# Patient Record
Sex: MALE | Race: WHITE | NOT HISPANIC OR LATINO | Employment: FULL TIME | URBAN - METROPOLITAN AREA
[De-identification: names, ages, dates, MRNs, and addresses within clinical notes are randomized per-mention and may not be internally consistent; named-entity substitution may affect disease eponyms.]

---

## 2017-05-22 ENCOUNTER — HOSPITAL ENCOUNTER (OUTPATIENT)
Dept: PHYSICAL THERAPY | Facility: HOSPITAL | Age: 47
Setting detail: RECURRING SERIES
Discharge: HOME | End: 2017-06-21
Attending: PODIATRIST | Admitting: PODIATRIST

## 2018-05-30 ENCOUNTER — HOSPITAL ENCOUNTER (OUTPATIENT)
Dept: FAMILY MEDICINE CLINIC | Facility: CLINIC | Age: 48
Setting detail: SPECIMEN
Discharge: HOME | End: 2018-05-30
Attending: INTERNAL MEDICINE | Admitting: INTERNAL MEDICINE

## 2018-05-30 LAB
ALBUMIN SERPL-MCNC: 4 G/DL (ref 3.5–4.8)
ALBUMIN/GLOB SERPL: 1.3 {RATIO} (ref 1–1.7)
ALP SERPL-CCNC: 77 IU/L (ref 32–91)
ALT SERPL-CCNC: 40 IU/L (ref 17–63)
ANION GAP SERPL CALC-SCNC: 12.1 MMOL/L (ref 10–20)
AST SERPL-CCNC: 30 IU/L (ref 15–41)
BASOPHILS # BLD AUTO: 0.1 10*3/UL (ref 0–0.2)
BASOPHILS NFR BLD AUTO: 1 % (ref 0–2)
BILIRUB SERPL-MCNC: 0.8 MG/DL (ref 0.3–1.2)
BILIRUB UR QL STRIP: NEGATIVE MG/DL
BUN SERPL-MCNC: 9 MG/DL (ref 8–20)
BUN/CREAT SERPL: 10 (ref 6.2–20.3)
CALCIUM SERPL-MCNC: 9.1 MG/DL (ref 8.9–10.3)
CASTS URNS QL MICRO: ABNORMAL /[LPF]
CHLORIDE SERPL-SCNC: 104 MMOL/L (ref 101–111)
CHOLEST SERPL-MCNC: 172 MG/DL
CHOLEST/HDLC SERPL: 4.3 {RATIO}
COLOR UR: YELLOW
CONV BACTERIA IN URINE MICRO: NEGATIVE
CONV CLARITY OF URINE: CLEAR
CONV CO2: 25 MMOL/L (ref 22–32)
CONV HYALINE CASTS IN URINE MICRO: 0 /[LPF] (ref 0–5)
CONV LDL CHOLESTEROL DIRECT: 119 MG/DL (ref 0–100)
CONV PROTEIN IN URINE BY AUTOMATED TEST STRIP: NEGATIVE MG/DL
CONV SMALL ROUND CELLS: ABNORMAL /[HPF]
CONV TOTAL PROTEIN: 7.1 G/DL (ref 6.1–7.9)
CONV UROBILINOGEN IN URINE BY AUTOMATED TEST STRIP: 0.2 MG/DL
CREAT UR-MCNC: 0.9 MG/DL (ref 0.7–1.2)
CULTURE INDICATED?: ABNORMAL
DIFFERENTIAL METHOD BLD: (no result)
EOSINOPHIL # BLD AUTO: 0.1 10*3/UL (ref 0–0.3)
EOSINOPHIL # BLD AUTO: 2 % (ref 0–3)
ERYTHROCYTE [DISTWIDTH] IN BLOOD BY AUTOMATED COUNT: 13 % (ref 11.5–14.5)
GLOBULIN UR ELPH-MCNC: 3.1 G/DL (ref 2.5–3.8)
GLUCOSE SERPL-MCNC: 237 MG/DL (ref 65–99)
GLUCOSE UR QL: >1000 MG/DL
HCT VFR BLD AUTO: 45.8 % (ref 40–54)
HDLC SERPL-MCNC: 40 MG/DL
HGB BLD-MCNC: 15.3 G/DL (ref 14–18)
HGB UR QL STRIP: NEGATIVE
KETONES UR QL STRIP: NEGATIVE MG/DL
LDLC/HDLC SERPL: 3 {RATIO}
LEUKOCYTE ESTERASE UR QL STRIP: NEGATIVE
LIPID INTERPRETATION: ABNORMAL
LYMPHOCYTES # BLD AUTO: 1.7 10*3/UL (ref 0.8–4.8)
LYMPHOCYTES NFR BLD AUTO: 31 % (ref 18–42)
MCH RBC QN AUTO: 31 PG (ref 26–32)
MCHC RBC AUTO-ENTMCNC: 33.4 G/DL (ref 32–36)
MCV RBC AUTO: 92.9 FL (ref 80–94)
MONOCYTES # BLD AUTO: 0.4 10*3/UL (ref 0.1–1.3)
MONOCYTES NFR BLD AUTO: 8 % (ref 2–11)
NEUTROPHILS # BLD AUTO: 3.1 10*3/UL (ref 2.3–8.6)
NEUTROPHILS NFR BLD AUTO: 58 % (ref 50–75)
NITRITE UR QL STRIP: NEGATIVE
NRBC BLD AUTO-RTO: 0 /100{WBCS}
NRBC/RBC NFR BLD MANUAL: 0 10*3/UL
PH UR STRIP.AUTO: 5 [PH] (ref 4.5–8)
PLATELET # BLD AUTO: 220 10*3/UL (ref 150–450)
PMV BLD AUTO: 9.3 FL (ref 7.4–10.4)
POTASSIUM SERPL-SCNC: 4.1 MMOL/L (ref 3.6–5.1)
RBC # BLD AUTO: 4.93 10*6/UL (ref 4.6–6)
RBC #/AREA URNS HPF: 1 /[HPF] (ref 0–3)
SODIUM SERPL-SCNC: 137 MMOL/L (ref 136–144)
SP GR UR: 1.02 (ref 1–1.03)
SPERM URNS QL MICRO: ABNORMAL /[HPF]
SQUAMOUS SPT QL MICRO: 0 /[HPF] (ref 0–5)
TRIGL SERPL-MCNC: 130 MG/DL
UNIDENT CRYS URNS QL MICRO: ABNORMAL /[HPF]
VLDLC SERPL CALC-MCNC: 13.5 MG/DL
WBC # BLD AUTO: 5.3 10*3/UL (ref 4.5–11.5)
WBC #/AREA URNS HPF: 1 /[HPF] (ref 0–5)
YEAST SPEC QL WET PREP: ABNORMAL /[HPF]

## 2019-05-01 ENCOUNTER — CONVERSION ENCOUNTER (OUTPATIENT)
Dept: URGENT CARE | Facility: CLINIC | Age: 49
End: 2019-05-01

## 2019-06-03 VITALS
OXYGEN SATURATION: 98 % | RESPIRATION RATE: 20 BRPM | BODY MASS INDEX: 28.79 KG/M2 | SYSTOLIC BLOOD PRESSURE: 166 MMHG | HEART RATE: 64 BPM | WEIGHT: 218.2 LBS | DIASTOLIC BLOOD PRESSURE: 86 MMHG

## 2019-07-17 RX ORDER — MELOXICAM 15 MG/1
TABLET ORAL
Qty: 30 TABLET | Refills: 3 | Status: SHIPPED | OUTPATIENT
Start: 2019-07-17 | End: 2019-11-22 | Stop reason: SDUPTHER

## 2019-07-22 RX ORDER — SIMVASTATIN 40 MG
TABLET ORAL
Qty: 90 TABLET | Refills: 2 | Status: SHIPPED | OUTPATIENT
Start: 2019-07-22 | End: 2020-04-24

## 2019-08-05 PROBLEM — S86.319A PERONEAL TENDON RUPTURE: Status: ACTIVE | Noted: 2017-04-20

## 2019-08-05 PROBLEM — E78.5 HYPERLIPIDEMIA: Status: ACTIVE | Noted: 2019-08-05

## 2019-08-05 RX ORDER — MULTIVITAMIN
1 CAPSULE ORAL DAILY
COMMUNITY
Start: 2016-02-10

## 2019-08-05 RX ORDER — ALPHA LIPOIC ACID 200 MG
CAPSULE ORAL
COMMUNITY
Start: 2016-02-10 | End: 2019-08-06

## 2019-08-05 RX ORDER — GLIMEPIRIDE 4 MG/1
TABLET ORAL EVERY 12 HOURS
COMMUNITY
Start: 2017-12-06 | End: 2019-08-06 | Stop reason: SDUPTHER

## 2019-08-05 RX ORDER — BLOOD-GLUCOSE METER
EACH MISCELLANEOUS
COMMUNITY
Start: 2015-05-13 | End: 2020-10-14

## 2019-08-06 ENCOUNTER — OFFICE VISIT (OUTPATIENT)
Dept: FAMILY MEDICINE CLINIC | Facility: CLINIC | Age: 49
End: 2019-08-06

## 2019-08-06 ENCOUNTER — LAB (OUTPATIENT)
Dept: FAMILY MEDICINE CLINIC | Facility: CLINIC | Age: 49
End: 2019-08-06

## 2019-08-06 VITALS
HEIGHT: 73 IN | SYSTOLIC BLOOD PRESSURE: 158 MMHG | HEART RATE: 125 BPM | WEIGHT: 219.6 LBS | BODY MASS INDEX: 29.1 KG/M2 | DIASTOLIC BLOOD PRESSURE: 84 MMHG | OXYGEN SATURATION: 97 % | RESPIRATION RATE: 18 BRPM | TEMPERATURE: 97.7 F

## 2019-08-06 DIAGNOSIS — I10 ESSENTIAL HYPERTENSION: Primary | ICD-10-CM

## 2019-08-06 DIAGNOSIS — E11.65 TYPE 2 DIABETES MELLITUS WITH HYPERGLYCEMIA, WITHOUT LONG-TERM CURRENT USE OF INSULIN (HCC): ICD-10-CM

## 2019-08-06 DIAGNOSIS — R00.0 TACHYCARDIA: ICD-10-CM

## 2019-08-06 DIAGNOSIS — Z12.11 SCREENING FOR COLON CANCER: ICD-10-CM

## 2019-08-06 DIAGNOSIS — Z00.00 ENCOUNTER FOR GENERAL ADULT MEDICAL EXAMINATION WITHOUT ABNORMAL FINDINGS: ICD-10-CM

## 2019-08-06 DIAGNOSIS — E78.41 ELEVATED LIPOPROTEIN(A): ICD-10-CM

## 2019-08-06 DIAGNOSIS — E55.9 VITAMIN D DEFICIENCY DISEASE: ICD-10-CM

## 2019-08-06 LAB
ALBUMIN SERPL-MCNC: 4.1 G/DL (ref 3.5–4.8)
ALBUMIN UR-MCNC: 163 MG/L
ALBUMIN/GLOB SERPL: 1.2 G/DL (ref 1–1.7)
ALP SERPL-CCNC: 88 U/L (ref 32–91)
ALT SERPL W P-5'-P-CCNC: 46 U/L (ref 17–63)
ANION GAP SERPL CALCULATED.3IONS-SCNC: 14.1 MMOL/L (ref 5–15)
AST SERPL-CCNC: 24 U/L (ref 15–41)
BASOPHILS # BLD AUTO: 0.1 10*3/MM3 (ref 0–0.2)
BASOPHILS NFR BLD AUTO: 1.4 % (ref 0–1.5)
BILIRUB SERPL-MCNC: 0.9 MG/DL (ref 0.3–1.2)
BUN BLD-MCNC: 9 MG/DL (ref 8–20)
BUN/CREAT SERPL: 10 (ref 6.2–20.3)
CALCIUM SPEC-SCNC: 9.1 MG/DL (ref 8.9–10.3)
CHLORIDE SERPL-SCNC: 99 MMOL/L (ref 101–111)
CO2 SERPL-SCNC: 24 MMOL/L (ref 22–32)
CREAT BLD-MCNC: 0.9 MG/DL (ref 0.7–1.2)
DEPRECATED RDW RBC AUTO: 43.8 FL (ref 37–54)
EOSINOPHIL # BLD AUTO: 0.1 10*3/MM3 (ref 0–0.4)
EOSINOPHIL NFR BLD AUTO: 1.5 % (ref 0.3–6.2)
ERYTHROCYTE [DISTWIDTH] IN BLOOD BY AUTOMATED COUNT: 13.2 % (ref 12.3–15.4)
GFR SERPL CREATININE-BSD FRML MDRD: 89 ML/MIN/1.73
GLOBULIN UR ELPH-MCNC: 3.3 GM/DL (ref 2.5–3.8)
GLUCOSE BLD-MCNC: 270 MG/DL (ref 65–99)
HBA1C MFR BLD: 9.3 % (ref 3.5–5.6)
HCT VFR BLD AUTO: 45.9 % (ref 37.5–51)
HGB BLD-MCNC: 15.7 G/DL (ref 13–17.7)
LYMPHOCYTES # BLD AUTO: 1.4 10*3/MM3 (ref 0.7–3.1)
LYMPHOCYTES NFR BLD AUTO: 22.3 % (ref 19.6–45.3)
MCH RBC QN AUTO: 32.1 PG (ref 26.6–33)
MCHC RBC AUTO-ENTMCNC: 34.2 G/DL (ref 31.5–35.7)
MCV RBC AUTO: 94 FL (ref 79–97)
MONOCYTES # BLD AUTO: 0.5 10*3/MM3 (ref 0.1–0.9)
MONOCYTES NFR BLD AUTO: 7.1 % (ref 5–12)
NEUTROPHILS # BLD AUTO: 4.4 10*3/MM3 (ref 1.7–7)
NEUTROPHILS NFR BLD AUTO: 67.7 % (ref 42.7–76)
NRBC BLD AUTO-RTO: 0.1 /100 WBC (ref 0–0.2)
PLATELET # BLD AUTO: 237 10*3/MM3 (ref 140–450)
PMV BLD AUTO: 9.4 FL (ref 6–12)
POTASSIUM BLD-SCNC: 4.1 MMOL/L (ref 3.6–5.1)
PROT SERPL-MCNC: 7.4 G/DL (ref 6.1–7.9)
PSA SERPL-MCNC: 0.57 NG/ML (ref 0–4)
RBC # BLD AUTO: 4.89 10*6/MM3 (ref 4.14–5.8)
SODIUM BLD-SCNC: 133 MMOL/L (ref 136–144)
WBC NRBC COR # BLD: 6.5 10*3/MM3 (ref 3.4–10.8)

## 2019-08-06 PROCEDURE — 82306 VITAMIN D 25 HYDROXY: CPT | Performed by: INTERNAL MEDICINE

## 2019-08-06 PROCEDURE — 80053 COMPREHEN METABOLIC PANEL: CPT | Performed by: INTERNAL MEDICINE

## 2019-08-06 PROCEDURE — G0103 PSA SCREENING: HCPCS | Performed by: INTERNAL MEDICINE

## 2019-08-06 PROCEDURE — 82043 UR ALBUMIN QUANTITATIVE: CPT | Performed by: INTERNAL MEDICINE

## 2019-08-06 PROCEDURE — 99396 PREV VISIT EST AGE 40-64: CPT | Performed by: INTERNAL MEDICINE

## 2019-08-06 PROCEDURE — 93000 ELECTROCARDIOGRAM COMPLETE: CPT | Performed by: INTERNAL MEDICINE

## 2019-08-06 PROCEDURE — 90670 PCV13 VACCINE IM: CPT | Performed by: INTERNAL MEDICINE

## 2019-08-06 PROCEDURE — 90471 IMMUNIZATION ADMIN: CPT | Performed by: INTERNAL MEDICINE

## 2019-08-06 PROCEDURE — 36415 COLL VENOUS BLD VENIPUNCTURE: CPT

## 2019-08-06 PROCEDURE — 83036 HEMOGLOBIN GLYCOSYLATED A1C: CPT | Performed by: INTERNAL MEDICINE

## 2019-08-06 PROCEDURE — 85025 COMPLETE CBC W/AUTO DIFF WBC: CPT | Performed by: INTERNAL MEDICINE

## 2019-08-06 RX ORDER — BUPROPION HYDROCHLORIDE 150 MG/1
150 TABLET ORAL DAILY
Qty: 30 TABLET | Refills: 3 | Status: SHIPPED | OUTPATIENT
Start: 2019-08-06 | End: 2019-10-29 | Stop reason: SDUPTHER

## 2019-08-06 RX ORDER — GLIMEPIRIDE 4 MG/1
4 TABLET ORAL EVERY 12 HOURS
Qty: 90 TABLET | Refills: 3 | Status: SHIPPED | OUTPATIENT
Start: 2019-08-06 | End: 2020-04-03

## 2019-08-06 NOTE — ASSESSMENT & PLAN NOTE
Hypertension is unchanged.  Regular aerobic exercise.  Blood pressure will be reassessed in 3 months.

## 2019-08-06 NOTE — PROGRESS NOTES
"Rooming Tab(CC,VS,Pt Hx,Fall Screen)  Chief Complaint   Patient presents with   • Annual Exam       Subjective   Pt here for annual exam- admits not being good with DM- hasn't been here for some time to get labs and doesn't routinely check sugars at home.  Very complaint with meds however.   Neck is better- tries to do stretches- restarted mobic as mild pain and helping.   No headaches, sleeping ok- very stressed with work- takes 1-2 hours to fall asleep, increased sinuses from allergies.   no open sores, no chest pain no difficulty breathing    no ED issues  I have reviewed and updated his medications, medical history and problem list during today's office visit.     Patient Care Team:  Karen Pickard MD as PCP - General    Problem List Tab  Medications Tab  Synopsis Tab  Chart Review Tab  Care Everywhere Tab  Immunizations Tab  Patient History Tab    Social History     Tobacco Use   • Smoking status: Never Smoker   • Smokeless tobacco: Never Used   Substance Use Topics   • Alcohol use: Not on file       Review of Systems   Constitutional: Positive for activity change. Negative for fatigue and fever.   HENT: Negative for congestion.    Respiratory: Negative for apnea, cough and wheezing.    Cardiovascular: Negative for chest pain.   Gastrointestinal: Negative for abdominal distention.   Musculoskeletal: Positive for neck pain.   Neurological: Negative for syncope.   Psychiatric/Behavioral: Positive for sleep disturbance. Negative for behavioral problems.       Objective     Rooming Tab(CC,VS,Pt Hx,Fall Screen)  /84 (BP Location: Left arm, Patient Position: Sitting)   Pulse (!) 125   Temp 97.7 °F (36.5 °C) (Oral)   Resp 18   Ht 185.4 cm (73\")   Wt 99.6 kg (219 lb 9.6 oz)   SpO2 97%   BMI 28.97 kg/m²     Body mass index is 28.97 kg/m².    Physical Exam   Constitutional: He is oriented to person, place, and time. He appears well-developed and well-nourished.   HENT:   Head: Normocephalic and " atraumatic.   Right Ear: Tympanic membrane normal.   Left Ear: Tympanic membrane normal.   Eyes: Pupils are equal, round, and reactive to light.   Neck: Normal range of motion. Neck supple.   Cardiovascular: Normal rate and regular rhythm.   No murmur heard.  Pulmonary/Chest: Effort normal and breath sounds normal.   Abdominal: Soft. Bowel sounds are normal. He exhibits no distension.   Musculoskeletal:    DROM and diffuse tender neck   Neurological: He is oriented to person, place, and time.   Skin: Skin is warm. Capillary refill takes less than 2 seconds.   Nursing note and vitals reviewed.     Diabetic foot exam:   Left: Filament test present   Pulses Dorsalis Pedis:  present   Reflexes 2+    Vibratory sensation normal   Proprioception normal   Sharp/dull discrimination normal       Right: Filament test present   Pulses Dorsalis Pedis:  present   Reflexes 2+    Vibratory sensation normal   Proprioception normal   Sharp/dull discrimination normal    Statin Choice Calculator  Data Reviewed:       .procd    ECG 12 Lead  Date/Time: 8/6/2019 10:30 AM  Performed by: Karen Pickard MD  Authorized by: Karen Pickard MD   Previous ECG: no previous ECG available  Rhythm: sinus tachycardia  Rate: tachycardic  BPM: 96  ST Segments: ST segments normal  T Waves: T waves normal  QRS axis: normal  Other: no other findings    Clinical impression: normal ECG  Comments: HR was 125 on initial vitals                  Assessment/Plan   Order Review Tab  Health Maintenance Tab  Patient Plan/Order Tab  Diagnoses and all orders for this visit:    1. Essential hypertension (Primary)  Assessment & Plan:  Hypertension is unchanged.  Regular aerobic exercise.  Blood pressure will be reassessed in 3 months.      2. Type 2 diabetes mellitus with hyperglycemia, without long-term current use of insulin (CMS/[facility])  Assessment & Plan:  Diabetes is worsening.   Reminded to bring in blood sugar diary at next visit.  Diabetes will be  reassessed in 3 months.    Orders:  -     Hemoglobin A1c; Future  -     Comprehensive Metabolic Panel; Future  -     CBC & Differential; Future  -     MicroAlbumin, Urine, Random - Urine, Clean Catch    3. Elevated lipoprotein(a)  Assessment & Plan:  Lipid abnormalities are improving with treatment.  Pharmacotherapy as ordered.  Lipids will be reassessed in 6 months.      4. Encounter for general adult medical examination without abnormal findings  Assessment & Plan:  Discussed all recommendations for his age    Orders:  -     Pneumococcal Conjugate Vaccine 13-Valent All    5. Screening for colon cancer  -     Ambulatory Referral to Gastroenterology  -     Colonoscopy  -     PSA Screen; Future    6. Vitamin D deficiency disease  -     Vitamin D 25 Hydroxy    7. Tachycardia  -     ECG 12 Lead    Other orders  -     glimepiride (AMARYL) 4 MG tablet; Take 1 tablet by mouth Every 12 (Twelve) Hours.  Dispense: 90 tablet; Refill: 3  -     buPROPion XL (WELLBUTRIN XL) 150 MG 24 hr tablet; Take 1 tablet by mouth Daily.  Dispense: 30 tablet; Refill: 3      Wrapup Tab  Return in about 3 months (around 11/6/2019) for Recheck DM.       To get eye MD appt soon   needs c scope, to get pneumococcal today as well   f/u in 3 months to get better control of DM   pt understands   stay on allergy meds OTC as needed    During this visit for their annual exam, we reviewed their personal history, social history and family history.  We went over their medications and all the recommended health maintenence items for their age group. They were given the opportunity to ask questions and discuss other concerns.

## 2019-08-06 NOTE — ASSESSMENT & PLAN NOTE
Diabetes is worsening.   Reminded to bring in blood sugar diary at next visit.  Diabetes will be reassessed in 3 months.

## 2019-08-07 LAB — 25(OH)D3 SERPL-MCNC: 49.3 NG/ML (ref 30–100)

## 2019-08-07 NOTE — PROGRESS NOTES
Please call the patient regarding his abnormal result. Please tell pt that his sugar was 233 and that the a1c was 9.3--  We need to start a new medicine  That is a weekly shot that is NOT insulin.  I have sent in Rx, and I would get online and print out a coupon to help the cost.  Please see me in 1 month.

## 2019-08-12 ENCOUNTER — TELEPHONE (OUTPATIENT)
Dept: FAMILY MEDICINE CLINIC | Facility: CLINIC | Age: 49
End: 2019-08-12

## 2019-08-12 NOTE — TELEPHONE ENCOUNTER
VM MESSAGE.  PT STATES YOU STARTED HIM ON NEW RX FOR TRULICITY. HE NEEDS TO KNOW IF HE IS TO QUIT THE JANUMET. THANK YOU.

## 2019-08-15 ENCOUNTER — TELEPHONE (OUTPATIENT)
Dept: FAMILY MEDICINE CLINIC | Facility: CLINIC | Age: 49
End: 2019-08-15

## 2019-08-15 NOTE — TELEPHONE ENCOUNTER
PATIENT CONTACTED PHARMACY AND THEY STATE THEY FAXED OVER REJECTION ON BOTH 8/13/19 AND 8/14/19. THANKS FOR YOUR HELP.

## 2019-08-15 NOTE — TELEPHONE ENCOUNTER
"VM MESSAGE.  PATIENT STATES HIS PHARMACY NEEDS AN \"OVERRIDE\" FOR THE JANUMET. INSURANCE IS NOT WANTING TO COVER BOTH JANUMET AND TRULICITY. THANK YOU.  "

## 2019-09-13 ENCOUNTER — ON CAMPUS - OUTPATIENT (AMBULATORY)
Dept: URBAN - METROPOLITAN AREA HOSPITAL 2 | Facility: HOSPITAL | Age: 49
End: 2019-09-13
Payer: COMMERCIAL

## 2019-09-13 ENCOUNTER — OFFICE (AMBULATORY)
Dept: URBAN - METROPOLITAN AREA PATHOLOGY 4 | Facility: PATHOLOGY | Age: 49
End: 2019-09-13
Payer: COMMERCIAL

## 2019-09-13 VITALS
DIASTOLIC BLOOD PRESSURE: 103 MMHG | WEIGHT: 212 LBS | DIASTOLIC BLOOD PRESSURE: 108 MMHG | HEART RATE: 116 BPM | SYSTOLIC BLOOD PRESSURE: 146 MMHG | RESPIRATION RATE: 17 BRPM | SYSTOLIC BLOOD PRESSURE: 132 MMHG | DIASTOLIC BLOOD PRESSURE: 97 MMHG | SYSTOLIC BLOOD PRESSURE: 151 MMHG | DIASTOLIC BLOOD PRESSURE: 60 MMHG | RESPIRATION RATE: 16 BRPM | SYSTOLIC BLOOD PRESSURE: 144 MMHG | HEART RATE: 112 BPM | HEART RATE: 117 BPM | SYSTOLIC BLOOD PRESSURE: 109 MMHG | HEIGHT: 73 IN | HEART RATE: 104 BPM | TEMPERATURE: 96.9 F | OXYGEN SATURATION: 96 % | SYSTOLIC BLOOD PRESSURE: 145 MMHG | DIASTOLIC BLOOD PRESSURE: 93 MMHG | HEART RATE: 110 BPM | HEART RATE: 126 BPM | DIASTOLIC BLOOD PRESSURE: 89 MMHG | RESPIRATION RATE: 18 BRPM | OXYGEN SATURATION: 98 %

## 2019-09-13 DIAGNOSIS — D12.3 BENIGN NEOPLASM OF TRANSVERSE COLON: ICD-10-CM

## 2019-09-13 DIAGNOSIS — Z12.11 ENCOUNTER FOR SCREENING FOR MALIGNANT NEOPLASM OF COLON: ICD-10-CM

## 2019-09-13 DIAGNOSIS — C18.4 MALIGNANT NEOPLASM OF TRANSVERSE COLON: ICD-10-CM

## 2019-09-13 PROBLEM — R19.4 CHANGE IN BOWEL HABITS: Status: ACTIVE | Noted: 2019-09-13

## 2019-09-13 LAB
GI HISTOLOGY: A. UNSPECIFIED: (no result)
GI HISTOLOGY: PDF REPORT: (no result)

## 2019-09-13 PROCEDURE — 45385 COLONOSCOPY W/LESION REMOVAL: CPT | Performed by: INTERNAL MEDICINE

## 2019-09-13 PROCEDURE — 88305 TISSUE EXAM BY PATHOLOGIST: CPT | Mod: 33 | Performed by: INTERNAL MEDICINE

## 2019-09-17 ENCOUNTER — OFFICE VISIT (OUTPATIENT)
Dept: FAMILY MEDICINE CLINIC | Facility: CLINIC | Age: 49
End: 2019-09-17

## 2019-09-17 VITALS
WEIGHT: 215 LBS | SYSTOLIC BLOOD PRESSURE: 142 MMHG | OXYGEN SATURATION: 98 % | RESPIRATION RATE: 16 BRPM | TEMPERATURE: 97.8 F | HEART RATE: 91 BPM | DIASTOLIC BLOOD PRESSURE: 84 MMHG | BODY MASS INDEX: 28.49 KG/M2 | HEIGHT: 73 IN

## 2019-09-17 DIAGNOSIS — E11.65 TYPE 2 DIABETES MELLITUS WITH HYPERGLYCEMIA, WITHOUT LONG-TERM CURRENT USE OF INSULIN (HCC): Primary | ICD-10-CM

## 2019-09-17 DIAGNOSIS — I10 ESSENTIAL HYPERTENSION: ICD-10-CM

## 2019-09-17 DIAGNOSIS — E78.41 ELEVATED LIPOPROTEIN(A): ICD-10-CM

## 2019-09-17 LAB
ALBUMIN SERPL-MCNC: 4.2 G/DL (ref 3.5–4.8)
ALBUMIN/GLOB SERPL: 1.2 G/DL (ref 1–1.7)
ALP SERPL-CCNC: 87 U/L (ref 32–91)
ALT SERPL W P-5'-P-CCNC: 42 U/L (ref 17–63)
ANION GAP SERPL CALCULATED.3IONS-SCNC: 13.4 MMOL/L (ref 5–15)
AST SERPL-CCNC: 27 U/L (ref 15–41)
BILIRUB SERPL-MCNC: 0.8 MG/DL (ref 0.3–1.2)
BUN BLD-MCNC: 10 MG/DL (ref 8–20)
BUN/CREAT SERPL: 9.1 (ref 6.2–20.3)
CALCIUM SPEC-SCNC: 9 MG/DL (ref 8.9–10.3)
CHLORIDE SERPL-SCNC: 101 MMOL/L (ref 101–111)
CO2 SERPL-SCNC: 27 MMOL/L (ref 22–32)
CREAT BLD-MCNC: 1.1 MG/DL (ref 0.7–1.2)
GFR SERPL CREATININE-BSD FRML MDRD: 71 ML/MIN/1.73
GLOBULIN UR ELPH-MCNC: 3.5 GM/DL (ref 2.5–3.8)
GLUCOSE BLD-MCNC: 154 MG/DL (ref 65–99)
HBA1C MFR BLD: 8 % (ref 3.5–5.6)
POTASSIUM BLD-SCNC: 4.4 MMOL/L (ref 3.6–5.1)
PROT SERPL-MCNC: 7.7 G/DL (ref 6.1–7.9)
SODIUM BLD-SCNC: 137 MMOL/L (ref 136–144)

## 2019-09-17 PROCEDURE — 99213 OFFICE O/P EST LOW 20 MIN: CPT | Performed by: INTERNAL MEDICINE

## 2019-09-17 PROCEDURE — 83036 HEMOGLOBIN GLYCOSYLATED A1C: CPT | Performed by: INTERNAL MEDICINE

## 2019-09-17 PROCEDURE — 80053 COMPREHEN METABOLIC PANEL: CPT | Performed by: INTERNAL MEDICINE

## 2019-09-17 NOTE — PROGRESS NOTES
"Rooming Tab(CC,VS,Pt Hx,Fall Screen)  Chief Complaint   Patient presents with   • Diabetes       Subjective   Pt was seen 6 weeks ago with DM follow up- sugars were elevated and a1c was quite high-  Was on trucility- tried to add in the janumet and insurance denied it - treid to do override- but pt never got it- was using wifes 500mg metformin BID with the truciity only-  Highest sugar in last 2 weeks was 165-     wellbutrin working well- doesn't fel as stressed   no open sores on legs/feet.  sleeping well   I have reviewed and updated his medications, medical history and problem list during today's office visit.     Patient Care Team:  Karen Pickard MD as PCP - General    Problem List Tab  Medications Tab  Synopsis Tab  Chart Review Tab  Care Everywhere Tab  Immunizations Tab  Patient History Tab    Social History     Tobacco Use   • Smoking status: Never Smoker   • Smokeless tobacco: Never Used   Substance Use Topics   • Alcohol use: Not on file       Review of Systems   Constitutional: Negative for fatigue and fever.   HENT: Negative for congestion.    Respiratory: Negative for apnea, cough and wheezing.    Cardiovascular: Negative for chest pain.   Gastrointestinal: Negative for abdominal distention.   Neurological: Negative for syncope.   Psychiatric/Behavioral: Negative for behavioral problems.       Objective     Rooming Tab(CC,VS,Pt Hx,Fall Screen)  /84 (BP Location: Right arm, Patient Position: Sitting, Cuff Size: Adult)   Pulse 91   Temp 97.8 °F (36.6 °C) (Oral)   Resp 16   Ht 185.4 cm (73\")   Wt 97.5 kg (215 lb)   SpO2 98%   BMI 28.37 kg/m²     Body mass index is 28.37 kg/m².    Physical Exam   Constitutional: He appears well-developed and well-nourished.   HENT:   Head: Normocephalic.   Cardiovascular: Regular rhythm.   Pulmonary/Chest: Effort normal and breath sounds normal. No respiratory distress.   Abdominal: Soft. Bowel sounds are normal. He exhibits no distension.   Skin: Skin " is warm. Capillary refill takes less than 2 seconds.   Nursing note and vitals reviewed.       Statin Choice Calculator  Data Reviewed:               Lab Results   Component Value Date    BUN 9 08/06/2019    CREATININE 0.90 08/06/2019    EGFRIFNONA 89 08/06/2019     (L) 08/06/2019    K 4.1 08/06/2019    CL 99 (L) 08/06/2019    CALCIUM 9.1 08/06/2019    ALBUMIN 4.10 08/06/2019    BILITOT 0.9 08/06/2019    ALKPHOS 88 08/06/2019    AST 24 08/06/2019    ALT 46 08/06/2019    MICROALBUR 163.0 (H) 08/06/2019    WBC 6.50 08/06/2019    RBC 4.89 08/06/2019    HCT 45.9 08/06/2019    MCV 94.0 08/06/2019    MCH 32.1 08/06/2019    PSA 0.570 08/06/2019    IGJM81ZO 49.3 08/06/2019      Assessment/Plan   Order Review Tab  Health Maintenance Tab  Patient Plan/Order Tab  Diagnoses and all orders for this visit:    1. Type 2 diabetes mellitus with hyperglycemia, without long-term current use of insulin (CMS/[facility]) (Primary)  -     Hemoglobin A1c  -     Comprehensive Metabolic Panel    2. Essential hypertension    3. Elevated lipoprotein(a)    Other orders  -     metFORMIN (GLUCOPHAGE) 1000 MG tablet; Take 1 tablet by mouth 2 (Two) Times a Day With Meals for 180 days.  Dispense: 180 tablet; Refill: 1        Wrapup Tab  Return in about 3 months (around 12/17/2019).      They were informed of the diagnosis and treatment plan and directed to f/u for any further problems or concerns.   will see if needs the januvia now or not- hopefully can just do metformin with truilicty

## 2019-10-29 RX ORDER — BUPROPION HYDROCHLORIDE 150 MG/1
TABLET ORAL
Qty: 30 TABLET | Refills: 3 | Status: SHIPPED | OUTPATIENT
Start: 2019-10-29 | End: 2020-03-09

## 2019-11-22 RX ORDER — MELOXICAM 15 MG/1
TABLET ORAL
Qty: 30 TABLET | Refills: 3 | Status: SHIPPED | OUTPATIENT
Start: 2019-11-22 | End: 2020-04-06

## 2019-12-17 ENCOUNTER — OFFICE VISIT (OUTPATIENT)
Dept: FAMILY MEDICINE CLINIC | Facility: CLINIC | Age: 49
End: 2019-12-17

## 2019-12-17 ENCOUNTER — LAB (OUTPATIENT)
Dept: FAMILY MEDICINE CLINIC | Facility: CLINIC | Age: 49
End: 2019-12-17

## 2019-12-17 VITALS
DIASTOLIC BLOOD PRESSURE: 78 MMHG | HEART RATE: 92 BPM | WEIGHT: 210.6 LBS | RESPIRATION RATE: 16 BRPM | OXYGEN SATURATION: 97 % | BODY MASS INDEX: 27.79 KG/M2 | SYSTOLIC BLOOD PRESSURE: 130 MMHG | TEMPERATURE: 98.6 F

## 2019-12-17 DIAGNOSIS — E11.9 TYPE 2 DIABETES MELLITUS WITHOUT COMPLICATION, WITHOUT LONG-TERM CURRENT USE OF INSULIN (HCC): ICD-10-CM

## 2019-12-17 DIAGNOSIS — E11.9 TYPE 2 DIABETES MELLITUS WITHOUT COMPLICATION, WITHOUT LONG-TERM CURRENT USE OF INSULIN (HCC): Primary | ICD-10-CM

## 2019-12-17 LAB — HBA1C MFR BLD: 6.3 % (ref 3.5–5.6)

## 2019-12-17 PROCEDURE — 99213 OFFICE O/P EST LOW 20 MIN: CPT | Performed by: INTERNAL MEDICINE

## 2019-12-17 PROCEDURE — 80053 COMPREHEN METABOLIC PANEL: CPT | Performed by: INTERNAL MEDICINE

## 2019-12-17 PROCEDURE — 36415 COLL VENOUS BLD VENIPUNCTURE: CPT

## 2019-12-17 PROCEDURE — 83036 HEMOGLOBIN GLYCOSYLATED A1C: CPT | Performed by: INTERNAL MEDICINE

## 2019-12-17 NOTE — PROGRESS NOTES
Rooming Tab(CC,VS,Pt Hx,Fall Screen)  Chief Complaint   Patient presents with   • Diabetes       Subjective   Pt here for DM check- states started checking again 2 weeks ago- and sugars are doing better with this regimen.   is the range.  Getting used to the weekly injection- taking the prilosec to help with the nausea- down 9 lbs in 4 months.   was out of wellbutrin for 1 week- but back on it.  Hard to find time exercising- but incorporates in daily work- phone shows 3.5K steps average.   I have reviewed and updated his medications, medical history and problem list during today's office visit.     Patient Care Team:  Karen Pickard MD as PCP - General    Problem List Tab  Medications Tab  Synopsis Tab  Chart Review Tab  Care Everywhere Tab  Immunizations Tab  Patient History Tab    Social History     Tobacco Use   • Smoking status: Never Smoker   • Smokeless tobacco: Never Used   Substance Use Topics   • Alcohol use: Yes     Alcohol/week: 1.0 standard drinks     Types: 1 Glasses of wine, 1 Standard drinks or equivalent per week       Review of Systems   Constitutional: Positive for fatigue.   HENT: Negative for congestion and swollen glands.    Eyes: Negative for blurred vision and double vision.   Gastrointestinal: Positive for GERD. Negative for abdominal pain.   Endocrine: Negative for polyphagia.   Musculoskeletal: Negative for joint swelling.   Skin: Negative for rash and skin lesions.   Neurological: Negative for syncope and numbness.   Psychiatric/Behavioral: Positive for sleep disturbance. Negative for depressed mood.       Objective     Rooming Tab(CC,VS,Pt Hx,Fall Screen)  /78 (BP Location: Left arm, Patient Position: Sitting, Cuff Size: Adult)   Pulse 92   Temp 98.6 °F (37 °C) (Oral)   Resp 16   Wt 95.5 kg (210 lb 9.6 oz)   SpO2 97%   BMI 27.79 kg/m²     Body mass index is 27.79 kg/m².    Physical Exam   Constitutional: He is oriented to person, place, and time. He appears  well-developed and well-nourished.   HENT:   Head: Normocephalic and atraumatic.   Right Ear: Tympanic membrane normal.   Left Ear: Tympanic membrane normal.   Eyes: Pupils are equal, round, and reactive to light.   Neck: Normal range of motion. Neck supple.   Cardiovascular: Normal rate and regular rhythm.   No murmur heard.  Pulmonary/Chest: Effort normal and breath sounds normal.   Abdominal: Soft. Bowel sounds are normal. He exhibits no distension.   Neurological: He is oriented to person, place, and time.   Skin: Capillary refill takes less than 2 seconds.   Nursing note and vitals reviewed.       Statin Choice Calculator  Data Reviewed:      Diabetic foot exam:   Left:    Pulses Dorsalis Pedis:  present   Reflexes 2+    Vibratory sensation normal   Proprioception normal   Sharp/dull discrimination normal       Right:   Pulses Dorsalis Pedis:  present   Reflexes 2+    Vibratory sensation normal   Proprioception normal   Sharp/dull discrimination normal            Lab Results   Component Value Date    BUN 14 12/17/2019    CREATININE 1.15 12/17/2019    EGFRIFNONA 67 12/17/2019     12/17/2019    K 4.1 12/17/2019     12/17/2019    CALCIUM 9.6 12/17/2019    ALBUMIN 4.50 12/17/2019    BILITOT 0.2 12/17/2019    ALKPHOS 75 12/17/2019    AST 23 12/17/2019    ALT 29 12/17/2019      Assessment/Plan   Order Review Tab  Health Maintenance Tab  Patient Plan/Order Tab  Diagnoses and all orders for this visit:    1. Type 2 diabetes mellitus without complication, without long-term current use of insulin (CMS/[facility]) (Primary)  Comments:  doing better- conitnue with current meds.   Orders:  -     Hemoglobin A1c; Future  -     Comprehensive Metabolic Panel; Future        Wrapup Tab  Return in about 6 months (around 6/17/2020), or if symptoms worsen or fail to improve.       They were informed of the diagnosis and treatment plan and directed to f/u for any further problems or concerns.

## 2019-12-18 LAB
ALBUMIN SERPL-MCNC: 4.5 G/DL (ref 3.5–5.2)
ALBUMIN/GLOB SERPL: 1.5 G/DL
ALP SERPL-CCNC: 75 U/L (ref 39–117)
ALT SERPL W P-5'-P-CCNC: 29 U/L (ref 1–41)
ANION GAP SERPL CALCULATED.3IONS-SCNC: 12.9 MMOL/L (ref 5–15)
AST SERPL-CCNC: 23 U/L (ref 1–40)
BILIRUB SERPL-MCNC: 0.2 MG/DL (ref 0.2–1.2)
BUN BLD-MCNC: 14 MG/DL (ref 6–20)
BUN/CREAT SERPL: 12.2 (ref 7–25)
CALCIUM SPEC-SCNC: 9.6 MG/DL (ref 8.6–10.5)
CHLORIDE SERPL-SCNC: 103 MMOL/L (ref 98–107)
CO2 SERPL-SCNC: 24.1 MMOL/L (ref 22–29)
CREAT BLD-MCNC: 1.15 MG/DL (ref 0.76–1.27)
GFR SERPL CREATININE-BSD FRML MDRD: 67 ML/MIN/1.73
GLOBULIN UR ELPH-MCNC: 3.1 GM/DL
GLUCOSE BLD-MCNC: 174 MG/DL (ref 65–99)
POTASSIUM BLD-SCNC: 4.1 MMOL/L (ref 3.5–5.2)
PROT SERPL-MCNC: 7.6 G/DL (ref 6–8.5)
SODIUM BLD-SCNC: 140 MMOL/L (ref 136–145)

## 2020-03-09 RX ORDER — BUPROPION HYDROCHLORIDE 150 MG/1
TABLET ORAL
Qty: 90 TABLET | Refills: 1 | Status: SHIPPED | OUTPATIENT
Start: 2020-03-09 | End: 2020-09-01

## 2020-04-03 RX ORDER — GLIMEPIRIDE 4 MG/1
TABLET ORAL
Qty: 180 TABLET | Refills: 1 | Status: SHIPPED | OUTPATIENT
Start: 2020-04-03 | End: 2020-10-01

## 2020-04-06 RX ORDER — MELOXICAM 15 MG/1
TABLET ORAL
Qty: 30 TABLET | Refills: 3 | Status: SHIPPED | OUTPATIENT
Start: 2020-04-06 | End: 2020-10-14 | Stop reason: RX

## 2020-04-24 RX ORDER — SIMVASTATIN 40 MG
TABLET ORAL
Qty: 90 TABLET | Refills: 2 | Status: SHIPPED | OUTPATIENT
Start: 2020-04-24 | End: 2020-12-31

## 2020-07-16 RX ORDER — DULAGLUTIDE 1.5 MG/.5ML
INJECTION, SOLUTION SUBCUTANEOUS
Qty: 2 PEN | Refills: 5 | Status: SHIPPED | OUTPATIENT
Start: 2020-07-16 | End: 2021-01-04

## 2020-08-14 ENCOUNTER — CLINICAL SUPPORT (OUTPATIENT)
Dept: FAMILY MEDICINE CLINIC | Facility: CLINIC | Age: 50
End: 2020-08-14

## 2020-08-14 DIAGNOSIS — Z23 IMMUNIZATION DUE: Primary | ICD-10-CM

## 2020-08-14 PROCEDURE — 90715 TDAP VACCINE 7 YRS/> IM: CPT | Performed by: INTERNAL MEDICINE

## 2020-08-14 PROCEDURE — 90471 IMMUNIZATION ADMIN: CPT | Performed by: INTERNAL MEDICINE

## 2020-09-01 RX ORDER — BUPROPION HYDROCHLORIDE 150 MG/1
TABLET ORAL
Qty: 90 TABLET | Refills: 1 | Status: SHIPPED | OUTPATIENT
Start: 2020-09-01 | End: 2021-01-22 | Stop reason: SDUPTHER

## 2020-10-01 RX ORDER — GLIMEPIRIDE 4 MG/1
TABLET ORAL
Qty: 30 TABLET | Refills: 0 | Status: SHIPPED | OUTPATIENT
Start: 2020-10-01 | End: 2020-10-13

## 2020-10-01 NOTE — TELEPHONE ENCOUNTER
Please tell pt that he is overdue for a DM check- please make appt and will do labs that day as well

## 2020-10-10 ENCOUNTER — HOSPITAL ENCOUNTER (EMERGENCY)
Facility: HOSPITAL | Age: 50
Discharge: HOME | End: 2020-10-10
Attending: EMERGENCY MEDICINE | Admitting: EMERGENCY MEDICINE

## 2020-10-10 ENCOUNTER — APPOINTMENT (OUTPATIENT)
Dept: GENERAL RADIOLOGY | Facility: HOSPITAL | Age: 50
End: 2020-10-10

## 2020-10-10 VITALS
SYSTOLIC BLOOD PRESSURE: 143 MMHG | OXYGEN SATURATION: 100 % | HEIGHT: 73 IN | BODY MASS INDEX: 27.26 KG/M2 | HEART RATE: 112 BPM | WEIGHT: 205.69 LBS | RESPIRATION RATE: 15 BRPM | DIASTOLIC BLOOD PRESSURE: 88 MMHG | TEMPERATURE: 99.2 F

## 2020-10-10 DIAGNOSIS — U07.1 COVID-19: Primary | ICD-10-CM

## 2020-10-10 LAB
ALBUMIN SERPL-MCNC: 4.3 G/DL (ref 3.5–5.2)
ALBUMIN/GLOB SERPL: 1.3 G/DL
ALP SERPL-CCNC: 87 U/L (ref 39–117)
ALT SERPL W P-5'-P-CCNC: 41 U/L (ref 1–41)
ANION GAP SERPL CALCULATED.3IONS-SCNC: 14 MMOL/L (ref 5–15)
AST SERPL-CCNC: 32 U/L (ref 1–40)
BASOPHILS # BLD AUTO: 0.1 10*3/MM3 (ref 0–0.2)
BASOPHILS NFR BLD AUTO: 1.9 % (ref 0–1.5)
BILIRUB SERPL-MCNC: 0.4 MG/DL (ref 0–1.2)
BUN SERPL-MCNC: 10 MG/DL (ref 6–20)
BUN SERPL-MCNC: ABNORMAL MG/DL
BUN/CREAT SERPL: ABNORMAL
CALCIUM SPEC-SCNC: 8.9 MG/DL (ref 8.6–10.5)
CHLORIDE SERPL-SCNC: 97 MMOL/L (ref 98–107)
CO2 SERPL-SCNC: 25 MMOL/L (ref 22–29)
CREAT SERPL-MCNC: 1.07 MG/DL (ref 0.76–1.27)
D-LACTATE SERPL-SCNC: 1.6 MMOL/L (ref 0.5–2)
DEPRECATED RDW RBC AUTO: 43.3 FL (ref 37–54)
EOSINOPHIL # BLD AUTO: 0 10*3/MM3 (ref 0–0.4)
EOSINOPHIL NFR BLD AUTO: 0.1 % (ref 0.3–6.2)
ERYTHROCYTE [DISTWIDTH] IN BLOOD BY AUTOMATED COUNT: 13.2 % (ref 12.3–15.4)
GFR SERPL CREATININE-BSD FRML MDRD: 73 ML/MIN/1.73
GLOBULIN UR ELPH-MCNC: 3.2 GM/DL
GLUCOSE SERPL-MCNC: 141 MG/DL (ref 65–99)
HCT VFR BLD AUTO: 47.6 % (ref 37.5–51)
HGB BLD-MCNC: 16.3 G/DL (ref 13–17.7)
LYMPHOCYTES # BLD AUTO: 1.3 10*3/MM3 (ref 0.7–3.1)
LYMPHOCYTES NFR BLD AUTO: 29.8 % (ref 19.6–45.3)
MCH RBC QN AUTO: 31.6 PG (ref 26.6–33)
MCHC RBC AUTO-ENTMCNC: 34.3 G/DL (ref 31.5–35.7)
MCV RBC AUTO: 92.3 FL (ref 79–97)
MONOCYTES # BLD AUTO: 0.6 10*3/MM3 (ref 0.1–0.9)
MONOCYTES NFR BLD AUTO: 13.7 % (ref 5–12)
NEUTROPHILS NFR BLD AUTO: 2.4 10*3/MM3 (ref 1.7–7)
NEUTROPHILS NFR BLD AUTO: 54.5 % (ref 42.7–76)
NRBC BLD AUTO-RTO: 0.2 /100 WBC (ref 0–0.2)
PLATELET # BLD AUTO: 190 10*3/MM3 (ref 140–450)
PMV BLD AUTO: 7.9 FL (ref 6–12)
POTASSIUM SERPL-SCNC: 3.8 MMOL/L (ref 3.5–5.2)
PROT SERPL-MCNC: 7.5 G/DL (ref 6–8.5)
RBC # BLD AUTO: 5.16 10*6/MM3 (ref 4.14–5.8)
SARS-COV-2 RNA PNL SPEC NAA+PROBE: DETECTED
SODIUM SERPL-SCNC: 136 MMOL/L (ref 136–145)
WBC # BLD AUTO: 4.3 10*3/MM3 (ref 3.4–10.8)

## 2020-10-10 PROCEDURE — 85025 COMPLETE CBC W/AUTO DIFF WBC: CPT | Performed by: EMERGENCY MEDICINE

## 2020-10-10 PROCEDURE — 83605 ASSAY OF LACTIC ACID: CPT

## 2020-10-10 PROCEDURE — 99284 EMERGENCY DEPT VISIT MOD MDM: CPT

## 2020-10-10 PROCEDURE — 71045 X-RAY EXAM CHEST 1 VIEW: CPT

## 2020-10-10 PROCEDURE — 87635 SARS-COV-2 COVID-19 AMP PRB: CPT | Performed by: EMERGENCY MEDICINE

## 2020-10-10 PROCEDURE — 80053 COMPREHEN METABOLIC PANEL: CPT | Performed by: EMERGENCY MEDICINE

## 2020-10-10 PROCEDURE — 87040 BLOOD CULTURE FOR BACTERIA: CPT | Performed by: EMERGENCY MEDICINE

## 2020-10-10 RX ORDER — SODIUM CHLORIDE 0.9 % (FLUSH) 0.9 %
10 SYRINGE (ML) INJECTION AS NEEDED
Status: DISCONTINUED | OUTPATIENT
Start: 2020-10-10 | End: 2020-10-10 | Stop reason: HOSPADM

## 2020-10-10 RX ADMIN — SODIUM CHLORIDE 1000 ML: 9 INJECTION, SOLUTION INTRAVENOUS at 11:35

## 2020-10-10 NOTE — ED PROVIDER NOTES
Subjective   Chief complaint cough congestion short of breath    History of present illness this is a 50-year-old male with a one-week history of congestion in his sinuses drainage and cough.  He states he can draw on a deep breath without coughing.  He denies any fevers although he said chills he has had body aches and muscle aches.  Denies any leg swelling no foreign travels no tick bites or rashes.  No one in the home with similar illness.  Patient states he wears a mask everywhere he goes.  He has not had a COVID-19 test.  Nothing makes this worse or better is ongoing for 1 week continuous moderate in degree.  No vomiting or diarrhea          Review of Systems   Constitutional: Positive for chills. Negative for fatigue and fever.   HENT: Positive for congestion and sinus pressure.    Eyes: Negative for photophobia and visual disturbance.   Respiratory: Positive for cough, chest tightness and shortness of breath.    Cardiovascular: Negative for chest pain and leg swelling.   Gastrointestinal: Negative for abdominal pain and vomiting.   Endocrine: Negative for cold intolerance and heat intolerance.   Genitourinary: Negative for difficulty urinating and dysuria.   Musculoskeletal: Positive for arthralgias and myalgias.   Skin: Negative for color change and pallor.   Neurological: Negative for dizziness and headaches.   Psychiatric/Behavioral: Negative for agitation and behavioral problems.       Past Medical History:   Diagnosis Date   • Colon polyp 2019   • Diabetes mellitus (CMS/HCC)     Type 2   • Hyperlipidemia        No Known Allergies    Past Surgical History:   Procedure Laterality Date   • COLONOSCOPY  2019       Family History   Problem Relation Age of Onset   • Arthritis Mother         Lupus   • Diabetes Brother    • Heart disease Father    • Heart disease Brother        Social History     Socioeconomic History   • Marital status:      Spouse name: Not on file   • Number of children: Not on file    • Years of education: Not on file   • Highest education level: Not on file   Tobacco Use   • Smoking status: Never Smoker   • Smokeless tobacco: Never Used   Substance and Sexual Activity   • Alcohol use: Yes     Alcohol/week: 1.0 standard drinks     Types: 1 Glasses of wine, 1 Standard drinks or equivalent per week   • Drug use: Never   • Sexual activity: Yes     Partners: Female     Birth control/protection: Post-menopausal, None           Objective   Physical Exam  50-year-old male awake alert sats 100% on room air with a dry hacking cough HEENT extraocular stenting pupils equal nares no photophobia mouth is clear otherwise no stridor or drooling neck supple without adenopathy lungs a few scattered rhonchi wheezes throughout but no retractions heart regular slight tachycardic without murmur abdomen was soft that tenderness no masses extremities no edema cords or Homans sign no evidence of DVT no cellulitic changes.  Patient's awake alert he follows commands no facial asymmetry normal speech focal weakness  Procedures           ED Course      Results for orders placed or performed during the hospital encounter of 10/10/20   COVID-19,[facility] IN-HOUSE,Nasal Swab No Transport Media 3-4 HR TAT - Swab, Nasal Cavity    Specimen: Nasal Cavity; Swab   Result Value Ref Range    COVID19 Detected (C) Not Detected - Ref. Range   Comprehensive Metabolic Panel    Specimen: Blood   Result Value Ref Range    Glucose 141 (H) 65 - 99 mg/dL    BUN      Creatinine 1.07 0.76 - 1.27 mg/dL    Sodium 136 136 - 145 mmol/L    Potassium 3.8 3.5 - 5.2 mmol/L    Chloride 97 (L) 98 - 107 mmol/L    CO2 25.0 22.0 - 29.0 mmol/L    Calcium 8.9 8.6 - 10.5 mg/dL    Total Protein 7.5 6.0 - 8.5 g/dL    Albumin 4.30 3.50 - 5.20 g/dL    ALT (SGPT) 41 1 - 41 U/L    AST (SGOT) 32 1 - 40 U/L    Alkaline Phosphatase 87 39 - 117 U/L    Total Bilirubin 0.4 0.0 - 1.2 mg/dL    eGFR Non African Amer 73 >60 mL/min/1.73    Globulin 3.2 gm/dL    A/G Ratio 1.3  g/dL    BUN/Creatinine Ratio      Anion Gap 14.0 5.0 - 15.0 mmol/L   CBC Auto Differential    Specimen: Blood   Result Value Ref Range    WBC 4.30 3.40 - 10.80 10*3/mm3    RBC 5.16 4.14 - 5.80 10*6/mm3    Hemoglobin 16.3 13.0 - 17.7 g/dL    Hematocrit 47.6 37.5 - 51.0 %    MCV 92.3 79.0 - 97.0 fL    MCH 31.6 26.6 - 33.0 pg    MCHC 34.3 31.5 - 35.7 g/dL    RDW 13.2 12.3 - 15.4 %    RDW-SD 43.3 37.0 - 54.0 fl    MPV 7.9 6.0 - 12.0 fL    Platelets 190 140 - 450 10*3/mm3    Neutrophil % 54.5 42.7 - 76.0 %    Lymphocyte % 29.8 19.6 - 45.3 %    Monocyte % 13.7 (H) 5.0 - 12.0 %    Eosinophil % 0.1 (L) 0.3 - 6.2 %    Basophil % 1.9 (H) 0.0 - 1.5 %    Neutrophils, Absolute 2.40 1.70 - 7.00 10*3/mm3    Lymphocytes, Absolute 1.30 0.70 - 3.10 10*3/mm3    Monocytes, Absolute 0.60 0.10 - 0.90 10*3/mm3    Eosinophils, Absolute 0.00 0.00 - 0.40 10*3/mm3    Basophils, Absolute 0.10 0.00 - 0.20 10*3/mm3    nRBC 0.2 0.0 - 0.2 /100 WBC   BUN    Specimen: Blood   Result Value Ref Range    BUN 10 6 - 20 mg/dL     Xr Chest 1 View    Result Date: 10/10/2020  No active disease.  Electronically Signed By-Alton Garcia On:10/10/2020 11:25 AM This report was finalized on [id number] by  Alton Garcia, .    Medications   sodium chloride 0.9 % flush 10 mL (has no administration in time range)   sodium chloride 0.9 % bolus 1,000 mL (1,000 mL Intravenous New Bag 10/10/20 1135)                                            MDM  Number of Diagnoses or Management Options  COVID-19:   Diagnosis management comments: Medical decision make.  Patient IV normal saline x1 L and had the above exam evaluation.  Chest x-ray negative CBC electrolytes unremarkable lactic acid was normal.  Cultures pending.  COVID-19 was positive.  The patient on repeat exam is resting comfortably he has been in the ER for quite some time his oxygen saturations have been 99 to 100% with a respiratory rate of 18.  Heart rate after a bag of fluids is down to 90s.  And is feeling better.   The patient does have diabetes but chest x-ray is clear lungs are mostly clear sats are excellent and his respiratory rate is normal.  Patient's heart rate improved.  He qualifies for outpatient procedure he prefers to go home.  He will self quarantine at home we talked about what to return for he was given an O2 sat monitor and we talked about what to return for with oxygen saturations persistently low below 94% or shortness of breath vomiting at home anything down or severe worsening symptoms.  Patient voices understanding he was discharged home for outpatient management.  Patient examined appropriate PPE per hospital protocol       Amount and/or Complexity of Data Reviewed  Clinical lab tests: reviewed  Tests in the radiology section of CPT®: reviewed        Final diagnoses:   COVID-19            Aguila Mack MD  10/10/20 1232

## 2020-10-10 NOTE — DISCHARGE INSTRUCTIONS
Rest plenty fluids Tylenol.  Quarantine.  O2 saturation.  Return if less than 94% or consistently below 90%.  Return for uncontrolled fevers vomiting cannot anything down shortness of breath altered mental status or any other new or worse problems or concerns

## 2020-10-12 ENCOUNTER — READMISSION MANAGEMENT (OUTPATIENT)
Dept: CALL CENTER | Facility: HOSPITAL | Age: 50
End: 2020-10-12

## 2020-10-12 ENCOUNTER — TELEMEDICINE (OUTPATIENT)
Dept: FAMILY MEDICINE CLINIC | Facility: CLINIC | Age: 50
End: 2020-10-12

## 2020-10-12 DIAGNOSIS — U07.1 COVID-19 VIRUS DETECTED: ICD-10-CM

## 2020-10-12 DIAGNOSIS — E11.9 TYPE 2 DIABETES MELLITUS WITHOUT COMPLICATION, WITHOUT LONG-TERM CURRENT USE OF INSULIN (HCC): Primary | ICD-10-CM

## 2020-10-12 PROCEDURE — 99213 OFFICE O/P EST LOW 20 MIN: CPT | Performed by: INTERNAL MEDICINE

## 2020-10-12 RX ORDER — METHYLPREDNISOLONE 4 MG/1
TABLET ORAL
Qty: 21 EACH | Refills: 0 | Status: SHIPPED | OUTPATIENT
Start: 2020-10-12 | End: 2020-10-19 | Stop reason: HOSPADM

## 2020-10-12 RX ORDER — AZITHROMYCIN 250 MG/1
TABLET, FILM COATED ORAL
Qty: 6 TABLET | Refills: 0 | Status: SHIPPED | OUTPATIENT
Start: 2020-10-12 | End: 2020-10-19 | Stop reason: HOSPADM

## 2020-10-12 NOTE — OUTREACH NOTE
Prep Survey      Responses   [facility]  [facility]   Is LACE score < 7 ?  Yes   Eligibility  Readm Mgmt   Discharge diagnosis  Covid pos   Does the patient have one of the following disease processes/diagnoses(primary or secondary)?  COVID-19   Does the patient have Home health ordered?  No   Is there a DME ordered?  Yes   What DME was ordered?  Home O2 pulse ox   Prep survey completed?  Yes          Hermelinda Merritt RN

## 2020-10-12 NOTE — PROGRESS NOTES
Rooming Tab(CC,VS,Pt Hx,Fall Screen)  No chief complaint on file.      Subjective   Pt here through telemedicine with cough/congestion.  Last Tuesday 10/6 thought was allergies and off work- then by Thursday got much worse- fever, cough, headache and myalgias. Went and got tested - tried to several times on Thursday/Friday and was cancelled through [facility]- went to ED. Was +, has pulse o2 and always in 97%- except when coughing.  Now more dry. taking ASA, mucinex. Very tired        I have reviewed and updated his medications, medical history and problem list during today's office visit.     Patient Care Team:  Karen Pickard MD as PCP - General    Problem List Tab  Medications Tab  Synopsis Tab  Chart Review Tab  Care Everywhere Tab  Immunizations Tab  Patient History Tab    Social History     Tobacco Use   • Smoking status: Never Smoker   • Smokeless tobacco: Never Used   Substance Use Topics   • Alcohol use: Yes     Alcohol/week: 1.0 standard drinks     Types: 1 Glasses of wine, 1 Standard drinks or equivalent per week       Review of Systems   Constitutional: Positive for activity change, appetite change, chills, fatigue and fever.   HENT: Positive for congestion.    Respiratory: Positive for cough, chest tightness and shortness of breath.    Musculoskeletal: Positive for myalgias.   Psychiatric/Behavioral: Positive for sleep disturbance.       Objective     Rooming Tab(CC,VS,Pt Hx,Fall Screen)  There were no vitals taken for this visit.    There is no height or weight on file to calculate BMI.    Physical Exam  Constitutional:       Appearance: Normal appearance. He is obese. He is not ill-appearing.   HENT:      Head: Normocephalic and atraumatic.   Eyes:      General: No scleral icterus.  Pulmonary:      Effort: Pulmonary effort is normal. No respiratory distress.      Breath sounds: Rhonchi present.      Comments: Coughing during exam  Neurological:      General: No focal deficit present.      Mental  Status: He is alert and oriented to person, place, and time.   Psychiatric:         Mood and Affect: Mood normal.          Statin Choice Calculator  Data Reviewed:    Xr Chest 1 View    Result Date: 10/10/2020  Impression: No active disease.  Electronically Signed By-Alton Garcia On:10/10/2020 11:25 AM This report was finalized on [id number] by  Alton Garcia, .            Lab Results   Component Value Date    BUN  10/10/2020      Comment:      Testing performed by alternate method    BUN 10 10/10/2020    CREATININE 1.07 10/10/2020    EGFRIFNONA 73 10/10/2020     10/10/2020    K 3.8 10/10/2020    CL 97 (L) 10/10/2020    CALCIUM 8.9 10/10/2020    ALBUMIN 4.30 10/10/2020    BILITOT 0.4 10/10/2020    ALKPHOS 87 10/10/2020    AST 32 10/10/2020    ALT 41 10/10/2020    WBC 4.30 10/10/2020    RBC 5.16 10/10/2020    HCT 47.6 10/10/2020    MCV 92.3 10/10/2020    MCH 31.6 10/10/2020      Assessment/Plan   Order Review Tab  Health Maintenance Tab  Patient Plan/Order Tab  Diagnoses and all orders for this visit:    1. Type 2 diabetes mellitus without complication, without long-term current use of insulin (CMS/[facility]) (Primary)    2. COVID-19 virus detected  Comments:  isolate in his bedroom away from pregnant daughter  Assessment & Plan:  Instructed pt tylenol cold/flu, fluids, zinc, zpach and medrol but watch sugars on the medrol      Other orders  -     azithromycin (Zithromax Z-Migel) 250 MG tablet; Take 2 tablets the first day, then 1 tablet daily for 4 days.  Dispense: 6 tablet; Refill: 0  -     methylPREDNISolone (MEDROL) 4 MG dose pack; Take as directed on package instructions.  Dispense: 21 each; Refill: 0      Wrapup Tab  Return if symptoms worsen or fail to improve.       They were informed of the diagnosis and treatment plan and directed to f/u for any further problems or concerns.      During this visit for their annual exam, we reviewed their personal history, social history and family history.  We went over their  medications and all the recommended health maintenence items for their age group. They were given the opportunity to ask questions and discuss other concerns.

## 2020-10-12 NOTE — OUTREACH NOTE
COVID-19 Week 1 Survey      Responses   [facility]   Does the patient have one of the following disease processes/diagnoses(primary or secondary)?  COVID-19   COVID-19 underlying condition?  None   Call Number  Call 1   Week 1 Call successful?  Yes   Call start time  1250   Call end time  1326   Discharge diagnosis  Covid pos   Is patient permission given to speak with other caregiver?  Yes   List who call center can speak with  wife   Person spoke with today (if not patient) and relationship  wife   Meds reviewed with patient/caregiver?  Yes   Is the patient having any side effects they believe may be caused by any medication additions or changes?  No   Does the patient have all medications ordered at discharge?  N/A   Is the patient taking all medications as directed (includes completed medication regime)?  Yes   Medication comments  PCP called in Zinc, steroid, Zpack and told them to take Sudaphed cold/flu OTC this morning. The pt is also taky Tylenol for fever & body aches   Does the patient have a primary care provider?   Yes   Does the patient have an appointment with their PCP or specialist within 7 days of discharge?  Yes   Has the patient kept scheduled appointments due by today?  Yes   What DME was ordered?  Home O2 pulse ox   Has all DME been delivered?  Yes   Psychosocial issues?  No   Psychosocial comments  dtr who is 8mo pregnant and her  lives with pt and his wife, they are very concerned.    Comments  Pt has temp currently of 101.4 via forehead thermometer.No Tylenol since last pm. Pt is isolating in bedroom away from family. C/o myalgia, weakness, nausea, cough, SOA per wife. Wife is using isolation precautions when caring for pt. They're concerned about preg dtr, they're waiting for her OB to call back to see if she should isolate somewhere else. They all have had swab test yesterday, waiting for results. Explained that this is 14 day incubation virus so  they could come up + up until the 14th day.    Did the patient receive a copy of their discharge instructions?  Yes   Did the patient receive a copy of COVID-19 specific instructions?  Yes   Nursing interventions  Reviewed instructions with patient   What is the patient's perception of their health status since discharge?  Same   Does the patient have any of the following symptoms?  Fever/chills, Shortness of breath, Cough [myalgia, weakness, nausea, loss of appetite.]   Pulse Ox monitoring  Intermittent   Pulse Ox device source  Patient   O2 Sat comments  95%   O2 Sat: education provided  Sat levels   Is the patient/caregiver able to teach back steps to recovery at home?  Set small, achievable goals for return to baseline health, Rest and rebuild strength, gradually increase activity, Eat a well-balance diet   Is the patient/caregiver able to teach back the hierarchy of who to call/visit for symptoms/problems? PCP, Specialist, Home health nurse, Urgent Care, ED, 911  Yes   COVID-19 call completed?  Yes   Wrap up additional comments  Educated on COVID isolation precautions, disinfecting high touch areas, changing toothbrush or anything that may have resp secretions on it such as chapstick. Wearing gloves when discarding his food/drinks/tissues.           Brielle Brito, RN

## 2020-10-13 ENCOUNTER — READMISSION MANAGEMENT (OUTPATIENT)
Dept: CALL CENTER | Facility: HOSPITAL | Age: 50
End: 2020-10-13

## 2020-10-13 RX ORDER — GLIMEPIRIDE 4 MG/1
TABLET ORAL
Qty: 30 TABLET | Refills: 0 | Status: SHIPPED | OUTPATIENT
Start: 2020-10-13 | End: 2020-10-27

## 2020-10-13 NOTE — TELEPHONE ENCOUNTER
Please have pt schedule appt last week in October- for follow up for COVID and diabetes and needs labs

## 2020-10-13 NOTE — OUTREACH NOTE
COVID-19 Week 1 Survey      Responses   [facility]   Does the patient have one of the following disease processes/diagnoses(primary or secondary)?  COVID-19   COVID-19 underlying condition?  None   Call Number  Call 2   Week 1 Call successful?  No   Discharge diagnosis  Covid pos          Larissa Tee LPN

## 2020-10-14 ENCOUNTER — APPOINTMENT (OUTPATIENT)
Dept: CT IMAGING | Facility: HOSPITAL | Age: 50
End: 2020-10-14

## 2020-10-14 ENCOUNTER — READMISSION MANAGEMENT (OUTPATIENT)
Dept: CALL CENTER | Facility: HOSPITAL | Age: 50
End: 2020-10-14

## 2020-10-14 ENCOUNTER — HOSPITAL ENCOUNTER (INPATIENT)
Facility: HOSPITAL | Age: 50
LOS: 3 days | Discharge: HOME | End: 2020-10-19
Attending: FAMILY MEDICINE | Admitting: FAMILY MEDICINE

## 2020-10-14 ENCOUNTER — APPOINTMENT (OUTPATIENT)
Dept: GENERAL RADIOLOGY | Facility: HOSPITAL | Age: 50
End: 2020-10-14

## 2020-10-14 ENCOUNTER — TELEPHONE (OUTPATIENT)
Dept: FAMILY MEDICINE CLINIC | Facility: CLINIC | Age: 50
End: 2020-10-14

## 2020-10-14 DIAGNOSIS — U07.1 COVID-19: Primary | ICD-10-CM

## 2020-10-14 DIAGNOSIS — R09.02 HYPOXEMIA: ICD-10-CM

## 2020-10-14 PROBLEM — E55.9 VITAMIN D DEFICIENCY: Chronic | Status: ACTIVE | Noted: 2020-10-14

## 2020-10-14 PROBLEM — F32.A DEPRESSION: Chronic | Status: ACTIVE | Noted: 2020-10-14

## 2020-10-14 PROBLEM — E78.5 HYPERLIPIDEMIA: Chronic | Status: ACTIVE | Noted: 2019-08-05

## 2020-10-14 LAB
ALBUMIN SERPL-MCNC: 3.6 G/DL (ref 3.5–5.2)
ALBUMIN SERPL-MCNC: 4 G/DL (ref 3.5–5.2)
ALBUMIN/GLOB SERPL: 1.3 G/DL
ALP SERPL-CCNC: 62 U/L (ref 39–117)
ALP SERPL-CCNC: 68 U/L (ref 39–117)
ALT SERPL W P-5'-P-CCNC: 30 U/L (ref 1–41)
ALT SERPL W P-5'-P-CCNC: 34 U/L (ref 1–41)
ANION GAP SERPL CALCULATED.3IONS-SCNC: 14 MMOL/L (ref 5–15)
ARTERIAL PATENCY WRIST A: POSITIVE
AST SERPL-CCNC: 32 U/L (ref 1–40)
AST SERPL-CCNC: 33 U/L (ref 1–40)
ATMOSPHERIC PRESS: ABNORMAL MM[HG]
BASE EXCESS BLDA CALC-SCNC: 3.1 MMOL/L (ref 0–3)
BASOPHILS # BLD AUTO: 0 10*3/MM3 (ref 0–0.2)
BASOPHILS NFR BLD AUTO: 0.3 % (ref 0–1.5)
BDY SITE: ABNORMAL
BILIRUB CONJ SERPL-MCNC: 0.2 MG/DL (ref 0–0.3)
BILIRUB INDIRECT SERPL-MCNC: 0.2 MG/DL
BILIRUB SERPL-MCNC: 0.4 MG/DL (ref 0–1.2)
BILIRUB SERPL-MCNC: 0.5 MG/DL (ref 0–1.2)
BUN SERPL-MCNC: 12 MG/DL (ref 6–20)
BUN SERPL-MCNC: ABNORMAL MG/DL
BUN/CREAT SERPL: ABNORMAL
CALCIUM SPEC-SCNC: 8.1 MG/DL (ref 8.6–10.5)
CHLORIDE SERPL-SCNC: 95 MMOL/L (ref 98–107)
CK SERPL-CCNC: 455 U/L (ref 20–200)
CO2 BLDA-SCNC: 25.1 MMOL/L (ref 22–29)
CO2 SERPL-SCNC: 24 MMOL/L (ref 22–29)
CREAT SERPL-MCNC: 0.86 MG/DL (ref 0.76–1.27)
CREAT SERPL-MCNC: 0.87 MG/DL (ref 0.76–1.27)
CRP SERPL-MCNC: 1.81 MG/DL (ref 0–0.5)
D DIMER PPP FEU-MCNC: <0.19 MG/L (FEU) (ref 0–0.59)
D-LACTATE SERPL-SCNC: 1.7 MMOL/L (ref 0.5–2)
DEPRECATED RDW RBC AUTO: 40.7 FL (ref 37–54)
EOSINOPHIL # BLD AUTO: 0 10*3/MM3 (ref 0–0.4)
EOSINOPHIL NFR BLD AUTO: 0 % (ref 0.3–6.2)
ERYTHROCYTE [DISTWIDTH] IN BLOOD BY AUTOMATED COUNT: 12.8 % (ref 12.3–15.4)
FERRITIN SERPL-MCNC: 643 NG/ML (ref 30–400)
GFR SERPL CREATININE-BSD FRML MDRD: 93 ML/MIN/1.73
GFR SERPL CREATININE-BSD FRML MDRD: 94 ML/MIN/1.73
GLOBULIN UR ELPH-MCNC: 3 GM/DL
GLUCOSE BLDC GLUCOMTR-MCNC: 153 MG/DL (ref 70–105)
GLUCOSE BLDC GLUCOMTR-MCNC: 153 MG/DL (ref 70–105)
GLUCOSE BLDC GLUCOMTR-MCNC: 229 MG/DL (ref 70–105)
GLUCOSE BLDC GLUCOMTR-MCNC: 279 MG/DL (ref 70–105)
GLUCOSE SERPL-MCNC: 160 MG/DL (ref 65–99)
HBA1C MFR BLD: 6.7 % (ref 3.5–5.6)
HCO3 BLDA-SCNC: 24.2 MMOL/L (ref 21–28)
HCT VFR BLD AUTO: 42.7 % (ref 37.5–51)
HEMODILUTION: NO
HGB BLD-MCNC: 14.6 G/DL (ref 13–17.7)
INHALED O2 CONCENTRATION: 21 %
L PNEUMO1 AG UR QL IA: NEGATIVE
LDH SERPL-CCNC: 249 U/L (ref 135–225)
LDH SERPL-CCNC: 271 U/L (ref 135–225)
LYMPHOCYTES # BLD AUTO: 0.7 10*3/MM3 (ref 0.7–3.1)
LYMPHOCYTES NFR BLD AUTO: 10.6 % (ref 19.6–45.3)
MCH RBC QN AUTO: 31.2 PG (ref 26.6–33)
MCHC RBC AUTO-ENTMCNC: 34.2 G/DL (ref 31.5–35.7)
MCV RBC AUTO: 91.1 FL (ref 79–97)
MODALITY: ABNORMAL
MONOCYTES # BLD AUTO: 0.7 10*3/MM3 (ref 0.1–0.9)
MONOCYTES NFR BLD AUTO: 10.3 % (ref 5–12)
NEUTROPHILS NFR BLD AUTO: 5.4 10*3/MM3 (ref 1.7–7)
NEUTROPHILS NFR BLD AUTO: 78.8 % (ref 42.7–76)
NRBC BLD AUTO-RTO: 0.1 /100 WBC (ref 0–0.2)
PCO2 BLDA: 27.5 MM HG (ref 35–48)
PH BLDA: 7.55 PH UNITS (ref 7.35–7.45)
PLATELET # BLD AUTO: 198 10*3/MM3 (ref 140–450)
PMV BLD AUTO: 8.1 FL (ref 6–12)
PO2 BLDA: 69.7 MM HG (ref 83–108)
POTASSIUM SERPL-SCNC: 3.3 MMOL/L (ref 3.5–5.2)
PROCALCITONIN SERPL-MCNC: 0.09 NG/ML (ref 0–0.25)
PROCALCITONIN SERPL-MCNC: 0.17 NG/ML (ref 0–0.25)
PROT SERPL-MCNC: 6.5 G/DL (ref 6–8.5)
PROT SERPL-MCNC: 7 G/DL (ref 6–8.5)
RBC # BLD AUTO: 4.69 10*6/MM3 (ref 4.14–5.8)
S PNEUM AG SPEC QL LA: NEGATIVE
SAO2 % BLDCOA: 96.1 % (ref 94–98)
SODIUM SERPL-SCNC: 133 MMOL/L (ref 136–145)
TROPONIN T SERPL-MCNC: <0.01 NG/ML (ref 0–0.03)
WBC # BLD AUTO: 6.9 10*3/MM3 (ref 3.4–10.8)

## 2020-10-14 PROCEDURE — 71045 X-RAY EXAM CHEST 1 VIEW: CPT

## 2020-10-14 PROCEDURE — 82728 ASSAY OF FERRITIN: CPT | Performed by: NURSE PRACTITIONER

## 2020-10-14 PROCEDURE — 82962 GLUCOSE BLOOD TEST: CPT

## 2020-10-14 PROCEDURE — 93010 ELECTROCARDIOGRAM REPORT: CPT | Performed by: INTERNAL MEDICINE

## 2020-10-14 PROCEDURE — 25010000002 DEXAMETHASONE PER 1 MG: Performed by: NURSE PRACTITIONER

## 2020-10-14 PROCEDURE — 94664 DEMO&/EVAL PT USE INHALER: CPT

## 2020-10-14 PROCEDURE — 71275 CT ANGIOGRAPHY CHEST: CPT

## 2020-10-14 PROCEDURE — 80053 COMPREHEN METABOLIC PANEL: CPT | Performed by: NURSE PRACTITIONER

## 2020-10-14 PROCEDURE — 94799 UNLISTED PULMONARY SVC/PX: CPT

## 2020-10-14 PROCEDURE — 99222 1ST HOSP IP/OBS MODERATE 55: CPT | Performed by: NURSE PRACTITIONER

## 2020-10-14 PROCEDURE — 99284 EMERGENCY DEPT VISIT MOD MDM: CPT

## 2020-10-14 PROCEDURE — G0378 HOSPITAL OBSERVATION PER HR: HCPCS

## 2020-10-14 PROCEDURE — 0 IOPAMIDOL PER 1 ML: Performed by: NURSE PRACTITIONER

## 2020-10-14 PROCEDURE — 86140 C-REACTIVE PROTEIN: CPT | Performed by: NURSE PRACTITIONER

## 2020-10-14 PROCEDURE — 87899 AGENT NOS ASSAY W/OPTIC: CPT | Performed by: NURSE PRACTITIONER

## 2020-10-14 PROCEDURE — 36600 WITHDRAWAL OF ARTERIAL BLOOD: CPT

## 2020-10-14 PROCEDURE — 84145 PROCALCITONIN (PCT): CPT | Performed by: NURSE PRACTITIONER

## 2020-10-14 PROCEDURE — 83036 HEMOGLOBIN GLYCOSYLATED A1C: CPT | Performed by: NURSE PRACTITIONER

## 2020-10-14 PROCEDURE — 83615 LACTATE (LD) (LDH) ENZYME: CPT | Performed by: NURSE PRACTITIONER

## 2020-10-14 PROCEDURE — 82803 BLOOD GASES ANY COMBINATION: CPT

## 2020-10-14 PROCEDURE — 87040 BLOOD CULTURE FOR BACTERIA: CPT | Performed by: NURSE PRACTITIONER

## 2020-10-14 PROCEDURE — 25010000002 ENOXAPARIN PER 10 MG: Performed by: NURSE PRACTITIONER

## 2020-10-14 PROCEDURE — 93005 ELECTROCARDIOGRAM TRACING: CPT | Performed by: FAMILY MEDICINE

## 2020-10-14 PROCEDURE — 25010000002 AZITHROMYCIN PER 500 MG: Performed by: NURSE PRACTITIONER

## 2020-10-14 PROCEDURE — 94640 AIRWAY INHALATION TREATMENT: CPT

## 2020-10-14 PROCEDURE — XW033E5 INTRODUCTION OF REMDESIVIR ANTI-INFECTIVE INTO PERIPHERAL VEIN, PERCUTANEOUS APPROACH, NEW TECHNOLOGY GROUP 5: ICD-10-PCS | Performed by: INTERNAL MEDICINE

## 2020-10-14 PROCEDURE — 80076 HEPATIC FUNCTION PANEL: CPT | Performed by: NURSE PRACTITIONER

## 2020-10-14 PROCEDURE — 85025 COMPLETE CBC W/AUTO DIFF WBC: CPT | Performed by: NURSE PRACTITIONER

## 2020-10-14 PROCEDURE — 63710000001 INSULIN LISPRO (HUMAN) PER 5 UNITS: Performed by: NURSE PRACTITIONER

## 2020-10-14 PROCEDURE — 93005 ELECTROCARDIOGRAM TRACING: CPT | Performed by: NURSE PRACTITIONER

## 2020-10-14 PROCEDURE — 82550 ASSAY OF CK (CPK): CPT | Performed by: NURSE PRACTITIONER

## 2020-10-14 PROCEDURE — 85379 FIBRIN DEGRADATION QUANT: CPT | Performed by: NURSE PRACTITIONER

## 2020-10-14 PROCEDURE — 93005 ELECTROCARDIOGRAM TRACING: CPT

## 2020-10-14 PROCEDURE — 83605 ASSAY OF LACTIC ACID: CPT

## 2020-10-14 PROCEDURE — 82565 ASSAY OF CREATININE: CPT | Performed by: NURSE PRACTITIONER

## 2020-10-14 PROCEDURE — 84484 ASSAY OF TROPONIN QUANT: CPT | Performed by: NURSE PRACTITIONER

## 2020-10-14 RX ORDER — POTASSIUM CHLORIDE 20 MEQ/1
40 TABLET, EXTENDED RELEASE ORAL AS NEEDED
Status: DISCONTINUED | OUTPATIENT
Start: 2020-10-14 | End: 2020-10-19 | Stop reason: HOSPADM

## 2020-10-14 RX ORDER — ALBUTEROL SULFATE 90 UG/1
2 AEROSOL, METERED RESPIRATORY (INHALATION) EVERY 4 HOURS PRN
Status: DISCONTINUED | OUTPATIENT
Start: 2020-10-14 | End: 2020-10-19 | Stop reason: HOSPADM

## 2020-10-14 RX ORDER — IPRATROPIUM BROMIDE AND ALBUTEROL SULFATE 2.5; .5 MG/3ML; MG/3ML
3 SOLUTION RESPIRATORY (INHALATION) EVERY 4 HOURS PRN
Status: DISCONTINUED | OUTPATIENT
Start: 2020-10-14 | End: 2020-10-14

## 2020-10-14 RX ORDER — SODIUM CHLORIDE 0.9 % (FLUSH) 0.9 %
10 SYRINGE (ML) INJECTION AS NEEDED
Status: DISCONTINUED | OUTPATIENT
Start: 2020-10-14 | End: 2020-10-19 | Stop reason: HOSPADM

## 2020-10-14 RX ORDER — NICOTINE POLACRILEX 4 MG
15 LOZENGE BUCCAL
Status: DISCONTINUED | OUTPATIENT
Start: 2020-10-14 | End: 2020-10-19 | Stop reason: HOSPADM

## 2020-10-14 RX ORDER — ATORVASTATIN CALCIUM 20 MG/1
20 TABLET, FILM COATED ORAL NIGHTLY
Status: DISCONTINUED | OUTPATIENT
Start: 2020-10-14 | End: 2020-10-19 | Stop reason: HOSPADM

## 2020-10-14 RX ORDER — INSULIN LISPRO 100 [IU]/ML
0-24 INJECTION, SOLUTION INTRAVENOUS; SUBCUTANEOUS AS NEEDED
Status: DISCONTINUED | OUTPATIENT
Start: 2020-10-14 | End: 2020-10-19 | Stop reason: HOSPADM

## 2020-10-14 RX ORDER — DEXAMETHASONE SODIUM PHOSPHATE 4 MG/ML
6 INJECTION, SOLUTION INTRA-ARTICULAR; INTRALESIONAL; INTRAMUSCULAR; INTRAVENOUS; SOFT TISSUE DAILY
Status: DISCONTINUED | OUTPATIENT
Start: 2020-10-15 | End: 2020-10-18

## 2020-10-14 RX ORDER — ALBUTEROL SULFATE 90 UG/1
2 AEROSOL, METERED RESPIRATORY (INHALATION) ONCE
Status: COMPLETED | OUTPATIENT
Start: 2020-10-14 | End: 2020-10-14

## 2020-10-14 RX ORDER — BUPROPION HYDROCHLORIDE 150 MG/1
150 TABLET ORAL DAILY
Status: DISCONTINUED | OUTPATIENT
Start: 2020-10-14 | End: 2020-10-19 | Stop reason: HOSPADM

## 2020-10-14 RX ORDER — ASPIRIN 81 MG/1
81 TABLET ORAL DAILY
Status: DISCONTINUED | OUTPATIENT
Start: 2020-10-14 | End: 2020-10-19 | Stop reason: HOSPADM

## 2020-10-14 RX ORDER — ONDANSETRON 4 MG/1
4 TABLET, FILM COATED ORAL EVERY 6 HOURS PRN
Status: DISCONTINUED | OUTPATIENT
Start: 2020-10-14 | End: 2020-10-19 | Stop reason: HOSPADM

## 2020-10-14 RX ORDER — DEXAMETHASONE SODIUM PHOSPHATE 4 MG/ML
6 INJECTION, SOLUTION INTRA-ARTICULAR; INTRALESIONAL; INTRAMUSCULAR; INTRAVENOUS; SOFT TISSUE ONCE
Status: COMPLETED | OUTPATIENT
Start: 2020-10-14 | End: 2020-10-14

## 2020-10-14 RX ORDER — ONDANSETRON 2 MG/ML
4 INJECTION INTRAMUSCULAR; INTRAVENOUS EVERY 6 HOURS PRN
Status: DISCONTINUED | OUTPATIENT
Start: 2020-10-14 | End: 2020-10-19 | Stop reason: HOSPADM

## 2020-10-14 RX ORDER — INSULIN LISPRO 100 [IU]/ML
0-24 INJECTION, SOLUTION INTRAVENOUS; SUBCUTANEOUS
Status: DISCONTINUED | OUTPATIENT
Start: 2020-10-14 | End: 2020-10-19 | Stop reason: HOSPADM

## 2020-10-14 RX ORDER — ACETAMINOPHEN 160 MG/5ML
650 SOLUTION ORAL EVERY 4 HOURS PRN
Status: DISCONTINUED | OUTPATIENT
Start: 2020-10-14 | End: 2020-10-19 | Stop reason: HOSPADM

## 2020-10-14 RX ORDER — ALBUTEROL SULFATE 90 UG/1
2 AEROSOL, METERED RESPIRATORY (INHALATION)
Status: DISCONTINUED | OUTPATIENT
Start: 2020-10-14 | End: 2020-10-19 | Stop reason: HOSPADM

## 2020-10-14 RX ORDER — ACETAMINOPHEN 325 MG/1
650 TABLET ORAL EVERY 4 HOURS PRN
Status: DISCONTINUED | OUTPATIENT
Start: 2020-10-14 | End: 2020-10-19 | Stop reason: HOSPADM

## 2020-10-14 RX ORDER — ACETAMINOPHEN 650 MG/1
650 SUPPOSITORY RECTAL EVERY 4 HOURS PRN
Status: DISCONTINUED | OUTPATIENT
Start: 2020-10-14 | End: 2020-10-19 | Stop reason: HOSPADM

## 2020-10-14 RX ORDER — SODIUM CHLORIDE 0.9 % (FLUSH) 0.9 %
10 SYRINGE (ML) INJECTION EVERY 12 HOURS SCHEDULED
Status: DISCONTINUED | OUTPATIENT
Start: 2020-10-14 | End: 2020-10-19 | Stop reason: HOSPADM

## 2020-10-14 RX ORDER — DEXTROSE MONOHYDRATE 25 G/50ML
25 INJECTION, SOLUTION INTRAVENOUS
Status: DISCONTINUED | OUTPATIENT
Start: 2020-10-14 | End: 2020-10-19 | Stop reason: HOSPADM

## 2020-10-14 RX ORDER — MAGNESIUM SULFATE HEPTAHYDRATE 40 MG/ML
2 INJECTION, SOLUTION INTRAVENOUS AS NEEDED
Status: DISCONTINUED | OUTPATIENT
Start: 2020-10-14 | End: 2020-10-19 | Stop reason: HOSPADM

## 2020-10-14 RX ORDER — MAGNESIUM SULFATE 1 G/100ML
1 INJECTION INTRAVENOUS AS NEEDED
Status: DISCONTINUED | OUTPATIENT
Start: 2020-10-14 | End: 2020-10-19 | Stop reason: HOSPADM

## 2020-10-14 RX ORDER — MULTIVITAMIN,THERAPEUTIC
1 TABLET ORAL DAILY
Status: DISCONTINUED | OUTPATIENT
Start: 2020-10-14 | End: 2020-10-19 | Stop reason: HOSPADM

## 2020-10-14 RX ADMIN — IOPAMIDOL 100 ML: 755 INJECTION, SOLUTION INTRAVENOUS at 09:43

## 2020-10-14 RX ADMIN — ATORVASTATIN CALCIUM 20 MG: 20 TABLET, FILM COATED ORAL at 23:52

## 2020-10-14 RX ADMIN — SODIUM CHLORIDE 200 MG: 9 INJECTION, SOLUTION INTRAVENOUS at 15:04

## 2020-10-14 RX ADMIN — ASPIRIN 81 MG: 81 TABLET, COATED ORAL at 15:05

## 2020-10-14 RX ADMIN — SODIUM CHLORIDE 500 MG: 9 INJECTION, SOLUTION INTRAVENOUS at 17:46

## 2020-10-14 RX ADMIN — DEXAMETHASONE SODIUM PHOSPHATE 6 MG: 4 INJECTION, SOLUTION INTRAMUSCULAR; INTRAVENOUS at 11:21

## 2020-10-14 RX ADMIN — Medication 10 ML: at 23:54

## 2020-10-14 RX ADMIN — ALBUTEROL SULFATE 2 PUFF: 108 AEROSOL, METERED RESPIRATORY (INHALATION) at 11:07

## 2020-10-14 RX ADMIN — INSULIN LISPRO 12 UNITS: 100 INJECTION, SOLUTION INTRAVENOUS; SUBCUTANEOUS at 17:46

## 2020-10-14 RX ADMIN — SODIUM CHLORIDE 1000 ML: 0.9 INJECTION, SOLUTION INTRAVENOUS at 11:22

## 2020-10-14 RX ADMIN — ALBUTEROL SULFATE 2 PUFF: 90 AEROSOL, METERED RESPIRATORY (INHALATION) at 14:40

## 2020-10-14 RX ADMIN — Medication 10 ML: at 15:05

## 2020-10-14 RX ADMIN — THERA TABS 1 TABLET: TAB at 15:05

## 2020-10-14 RX ADMIN — ENOXAPARIN SODIUM 40 MG: 40 INJECTION SUBCUTANEOUS at 23:53

## 2020-10-14 RX ADMIN — Medication 5000 UNITS: at 15:05

## 2020-10-14 RX ADMIN — SODIUM CHLORIDE 1000 ML: 9 INJECTION, SOLUTION INTRAVENOUS at 08:11

## 2020-10-14 RX ADMIN — BUPROPION HYDROCHLORIDE 150 MG: 150 TABLET, EXTENDED RELEASE ORAL at 15:05

## 2020-10-14 RX ADMIN — ALBUTEROL SULFATE 2 PUFF: 90 AEROSOL, METERED RESPIRATORY (INHALATION) at 19:00

## 2020-10-14 NOTE — TELEPHONE ENCOUNTER
Spouse called in requesting to speak with Dr Pickard, she says the patient was admitted into the hospital yesterday. Spouse wants the doctor to know what's going on.    Please call back to advise    Best call back # [phone]

## 2020-10-14 NOTE — TELEPHONE ENCOUNTER
Please tell wife that pt was admitted with COVID since his oxygen level was decreasing.  He was placed on 2 L of oxygen and doing well- he is on IV form of zithromax, steroid and starting an antiviral that works reslly well.  This is started on all hospital patients that require oxygen with COVID. He had a CT that showed no blood clots, but did have a small pneumonia.   His nurse should update you regularly about his care and hopefully he will be able to facetime you shortly as well.  They will give him insulin instead of metformin while in the hospital

## 2020-10-14 NOTE — OUTREACH NOTE
COVID-19 Week 1 Survey      Responses   [facility]   Does the patient have one of the following disease processes/diagnoses(primary or secondary)?  COVID-19   COVID-19 underlying condition?  None   Call Number  Call 3   Week 1 Call successful?  No   Discharge diagnosis  Covid pos          Brielle Brito RN

## 2020-10-14 NOTE — OUTREACH NOTE
COVID-19 Week 1 Survey      Responses   [facility]   Does the patient have one of the following disease processes/diagnoses(primary or secondary)?  COVID-19   COVID-19 underlying condition?  None   Call Number  Call 3   Week 1 Call successful?  No   Revoke  Readmitted   Discharge diagnosis  Covid pos          Brittney Sweet RN

## 2020-10-15 LAB
ALBUMIN SERPL-MCNC: 3.5 G/DL (ref 3.5–5.2)
ALBUMIN/GLOB SERPL: 1.3 G/DL
ALP SERPL-CCNC: 57 U/L (ref 39–117)
ALT SERPL W P-5'-P-CCNC: 29 U/L (ref 1–41)
ANION GAP SERPL CALCULATED.3IONS-SCNC: 10 MMOL/L (ref 5–15)
AST SERPL-CCNC: 28 U/L (ref 1–40)
BACTERIA SPEC AEROBE CULT: NORMAL
BACTERIA SPEC AEROBE CULT: NORMAL
BASOPHILS # BLD AUTO: 0 10*3/MM3 (ref 0–0.2)
BASOPHILS NFR BLD AUTO: 0.3 % (ref 0–1.5)
BILIRUB CONJ SERPL-MCNC: <0.2 MG/DL (ref 0–0.3)
BILIRUB SERPL-MCNC: 0.3 MG/DL (ref 0–1.2)
BUN SERPL-MCNC: 13 MG/DL (ref 6–20)
BUN SERPL-MCNC: ABNORMAL MG/DL
BUN/CREAT SERPL: ABNORMAL
CALCIUM SPEC-SCNC: 8 MG/DL (ref 8.6–10.5)
CHLORIDE SERPL-SCNC: 102 MMOL/L (ref 98–107)
CO2 SERPL-SCNC: 27 MMOL/L (ref 22–29)
CREAT SERPL-MCNC: 0.84 MG/DL (ref 0.76–1.27)
D DIMER PPP FEU-MCNC: <0.19 MG/L (FEU) (ref 0–0.59)
DEPRECATED RDW RBC AUTO: 42.9 FL (ref 37–54)
EOSINOPHIL # BLD AUTO: 0 10*3/MM3 (ref 0–0.4)
EOSINOPHIL NFR BLD AUTO: 0 % (ref 0.3–6.2)
ERYTHROCYTE [DISTWIDTH] IN BLOOD BY AUTOMATED COUNT: 13.3 % (ref 12.3–15.4)
FERRITIN SERPL-MCNC: 727.4 NG/ML (ref 30–400)
GFR SERPL CREATININE-BSD FRML MDRD: 96 ML/MIN/1.73
GLOBULIN UR ELPH-MCNC: 2.7 GM/DL
GLUCOSE BLDC GLUCOMTR-MCNC: 157 MG/DL (ref 70–105)
GLUCOSE BLDC GLUCOMTR-MCNC: 180 MG/DL (ref 70–105)
GLUCOSE BLDC GLUCOMTR-MCNC: 191 MG/DL (ref 70–105)
GLUCOSE BLDC GLUCOMTR-MCNC: 193 MG/DL (ref 70–105)
GLUCOSE BLDC GLUCOMTR-MCNC: 234 MG/DL (ref 70–105)
GLUCOSE SERPL-MCNC: 163 MG/DL (ref 65–99)
HCT VFR BLD AUTO: 37.9 % (ref 37.5–51)
HGB BLD-MCNC: 12.8 G/DL (ref 13–17.7)
LDH SERPL-CCNC: 272 U/L (ref 135–225)
LYMPHOCYTES # BLD AUTO: 0.9 10*3/MM3 (ref 0.7–3.1)
LYMPHOCYTES NFR BLD AUTO: 15.9 % (ref 19.6–45.3)
MAGNESIUM SERPL-MCNC: 2.2 MG/DL (ref 1.6–2.6)
MCH RBC QN AUTO: 31.1 PG (ref 26.6–33)
MCHC RBC AUTO-ENTMCNC: 33.8 G/DL (ref 31.5–35.7)
MCV RBC AUTO: 92 FL (ref 79–97)
MONOCYTES # BLD AUTO: 0.7 10*3/MM3 (ref 0.1–0.9)
MONOCYTES NFR BLD AUTO: 12.1 % (ref 5–12)
NEUTROPHILS NFR BLD AUTO: 4 10*3/MM3 (ref 1.7–7)
NEUTROPHILS NFR BLD AUTO: 71.7 % (ref 42.7–76)
NRBC BLD AUTO-RTO: 0.1 /100 WBC (ref 0–0.2)
PLATELET # BLD AUTO: 193 10*3/MM3 (ref 140–450)
PMV BLD AUTO: 8.5 FL (ref 6–12)
POTASSIUM SERPL-SCNC: 3.6 MMOL/L (ref 3.5–5.2)
PROT SERPL-MCNC: 6.2 G/DL (ref 6–8.5)
RBC # BLD AUTO: 4.12 10*6/MM3 (ref 4.14–5.8)
SODIUM SERPL-SCNC: 139 MMOL/L (ref 136–145)
WBC # BLD AUTO: 5.6 10*3/MM3 (ref 3.4–10.8)

## 2020-10-15 PROCEDURE — 83615 LACTATE (LD) (LDH) ENZYME: CPT | Performed by: FAMILY MEDICINE

## 2020-10-15 PROCEDURE — 85025 COMPLETE CBC W/AUTO DIFF WBC: CPT | Performed by: NURSE PRACTITIONER

## 2020-10-15 PROCEDURE — 63710000001 INSULIN LISPRO (HUMAN) PER 5 UNITS: Performed by: NURSE PRACTITIONER

## 2020-10-15 PROCEDURE — 94799 UNLISTED PULMONARY SVC/PX: CPT

## 2020-10-15 PROCEDURE — 83735 ASSAY OF MAGNESIUM: CPT | Performed by: NURSE PRACTITIONER

## 2020-10-15 PROCEDURE — 25010000002 DEXAMETHASONE PER 1 MG: Performed by: NURSE PRACTITIONER

## 2020-10-15 PROCEDURE — 82248 BILIRUBIN DIRECT: CPT | Performed by: NURSE PRACTITIONER

## 2020-10-15 PROCEDURE — G0378 HOSPITAL OBSERVATION PER HR: HCPCS

## 2020-10-15 PROCEDURE — 80053 COMPREHEN METABOLIC PANEL: CPT | Performed by: NURSE PRACTITIONER

## 2020-10-15 PROCEDURE — 82962 GLUCOSE BLOOD TEST: CPT

## 2020-10-15 PROCEDURE — 25010000002 AZITHROMYCIN PER 500 MG: Performed by: NURSE PRACTITIONER

## 2020-10-15 PROCEDURE — 25010000002 ENOXAPARIN PER 10 MG: Performed by: NURSE PRACTITIONER

## 2020-10-15 PROCEDURE — 85379 FIBRIN DEGRADATION QUANT: CPT | Performed by: FAMILY MEDICINE

## 2020-10-15 PROCEDURE — 99232 SBSQ HOSP IP/OBS MODERATE 35: CPT | Performed by: FAMILY MEDICINE

## 2020-10-15 PROCEDURE — 82728 ASSAY OF FERRITIN: CPT | Performed by: FAMILY MEDICINE

## 2020-10-15 RX ADMIN — ALBUTEROL SULFATE 2 PUFF: 90 AEROSOL, METERED RESPIRATORY (INHALATION) at 11:54

## 2020-10-15 RX ADMIN — SODIUM CHLORIDE 500 MG: 9 INJECTION, SOLUTION INTRAVENOUS at 16:20

## 2020-10-15 RX ADMIN — ALBUTEROL SULFATE 2 PUFF: 90 AEROSOL, METERED RESPIRATORY (INHALATION) at 19:49

## 2020-10-15 RX ADMIN — Medication 10 ML: at 20:24

## 2020-10-15 RX ADMIN — INSULIN LISPRO 4 UNITS: 100 INJECTION, SOLUTION INTRAVENOUS; SUBCUTANEOUS at 12:40

## 2020-10-15 RX ADMIN — Medication 5000 UNITS: at 08:02

## 2020-10-15 RX ADMIN — ALBUTEROL SULFATE 2 PUFF: 90 AEROSOL, METERED RESPIRATORY (INHALATION) at 15:12

## 2020-10-15 RX ADMIN — INSULIN LISPRO 4 UNITS: 100 INJECTION, SOLUTION INTRAVENOUS; SUBCUTANEOUS at 08:02

## 2020-10-15 RX ADMIN — ATORVASTATIN CALCIUM 20 MG: 20 TABLET, FILM COATED ORAL at 20:24

## 2020-10-15 RX ADMIN — SODIUM CHLORIDE 100 MG: 9 INJECTION, SOLUTION INTRAVENOUS at 12:40

## 2020-10-15 RX ADMIN — BUPROPION HYDROCHLORIDE 150 MG: 150 TABLET, EXTENDED RELEASE ORAL at 08:02

## 2020-10-15 RX ADMIN — ENOXAPARIN SODIUM 40 MG: 40 INJECTION SUBCUTANEOUS at 20:24

## 2020-10-15 RX ADMIN — DEXAMETHASONE SODIUM PHOSPHATE 6 MG: 4 INJECTION, SOLUTION INTRAMUSCULAR; INTRAVENOUS at 08:02

## 2020-10-15 RX ADMIN — INSULIN LISPRO 4 UNITS: 100 INJECTION, SOLUTION INTRAVENOUS; SUBCUTANEOUS at 17:18

## 2020-10-15 RX ADMIN — Medication 10 ML: at 08:02

## 2020-10-15 RX ADMIN — THERA TABS 1 TABLET: TAB at 08:01

## 2020-10-15 RX ADMIN — ALBUTEROL SULFATE 2 PUFF: 90 AEROSOL, METERED RESPIRATORY (INHALATION) at 06:35

## 2020-10-15 RX ADMIN — ASPIRIN 81 MG: 81 TABLET, COATED ORAL at 08:01

## 2020-10-16 LAB
ALBUMIN SERPL-MCNC: 3.5 G/DL (ref 3.5–5.2)
ALBUMIN/GLOB SERPL: 1.3 G/DL
ALP SERPL-CCNC: 67 U/L (ref 39–117)
ALT SERPL W P-5'-P-CCNC: 43 U/L (ref 1–41)
ANION GAP SERPL CALCULATED.3IONS-SCNC: 10 MMOL/L (ref 5–15)
AST SERPL-CCNC: 35 U/L (ref 1–40)
BASOPHILS # BLD AUTO: 0 10*3/MM3 (ref 0–0.2)
BASOPHILS NFR BLD AUTO: 0.1 % (ref 0–1.5)
BILIRUB CONJ SERPL-MCNC: <0.2 MG/DL (ref 0–0.3)
BILIRUB SERPL-MCNC: 0.4 MG/DL (ref 0–1.2)
BUN SERPL-MCNC: 15 MG/DL (ref 6–20)
BUN SERPL-MCNC: ABNORMAL MG/DL
BUN/CREAT SERPL: ABNORMAL
CALCIUM SPEC-SCNC: 8.3 MG/DL (ref 8.6–10.5)
CHLORIDE SERPL-SCNC: 103 MMOL/L (ref 98–107)
CK SERPL-CCNC: 338 U/L (ref 20–200)
CO2 SERPL-SCNC: 27 MMOL/L (ref 22–29)
CREAT SERPL-MCNC: 0.81 MG/DL (ref 0.76–1.27)
CRP SERPL-MCNC: 1.64 MG/DL (ref 0–0.5)
D DIMER PPP FEU-MCNC: <0.19 MG/L (FEU) (ref 0–0.59)
DEPRECATED RDW RBC AUTO: 40.7 FL (ref 37–54)
EOSINOPHIL # BLD AUTO: 0 10*3/MM3 (ref 0–0.4)
EOSINOPHIL NFR BLD AUTO: 0 % (ref 0.3–6.2)
ERYTHROCYTE [DISTWIDTH] IN BLOOD BY AUTOMATED COUNT: 12.8 % (ref 12.3–15.4)
FERRITIN SERPL-MCNC: 631.8 NG/ML (ref 30–400)
FIBRINOGEN PPP-MCNC: 482 MG/DL (ref 210–450)
GFR SERPL CREATININE-BSD FRML MDRD: 100 ML/MIN/1.73
GLOBULIN UR ELPH-MCNC: 2.6 GM/DL
GLUCOSE BLDC GLUCOMTR-MCNC: 171 MG/DL (ref 70–105)
GLUCOSE BLDC GLUCOMTR-MCNC: 210 MG/DL (ref 70–105)
GLUCOSE BLDC GLUCOMTR-MCNC: 261 MG/DL (ref 70–105)
GLUCOSE BLDC GLUCOMTR-MCNC: 283 MG/DL (ref 70–105)
GLUCOSE SERPL-MCNC: 197 MG/DL (ref 65–99)
HCT VFR BLD AUTO: 37.7 % (ref 37.5–51)
HGB BLD-MCNC: 13 G/DL (ref 13–17.7)
LDH SERPL-CCNC: 247 U/L (ref 135–225)
LYMPHOCYTES # BLD AUTO: 0.9 10*3/MM3 (ref 0.7–3.1)
LYMPHOCYTES NFR BLD AUTO: 18.5 % (ref 19.6–45.3)
MAGNESIUM SERPL-MCNC: 2.3 MG/DL (ref 1.6–2.6)
MCH RBC QN AUTO: 31.6 PG (ref 26.6–33)
MCHC RBC AUTO-ENTMCNC: 34.5 G/DL (ref 31.5–35.7)
MCV RBC AUTO: 91.5 FL (ref 79–97)
MONOCYTES # BLD AUTO: 0.7 10*3/MM3 (ref 0.1–0.9)
MONOCYTES NFR BLD AUTO: 13.5 % (ref 5–12)
NEUTROPHILS NFR BLD AUTO: 3.4 10*3/MM3 (ref 1.7–7)
NEUTROPHILS NFR BLD AUTO: 67.9 % (ref 42.7–76)
NRBC BLD AUTO-RTO: 0.1 /100 WBC (ref 0–0.2)
PLATELET # BLD AUTO: 212 10*3/MM3 (ref 140–450)
PMV BLD AUTO: 8.3 FL (ref 6–12)
POTASSIUM SERPL-SCNC: 3.6 MMOL/L (ref 3.5–5.2)
PROT SERPL-MCNC: 6.1 G/DL (ref 6–8.5)
RBC # BLD AUTO: 4.12 10*6/MM3 (ref 4.14–5.8)
SODIUM SERPL-SCNC: 140 MMOL/L (ref 136–145)
WBC # BLD AUTO: 5.1 10*3/MM3 (ref 3.4–10.8)

## 2020-10-16 PROCEDURE — 94799 UNLISTED PULMONARY SVC/PX: CPT

## 2020-10-16 PROCEDURE — 85025 COMPLETE CBC W/AUTO DIFF WBC: CPT | Performed by: FAMILY MEDICINE

## 2020-10-16 PROCEDURE — 86140 C-REACTIVE PROTEIN: CPT | Performed by: FAMILY MEDICINE

## 2020-10-16 PROCEDURE — 25010000002 DEXAMETHASONE PER 1 MG: Performed by: NURSE PRACTITIONER

## 2020-10-16 PROCEDURE — 63710000001 INSULIN LISPRO (HUMAN) PER 5 UNITS: Performed by: NURSE PRACTITIONER

## 2020-10-16 PROCEDURE — 82248 BILIRUBIN DIRECT: CPT | Performed by: NURSE PRACTITIONER

## 2020-10-16 PROCEDURE — 85384 FIBRINOGEN ACTIVITY: CPT | Performed by: FAMILY MEDICINE

## 2020-10-16 PROCEDURE — 82962 GLUCOSE BLOOD TEST: CPT

## 2020-10-16 PROCEDURE — 83735 ASSAY OF MAGNESIUM: CPT | Performed by: NURSE PRACTITIONER

## 2020-10-16 PROCEDURE — 83615 LACTATE (LD) (LDH) ENZYME: CPT | Performed by: FAMILY MEDICINE

## 2020-10-16 PROCEDURE — 82550 ASSAY OF CK (CPK): CPT | Performed by: FAMILY MEDICINE

## 2020-10-16 PROCEDURE — 82728 ASSAY OF FERRITIN: CPT | Performed by: FAMILY MEDICINE

## 2020-10-16 PROCEDURE — 99232 SBSQ HOSP IP/OBS MODERATE 35: CPT | Performed by: FAMILY MEDICINE

## 2020-10-16 PROCEDURE — 25010000002 ENOXAPARIN PER 10 MG: Performed by: NURSE PRACTITIONER

## 2020-10-16 PROCEDURE — 80053 COMPREHEN METABOLIC PANEL: CPT | Performed by: FAMILY MEDICINE

## 2020-10-16 PROCEDURE — 25010000002 AZITHROMYCIN PER 500 MG: Performed by: NURSE PRACTITIONER

## 2020-10-16 PROCEDURE — 85379 FIBRIN DEGRADATION QUANT: CPT | Performed by: FAMILY MEDICINE

## 2020-10-16 RX ADMIN — ASPIRIN 81 MG: 81 TABLET, COATED ORAL at 08:57

## 2020-10-16 RX ADMIN — Medication 10 ML: at 08:58

## 2020-10-16 RX ADMIN — SODIUM CHLORIDE 100 MG: 9 INJECTION, SOLUTION INTRAVENOUS at 12:11

## 2020-10-16 RX ADMIN — ALBUTEROL SULFATE 2 PUFF: 90 AEROSOL, METERED RESPIRATORY (INHALATION) at 16:12

## 2020-10-16 RX ADMIN — ENOXAPARIN SODIUM 40 MG: 40 INJECTION SUBCUTANEOUS at 20:51

## 2020-10-16 RX ADMIN — DEXAMETHASONE SODIUM PHOSPHATE 6 MG: 4 INJECTION, SOLUTION INTRAMUSCULAR; INTRAVENOUS at 08:58

## 2020-10-16 RX ADMIN — Medication 5000 UNITS: at 08:57

## 2020-10-16 RX ADMIN — ALBUTEROL SULFATE 2 PUFF: 90 AEROSOL, METERED RESPIRATORY (INHALATION) at 07:17

## 2020-10-16 RX ADMIN — INSULIN LISPRO 12 UNITS: 100 INJECTION, SOLUTION INTRAVENOUS; SUBCUTANEOUS at 16:57

## 2020-10-16 RX ADMIN — BUPROPION HYDROCHLORIDE 150 MG: 150 TABLET, EXTENDED RELEASE ORAL at 08:57

## 2020-10-16 RX ADMIN — ALBUTEROL SULFATE 2 PUFF: 90 AEROSOL, METERED RESPIRATORY (INHALATION) at 19:35

## 2020-10-16 RX ADMIN — THERA TABS 1 TABLET: TAB at 08:57

## 2020-10-16 RX ADMIN — INSULIN LISPRO 8 UNITS: 100 INJECTION, SOLUTION INTRAVENOUS; SUBCUTANEOUS at 12:11

## 2020-10-16 RX ADMIN — INSULIN LISPRO 2 UNITS: 100 INJECTION, SOLUTION INTRAVENOUS; SUBCUTANEOUS at 08:58

## 2020-10-16 RX ADMIN — ALBUTEROL SULFATE 2 PUFF: 90 AEROSOL, METERED RESPIRATORY (INHALATION) at 11:52

## 2020-10-16 RX ADMIN — ATORVASTATIN CALCIUM 20 MG: 20 TABLET, FILM COATED ORAL at 20:51

## 2020-10-16 RX ADMIN — Medication 10 ML: at 20:51

## 2020-10-16 RX ADMIN — SODIUM CHLORIDE 500 MG: 9 INJECTION, SOLUTION INTRAVENOUS at 16:21

## 2020-10-17 LAB
ALBUMIN SERPL-MCNC: 3.6 G/DL (ref 3.5–5.2)
ALBUMIN/GLOB SERPL: 1.4 G/DL
ALP SERPL-CCNC: 66 U/L (ref 39–117)
ALT SERPL W P-5'-P-CCNC: 50 U/L (ref 1–41)
ANION GAP SERPL CALCULATED.3IONS-SCNC: 10 MMOL/L (ref 5–15)
AST SERPL-CCNC: 33 U/L (ref 1–40)
BASOPHILS # BLD AUTO: 0 10*3/MM3 (ref 0–0.2)
BASOPHILS NFR BLD AUTO: 0.1 % (ref 0–1.5)
BILIRUB CONJ SERPL-MCNC: 0.2 MG/DL (ref 0–0.3)
BILIRUB SERPL-MCNC: 0.4 MG/DL (ref 0–1.2)
BUN SERPL-MCNC: 16 MG/DL (ref 6–20)
BUN/CREAT SERPL: 21.6 (ref 7–25)
CALCIUM SPEC-SCNC: 8.3 MG/DL (ref 8.6–10.5)
CHLORIDE SERPL-SCNC: 103 MMOL/L (ref 98–107)
CK SERPL-CCNC: 208 U/L (ref 20–200)
CO2 SERPL-SCNC: 27 MMOL/L (ref 22–29)
CREAT SERPL-MCNC: 0.74 MG/DL (ref 0.76–1.27)
CRP SERPL-MCNC: 0.99 MG/DL (ref 0–0.5)
DEPRECATED RDW RBC AUTO: 41.1 FL (ref 37–54)
EOSINOPHIL # BLD AUTO: 0 10*3/MM3 (ref 0–0.4)
EOSINOPHIL NFR BLD AUTO: 0 % (ref 0.3–6.2)
ERYTHROCYTE [DISTWIDTH] IN BLOOD BY AUTOMATED COUNT: 13 % (ref 12.3–15.4)
FERRITIN SERPL-MCNC: 579.2 NG/ML (ref 30–400)
GFR SERPL CREATININE-BSD FRML MDRD: 112 ML/MIN/1.73
GLOBULIN UR ELPH-MCNC: 2.6 GM/DL
GLUCOSE BLDC GLUCOMTR-MCNC: 196 MG/DL (ref 70–105)
GLUCOSE BLDC GLUCOMTR-MCNC: 254 MG/DL (ref 70–105)
GLUCOSE BLDC GLUCOMTR-MCNC: 261 MG/DL (ref 70–105)
GLUCOSE BLDC GLUCOMTR-MCNC: 268 MG/DL (ref 70–105)
GLUCOSE SERPL-MCNC: 199 MG/DL (ref 65–99)
HCT VFR BLD AUTO: 37.9 % (ref 37.5–51)
HGB BLD-MCNC: 13.4 G/DL (ref 13–17.7)
LYMPHOCYTES # BLD AUTO: 1 10*3/MM3 (ref 0.7–3.1)
LYMPHOCYTES NFR BLD AUTO: 18.6 % (ref 19.6–45.3)
MAGNESIUM SERPL-MCNC: 2.2 MG/DL (ref 1.6–2.6)
MCH RBC QN AUTO: 32.2 PG (ref 26.6–33)
MCHC RBC AUTO-ENTMCNC: 35.2 G/DL (ref 31.5–35.7)
MCV RBC AUTO: 91.3 FL (ref 79–97)
MONOCYTES # BLD AUTO: 0.7 10*3/MM3 (ref 0.1–0.9)
MONOCYTES NFR BLD AUTO: 13.9 % (ref 5–12)
NEUTROPHILS NFR BLD AUTO: 3.5 10*3/MM3 (ref 1.7–7)
NEUTROPHILS NFR BLD AUTO: 67.4 % (ref 42.7–76)
NRBC BLD AUTO-RTO: 0.1 /100 WBC (ref 0–0.2)
PLATELET # BLD AUTO: 245 10*3/MM3 (ref 140–450)
PMV BLD AUTO: 8.7 FL (ref 6–12)
POTASSIUM SERPL-SCNC: 3.8 MMOL/L (ref 3.5–5.2)
PROT SERPL-MCNC: 6.2 G/DL (ref 6–8.5)
RBC # BLD AUTO: 4.16 10*6/MM3 (ref 4.14–5.8)
SODIUM SERPL-SCNC: 140 MMOL/L (ref 136–145)
WBC # BLD AUTO: 5.2 10*3/MM3 (ref 3.4–10.8)

## 2020-10-17 PROCEDURE — 83735 ASSAY OF MAGNESIUM: CPT | Performed by: NURSE PRACTITIONER

## 2020-10-17 PROCEDURE — 86140 C-REACTIVE PROTEIN: CPT | Performed by: FAMILY MEDICINE

## 2020-10-17 PROCEDURE — 85025 COMPLETE CBC W/AUTO DIFF WBC: CPT | Performed by: FAMILY MEDICINE

## 2020-10-17 PROCEDURE — 25010000002 ENOXAPARIN PER 10 MG: Performed by: NURSE PRACTITIONER

## 2020-10-17 PROCEDURE — 94799 UNLISTED PULMONARY SVC/PX: CPT

## 2020-10-17 PROCEDURE — 82550 ASSAY OF CK (CPK): CPT | Performed by: FAMILY MEDICINE

## 2020-10-17 PROCEDURE — 25010000002 DEXAMETHASONE PER 1 MG: Performed by: NURSE PRACTITIONER

## 2020-10-17 PROCEDURE — 99232 SBSQ HOSP IP/OBS MODERATE 35: CPT | Performed by: FAMILY MEDICINE

## 2020-10-17 PROCEDURE — 82962 GLUCOSE BLOOD TEST: CPT

## 2020-10-17 PROCEDURE — 82728 ASSAY OF FERRITIN: CPT | Performed by: FAMILY MEDICINE

## 2020-10-17 PROCEDURE — 80053 COMPREHEN METABOLIC PANEL: CPT | Performed by: FAMILY MEDICINE

## 2020-10-17 PROCEDURE — 63710000001 INSULIN LISPRO (HUMAN) PER 5 UNITS: Performed by: NURSE PRACTITIONER

## 2020-10-17 PROCEDURE — 82248 BILIRUBIN DIRECT: CPT | Performed by: NURSE PRACTITIONER

## 2020-10-17 RX ADMIN — DEXAMETHASONE SODIUM PHOSPHATE 6 MG: 4 INJECTION, SOLUTION INTRAMUSCULAR; INTRAVENOUS at 08:43

## 2020-10-17 RX ADMIN — ALBUTEROL SULFATE 2 PUFF: 90 AEROSOL, METERED RESPIRATORY (INHALATION) at 19:57

## 2020-10-17 RX ADMIN — Medication 5000 UNITS: at 08:43

## 2020-10-17 RX ADMIN — ENOXAPARIN SODIUM 40 MG: 40 INJECTION SUBCUTANEOUS at 20:14

## 2020-10-17 RX ADMIN — ATORVASTATIN CALCIUM 20 MG: 20 TABLET, FILM COATED ORAL at 20:14

## 2020-10-17 RX ADMIN — SODIUM CHLORIDE 100 MG: 9 INJECTION, SOLUTION INTRAVENOUS at 12:27

## 2020-10-17 RX ADMIN — ASPIRIN 81 MG: 81 TABLET, COATED ORAL at 08:43

## 2020-10-17 RX ADMIN — ALBUTEROL SULFATE 2 PUFF: 90 AEROSOL, METERED RESPIRATORY (INHALATION) at 15:00

## 2020-10-17 RX ADMIN — THERA TABS 1 TABLET: TAB at 08:43

## 2020-10-17 RX ADMIN — ALBUTEROL SULFATE 2 PUFF: 90 AEROSOL, METERED RESPIRATORY (INHALATION) at 07:41

## 2020-10-17 RX ADMIN — ALBUTEROL SULFATE 2 PUFF: 90 AEROSOL, METERED RESPIRATORY (INHALATION) at 11:06

## 2020-10-17 RX ADMIN — MINERAL OIL, AND WHITE PETROLATUM: 425; 573 OINTMENT OPHTHALMIC at 13:04

## 2020-10-17 RX ADMIN — Medication 10 ML: at 20:15

## 2020-10-17 RX ADMIN — INSULIN LISPRO 8 UNITS: 100 INJECTION, SOLUTION INTRAVENOUS; SUBCUTANEOUS at 11:56

## 2020-10-17 RX ADMIN — BUPROPION HYDROCHLORIDE 150 MG: 150 TABLET, EXTENDED RELEASE ORAL at 08:43

## 2020-10-17 RX ADMIN — INSULIN LISPRO 12 UNITS: 100 INJECTION, SOLUTION INTRAVENOUS; SUBCUTANEOUS at 16:55

## 2020-10-17 RX ADMIN — INSULIN LISPRO 4 UNITS: 100 INJECTION, SOLUTION INTRAVENOUS; SUBCUTANEOUS at 08:44

## 2020-10-17 RX ADMIN — Medication 10 ML: at 08:44

## 2020-10-18 LAB
ALBUMIN SERPL-MCNC: 3.4 G/DL (ref 3.5–5.2)
ALBUMIN/GLOB SERPL: 1.3 G/DL
ALP SERPL-CCNC: 68 U/L (ref 39–117)
ALT SERPL W P-5'-P-CCNC: 48 U/L (ref 1–41)
ANION GAP SERPL CALCULATED.3IONS-SCNC: 12 MMOL/L (ref 5–15)
AST SERPL-CCNC: 23 U/L (ref 1–40)
BASOPHILS # BLD AUTO: 0 10*3/MM3 (ref 0–0.2)
BASOPHILS NFR BLD AUTO: 0.1 % (ref 0–1.5)
BILIRUB CONJ SERPL-MCNC: <0.2 MG/DL (ref 0–0.3)
BILIRUB SERPL-MCNC: 0.4 MG/DL (ref 0–1.2)
BUN SERPL-MCNC: 18 MG/DL (ref 6–20)
BUN/CREAT SERPL: 22.8 (ref 7–25)
CALCIUM SPEC-SCNC: 8.9 MG/DL (ref 8.6–10.5)
CHLORIDE SERPL-SCNC: 103 MMOL/L (ref 98–107)
CK SERPL-CCNC: 174 U/L (ref 20–200)
CO2 SERPL-SCNC: 25 MMOL/L (ref 22–29)
CREAT SERPL-MCNC: 0.79 MG/DL (ref 0.76–1.27)
CRP SERPL-MCNC: 0.59 MG/DL (ref 0–0.5)
D DIMER PPP FEU-MCNC: <0.19 MG/L (FEU) (ref 0–0.59)
DEPRECATED RDW RBC AUTO: 40.7 FL (ref 37–54)
EOSINOPHIL # BLD AUTO: 0 10*3/MM3 (ref 0–0.4)
EOSINOPHIL NFR BLD AUTO: 0 % (ref 0.3–6.2)
ERYTHROCYTE [DISTWIDTH] IN BLOOD BY AUTOMATED COUNT: 12.8 % (ref 12.3–15.4)
FERRITIN SERPL-MCNC: 493.4 NG/ML (ref 30–400)
FIBRINOGEN PPP-MCNC: 439 MG/DL (ref 210–450)
GFR SERPL CREATININE-BSD FRML MDRD: 103 ML/MIN/1.73
GLOBULIN UR ELPH-MCNC: 2.7 GM/DL
GLUCOSE BLDC GLUCOMTR-MCNC: 204 MG/DL (ref 70–105)
GLUCOSE BLDC GLUCOMTR-MCNC: 238 MG/DL (ref 70–105)
GLUCOSE BLDC GLUCOMTR-MCNC: 290 MG/DL (ref 70–105)
GLUCOSE BLDC GLUCOMTR-MCNC: 299 MG/DL (ref 70–105)
GLUCOSE SERPL-MCNC: 222 MG/DL (ref 65–99)
HCT VFR BLD AUTO: 36.9 % (ref 37.5–51)
HGB BLD-MCNC: 12.8 G/DL (ref 13–17.7)
LDH SERPL-CCNC: 244 U/L (ref 135–225)
LYMPHOCYTES # BLD AUTO: 1.1 10*3/MM3 (ref 0.7–3.1)
LYMPHOCYTES NFR BLD AUTO: 16.6 % (ref 19.6–45.3)
MAGNESIUM SERPL-MCNC: 2.1 MG/DL (ref 1.6–2.6)
MCH RBC QN AUTO: 31.5 PG (ref 26.6–33)
MCHC RBC AUTO-ENTMCNC: 34.6 G/DL (ref 31.5–35.7)
MCV RBC AUTO: 91.1 FL (ref 79–97)
MONOCYTES # BLD AUTO: 0.9 10*3/MM3 (ref 0.1–0.9)
MONOCYTES NFR BLD AUTO: 12.9 % (ref 5–12)
NEUTROPHILS NFR BLD AUTO: 4.7 10*3/MM3 (ref 1.7–7)
NEUTROPHILS NFR BLD AUTO: 70.4 % (ref 42.7–76)
NRBC BLD AUTO-RTO: 0.1 /100 WBC (ref 0–0.2)
PLATELET # BLD AUTO: 283 10*3/MM3 (ref 140–450)
PMV BLD AUTO: 8.6 FL (ref 6–12)
POTASSIUM SERPL-SCNC: 3.6 MMOL/L (ref 3.5–5.2)
PROT SERPL-MCNC: 6.1 G/DL (ref 6–8.5)
RBC # BLD AUTO: 4.05 10*6/MM3 (ref 4.14–5.8)
SODIUM SERPL-SCNC: 140 MMOL/L (ref 136–145)
WBC # BLD AUTO: 6.7 10*3/MM3 (ref 3.4–10.8)

## 2020-10-18 PROCEDURE — 25010000002 DEXAMETHASONE PER 1 MG: Performed by: NURSE PRACTITIONER

## 2020-10-18 PROCEDURE — 82728 ASSAY OF FERRITIN: CPT | Performed by: FAMILY MEDICINE

## 2020-10-18 PROCEDURE — 85025 COMPLETE CBC W/AUTO DIFF WBC: CPT | Performed by: FAMILY MEDICINE

## 2020-10-18 PROCEDURE — 63710000001 INSULIN LISPRO (HUMAN) PER 5 UNITS: Performed by: NURSE PRACTITIONER

## 2020-10-18 PROCEDURE — 83615 LACTATE (LD) (LDH) ENZYME: CPT | Performed by: FAMILY MEDICINE

## 2020-10-18 PROCEDURE — 99232 SBSQ HOSP IP/OBS MODERATE 35: CPT | Performed by: FAMILY MEDICINE

## 2020-10-18 PROCEDURE — 63710000001 INSULIN REGULAR HUMAN PER 5 UNITS: Performed by: FAMILY MEDICINE

## 2020-10-18 PROCEDURE — 80053 COMPREHEN METABOLIC PANEL: CPT | Performed by: FAMILY MEDICINE

## 2020-10-18 PROCEDURE — 83735 ASSAY OF MAGNESIUM: CPT | Performed by: NURSE PRACTITIONER

## 2020-10-18 PROCEDURE — 82248 BILIRUBIN DIRECT: CPT | Performed by: NURSE PRACTITIONER

## 2020-10-18 PROCEDURE — 82962 GLUCOSE BLOOD TEST: CPT

## 2020-10-18 PROCEDURE — 85379 FIBRIN DEGRADATION QUANT: CPT | Performed by: FAMILY MEDICINE

## 2020-10-18 PROCEDURE — 85384 FIBRINOGEN ACTIVITY: CPT | Performed by: FAMILY MEDICINE

## 2020-10-18 PROCEDURE — 82550 ASSAY OF CK (CPK): CPT | Performed by: FAMILY MEDICINE

## 2020-10-18 PROCEDURE — 94799 UNLISTED PULMONARY SVC/PX: CPT

## 2020-10-18 PROCEDURE — 86140 C-REACTIVE PROTEIN: CPT | Performed by: FAMILY MEDICINE

## 2020-10-18 PROCEDURE — 25010000002 ENOXAPARIN PER 10 MG: Performed by: NURSE PRACTITIONER

## 2020-10-18 RX ORDER — DEXAMETHASONE 6 MG/1
6 TABLET ORAL
Status: COMPLETED | OUTPATIENT
Start: 2020-10-19 | End: 2020-10-19

## 2020-10-18 RX ORDER — DEXAMETHASONE 6 MG/1
6 TABLET ORAL
Status: DISCONTINUED | OUTPATIENT
Start: 2020-10-18 | End: 2020-10-18

## 2020-10-18 RX ADMIN — BUPROPION HYDROCHLORIDE 150 MG: 150 TABLET, EXTENDED RELEASE ORAL at 08:01

## 2020-10-18 RX ADMIN — ALBUTEROL SULFATE 2 PUFF: 90 AEROSOL, METERED RESPIRATORY (INHALATION) at 07:34

## 2020-10-18 RX ADMIN — INSULIN HUMAN 3 UNITS: 100 INJECTION, SOLUTION PARENTERAL at 08:02

## 2020-10-18 RX ADMIN — ENOXAPARIN SODIUM 40 MG: 40 INJECTION SUBCUTANEOUS at 21:38

## 2020-10-18 RX ADMIN — DEXAMETHASONE SODIUM PHOSPHATE 6 MG: 4 INJECTION, SOLUTION INTRAMUSCULAR; INTRAVENOUS at 08:01

## 2020-10-18 RX ADMIN — ASPIRIN 81 MG: 81 TABLET, COATED ORAL at 08:01

## 2020-10-18 RX ADMIN — INSULIN LISPRO 8 UNITS: 100 INJECTION, SOLUTION INTRAVENOUS; SUBCUTANEOUS at 08:01

## 2020-10-18 RX ADMIN — SODIUM CHLORIDE 100 MG: 9 INJECTION, SOLUTION INTRAVENOUS at 12:03

## 2020-10-18 RX ADMIN — THERA TABS 1 TABLET: TAB at 08:01

## 2020-10-18 RX ADMIN — ALBUTEROL SULFATE 2 PUFF: 90 AEROSOL, METERED RESPIRATORY (INHALATION) at 12:17

## 2020-10-18 RX ADMIN — ALBUTEROL SULFATE 2 PUFF: 90 AEROSOL, METERED RESPIRATORY (INHALATION) at 15:31

## 2020-10-18 RX ADMIN — ALBUTEROL SULFATE 2 PUFF: 90 AEROSOL, METERED RESPIRATORY (INHALATION) at 20:21

## 2020-10-18 RX ADMIN — Medication 10 ML: at 08:02

## 2020-10-18 RX ADMIN — ATORVASTATIN CALCIUM 20 MG: 20 TABLET, FILM COATED ORAL at 21:39

## 2020-10-18 RX ADMIN — Medication 5000 UNITS: at 08:01

## 2020-10-18 RX ADMIN — INSULIN LISPRO 8 UNITS: 100 INJECTION, SOLUTION INTRAVENOUS; SUBCUTANEOUS at 12:03

## 2020-10-18 RX ADMIN — Medication 10 ML: at 21:40

## 2020-10-18 RX ADMIN — INSULIN LISPRO 12 UNITS: 100 INJECTION, SOLUTION INTRAVENOUS; SUBCUTANEOUS at 17:27

## 2020-10-19 ENCOUNTER — READMISSION MANAGEMENT (OUTPATIENT)
Dept: CALL CENTER | Facility: HOSPITAL | Age: 50
End: 2020-10-19

## 2020-10-19 VITALS
TEMPERATURE: 97.8 F | HEART RATE: 90 BPM | RESPIRATION RATE: 19 BRPM | DIASTOLIC BLOOD PRESSURE: 81 MMHG | OXYGEN SATURATION: 95 % | BODY MASS INDEX: 26.5 KG/M2 | WEIGHT: 199.96 LBS | SYSTOLIC BLOOD PRESSURE: 134 MMHG | HEIGHT: 73 IN

## 2020-10-19 LAB
ALBUMIN SERPL-MCNC: 3.4 G/DL (ref 3.5–5.2)
ALBUMIN/GLOB SERPL: 1.2 G/DL
ALP SERPL-CCNC: 70 U/L (ref 39–117)
ALT SERPL W P-5'-P-CCNC: 43 U/L (ref 1–41)
ANION GAP SERPL CALCULATED.3IONS-SCNC: 12 MMOL/L (ref 5–15)
AST SERPL-CCNC: 18 U/L (ref 1–40)
BACTERIA SPEC AEROBE CULT: NORMAL
BACTERIA SPEC AEROBE CULT: NORMAL
BASOPHILS # BLD AUTO: 0 10*3/MM3 (ref 0–0.2)
BASOPHILS NFR BLD AUTO: 0.1 % (ref 0–1.5)
BILIRUB CONJ SERPL-MCNC: <0.2 MG/DL (ref 0–0.3)
BILIRUB SERPL-MCNC: 0.4 MG/DL (ref 0–1.2)
BUN SERPL-MCNC: 19 MG/DL (ref 6–20)
BUN/CREAT SERPL: 23.8 (ref 7–25)
CALCIUM SPEC-SCNC: 8.9 MG/DL (ref 8.6–10.5)
CHLORIDE SERPL-SCNC: 104 MMOL/L (ref 98–107)
CK SERPL-CCNC: 137 U/L (ref 20–200)
CO2 SERPL-SCNC: 23 MMOL/L (ref 22–29)
CREAT SERPL-MCNC: 0.8 MG/DL (ref 0.76–1.27)
CRP SERPL-MCNC: 0.35 MG/DL (ref 0–0.5)
DEPRECATED RDW RBC AUTO: 41.6 FL (ref 37–54)
EOSINOPHIL # BLD AUTO: 0 10*3/MM3 (ref 0–0.4)
EOSINOPHIL NFR BLD AUTO: 0 % (ref 0.3–6.2)
ERYTHROCYTE [DISTWIDTH] IN BLOOD BY AUTOMATED COUNT: 13 % (ref 12.3–15.4)
FERRITIN SERPL-MCNC: 475.5 NG/ML (ref 30–400)
GFR SERPL CREATININE-BSD FRML MDRD: 102 ML/MIN/1.73
GLOBULIN UR ELPH-MCNC: 2.8 GM/DL
GLUCOSE BLDC GLUCOMTR-MCNC: 216 MG/DL (ref 70–105)
GLUCOSE BLDC GLUCOMTR-MCNC: 253 MG/DL (ref 70–105)
GLUCOSE BLDC GLUCOMTR-MCNC: 335 MG/DL (ref 70–105)
GLUCOSE SERPL-MCNC: 245 MG/DL (ref 65–99)
HCT VFR BLD AUTO: 39.2 % (ref 37.5–51)
HGB BLD-MCNC: 13.4 G/DL (ref 13–17.7)
LYMPHOCYTES # BLD AUTO: 1.3 10*3/MM3 (ref 0.7–3.1)
LYMPHOCYTES NFR BLD AUTO: 18.6 % (ref 19.6–45.3)
MAGNESIUM SERPL-MCNC: 2.1 MG/DL (ref 1.6–2.6)
MCH RBC QN AUTO: 31.2 PG (ref 26.6–33)
MCHC RBC AUTO-ENTMCNC: 34.3 G/DL (ref 31.5–35.7)
MCV RBC AUTO: 91 FL (ref 79–97)
MONOCYTES # BLD AUTO: 0.9 10*3/MM3 (ref 0.1–0.9)
MONOCYTES NFR BLD AUTO: 12.1 % (ref 5–12)
NEUTROPHILS NFR BLD AUTO: 4.9 10*3/MM3 (ref 1.7–7)
NEUTROPHILS NFR BLD AUTO: 69.2 % (ref 42.7–76)
NRBC BLD AUTO-RTO: 0.1 /100 WBC (ref 0–0.2)
PLATELET # BLD AUTO: 318 10*3/MM3 (ref 140–450)
PMV BLD AUTO: 8.4 FL (ref 6–12)
POTASSIUM SERPL-SCNC: 3.4 MMOL/L (ref 3.5–5.2)
POTASSIUM SERPL-SCNC: 4.2 MMOL/L (ref 3.5–5.2)
PROT SERPL-MCNC: 6.2 G/DL (ref 6–8.5)
RBC # BLD AUTO: 4.31 10*6/MM3 (ref 4.14–5.8)
SODIUM SERPL-SCNC: 139 MMOL/L (ref 136–145)
WBC # BLD AUTO: 7.1 10*3/MM3 (ref 3.4–10.8)

## 2020-10-19 PROCEDURE — 82728 ASSAY OF FERRITIN: CPT | Performed by: FAMILY MEDICINE

## 2020-10-19 PROCEDURE — 83735 ASSAY OF MAGNESIUM: CPT | Performed by: NURSE PRACTITIONER

## 2020-10-19 PROCEDURE — 94799 UNLISTED PULMONARY SVC/PX: CPT

## 2020-10-19 PROCEDURE — 82550 ASSAY OF CK (CPK): CPT | Performed by: FAMILY MEDICINE

## 2020-10-19 PROCEDURE — 84132 ASSAY OF SERUM POTASSIUM: CPT | Performed by: INTERNAL MEDICINE

## 2020-10-19 PROCEDURE — 85025 COMPLETE CBC W/AUTO DIFF WBC: CPT | Performed by: FAMILY MEDICINE

## 2020-10-19 PROCEDURE — 63710000001 INSULIN REGULAR HUMAN PER 5 UNITS: Performed by: FAMILY MEDICINE

## 2020-10-19 PROCEDURE — 80053 COMPREHEN METABOLIC PANEL: CPT | Performed by: FAMILY MEDICINE

## 2020-10-19 PROCEDURE — 86140 C-REACTIVE PROTEIN: CPT | Performed by: FAMILY MEDICINE

## 2020-10-19 PROCEDURE — 63710000001 INSULIN LISPRO (HUMAN) PER 5 UNITS: Performed by: NURSE PRACTITIONER

## 2020-10-19 PROCEDURE — 82962 GLUCOSE BLOOD TEST: CPT

## 2020-10-19 PROCEDURE — 82248 BILIRUBIN DIRECT: CPT | Performed by: NURSE PRACTITIONER

## 2020-10-19 PROCEDURE — 99239 HOSP IP/OBS DSCHRG MGMT >30: CPT | Performed by: INTERNAL MEDICINE

## 2020-10-19 PROCEDURE — 94618 PULMONARY STRESS TESTING: CPT

## 2020-10-19 RX ORDER — ALBUTEROL SULFATE 90 UG/1
2 AEROSOL, METERED RESPIRATORY (INHALATION)
Qty: 36 G | Refills: 0 | Status: SHIPPED | OUTPATIENT
Start: 2020-10-19 | End: 2021-07-23

## 2020-10-19 RX ORDER — LISINOPRIL 5 MG/1
5 TABLET ORAL
Qty: 30 TABLET | Refills: 0 | Status: SHIPPED | OUTPATIENT
Start: 2020-10-20 | End: 2021-01-22 | Stop reason: SDUPTHER

## 2020-10-19 RX ORDER — LISINOPRIL 5 MG/1
5 TABLET ORAL
Status: DISCONTINUED | OUTPATIENT
Start: 2020-10-19 | End: 2020-10-19 | Stop reason: HOSPADM

## 2020-10-19 RX ADMIN — INSULIN LISPRO 16 UNITS: 100 INJECTION, SOLUTION INTRAVENOUS; SUBCUTANEOUS at 17:22

## 2020-10-19 RX ADMIN — INSULIN LISPRO 12 UNITS: 100 INJECTION, SOLUTION INTRAVENOUS; SUBCUTANEOUS at 12:06

## 2020-10-19 RX ADMIN — POTASSIUM CHLORIDE 40 MEQ: 1500 TABLET, EXTENDED RELEASE ORAL at 05:55

## 2020-10-19 RX ADMIN — BUPROPION HYDROCHLORIDE 150 MG: 150 TABLET, EXTENDED RELEASE ORAL at 08:31

## 2020-10-19 RX ADMIN — ALBUTEROL SULFATE 2 PUFF: 90 AEROSOL, METERED RESPIRATORY (INHALATION) at 11:56

## 2020-10-19 RX ADMIN — ALBUTEROL SULFATE 2 PUFF: 90 AEROSOL, METERED RESPIRATORY (INHALATION) at 08:03

## 2020-10-19 RX ADMIN — Medication 5000 UNITS: at 08:31

## 2020-10-19 RX ADMIN — LISINOPRIL 5 MG: 5 TABLET ORAL at 12:05

## 2020-10-19 RX ADMIN — ALBUTEROL SULFATE 2 PUFF: 90 AEROSOL, METERED RESPIRATORY (INHALATION) at 15:15

## 2020-10-19 RX ADMIN — INSULIN LISPRO 8 UNITS: 100 INJECTION, SOLUTION INTRAVENOUS; SUBCUTANEOUS at 08:31

## 2020-10-19 RX ADMIN — THERA TABS 1 TABLET: TAB at 08:31

## 2020-10-19 RX ADMIN — DEXAMETHASONE 6 MG: 6 TABLET ORAL at 08:32

## 2020-10-19 RX ADMIN — INSULIN HUMAN 6 UNITS: 100 INJECTION, SOLUTION PARENTERAL at 08:31

## 2020-10-19 RX ADMIN — POTASSIUM CHLORIDE 40 MEQ: 1500 TABLET, EXTENDED RELEASE ORAL at 12:08

## 2020-10-19 RX ADMIN — Medication 10 ML: at 08:32

## 2020-10-19 RX ADMIN — ASPIRIN 81 MG: 81 TABLET, COATED ORAL at 08:31

## 2020-10-20 ENCOUNTER — TRANSITIONAL CARE MANAGEMENT TELEPHONE ENCOUNTER (OUTPATIENT)
Dept: CALL CENTER | Facility: HOSPITAL | Age: 50
End: 2020-10-20

## 2020-10-20 NOTE — OUTREACH NOTE
Call Center TCM Note      Responses   [facility] patient discharged [facility]   Does the patient have one of the following disease processes/diagnoses(primary or secondary)?  COVID-19   COVID-19 underlying condition?  None   TCM attempt successful?  Yes   Call start time  1313   Call end time  1322   General alerts for this patient  Wife reports it's best to call patient on his cell phone   Discharge diagnosis  Hypoxemia d/t COVID-19, T2DM   Person spoke with today (if not patient) and relationship  Patient and wife    Meds reviewed with patient/caregiver?  Yes   Is the patient having any side effects they believe may be caused by any medication additions or changes?  No   Does the patient have all medications ordered at discharge?  Yes   Is the patient taking all medications as directed (includes completed medication regime)?  Yes   Does the patient have a primary care provider?   Yes   Comments regarding PCP  Hospital d/c f/u video visit appt is on 10/26/20 at 9:30 am    Does the patient have an appointment with their PCP or specialist within 7 days of discharge?  Yes   Has the patient kept scheduled appointments due by today?  N/A   What DME was ordered?  Home O2 ordered from [facility]   Has all DME been delivered?  Yes   Psychosocial issues?  No   Did the patient receive a copy of their discharge instructions?  Yes   Did the patient receive a copy of COVID-19 specific instructions?  Yes   Nursing interventions  Reviewed instructions with patient   What is the patient's perception of their health status since discharge?  Improving   Does the patient have any of the following symptoms?  Cough   Nursing Interventions  Nurse provided patient education   Pulse Ox monitoring  Intermittent   Pulse Ox device source  Patient   O2 Sat comments  >92% with exertion 98-99% with O2   O2 Sat: education provided  Sat levels   Is the patient/caregiver able to teach back steps to recovery at home?  Rest and rebuild  strength, gradually increase activity   If the patient is a current smoker, are they able to teach back resources for cessation?  Not a smoker   Is the patient/caregiver able to teach back the hierarchy of who to call/visit for symptoms/problems? PCP, Specialist, Home health nurse, Urgent Care, ED, 911  Yes   TCM call completed?  Yes          Alexandra Shepherd RN    10/20/2020, 13:22 EDT

## 2020-10-20 NOTE — OUTREACH NOTE
Prep Survey      Responses   [facility]   Is LACE score < 7 ?  No   Eligibility  TCM   [facility]   Date of Admission  10/14/20   Date of Discharge  10/19/20   Discharge Disposition  Home or Self Care   Discharge diagnosis  Hypoxemia d/t COVID-19, T2DM   Does the patient have one of the following disease processes/diagnoses(primary or secondary)?  COVID-19   Does the patient have Home health ordered?  No   Is there a DME ordered?  Yes   What DME was ordered?  Home O2 ordered from [place]   Prep survey completed?  Yes          Iris Cerda RN

## 2020-10-21 ENCOUNTER — READMISSION MANAGEMENT (OUTPATIENT)
Dept: CALL CENTER | Facility: HOSPITAL | Age: 50
End: 2020-10-21

## 2020-10-21 NOTE — OUTREACH NOTE
COVID-19 Week 1 Survey      Responses   [facility] discharged [facility]   Does the patient have one of the following disease processes/diagnoses(primary or secondary)?  COVID-19   COVID-19 underlying condition?  None   Call Number  Call 2   Week 1 Call successful?  No   Discharge diagnosis  Hypoxemia d/t COVID-19, T2DM          Brielle Brito RN

## 2020-10-22 ENCOUNTER — READMISSION MANAGEMENT (OUTPATIENT)
Dept: CALL CENTER | Facility: HOSPITAL | Age: 50
End: 2020-10-22

## 2020-10-22 NOTE — OUTREACH NOTE
COVID-19 Week 1 Survey      Responses   [facility]   Does the patient have one of the following disease processes/diagnoses(primary or secondary)?  COVID-19   COVID-19 underlying condition?  None   Call Number  Call 3   Week 1 Call successful?  No   Discharge diagnosis  Hypoxemia d/t COVID-19, T2DM          Susan Wise RN

## 2020-10-25 ENCOUNTER — READMISSION MANAGEMENT (OUTPATIENT)
Dept: CALL CENTER | Facility: HOSPITAL | Age: 50
End: 2020-10-25

## 2020-10-25 NOTE — OUTREACH NOTE
COVID-19 Week 1 Survey      Responses   [facility]   Does the patient have one of the following disease processes/diagnoses(primary or secondary)?  COVID-19   COVID-19 underlying condition?  None   Call Number  Call 4   Week 1 Call successful?  No   Discharge diagnosis  Hypoxemia d/t COVID-19, T2DM          Pau De RN

## 2020-10-26 ENCOUNTER — TELEMEDICINE (OUTPATIENT)
Dept: FAMILY MEDICINE CLINIC | Facility: CLINIC | Age: 50
End: 2020-10-26

## 2020-10-26 DIAGNOSIS — E11.65 TYPE 2 DIABETES MELLITUS WITH HYPERGLYCEMIA, WITHOUT LONG-TERM CURRENT USE OF INSULIN (HCC): ICD-10-CM

## 2020-10-26 DIAGNOSIS — U07.1 COVID-19: Primary | ICD-10-CM

## 2020-10-26 DIAGNOSIS — Z76.89 ENCOUNTER FOR SUPPORT AND COORDINATION OF TRANSITION OF CARE: ICD-10-CM

## 2020-10-26 DIAGNOSIS — R09.02 HYPOXEMIA: ICD-10-CM

## 2020-10-26 PROCEDURE — 99495 TRANSJ CARE MGMT MOD F2F 14D: CPT | Performed by: INTERNAL MEDICINE

## 2020-10-26 RX ORDER — BLOOD SUGAR DIAGNOSTIC
STRIP MISCELLANEOUS
Qty: 100 EACH | Refills: 12 | Status: SHIPPED | OUTPATIENT
Start: 2020-10-26 | End: 2022-09-09 | Stop reason: SDUPTHER

## 2020-10-26 RX ORDER — BLOOD-GLUCOSE METER
1 EACH MISCELLANEOUS DAILY
Qty: 1 KIT | Refills: 1 | Status: SHIPPED | OUTPATIENT
Start: 2020-10-26

## 2020-10-26 NOTE — PROGRESS NOTES
Transitional Care Follow Up Visit  Subjective     Ricci Ruiz is a 50 y.o. male who presents for a transitional care management visit.    Within 48 business hours after discharge our office contacted him via telephone to coordinate his care and needs.      I reviewed and discussed the details of that call along with the discharge summary, hospital problems, inpatient lab results, inpatient diagnostic studies, and consultation reports with Ricci.     Current outpatient and discharge medications have been reconciled for the patient.  Reviewed by: Karen Pickard MD      Date of TCM Phone Call 10/19/2020   [facility]   Date of Admission 10/14/2020   Date of Discharge 10/19/2020   Discharge Disposition Home or Self Care     Risk for Readmission (LACE) Score: 9 (10/19/2020  6:00 AM)      History of Present Illness   Course During Hospital Stay:  Was hospitalized for COVID and hypoxemia from 10/14-and discharged 10/17. Off steroids when was discharged- and sugars are doing better at 145-165.  Still very fatigued, eating better- getting nauseated if up doing too much and still constipated.  Weaned off O2 on Thursday. Still has tachycardia to 130 if does much activity. Down to 188lbs when got home.  On albuterol 4/day     The following portions of the patient's history were reviewed and updated as appropriate: current medications, past family history, past medical history, past social history, past surgical history and problem list.    Review of Systems   Constitutional: Positive for activity change and appetite change. Negative for fever.   HENT: Negative for congestion.    Respiratory: Positive for shortness of breath. Negative for cough.    Musculoskeletal: Negative for gait problem and myalgias.   Neurological: Negative for weakness.   Psychiatric/Behavioral: Positive for sleep disturbance.       Objective   Physical Exam  Constitutional:       General: He is not in acute distress.     Appearance:  Normal appearance. He is not ill-appearing.   HENT:      Head: Normocephalic and atraumatic.   Pulmonary:      Effort: Pulmonary effort is normal. No respiratory distress.   Skin:     Coloration: Skin is not jaundiced.   Neurological:      General: No focal deficit present.      Mental Status: He is alert and oriented to person, place, and time.   Psychiatric:         Mood and Affect: Mood normal.         Behavior: Behavior normal.         Assessment/Plan   Diagnoses and all orders for this visit:    1. COVID-19 (Primary)    2. Type 2 diabetes mellitus with hyperglycemia, without long-term current use of insulin (CMS/[facility])  Comments:  off steroids so sugars coming back- down weight and eating less    3. Hypoxemia  Assessment & Plan:  Weaned off oxygen- no O2 for 4 days- will have [facility] come pick it up if not using come Thrusday      4. Encounter for support and coordination of transition of care  Comments:  dsicussed all results- doing much better- recommend working from home this week    Other orders  -     Blood Glucose Monitoring Suppl (Accu-Chek Gladis Plus) w/Device kit; 1 kit Daily.  Dispense: 1 kit; Refill: 1  -     glucose blood (Accu-Chek Gladis Plus) test strip; Use daily E11.9  Dispense: 100 each; Refill: 12    They were informed of the diagnosis and treatment plan and directed to f/u for any further problems or concerns.

## 2020-10-27 RX ORDER — GLIMEPIRIDE 4 MG/1
TABLET ORAL
Qty: 30 TABLET | Refills: 5 | Status: SHIPPED | OUTPATIENT
Start: 2020-10-27 | End: 2021-01-23

## 2020-10-28 ENCOUNTER — READMISSION MANAGEMENT (OUTPATIENT)
Dept: CALL CENTER | Facility: HOSPITAL | Age: 50
End: 2020-10-28

## 2020-10-28 NOTE — OUTREACH NOTE
COVID-19 Week 2 Survey      Responses   [facility]   Does the patient have one of the following disease processes/diagnoses(primary or secondary)?  COVID-19   COVID-19 underlying condition?  None   Call Number  Call 1   COVID-19 Week 2: Call 1 attempt successful?  No   Discharge diagnosis  Hypoxemia d/t COVID-19, T2DM          Brielle Brito RN

## 2020-10-31 ENCOUNTER — READMISSION MANAGEMENT (OUTPATIENT)
Dept: CALL CENTER | Facility: HOSPITAL | Age: 50
End: 2020-10-31

## 2020-10-31 NOTE — OUTREACH NOTE
COVID-19 Week 2 Survey      Responses   [facility]   Does the patient have one of the following disease processes/diagnoses(primary or secondary)?  COVID-19   COVID-19 underlying condition?  None   Call Number  Call 2   COVID-19 Week 2: Call 1 attempt successful?  Yes   Call start time  1109   Call end time  1115   Meds reviewed with patient/caregiver?  Yes   Is the patient having any side effects they believe may be caused by any medication additions or changes?  No   Does the patient have all medications ordered at discharge?  Yes   Is the patient taking all medications as directed (includes completed medication regime)?  Yes   Does the patient have a primary care provider?   Yes   Does the patient have an appointment with their PCP or specialist within 7 days of discharge?  Yes   Has the patient kept scheduled appointments due by today?  Yes   Has home health visited the patient within 72 hours of discharge?  N/A   Psychosocial issues?  No   Did the patient receive a copy of their discharge instructions?  Yes   Did the patient receive a copy of COVID-19 specific instructions?  Yes   Nursing interventions  Reviewed instructions with patient   What is the patient's perception of their health status since discharge?  Improving   Does the patient have any of the following symptoms?  Cough   Nursing Interventions  Nurse provided patient education   Pulse Ox monitoring  Intermittent   Pulse Ox device source  Patient   O2 Sat comments  Reports O2 sats 99% on room air.   O2 Sat: education provided  Sat levels, Monitoring frequency, When to seek care   Is the patient/caregiver able to teach back steps to recovery at home?  Eat a well-balance diet   Is the patient/caregiver able to teach back the hierarchy of who to call/visit for symptoms/problems? PCP, Specialist, Home health nurse, Urgent Care, ED, 911  Yes   COVID-19 call completed?  Yes   Wrap up additional comments  States is  feeling better-no longer is using home oxygen. States continues with slight cough. Denies any fever, SOA or chest pain. States BS running at 125 range. States energy level is improved. Denies any needs today.          Rocio Zaidi RN

## 2020-11-09 ENCOUNTER — READMISSION MANAGEMENT (OUTPATIENT)
Dept: CALL CENTER | Facility: HOSPITAL | Age: 50
End: 2020-11-09

## 2020-11-09 NOTE — OUTREACH NOTE
COVID-19 Week 3 Survey      Responses   [facility]   Does the patient have one of the following disease processes/diagnoses(primary or secondary)?  COVID-19   COVID-19 underlying condition?  None   Call Number  Call 1   COVID-19 Week 3: Call 1 attempt successful?  No   Discharge diagnosis  Hypoxemia d/t COVID-19, T2DM          Brielle Brito RN

## 2020-11-16 ENCOUNTER — READMISSION MANAGEMENT (OUTPATIENT)
Dept: CALL CENTER | Facility: HOSPITAL | Age: 50
End: 2020-11-16

## 2020-11-16 NOTE — OUTREACH NOTE
COVID-19 Week 4 Survey      Responses   [facility] patient [facility]   Does the patient have one of the following disease processes/diagnoses(primary or secondary)?  COVID-19   COVID-19 underlying condition?  None   Call Number  Call 1   COVID-19 Week 4: Call 1 attempt successful?  No   Discharge diagnosis  Hypoxemia d/t COVID-19, T2DM          Román Ray RN

## 2020-12-31 ENCOUNTER — TELEPHONE (OUTPATIENT)
Dept: FAMILY MEDICINE CLINIC | Facility: CLINIC | Age: 50
End: 2020-12-31

## 2020-12-31 RX ORDER — SIMVASTATIN 40 MG
TABLET ORAL
Qty: 90 TABLET | Refills: 2 | Status: SHIPPED | OUTPATIENT
Start: 2020-12-31 | End: 2022-04-11

## 2021-01-04 RX ORDER — DULAGLUTIDE 1.5 MG/.5ML
INJECTION, SOLUTION SUBCUTANEOUS
Qty: 4 PEN | Refills: 5 | Status: SHIPPED | OUTPATIENT
Start: 2021-01-04 | End: 2021-06-28

## 2021-01-22 ENCOUNTER — OFFICE VISIT (OUTPATIENT)
Dept: FAMILY MEDICINE CLINIC | Facility: CLINIC | Age: 51
End: 2021-01-22

## 2021-01-22 ENCOUNTER — LAB (OUTPATIENT)
Dept: FAMILY MEDICINE CLINIC | Facility: CLINIC | Age: 51
End: 2021-01-22

## 2021-01-22 VITALS
OXYGEN SATURATION: 98 % | WEIGHT: 203 LBS | TEMPERATURE: 96.9 F | SYSTOLIC BLOOD PRESSURE: 148 MMHG | BODY MASS INDEX: 26.9 KG/M2 | DIASTOLIC BLOOD PRESSURE: 98 MMHG | RESPIRATION RATE: 16 BRPM | HEART RATE: 94 BPM | HEIGHT: 73 IN

## 2021-01-22 DIAGNOSIS — U07.1 COVID-19 VIRUS DETECTED: ICD-10-CM

## 2021-01-22 DIAGNOSIS — Z12.5 PROSTATE CANCER SCREENING: Primary | ICD-10-CM

## 2021-01-22 DIAGNOSIS — E55.9 VITAMIN D DEFICIENCY: Chronic | ICD-10-CM

## 2021-01-22 DIAGNOSIS — E11.9 TYPE 2 DIABETES MELLITUS WITHOUT COMPLICATION, WITHOUT LONG-TERM CURRENT USE OF INSULIN (HCC): Chronic | ICD-10-CM

## 2021-01-22 LAB
25(OH)D3 SERPL-MCNC: 52.5 NG/ML (ref 30–100)
ALBUMIN SERPL-MCNC: 4.3 G/DL (ref 3.5–5.2)
ALBUMIN UR-MCNC: 2.9 MG/DL
ALBUMIN/GLOB SERPL: 1.6 G/DL
ALP SERPL-CCNC: 66 U/L (ref 39–117)
ALT SERPL W P-5'-P-CCNC: 24 U/L (ref 1–41)
ANION GAP SERPL CALCULATED.3IONS-SCNC: 12.1 MMOL/L (ref 5–15)
AST SERPL-CCNC: 20 U/L (ref 1–40)
BILIRUB SERPL-MCNC: 0.4 MG/DL (ref 0–1.2)
BUN SERPL-MCNC: 15 MG/DL (ref 6–20)
BUN/CREAT SERPL: 14.4 (ref 7–25)
CALCIUM SPEC-SCNC: 9.2 MG/DL (ref 8.6–10.5)
CHLORIDE SERPL-SCNC: 104 MMOL/L (ref 98–107)
CHOLEST SERPL-MCNC: 162 MG/DL (ref 0–200)
CO2 SERPL-SCNC: 24.9 MMOL/L (ref 22–29)
CREAT SERPL-MCNC: 1.04 MG/DL (ref 0.76–1.27)
GFR SERPL CREATININE-BSD FRML MDRD: 75 ML/MIN/1.73
GLOBULIN UR ELPH-MCNC: 2.7 GM/DL
GLUCOSE SERPL-MCNC: 108 MG/DL (ref 65–99)
HBA1C MFR BLD: 6.4 % (ref 3.5–5.6)
HDLC SERPL-MCNC: 50 MG/DL (ref 40–60)
LDLC SERPL CALC-MCNC: 92 MG/DL (ref 0–100)
LDLC/HDLC SERPL: 1.81 {RATIO}
POTASSIUM SERPL-SCNC: 4.1 MMOL/L (ref 3.5–5.2)
PROT SERPL-MCNC: 7 G/DL (ref 6–8.5)
PSA SERPL-MCNC: 0.74 NG/ML (ref 0–4)
SODIUM SERPL-SCNC: 141 MMOL/L (ref 136–145)
TRIGL SERPL-MCNC: 108 MG/DL (ref 0–150)
VLDLC SERPL-MCNC: 20 MG/DL (ref 5–40)

## 2021-01-22 PROCEDURE — 80053 COMPREHEN METABOLIC PANEL: CPT | Performed by: INTERNAL MEDICINE

## 2021-01-22 PROCEDURE — 82306 VITAMIN D 25 HYDROXY: CPT | Performed by: INTERNAL MEDICINE

## 2021-01-22 PROCEDURE — 82043 UR ALBUMIN QUANTITATIVE: CPT | Performed by: INTERNAL MEDICINE

## 2021-01-22 PROCEDURE — 99214 OFFICE O/P EST MOD 30 MIN: CPT | Performed by: INTERNAL MEDICINE

## 2021-01-22 PROCEDURE — G0103 PSA SCREENING: HCPCS | Performed by: INTERNAL MEDICINE

## 2021-01-22 PROCEDURE — 83036 HEMOGLOBIN GLYCOSYLATED A1C: CPT | Performed by: INTERNAL MEDICINE

## 2021-01-22 PROCEDURE — 80061 LIPID PANEL: CPT | Performed by: INTERNAL MEDICINE

## 2021-01-22 RX ORDER — BUPROPION HYDROCHLORIDE 150 MG/1
150 TABLET ORAL DAILY
Qty: 90 TABLET | Refills: 3 | Status: SHIPPED | OUTPATIENT
Start: 2021-01-22 | End: 2022-02-07

## 2021-01-22 RX ORDER — LISINOPRIL 5 MG/1
5 TABLET ORAL
Qty: 90 TABLET | Refills: 3 | Status: SHIPPED | OUTPATIENT
Start: 2021-01-22 | End: 2022-02-07

## 2021-01-22 NOTE — PROGRESS NOTES
"Rooming Tab(CC,VS,Pt Hx,Fall Screen)  Chief Complaint   Patient presents with   • Diabetes       Subjective   Pt here for DM.  Tests in most mornings- usually 120-130 am's.   Likes trulicity and and metformin no diarrhea.  No numbness in feel. No Chest pain.   no moles changing.  Eye MD was good- went 12/19   Had COVID and required in patient care- then home on O2.  Now off and breathing well- feels like back to normal pulmonary.  I have reviewed and updated his medications, medical history and problem list during today's office visit.     Patient Care Team:  Karen Pickard MD as PCP - General    Problem List Tab  Medications Tab  Synopsis Tab  Chart Review Tab  Care Everywhere Tab  Immunizations Tab  Patient History Tab    Social History     Tobacco Use   • Smoking status: Never Smoker   • Smokeless tobacco: Never Used   Substance Use Topics   • Alcohol use: Yes     Alcohol/week: 1.0 standard drinks     Types: 1 Glasses of wine, 1 Standard drinks or equivalent per week       Review of Systems   Constitutional: Positive for fatigue.   HENT: Negative for congestion and swollen glands.    Eyes: Negative for blurred vision and double vision.   Gastrointestinal: Positive for GERD. Negative for abdominal pain.   Endocrine: Negative for polyphagia.   Musculoskeletal: Negative for joint swelling.   Skin: Negative for rash and skin lesions.   Neurological: Negative for syncope and numbness.   Psychiatric/Behavioral: Positive for sleep disturbance. Negative for depressed mood.       Objective     Rooming Tab(CC,VS,Pt Hx,Fall Screen)  /98   Pulse 94   Temp 96.9 °F (36.1 °C)   Resp 16   Ht 185.4 cm (73\")   Wt 92.1 kg (203 lb)   SpO2 98%   BMI 26.78 kg/m²     Body mass index is 26.78 kg/m².    Physical Exam  Vitals signs and nursing note reviewed.   Constitutional:       Appearance: He is well-developed.   HENT:      Head: Normocephalic and atraumatic.      Right Ear: Tympanic membrane normal.      Left Ear: " Tympanic membrane normal.   Eyes:      Pupils: Pupils are equal, round, and reactive to light.   Neck:      Musculoskeletal: Normal range of motion and neck supple.   Cardiovascular:      Rate and Rhythm: Normal rate and regular rhythm.      Pulses: Normal pulses.      Heart sounds: Normal heart sounds. No murmur.   Pulmonary:      Effort: Pulmonary effort is normal.      Breath sounds: Normal breath sounds.   Abdominal:      General: Bowel sounds are normal. There is no distension.      Palpations: Abdomen is soft.   Musculoskeletal:         General: No tenderness.   Skin:     Capillary Refill: Capillary refill takes less than 2 seconds.   Neurological:      Mental Status: He is oriented to person, place, and time.   Psychiatric:         Mood and Affect: Mood normal.         Behavior: Behavior normal.          Statin Choice Calculator  Data Reviewed:             Lab Results   Component Value Date    BUN 15 01/22/2021    CREATININE 1.04 01/22/2021    EGFRIFNONA 75 01/22/2021     01/22/2021    K 4.1 01/22/2021     01/22/2021    CALCIUM 9.2 01/22/2021    ALBUMIN 4.30 01/22/2021    BILITOT 0.4 01/22/2021    ALKPHOS 66 01/22/2021    AST 20 01/22/2021    ALT 24 01/22/2021    TRIG 108 01/22/2021    HDL 50 01/22/2021    VLDL 20 01/22/2021    LDL 92 01/22/2021    LDLHDL 1.81 01/22/2021    MICROALBUR 2.9 01/22/2021    PSA 0.738 01/22/2021    NZAX00QI 52.5 01/22/2021      Assessment/Plan   Order Review Tab  Health Maintenance Tab  Patient Plan/Order Tab  Diagnoses and all orders for this visit:    1. Prostate cancer screening (Primary)  -     PSA Screen    2. Vitamin D deficiency  Comments:  check level  Orders:  -     Vitamin D 25 Hydroxy    3. Type 2 diabetes mellitus without complication, without long-term current use of insulin (CMS/[facility])  Comments:  pt doing well with current regimen  Orders:  -     Comprehensive Metabolic Panel  -     Hemoglobin A1c  -     Lipid Panel  -     MicroAlbumin, Urine, Random -  Urine, Clean Catch; Future    4. COVID-19 virus detected  Comments:  resolved - fully     Other orders  -     lisinopril (PRINIVIL,ZESTRIL) 5 MG tablet; Take 1 tablet by mouth Daily.  Dispense: 90 tablet; Refill: 3  -     metFORMIN (GLUCOPHAGE) 1000 MG tablet; Take 1 tablet by mouth 2 (Two) Times a Day With Meals.  Dispense: 180 tablet; Refill: 3  -     buPROPion XL (WELLBUTRIN XL) 150 MG 24 hr tablet; Take 1 tablet by mouth Daily.  Dispense: 90 tablet; Refill: 3        Wrapup Tab  Return in about 6 months (around 7/22/2021), or if symptoms worsen or fail to improve.       They were informed of the diagnosis and treatment plan and directed to f/u for any further problems or concerns.

## 2021-01-23 RX ORDER — GLIMEPIRIDE 4 MG/1
TABLET ORAL
Qty: 180 TABLET | Refills: 3 | Status: SHIPPED | OUTPATIENT
Start: 2021-01-23 | End: 2022-01-24

## 2021-04-01 RX ORDER — CHOLECALCIFEROL (VITAMIN D3) 125 MCG
CAPSULE ORAL
Qty: 90 CAPSULE | Refills: 9 | Status: SHIPPED | OUTPATIENT
Start: 2021-04-01 | End: 2022-06-27

## 2021-06-28 RX ORDER — DULAGLUTIDE 1.5 MG/.5ML
INJECTION, SOLUTION SUBCUTANEOUS
Qty: 4 PEN | Refills: 5 | Status: SHIPPED | OUTPATIENT
Start: 2021-06-28 | End: 2021-12-17

## 2021-07-23 ENCOUNTER — LAB (OUTPATIENT)
Dept: FAMILY MEDICINE CLINIC | Facility: CLINIC | Age: 51
End: 2021-07-23

## 2021-07-23 ENCOUNTER — OFFICE VISIT (OUTPATIENT)
Dept: FAMILY MEDICINE CLINIC | Facility: CLINIC | Age: 51
End: 2021-07-23

## 2021-07-23 VITALS
HEART RATE: 95 BPM | RESPIRATION RATE: 16 BRPM | WEIGHT: 206 LBS | DIASTOLIC BLOOD PRESSURE: 94 MMHG | BODY MASS INDEX: 27.3 KG/M2 | HEIGHT: 73 IN | OXYGEN SATURATION: 98 % | SYSTOLIC BLOOD PRESSURE: 140 MMHG

## 2021-07-23 DIAGNOSIS — M50.10 CERVICAL DISC DISORDER WITH RADICULOPATHY: ICD-10-CM

## 2021-07-23 DIAGNOSIS — E11.65 TYPE 2 DIABETES MELLITUS WITH HYPERGLYCEMIA, WITHOUT LONG-TERM CURRENT USE OF INSULIN (HCC): Primary | ICD-10-CM

## 2021-07-23 DIAGNOSIS — I10 ESSENTIAL HYPERTENSION: ICD-10-CM

## 2021-07-23 LAB
ALBUMIN SERPL-MCNC: 4.6 G/DL (ref 3.5–5.2)
ALBUMIN/GLOB SERPL: 1.8 G/DL
ALP SERPL-CCNC: 86 U/L (ref 39–117)
ALT SERPL W P-5'-P-CCNC: 21 U/L (ref 1–41)
ANION GAP SERPL CALCULATED.3IONS-SCNC: 10 MMOL/L (ref 5–15)
AST SERPL-CCNC: 19 U/L (ref 1–40)
BILIRUB SERPL-MCNC: 0.4 MG/DL (ref 0–1.2)
BUN SERPL-MCNC: 13 MG/DL (ref 6–20)
BUN/CREAT SERPL: 8.9 (ref 7–25)
CALCIUM SPEC-SCNC: 9.3 MG/DL (ref 8.6–10.5)
CHLORIDE SERPL-SCNC: 103 MMOL/L (ref 98–107)
CO2 SERPL-SCNC: 25 MMOL/L (ref 22–29)
CREAT SERPL-MCNC: 1.46 MG/DL (ref 0.76–1.27)
GFR SERPL CREATININE-BSD FRML MDRD: 51 ML/MIN/1.73
GLOBULIN UR ELPH-MCNC: 2.6 GM/DL
GLUCOSE SERPL-MCNC: 120 MG/DL (ref 65–99)
HBA1C MFR BLD: 6.8 % (ref 3.5–5.6)
POTASSIUM SERPL-SCNC: 4.9 MMOL/L (ref 3.5–5.2)
PROT SERPL-MCNC: 7.2 G/DL (ref 6–8.5)
SODIUM SERPL-SCNC: 138 MMOL/L (ref 136–145)

## 2021-07-23 PROCEDURE — 99214 OFFICE O/P EST MOD 30 MIN: CPT | Performed by: INTERNAL MEDICINE

## 2021-07-23 PROCEDURE — 80053 COMPREHEN METABOLIC PANEL: CPT | Performed by: INTERNAL MEDICINE

## 2021-07-23 PROCEDURE — 36415 COLL VENOUS BLD VENIPUNCTURE: CPT | Performed by: INTERNAL MEDICINE

## 2021-07-23 PROCEDURE — 83036 HEMOGLOBIN GLYCOSYLATED A1C: CPT | Performed by: INTERNAL MEDICINE

## 2021-07-23 NOTE — PROGRESS NOTES
Rooming Tab(CC,VS,Pt Hx,Fall Screen)  Chief Complaint   Patient presents with   • Diabetes       Subjective   Pt here for DM check-  Admits not checking sugars much but good at taking meds regularly- keeping weight down after lost a lot with COVID.  Does get shaky in afternoon if skips lunch  Does still get more SOA with 1 flight of stairs- more than did before COVID- but no wheezing- will recover in couple minutes  Gets mild pain in LLQ and will make him nauseated- not associated with BM     I have reviewed and updated his medications, medical history and problem list during today's office visit.     Patient Care Team:  Karen Pickard MD as PCP - General    Problem List Tab  Medications Tab  Synopsis Tab  Chart Review Tab  Care Everywhere Tab  Immunizations Tab  Patient History Tab    Social History     Tobacco Use   • Smoking status: Never Smoker   • Smokeless tobacco: Never Used   Substance Use Topics   • Alcohol use: Yes     Alcohol/week: 1.0 standard drinks     Types: 1 Glasses of wine per week       Review of Systems  Answers for HPI/ROS submitted by the patient on 7/22/2021  What is the primary reason for your visit?: Diabetes  Diabetes type: type 2  MedicAlert ID: No  Disease duration: 4 years  foot paresthesias: No  foot ulcerations: No  visual change: No  Symptom course: stable  headaches: No  hunger: No  mood changes: No  sleepiness: No  sweats: Yes  blackouts: No  hospitalization: No  nocturnal hypoglycemia: No  required assistance: No  required glucagon: No  CVA: No  heart disease: No  impotence: No  nephropathy: No  peripheral neuropathy: No  PVD: No  retinopathy: No  CAD risks: dyslipidemia, family history, hypertension, obesity, sedentary lifestyle, stress  Current treatments: diet, oral agent (triple therapy)  Treatment compliance: most of the time  Home blood tests: 1-2 x per week  Monitoring compliance: inadequate  Blood glucose trend: no change  breakfast time: after 10 am  breakfast  "glucose level: 110-130  dinner time: after 8 pm  dinner glucose level: 110-130  Weight trend: stable  Current diet: diabetic, generally healthy  Meal planning: none, avoidance of concentrated sweets, carbohydrate counting  Exercise: intermittently  Dietitian visit: No  Eye exam current: Yes  Sees podiatrist: No      Objective     Rooming Tab(CC,VS,Pt Hx,Fall Screen)  /94 (BP Location: Left arm)   Pulse 95   Resp 16   Ht 185.4 cm (73\")   Wt 93.4 kg (206 lb)   SpO2 98%   BMI 27.18 kg/m²     Body mass index is 27.18 kg/m².    Physical Exam  Vitals and nursing note reviewed.   Constitutional:       Appearance: Normal appearance. He is well-developed.   HENT:      Head: Normocephalic and atraumatic.      Right Ear: Tympanic membrane normal.      Left Ear: Tympanic membrane normal.      Nose: No rhinorrhea.      Mouth/Throat:      Pharynx: No posterior oropharyngeal erythema.   Eyes:      Pupils: Pupils are equal, round, and reactive to light.   Cardiovascular:      Rate and Rhythm: Normal rate and regular rhythm.      Pulses: Normal pulses.      Heart sounds: Normal heart sounds. No murmur heard.     Pulmonary:      Effort: Pulmonary effort is normal.      Breath sounds: Normal breath sounds.   Abdominal:      General: Bowel sounds are normal. There is no distension.      Palpations: Abdomen is soft.   Musculoskeletal:         General: No tenderness.      Cervical back: Normal range of motion and neck supple.   Lymphadenopathy:      Cervical: No cervical adenopathy.   Skin:     Capillary Refill: Capillary refill takes less than 2 seconds.   Neurological:      Mental Status: He is alert and oriented to person, place, and time.   Psychiatric:         Mood and Affect: Mood normal.         Behavior: Behavior normal.          Statin Choice Calculator  Data Reviewed:         The data below has been reviewed by Karen Pickard MD on 07/23/2021.      Lab Results   Component Value Date    BUN 13 07/23/2021    " CREATININE 1.46 (H) 07/23/2021    EGFRIFNONA 51 (L) 07/23/2021     07/23/2021    K 4.9 07/23/2021     07/23/2021    CALCIUM 9.3 07/23/2021    ALBUMIN 4.60 07/23/2021    BILITOT 0.4 07/23/2021    ALKPHOS 86 07/23/2021    AST 19 07/23/2021    ALT 21 07/23/2021      Assessment/Plan   Order Review Tab  Health Maintenance Tab  Patient Plan/Order Tab  Diagnoses and all orders for this visit:    1. Type 2 diabetes mellitus with hyperglycemia, without long-term current use of insulin (CMS/[facility]) (Primary)  Comments:  check labs- will decrease amaryl if levels good  Orders:  -     Hemoglobin A1c  -     Comprehensive Metabolic Panel    2. Essential hypertension    3. Cervical disc disorder with radiculopathy  Comments:  watch way hold grandkid as can make neck worse again        Wrapup Tab  Return in about 6 months (around 1/23/2022), or if symptoms worsen or fail to improve.       They were informed of the diagnosis and treatment plan and directed to f/u for any further problems or concerns.

## 2021-09-01 ENCOUNTER — LAB (OUTPATIENT)
Dept: FAMILY MEDICINE CLINIC | Facility: CLINIC | Age: 51
End: 2021-09-01

## 2021-09-01 ENCOUNTER — OFFICE VISIT (OUTPATIENT)
Dept: FAMILY MEDICINE CLINIC | Facility: CLINIC | Age: 51
End: 2021-09-01

## 2021-09-01 VITALS
OXYGEN SATURATION: 98 % | HEIGHT: 73 IN | BODY MASS INDEX: 26.9 KG/M2 | SYSTOLIC BLOOD PRESSURE: 120 MMHG | RESPIRATION RATE: 16 BRPM | DIASTOLIC BLOOD PRESSURE: 70 MMHG | WEIGHT: 203 LBS | TEMPERATURE: 98.2 F | HEART RATE: 105 BPM

## 2021-09-01 DIAGNOSIS — Z11.59 NEED FOR HEPATITIS C SCREENING TEST: Primary | ICD-10-CM

## 2021-09-01 DIAGNOSIS — N28.9 RENAL INSUFFICIENCY: ICD-10-CM

## 2021-09-01 DIAGNOSIS — E11.9 TYPE 2 DIABETES MELLITUS WITHOUT COMPLICATION, WITHOUT LONG-TERM CURRENT USE OF INSULIN (HCC): Chronic | ICD-10-CM

## 2021-09-01 LAB
ALBUMIN SERPL-MCNC: 4.3 G/DL (ref 3.5–5.2)
ALBUMIN/GLOB SERPL: 1.5 G/DL
ALP SERPL-CCNC: 82 U/L (ref 39–117)
ALT SERPL W P-5'-P-CCNC: 23 U/L (ref 1–41)
ANION GAP SERPL CALCULATED.3IONS-SCNC: 13.1 MMOL/L (ref 5–15)
AST SERPL-CCNC: 17 U/L (ref 1–40)
BILIRUB SERPL-MCNC: 0.5 MG/DL (ref 0–1.2)
BUN SERPL-MCNC: 16 MG/DL (ref 6–20)
BUN/CREAT SERPL: 12.7 (ref 7–25)
CALCIUM SPEC-SCNC: 9.2 MG/DL (ref 8.6–10.5)
CHLORIDE SERPL-SCNC: 101 MMOL/L (ref 98–107)
CO2 SERPL-SCNC: 25.9 MMOL/L (ref 22–29)
CREAT SERPL-MCNC: 1.26 MG/DL (ref 0.76–1.27)
GFR SERPL CREATININE-BSD FRML MDRD: 60 ML/MIN/1.73
GLOBULIN UR ELPH-MCNC: 2.8 GM/DL
GLUCOSE SERPL-MCNC: 94 MG/DL (ref 65–99)
HCV AB SER DONR QL: NORMAL
POTASSIUM SERPL-SCNC: 3.9 MMOL/L (ref 3.5–5.2)
PROT SERPL-MCNC: 7.1 G/DL (ref 6–8.5)
SODIUM SERPL-SCNC: 140 MMOL/L (ref 136–145)

## 2021-09-01 PROCEDURE — 36415 COLL VENOUS BLD VENIPUNCTURE: CPT | Performed by: INTERNAL MEDICINE

## 2021-09-01 PROCEDURE — 99214 OFFICE O/P EST MOD 30 MIN: CPT | Performed by: INTERNAL MEDICINE

## 2021-09-01 PROCEDURE — 86803 HEPATITIS C AB TEST: CPT | Performed by: INTERNAL MEDICINE

## 2021-09-01 PROCEDURE — 80053 COMPREHEN METABOLIC PANEL: CPT | Performed by: INTERNAL MEDICINE

## 2021-09-01 NOTE — PROGRESS NOTES
"Rooming Tab(CC,VS,Pt Hx,Fall Screen)  Chief Complaint   Patient presents with   • abnormal kidney function       Subjective   Pt was seen here for in July and had elevated kidney function- trying to drink more water. BP better today.  stress is ok at work.  Had abdomen pain at last visit- thinks could of been a stone and now resolved. Drinks fair amount of tea   Sugars doing well- taking trulicity  regularly but forgetting metformin at night some    I have reviewed and updated his medications, medical history and problem list during today's office visit.     Patient Care Team:  Karen Pickard MD as PCP - General    Problem List Tab  Medications Tab  Synopsis Tab  Chart Review Tab  Care Everywhere Tab  Immunizations Tab  Patient History Tab    Social History     Tobacco Use   • Smoking status: Never Smoker   • Smokeless tobacco: Never Used   Substance Use Topics   • Alcohol use: Yes     Alcohol/week: 1.0 standard drinks     Types: 1 Glasses of wine per week       Review of Systems    Objective     Rooming Tab(CC,VS,Pt Hx,Fall Screen)  /70 (BP Location: Left arm, Patient Position: Sitting, Cuff Size: Adult)   Pulse 105   Temp 98.2 °F (36.8 °C) (Oral)   Resp 16   Ht 185.4 cm (73\")   Wt 92.1 kg (203 lb)   SpO2 98%   BMI 26.78 kg/m²     Body mass index is 26.78 kg/m².    Physical Exam  Vitals and nursing note reviewed.   Constitutional:       Appearance: Normal appearance. He is well-developed.   HENT:      Head: Normocephalic and atraumatic.      Right Ear: Tympanic membrane normal.      Left Ear: Tympanic membrane normal.      Nose: No rhinorrhea.      Mouth/Throat:      Pharynx: No posterior oropharyngeal erythema.   Eyes:      Pupils: Pupils are equal, round, and reactive to light.   Cardiovascular:      Rate and Rhythm: Normal rate and regular rhythm.      Pulses: Normal pulses.      Heart sounds: Normal heart sounds. No murmur heard.     Pulmonary:      Effort: Pulmonary effort is normal.      " Breath sounds: Normal breath sounds.   Abdominal:      General: Bowel sounds are normal. There is no distension.      Palpations: Abdomen is soft.   Musculoskeletal:         General: No tenderness.      Cervical back: Normal range of motion and neck supple.   Skin:     Capillary Refill: Capillary refill takes less than 2 seconds.   Neurological:      Mental Status: He is alert and oriented to person, place, and time.   Psychiatric:         Mood and Affect: Mood normal.         Behavior: Behavior normal.          Statin Choice Calculator  Data Reviewed:         The data below has been reviewed by Karen Pickard MD on 09/01/2021.      Lab Results   Component Value Date    BUN 16 09/01/2021    CREATININE 1.26 09/01/2021    EGFRIFNONA 60 (L) 09/01/2021     09/01/2021    K 3.9 09/01/2021     09/01/2021    CALCIUM 9.2 09/01/2021    ALBUMIN 4.30 09/01/2021    BILITOT 0.5 09/01/2021    ALKPHOS 82 09/01/2021    AST 17 09/01/2021    ALT 23 09/01/2021      Assessment/Plan   Order Review Tab  Health Maintenance Tab  Patient Plan/Order Tab  Diagnoses and all orders for this visit:    1. Need for hepatitis C screening test (Primary)  -     Hepatitis C Antibody    2. Type 2 diabetes mellitus without complication, without long-term current use of insulin (CMS/HCC)    3. Renal insufficiency  Assessment & Plan:  Renal condition is newly identified.  Continue current treatment regimen.  Renal condition will be reassessed as needed.    Orders:  -     Comprehensive Metabolic Panel    Other orders  -     Cancel: Pneumococcal Polysaccharide Vaccine 23-Valent Greater Than or Equal To 1yo Subcutaneous / IM      Wrapup Tab  Return if symptoms worsen or fail to improve.       They were informed of the diagnosis and treatment plan and directed to f/u for any further problems or concerns.        Your patient is overdue for a Diabetic Eye Exam

## 2021-09-01 NOTE — ASSESSMENT & PLAN NOTE
Renal condition is newly identified.  Continue current treatment regimen.  Renal condition will be reassessed as needed.

## 2021-12-17 RX ORDER — DULAGLUTIDE 1.5 MG/.5ML
1.5 INJECTION, SOLUTION SUBCUTANEOUS
Qty: 6 ML | Refills: 3 | Status: SHIPPED | OUTPATIENT
Start: 2021-12-17 | End: 2022-09-09

## 2022-01-24 RX ORDER — GLIMEPIRIDE 4 MG/1
TABLET ORAL
Qty: 180 TABLET | Refills: 3 | Status: SHIPPED | OUTPATIENT
Start: 2022-01-24 | End: 2022-09-09

## 2022-02-07 RX ORDER — BUPROPION HYDROCHLORIDE 150 MG/1
TABLET ORAL
Qty: 90 TABLET | Refills: 3 | Status: SHIPPED | OUTPATIENT
Start: 2022-02-07 | End: 2023-02-06

## 2022-02-07 RX ORDER — LISINOPRIL 5 MG/1
TABLET ORAL
Qty: 90 TABLET | Refills: 3 | Status: SHIPPED | OUTPATIENT
Start: 2022-02-07 | End: 2023-02-06

## 2022-04-11 RX ORDER — SIMVASTATIN 40 MG
TABLET ORAL
Qty: 90 TABLET | Refills: 2 | Status: SHIPPED | OUTPATIENT
Start: 2022-04-11 | End: 2022-12-27

## 2022-09-09 ENCOUNTER — LAB (OUTPATIENT)
Dept: FAMILY MEDICINE CLINIC | Facility: CLINIC | Age: 52
End: 2022-09-09

## 2022-09-09 ENCOUNTER — OFFICE VISIT (OUTPATIENT)
Dept: FAMILY MEDICINE CLINIC | Facility: CLINIC | Age: 52
End: 2022-09-09

## 2022-09-09 VITALS
TEMPERATURE: 97.7 F | SYSTOLIC BLOOD PRESSURE: 132 MMHG | OXYGEN SATURATION: 98 % | WEIGHT: 198.2 LBS | DIASTOLIC BLOOD PRESSURE: 68 MMHG | HEART RATE: 86 BPM | HEIGHT: 73 IN | RESPIRATION RATE: 16 BRPM | BODY MASS INDEX: 26.27 KG/M2

## 2022-09-09 DIAGNOSIS — E78.41 ELEVATED LIPOPROTEIN(A): Chronic | ICD-10-CM

## 2022-09-09 DIAGNOSIS — Z12.5 SCREENING FOR PROSTATE CANCER: ICD-10-CM

## 2022-09-09 DIAGNOSIS — Z00.00 PREVENTATIVE HEALTH CARE: Primary | ICD-10-CM

## 2022-09-09 DIAGNOSIS — E11.65 TYPE 2 DIABETES MELLITUS WITH HYPERGLYCEMIA, WITHOUT LONG-TERM CURRENT USE OF INSULIN: ICD-10-CM

## 2022-09-09 DIAGNOSIS — I10 PRIMARY HYPERTENSION: ICD-10-CM

## 2022-09-09 LAB
ALBUMIN SERPL-MCNC: 4.6 G/DL (ref 3.5–5.2)
ALBUMIN UR-MCNC: 3.8 MG/DL
ALBUMIN/GLOB SERPL: 1.5 G/DL
ALP SERPL-CCNC: 90 U/L (ref 39–117)
ALT SERPL W P-5'-P-CCNC: 23 U/L (ref 1–41)
ANION GAP SERPL CALCULATED.3IONS-SCNC: 11.2 MMOL/L (ref 5–15)
AST SERPL-CCNC: 16 U/L (ref 1–40)
BASOPHILS # BLD AUTO: 0.13 10*3/MM3 (ref 0–0.2)
BASOPHILS NFR BLD AUTO: 2.1 % (ref 0–1.5)
BILIRUB SERPL-MCNC: 0.5 MG/DL (ref 0–1.2)
BUN SERPL-MCNC: 15 MG/DL (ref 6–20)
BUN/CREAT SERPL: 11.2 (ref 7–25)
CALCIUM SPEC-SCNC: 9.8 MG/DL (ref 8.6–10.5)
CHLORIDE SERPL-SCNC: 102 MMOL/L (ref 98–107)
CHOLEST SERPL-MCNC: 154 MG/DL (ref 0–200)
CO2 SERPL-SCNC: 26.8 MMOL/L (ref 22–29)
CREAT SERPL-MCNC: 1.34 MG/DL (ref 0.76–1.27)
DEPRECATED RDW RBC AUTO: 39.7 FL (ref 37–54)
EGFRCR SERPLBLD CKD-EPI 2021: 63.3 ML/MIN/1.73
EOSINOPHIL # BLD AUTO: 0.18 10*3/MM3 (ref 0–0.4)
EOSINOPHIL NFR BLD AUTO: 2.9 % (ref 0.3–6.2)
ERYTHROCYTE [DISTWIDTH] IN BLOOD BY AUTOMATED COUNT: 11.9 % (ref 12.3–15.4)
GLOBULIN UR ELPH-MCNC: 3 GM/DL
GLUCOSE SERPL-MCNC: 117 MG/DL (ref 65–99)
HBA1C MFR BLD: 6.5 % (ref 3.5–5.6)
HCT VFR BLD AUTO: 42.8 % (ref 37.5–51)
HDLC SERPL-MCNC: 48 MG/DL (ref 40–60)
HGB BLD-MCNC: 14.9 G/DL (ref 13–17.7)
IMM GRANULOCYTES # BLD AUTO: 0.02 10*3/MM3 (ref 0–0.05)
IMM GRANULOCYTES NFR BLD AUTO: 0.3 % (ref 0–0.5)
LDLC SERPL CALC-MCNC: 89 MG/DL (ref 0–100)
LDLC/HDLC SERPL: 1.82 {RATIO}
LYMPHOCYTES # BLD AUTO: 2.02 10*3/MM3 (ref 0.7–3.1)
LYMPHOCYTES NFR BLD AUTO: 32.1 % (ref 19.6–45.3)
MCH RBC QN AUTO: 31.4 PG (ref 26.6–33)
MCHC RBC AUTO-ENTMCNC: 34.8 G/DL (ref 31.5–35.7)
MCV RBC AUTO: 90.1 FL (ref 79–97)
MONOCYTES # BLD AUTO: 0.76 10*3/MM3 (ref 0.1–0.9)
MONOCYTES NFR BLD AUTO: 12.1 % (ref 5–12)
NEUTROPHILS NFR BLD AUTO: 3.19 10*3/MM3 (ref 1.7–7)
NEUTROPHILS NFR BLD AUTO: 50.5 % (ref 42.7–76)
NRBC BLD AUTO-RTO: 0 /100 WBC (ref 0–0.2)
PLATELET # BLD AUTO: 246 10*3/MM3 (ref 140–450)
PMV BLD AUTO: 11 FL (ref 6–12)
POTASSIUM SERPL-SCNC: 4.1 MMOL/L (ref 3.5–5.2)
PROT SERPL-MCNC: 7.6 G/DL (ref 6–8.5)
PSA SERPL-MCNC: 0.63 NG/ML (ref 0–4)
RBC # BLD AUTO: 4.75 10*6/MM3 (ref 4.14–5.8)
SODIUM SERPL-SCNC: 140 MMOL/L (ref 136–145)
TRIGL SERPL-MCNC: 93 MG/DL (ref 0–150)
VLDLC SERPL-MCNC: 17 MG/DL (ref 5–40)
WBC NRBC COR # BLD: 6.3 10*3/MM3 (ref 3.4–10.8)

## 2022-09-09 PROCEDURE — 85025 COMPLETE CBC W/AUTO DIFF WBC: CPT | Performed by: INTERNAL MEDICINE

## 2022-09-09 PROCEDURE — 99396 PREV VISIT EST AGE 40-64: CPT | Performed by: INTERNAL MEDICINE

## 2022-09-09 PROCEDURE — 80061 LIPID PANEL: CPT | Performed by: INTERNAL MEDICINE

## 2022-09-09 PROCEDURE — 83036 HEMOGLOBIN GLYCOSYLATED A1C: CPT | Performed by: INTERNAL MEDICINE

## 2022-09-09 PROCEDURE — G0103 PSA SCREENING: HCPCS | Performed by: INTERNAL MEDICINE

## 2022-09-09 PROCEDURE — 36415 COLL VENOUS BLD VENIPUNCTURE: CPT | Performed by: INTERNAL MEDICINE

## 2022-09-09 PROCEDURE — 82043 UR ALBUMIN QUANTITATIVE: CPT | Performed by: INTERNAL MEDICINE

## 2022-09-09 PROCEDURE — 80053 COMPREHEN METABOLIC PANEL: CPT | Performed by: INTERNAL MEDICINE

## 2022-09-09 RX ORDER — DULAGLUTIDE 3 MG/.5ML
3 INJECTION, SOLUTION SUBCUTANEOUS WEEKLY
Qty: 2 ML | Refills: 5 | Status: SHIPPED | OUTPATIENT
Start: 2022-09-09 | End: 2023-03-10 | Stop reason: SDUPTHER

## 2022-09-09 NOTE — PROGRESS NOTES
"Rooming Tab(CC,VS,Pt Hx,Fall Screen)  Chief Complaint   Patient presents with   • Annual Exam   • Diabetes       Subjective     I have reviewed and updated his medications, medical history and problem list during today's office visit.    Mr. Ruiz presents today for follow-up.    Diabetes  He reports that his sugars are doing okay, but occasionally he will have a low reading in the middle of the night. He reports that most of the time he checks his blood glucose in the morning. He states that sometimes he will wake up sweaty. The patient reports that he is taking Trulicity plus glimepiride twice a day and metformin twice a day. He states that he tries to take the glimepiride almost every night, but sometimes he will forget about it. He denies that the Trulicity causes nausea.  He states that when he first started taking the Trulicity, he did not have an appetite, but that has resolved.    Depression and anxiety  The patient reports that with the bupropion, his anxiety has been much better. He states that he still has some issues at work occasionally, and this last week has been pretty stressful. The patient does not believe that he needs any higher doses yet.    Urination  He reports that he does not usually wake up in the middle of the night. He states that during the day he feels like his stream is still okay.    Health maintenance  The patient reports that he had a Tdap 2 years ago with the first grandchild. He states that he did receive his pneumonia injection. The patient states that on his last colonoscopy, there was a polyp found, and he was told it was tubular adenoma. He denies any back pain that is any more than usual. 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 He states that he has not noticed any moles. The patient reports that he has a hernia, and when he lays down, it occasionally \"pops out.\" The patient reports that he has been to " "the dentist this year. He states that his bowels are working fine. The patient reports that he still has his gallbladder. He denies any swelling or open sores on his feet. He states that he can feel all his toes. He sees his eye doctor once a year, and his exams have been normal.       Patient Care Team:  Karen Pickard MD as PCP - General    Problem List Tab  Medications Tab  Synopsis Tab  Chart Review Tab  Care Everywhere Tab  Immunizations Tab  Patient History Tab    Social History     Tobacco Use   • Smoking status: Never Smoker   • Smokeless tobacco: Never Used   Substance Use Topics   • Alcohol use: Yes     Alcohol/week: 1.0 standard drink     Types: 1 Glasses of wine per week       Review of Systems  Answers for HPI/ROS submitted by the patient on 9/9/2022  What is the primary reason for your visit?: Diabetes  Diabetes type: type 2  MedicAlert ID: No  Disease duration: 5 years  foot paresthesias: No  foot ulcerations: No  visual change: No  Symptom course: improving  sleepiness: Yes  sweats: Yes  blackouts: No  hospitalization: No  nocturnal hypoglycemia: Yes  required assistance: No  required glucagon: No  CVA: No  heart disease: No  impotence: No  nephropathy: No  peripheral neuropathy: No  PVD: No  retinopathy: No  CAD risks: dyslipidemia, family history, hypertension, obesity, sedentary lifestyle, stress  Current treatments: diet, oral agent (triple therapy)  Treatment compliance: most of the time  Home blood tests: 1-2 x per week  Monitoring compliance: fair  Dietitian visit: No  Eye exam current: Yes  Sees podiatrist: No      Objective     Rooming Tab(CC,VS,Pt Hx,Fall Screen)  /68   Pulse 86   Temp 97.7 °F (36.5 °C)   Resp 16   Ht 185.4 cm (73\")   Wt 89.9 kg (198 lb 3.2 oz)   SpO2 98%   BMI 26.15 kg/m²     Body mass index is 26.15 kg/m².    Physical Exam  Vitals and nursing note reviewed.   Constitutional:       Appearance: Normal appearance. He is well-developed.   HENT:      Head: " Normocephalic and atraumatic.      Right Ear: Tympanic membrane normal.      Left Ear: Tympanic membrane normal.      Nose: No rhinorrhea.      Mouth/Throat:      Pharynx: No posterior oropharyngeal erythema.   Eyes:      Pupils: Pupils are equal, round, and reactive to light.   Cardiovascular:      Rate and Rhythm: Normal rate and regular rhythm.      Pulses: Normal pulses.      Heart sounds: Normal heart sounds. No murmur heard.  Pulmonary:      Effort: Pulmonary effort is normal.      Breath sounds: Normal breath sounds.   Abdominal:      General: Bowel sounds are normal. There is no distension.      Palpations: Abdomen is soft.   Musculoskeletal:         General: No tenderness.      Cervical back: Normal range of motion and neck supple.   Skin:     Capillary Refill: Capillary refill takes less than 2 seconds.   Neurological:      Mental Status: He is alert and oriented to person, place, and time.   Psychiatric:         Mood and Affect: Mood normal.         Behavior: Behavior normal.          Statin Choice Calculator  Data Reviewed:         The data below has been reviewed by Karen Pickard MD on 09/09/2022.          Assessment & Plan   Order Review Tab  Health Maintenance Tab  Patient Plan/Order Tab  Diagnoses and all orders for this visit:    1. Preventative health care (Primary)  Comments:  discussed all recommendations   Orders:  -     CBC Auto Differential    2. Type 2 diabetes mellitus with hyperglycemia, without long-term current use of insulin (HCC)  Comments:  yearly DM eye exam  Orders:  -     Lipid panel  -     MicroAlbumin, Urine, Random - Urine, Clean Catch  -     Hemoglobin A1c  -     Comprehensive Metabolic Panel    3. Primary hypertension    4. Elevated lipoprotein(a)    5. Screening for prostate cancer  -     PSA Screen    Other orders  -     Dulaglutide (Trulicity) 3 MG/0.5ML solution pen-injector; Inject 0.5 mL under the skin into the appropriate area as directed 1 (One) Time Per Week.   Dispense: 2 mL; Refill: 5  -     glucose blood (Accu-Chek Gladis Plus) test strip; Use daily E11.9  Dispense: 100 each; Refill: 12    1. Diabetes  - We will increase his Trulicity from 1.5 mg to 3 mg.  - He will continue to monitor his blood glucose levels at home.  - If his blood glucose levels continue to be low in the middle of the night, we will decrease his metformin to 0.5 tablet at night.  - We will refill his test strips.    2. Depression and anxiety  - He will continue on his current dose of bupropion.    3. Urinary frequency  - We will obtain a PSA today.    4. Health maintenance  - He is due for a colonoscopy in 2024.  - He is due for a Tdap vaccine.  - He is due for his pneumonia vaccine.  - He will receive his COVID-19 booster.      Wrapup Tab  Return in about 6 months (around 3/9/2023), or if symptoms worsen or fail to improve, for Recheck.       During this visit for their annual exam, we reviewed their personal history, social history and family history.  We went over their medications and all the recommended health maintenence items for their age group. They were given the opportunity to ask questions and discuss other concerns.    Transcribed from ambient dictation for Karen Pickard MD by BRITNI CUMMINS.  09/09/22   14:12 EDT    Patient verbalized consent to the visit recording.  I have personally performed the services described in this document as transcribed by the above individual, and it is both accurate and complete.  Karen Pickard MD  9/12/2022  10:29 EDT

## 2022-12-16 RX ORDER — DULAGLUTIDE 1.5 MG/.5ML
1.5 INJECTION, SOLUTION SUBCUTANEOUS
Refills: 3 | OUTPATIENT
Start: 2022-12-16 | End: 2023-12-16

## 2022-12-27 RX ORDER — SIMVASTATIN 40 MG
TABLET ORAL
Qty: 90 TABLET | Refills: 2 | Status: SHIPPED | OUTPATIENT
Start: 2022-12-27

## 2023-02-06 RX ORDER — BUPROPION HYDROCHLORIDE 150 MG/1
TABLET ORAL
Qty: 90 TABLET | Refills: 1 | Status: SHIPPED | OUTPATIENT
Start: 2023-02-06

## 2023-02-06 RX ORDER — LISINOPRIL 5 MG/1
TABLET ORAL
Qty: 90 TABLET | Refills: 1 | Status: SHIPPED | OUTPATIENT
Start: 2023-02-06

## 2023-03-10 ENCOUNTER — LAB (OUTPATIENT)
Dept: FAMILY MEDICINE CLINIC | Facility: CLINIC | Age: 53
End: 2023-03-10
Payer: COMMERCIAL

## 2023-03-10 ENCOUNTER — OFFICE VISIT (OUTPATIENT)
Dept: FAMILY MEDICINE CLINIC | Facility: CLINIC | Age: 53
End: 2023-03-10
Payer: COMMERCIAL

## 2023-03-10 VITALS
HEART RATE: 83 BPM | HEIGHT: 73 IN | DIASTOLIC BLOOD PRESSURE: 83 MMHG | BODY MASS INDEX: 25.18 KG/M2 | WEIGHT: 190 LBS | OXYGEN SATURATION: 100 % | SYSTOLIC BLOOD PRESSURE: 133 MMHG

## 2023-03-10 DIAGNOSIS — I10 PRIMARY HYPERTENSION: ICD-10-CM

## 2023-03-10 DIAGNOSIS — E78.41 ELEVATED LIPOPROTEIN(A): Chronic | ICD-10-CM

## 2023-03-10 DIAGNOSIS — E55.9 VITAMIN D DEFICIENCY: Chronic | ICD-10-CM

## 2023-03-10 DIAGNOSIS — E11.65 TYPE 2 DIABETES MELLITUS WITH HYPERGLYCEMIA, WITHOUT LONG-TERM CURRENT USE OF INSULIN: Primary | ICD-10-CM

## 2023-03-10 LAB
25(OH)D3 SERPL-MCNC: 51.3 NG/ML (ref 30–100)
ALBUMIN SERPL-MCNC: 4.5 G/DL (ref 3.5–5.2)
ALBUMIN/GLOB SERPL: 1.8 G/DL
ALP SERPL-CCNC: 79 U/L (ref 39–117)
ALT SERPL W P-5'-P-CCNC: 23 U/L (ref 1–41)
ANION GAP SERPL CALCULATED.3IONS-SCNC: 11.7 MMOL/L (ref 5–15)
AST SERPL-CCNC: 20 U/L (ref 1–40)
BILIRUB SERPL-MCNC: 0.3 MG/DL (ref 0–1.2)
BUN SERPL-MCNC: 15 MG/DL (ref 6–20)
BUN/CREAT SERPL: 12.6 (ref 7–25)
CALCIUM SPEC-SCNC: 9.5 MG/DL (ref 8.6–10.5)
CHLORIDE SERPL-SCNC: 102 MMOL/L (ref 98–107)
CHOLEST SERPL-MCNC: 144 MG/DL (ref 0–200)
CO2 SERPL-SCNC: 26.3 MMOL/L (ref 22–29)
CREAT SERPL-MCNC: 1.19 MG/DL (ref 0.76–1.27)
EGFRCR SERPLBLD CKD-EPI 2021: 73 ML/MIN/1.73
GLOBULIN UR ELPH-MCNC: 2.5 GM/DL
GLUCOSE SERPL-MCNC: 103 MG/DL (ref 65–99)
HBA1C MFR BLD: 6.3 % (ref 4.8–5.6)
HDLC SERPL-MCNC: 58 MG/DL (ref 40–60)
LDLC SERPL CALC-MCNC: 74 MG/DL (ref 0–100)
LDLC/HDLC SERPL: 1.29 {RATIO}
POTASSIUM SERPL-SCNC: 4.3 MMOL/L (ref 3.5–5.2)
PROT SERPL-MCNC: 7 G/DL (ref 6–8.5)
SODIUM SERPL-SCNC: 140 MMOL/L (ref 136–145)
TRIGL SERPL-MCNC: 57 MG/DL (ref 0–150)
VLDLC SERPL-MCNC: 12 MG/DL (ref 5–40)

## 2023-03-10 PROCEDURE — 80053 COMPREHEN METABOLIC PANEL: CPT | Performed by: INTERNAL MEDICINE

## 2023-03-10 PROCEDURE — 80061 LIPID PANEL: CPT | Performed by: INTERNAL MEDICINE

## 2023-03-10 PROCEDURE — 36415 COLL VENOUS BLD VENIPUNCTURE: CPT | Performed by: INTERNAL MEDICINE

## 2023-03-10 PROCEDURE — 83036 HEMOGLOBIN GLYCOSYLATED A1C: CPT | Performed by: INTERNAL MEDICINE

## 2023-03-10 PROCEDURE — 99214 OFFICE O/P EST MOD 30 MIN: CPT | Performed by: INTERNAL MEDICINE

## 2023-03-10 PROCEDURE — 82306 VITAMIN D 25 HYDROXY: CPT | Performed by: INTERNAL MEDICINE

## 2023-03-10 RX ORDER — LORATADINE 10 MG/1
10 TABLET ORAL DAILY
COMMUNITY

## 2023-03-10 RX ORDER — DULAGLUTIDE 3 MG/.5ML
3 INJECTION, SOLUTION SUBCUTANEOUS WEEKLY
Qty: 2 ML | Refills: 5 | Status: SHIPPED | OUTPATIENT
Start: 2023-03-10

## 2023-03-10 NOTE — PROGRESS NOTES
"Rooming Tab(CC,VS,Pt Hx,Fall Screen)  Chief Complaint   Patient presents with   • Diabetes   • Hypertension   • Hyperlipidemia       Subjective   The patient presents today for a follow-up for diabetes and blood pressure.    Diabetes  The patient has not checked his blood glucose at home in a while, his meter is under a bunch of dust at the moment as they are putting in a new shower, does not need new test strips. He denies feeling low since being off the glimepiride. He takes the metformin every morning but not every night. The patient is still taking Trulicity. He denies constipation or diarrhea. He states that he gets over the counter aspirin. He denies any edema in his lower extremities. He denies open sores on his feet and can feel all 10 toes. He states he last went to the ophthalmologist in the fall of 2022 and denies diabetic eye issues.    Dizziness  He notes when he gets up off the floor, he will get vertigo or light headed, it is not bad and is not causing him to fall but he just has to stabilize. He denies any neck problems.    Vitamin D deficiency  He needs a refill on his vitamin D. He states that he is taking 5000 IU every day.    I have reviewed and updated his medications, medical history and problem list during today's office visit.     Patient Care Team:  Karen Pickard MD as PCP - General    Problem List Tab  Medications Tab  Synopsis Tab  Chart Review Tab  Care Everywhere Tab  Immunizations Tab  Patient History Tab    Social History     Tobacco Use   • Smoking status: Never   • Smokeless tobacco: Never   Substance Use Topics   • Alcohol use: Yes     Alcohol/week: 1.0 standard drink     Types: 1 Glasses of wine per week       Review of Systems    Objective     Rooming Tab(CC,VS,Pt Hx,Fall Screen)  /83   Pulse 83   Ht 185.4 cm (73\")   Wt 86.2 kg (190 lb)   SpO2 100%   BMI 25.07 kg/m²     Body mass index is 25.07 kg/m².    Physical Exam  Vitals and nursing note reviewed. "   Constitutional:       Appearance: Normal appearance. He is well-developed.   HENT:      Head: Normocephalic and atraumatic.      Right Ear: Tympanic membrane normal.      Left Ear: Tympanic membrane normal.      Nose: No rhinorrhea.      Mouth/Throat:      Pharynx: No posterior oropharyngeal erythema.   Eyes:      Pupils: Pupils are equal, round, and reactive to light.   Cardiovascular:      Rate and Rhythm: Normal rate and regular rhythm.      Pulses: Normal pulses.      Heart sounds: Normal heart sounds. No murmur heard.  Pulmonary:      Effort: Pulmonary effort is normal.      Breath sounds: Normal breath sounds.   Abdominal:      General: Bowel sounds are normal. There is no distension.      Palpations: Abdomen is soft.   Musculoskeletal:         General: No tenderness.      Cervical back: Normal range of motion and neck supple.   Skin:     Capillary Refill: Capillary refill takes less than 2 seconds.   Neurological:      Mental Status: He is alert and oriented to person, place, and time.   Psychiatric:         Mood and Affect: Mood normal.         Behavior: Behavior normal.          Statin Choice Calculator  Data Reviewed:         The data below has been reviewed by Karen Pickard MD on 03/10/2023.          Assessment & Plan   Order Review Tab  Health Maintenance Tab  Patient Plan/Order Tab  Diagnoses and all orders for this visit:    1. Type 2 diabetes mellitus with hyperglycemia, without long-term current use of insulin (HCC) (Primary)  Comments:  has seen eye Md yearly   Orders:  -     Hemoglobin A1c  -     Comprehensive Metabolic Panel  -     Lipid Panel    2. Primary hypertension    3. Vitamin D deficiency  -     Vitamin D,25-Hydroxy    4. Elevated lipoprotein(a)    Other orders  -     vitamin D3 125 MCG (5000 UT) capsule capsule; Take 1 capsule by mouth Daily.  Dispense: 90 capsule; Refill: 9  -     Dulaglutide (Trulicity) 3 MG/0.5ML solution pen-injector; Inject 0.5 mL under the skin into the  appropriate area as directed 1 (One) Time Per Week.  Dispense: 2 mL; Refill: 5    1. Type 2 diabetes mellitus without complication, without long-term current use of insulin (HCC) (Primary)  - He will continue taking metformin twice daily.  - He will continue taking Trulicity as prescribed.  - He will continue to monitor his blood glucose levels at home.    2. Essential hypertension  - He will continue taking lisinopril as prescribed.    3. Vitamin D deficiency  - He will continue taking vitamin D 5000 units daily.    4. Health maintenance  - He is due for a shingles vaccine.      Wrapup Tab  Return in about 6 months (around 9/10/2023), or if symptoms worsen or fail to improve, for Annual physical.       They were informed of the diagnosis and treatment plan and directed to f/u for any further problems or concerns.          Transcribed from ambient dictation for Karen Pickard MD by Noreen Olivo.  03/10/23   09:42 EST    Patient or patient representative verbalized consent to the visit recording.  I have personally performed the services described in this document as transcribed by the above individual, and it is both accurate and complete.  Karen Pickard MD  3/12/2023  19:21 EDT

## 2023-06-06 RX ORDER — BUPROPION HYDROCHLORIDE 150 MG/1
TABLET ORAL
Qty: 90 TABLET | Refills: 1 | Status: SHIPPED | OUTPATIENT
Start: 2023-06-06

## 2023-06-06 RX ORDER — LISINOPRIL 5 MG/1
TABLET ORAL
Qty: 90 TABLET | Refills: 1 | Status: SHIPPED | OUTPATIENT
Start: 2023-06-06

## 2023-12-05 RX ORDER — SIMVASTATIN 40 MG
TABLET ORAL
Qty: 90 TABLET | Refills: 2 | OUTPATIENT
Start: 2023-12-05

## 2023-12-26 RX ORDER — SIMVASTATIN 40 MG
TABLET ORAL
Qty: 90 TABLET | Refills: 2 | OUTPATIENT
Start: 2023-12-26

## 2023-12-26 RX ORDER — DULAGLUTIDE 3 MG/.5ML
INJECTION, SOLUTION SUBCUTANEOUS
Refills: 5 | OUTPATIENT
Start: 2023-12-26

## 2024-02-14 RX ORDER — BUPROPION HYDROCHLORIDE 150 MG/1
TABLET ORAL
Qty: 90 TABLET | Refills: 1 | OUTPATIENT
Start: 2024-02-14

## 2024-02-14 RX ORDER — LISINOPRIL 5 MG/1
TABLET ORAL
Qty: 90 TABLET | Refills: 1 | OUTPATIENT
Start: 2024-02-14

## 2024-05-01 ENCOUNTER — TRANSCRIBE ORDERS (OUTPATIENT)
Dept: ADMINISTRATIVE | Facility: HOSPITAL | Age: 54
End: 2024-05-01
Payer: COMMERCIAL

## 2024-05-01 DIAGNOSIS — I25.9 CHEST PAIN DUE TO MYOCARDIAL ISCHEMIA, UNSPECIFIED ISCHEMIC CHEST PAIN TYPE: Primary | ICD-10-CM

## 2024-05-02 ENCOUNTER — TRANSCRIBE ORDERS (OUTPATIENT)
Dept: ADMINISTRATIVE | Facility: HOSPITAL | Age: 54
End: 2024-05-02
Payer: COMMERCIAL

## 2024-05-02 DIAGNOSIS — I20.89 ANGINA PECTORIS, NOCTURNAL: ICD-10-CM

## 2024-05-02 DIAGNOSIS — I20.89 STABLE ANGINA: Primary | ICD-10-CM

## 2024-05-04 PROBLEM — Z91.89 CHRONIC CHEST PAIN WITH HIGH RISK FOR CAD: Status: ACTIVE | Noted: 2024-05-04

## 2024-05-04 PROBLEM — G89.29 CHRONIC CHEST PAIN WITH HIGH RISK FOR CAD: Status: ACTIVE | Noted: 2024-05-04

## 2024-05-04 PROBLEM — R07.9 CHRONIC CHEST PAIN WITH HIGH RISK FOR CAD: Status: ACTIVE | Noted: 2024-05-04

## 2024-05-04 NOTE — H&P (VIEW-ONLY)
Encounter Date:05/06/2024        Patient ID: Ricci Ruiz is a 53 y.o. male.      Chief Complaint:      History of Present Illness  53 years old man with hypertension, hyperlipidemia and diabetes who presents to cardiology office to establish care.  He complains of worsening chest pain ongoing for the last 2 to 3 weeks.  He also reports episodes when he gets pale and diaphoretic.  He also has a strong family history of coronary disease.  His current medications include lisinopril, simvastatin, metformin, Trulicity.    He is accompanied by his wife who is also my patient.  He reports shoulder pain, facial pain including jaw pain and neck pain with exertion.  The symptoms occur mostly when he walks uphill or fast.  Symptoms resolved with rest.  They have specially worsened over the last 2 weeks.  He has not taken nitroglycerin yet.  He has significant family history of premature coronary disease in his father and brother.    The following portions of the patient's history were reviewed and updated as appropriate: allergies, current medications, past family history, past medical history, past social history, past surgical history, and problem list.    Review of Systems   Constitutional: Positive for malaise/fatigue.   Cardiovascular:  Positive for chest pain and dyspnea on exertion. Negative for leg swelling and palpitations.   Respiratory:  Positive for shortness of breath. Negative for cough.    Gastrointestinal:  Negative for abdominal pain, nausea and vomiting.   Neurological:  Positive for dizziness, light-headedness and numbness. Negative for focal weakness and headaches.   All other systems reviewed and are negative.        Current Outpatient Medications:     aspirin 81 MG tablet, Take 1 tablet by mouth Daily., Disp: , Rfl:     Blood Glucose Monitoring Suppl (Accu-Chek Gladis Plus) w/Device kit, 1 kit Daily., Disp: 1 kit, Rfl: 1    buPROPion XL (WELLBUTRIN XL) 150 MG 24 hr tablet, TAKE 1 TABLET BY MOUTH  EVERY DAY, Disp: 90 tablet, Rfl: 1    Dulaglutide (Trulicity) 3 MG/0.5ML solution pen-injector, Inject 0.5 mL under the skin into the appropriate area as directed 1 (One) Time Per Week., Disp: 2 mL, Rfl: 5    glucose blood (Accu-Chek Gladis Plus) test strip, Use daily E11.9, Disp: 100 each, Rfl: 12    lisinopril (PRINIVIL,ZESTRIL) 5 MG tablet, TAKE 1 TABLET BY MOUTH EVERY DAY, Disp: 90 tablet, Rfl: 1    loratadine (Claritin) 10 MG tablet, Take 1 tablet by mouth Daily., Disp: , Rfl:     metFORMIN (GLUCOPHAGE) 1000 MG tablet, TAKE 1 TABLET BY MOUTH TWICE A DAY WITH MEALS, Disp: 180 tablet, Rfl: 1    Multiple Vitamin (MULTIVITAMIN) capsule, Take 1 capsule by mouth Daily., Disp: , Rfl:     nitroglycerin (NITROSTAT) 0.4 MG SL tablet, Place 1 tablet under the tongue Every 5 (Five) Minutes As Needed for Chest Pain., Disp: , Rfl:     simvastatin (ZOCOR) 40 MG tablet, TAKE 1 TABLET BY MOUTH EVERY DAY, Disp: 90 tablet, Rfl: 2    vitamin D3 125 MCG (5000 UT) capsule capsule, Take 1 capsule by mouth Daily., Disp: 90 capsule, Rfl: 9    Current outpatient and discharge medications have been reconciled for the patient.  Reviewed by: Alejandro Nagy MD       No Known Allergies    Family History   Problem Relation Age of Onset    Arthritis Mother         Lupus    Diabetes Brother     Heart disease Father     Heart disease Brother        Past Surgical History:   Procedure Laterality Date    COLONOSCOPY  2019       Past Medical History:   Diagnosis Date    Colon polyp 2019    Diabetes mellitus     Type 2    Hyperlipidemia        Family History   Problem Relation Age of Onset    Arthritis Mother         Lupus    Diabetes Brother     Heart disease Father     Heart disease Brother        Social History     Socioeconomic History    Marital status:    Tobacco Use    Smoking status: Never    Smokeless tobacco: Never   Vaping Use    Vaping status: Never Used   Substance and Sexual Activity    Alcohol use: Yes     Alcohol/week: 1.0  "standard drink of alcohol     Types: 1 Glasses of wine per week    Drug use: Never    Sexual activity: Yes     Partners: Female     Birth control/protection: Post-menopausal, None               Objective:       Physical Exam    /78 (BP Location: Left arm, Patient Position: Sitting)   Pulse 66   Ht 185.4 cm (73\")   Wt 86.2 kg (190 lb)   SpO2 98%   BMI 25.07 kg/m²   The patient is alert, oriented and in no distress.    Vital signs as noted above.    Head and neck revealed no carotid bruits or jugular venous distension.  No thyromegaly or lymphadenopathy is present.    Lungs clear.  No wheezing.  Breath sounds are normal bilaterally.    Heart normal first and second heart sounds.  No murmur..  No pericardial rub is present.  No gallop is present.    Abdomen soft and nontender.  No organomegaly is present.    Extremities revealed good peripheral pulses without any pedal edema.    Skin warm and dry.    Musculoskeletal system is grossly normal.    CNS grossly normal.           Diagnosis Plan   1. Unstable angina        2. Primary hypertension        3. Mixed hyperlipidemia        4. Type 2 diabetes mellitus without complication, without long-term current use of insulin        5. Anxiety and depression        LAB RESULTS (LAST 7 DAYS)    CBC        BMP        CMP         BNP        TROPONIN        CoAg        Creatinine Clearance  CrCl cannot be calculated (Patient's most recent lab result is older than the maximum 30 days allowed.).    ABG        Radiology  No radiology results for the last day    EKG    ECG 12 Lead    Date/Time: 5/6/2024 3:42 PM  Performed by: Alejandro Nagy MD    Authorized by: Alejandro Nagy MD  Previous ECG: no previous ECG available  Comments: ECG shows normal sinus rhythm.  Nonspecific ST changes.  Heart rate 66, CA interval 165, QRS duration 88 and QTc 401 ms.          Stress test      Echocardiogram      Cardiac catheterization  No results found for this or any previous " visit.          Assessment and Plan       Diagnoses and all orders for this visit:    1. Unstable angina (Primary)  Multiple cardiovascular risk factors including hypertension, hyperlipidemia, diabetes, family history of premature coronary disease.  ECG is normal.  He is awaiting stress test and echocardiogram  I have prescribed him nitroglycerin  I think given his symptoms he should undergo cardiac catheterization however patient is reluctant currently and would like to undergo stress test first.  2. Primary hypertension  Blood pressure is normal on lisinopril  We will start beta-blocker after stress testing has been completed.  3. Mixed hyperlipidemia  Currently on simvastatin  Obtain a lipid panel  4. Type 2 diabetes mellitus without complication, without long-term current use of insulin  Currently on metformin and Trulicity  Obtain an A1c  5. Anxiety and depression  He is on bupropion and  Other orders  -     ECG 12 Lead

## 2024-05-06 ENCOUNTER — PATIENT ROUNDING (BHMG ONLY) (OUTPATIENT)
Dept: CARDIOLOGY | Facility: CLINIC | Age: 54
End: 2024-05-06

## 2024-05-06 ENCOUNTER — OFFICE VISIT (OUTPATIENT)
Dept: CARDIOLOGY | Facility: CLINIC | Age: 54
End: 2024-05-06
Payer: COMMERCIAL

## 2024-05-06 VITALS
HEIGHT: 73 IN | SYSTOLIC BLOOD PRESSURE: 131 MMHG | DIASTOLIC BLOOD PRESSURE: 78 MMHG | HEART RATE: 66 BPM | OXYGEN SATURATION: 98 % | WEIGHT: 190 LBS | BODY MASS INDEX: 25.18 KG/M2

## 2024-05-06 DIAGNOSIS — F32.A ANXIETY AND DEPRESSION: ICD-10-CM

## 2024-05-06 DIAGNOSIS — E78.2 MIXED HYPERLIPIDEMIA: ICD-10-CM

## 2024-05-06 DIAGNOSIS — F41.9 ANXIETY AND DEPRESSION: ICD-10-CM

## 2024-05-06 DIAGNOSIS — I20.0 UNSTABLE ANGINA: Primary | ICD-10-CM

## 2024-05-06 DIAGNOSIS — I10 PRIMARY HYPERTENSION: ICD-10-CM

## 2024-05-06 DIAGNOSIS — E11.9 TYPE 2 DIABETES MELLITUS WITHOUT COMPLICATION, WITHOUT LONG-TERM CURRENT USE OF INSULIN: ICD-10-CM

## 2024-05-06 PROCEDURE — 99204 OFFICE O/P NEW MOD 45 MIN: CPT | Performed by: INTERNAL MEDICINE

## 2024-05-06 PROCEDURE — 93000 ELECTROCARDIOGRAM COMPLETE: CPT | Performed by: INTERNAL MEDICINE

## 2024-05-06 RX ORDER — NITROGLYCERIN 0.4 MG/1
0.4 TABLET SUBLINGUAL
COMMUNITY
Start: 2024-05-01

## 2024-05-09 ENCOUNTER — LAB (OUTPATIENT)
Dept: LAB | Facility: HOSPITAL | Age: 54
End: 2024-05-09
Payer: COMMERCIAL

## 2024-05-09 DIAGNOSIS — E11.9 TYPE 2 DIABETES MELLITUS WITHOUT COMPLICATION, WITHOUT LONG-TERM CURRENT USE OF INSULIN: ICD-10-CM

## 2024-05-09 DIAGNOSIS — E78.2 MIXED HYPERLIPIDEMIA: ICD-10-CM

## 2024-05-09 LAB
CHOLEST SERPL-MCNC: 146 MG/DL (ref 0–200)
HBA1C MFR BLD: 7.1 % (ref 4.8–5.6)
HDLC SERPL-MCNC: 48 MG/DL (ref 40–60)
LDLC SERPL CALC-MCNC: 80 MG/DL (ref 0–100)
LDLC/HDLC SERPL: 1.63 {RATIO}
TRIGL SERPL-MCNC: 98 MG/DL (ref 0–150)
VLDLC SERPL-MCNC: 18 MG/DL (ref 5–40)

## 2024-05-09 PROCEDURE — 80061 LIPID PANEL: CPT

## 2024-05-09 PROCEDURE — 83036 HEMOGLOBIN GLYCOSYLATED A1C: CPT

## 2024-05-09 PROCEDURE — 36415 COLL VENOUS BLD VENIPUNCTURE: CPT

## 2024-05-13 ENCOUNTER — HOSPITAL ENCOUNTER (OUTPATIENT)
Dept: CARDIOLOGY | Facility: HOSPITAL | Age: 54
Discharge: HOME | End: 2024-05-13
Payer: COMMERCIAL

## 2024-05-13 VITALS
DIASTOLIC BLOOD PRESSURE: 73 MMHG | BODY MASS INDEX: 25.18 KG/M2 | HEIGHT: 73 IN | WEIGHT: 190 LBS | SYSTOLIC BLOOD PRESSURE: 131 MMHG

## 2024-05-13 DIAGNOSIS — I25.9 CHEST PAIN DUE TO MYOCARDIAL ISCHEMIA, UNSPECIFIED ISCHEMIC CHEST PAIN TYPE: ICD-10-CM

## 2024-05-13 DIAGNOSIS — I20.89 ANGINA PECTORIS, NOCTURNAL: ICD-10-CM

## 2024-05-13 LAB
BH CV ECHO MEAS - ACS: 1.76 CM
BH CV ECHO MEAS - AO MAX PG: 5.8 MMHG
BH CV ECHO MEAS - AO MEAN PG: 3.2 MMHG
BH CV ECHO MEAS - AO ROOT DIAM: 3.2 CM
BH CV ECHO MEAS - AO V2 MAX: 120.2 CM/SEC
BH CV ECHO MEAS - AO V2 VTI: 24.9 CM
BH CV ECHO MEAS - AVA(I,D): 2.34 CM2
BH CV ECHO MEAS - EDV(CUBED): 39.5 ML
BH CV ECHO MEAS - EDV(MOD-SP4): 72.8 ML
BH CV ECHO MEAS - EF(MOD-BP): 54 %
BH CV ECHO MEAS - EF(MOD-SP4): 53.5 %
BH CV ECHO MEAS - ESV(CUBED): 18.9 ML
BH CV ECHO MEAS - ESV(MOD-SP4): 33.8 ML
BH CV ECHO MEAS - FS: 21.9 %
BH CV ECHO MEAS - IVS/LVPW: 1 CM
BH CV ECHO MEAS - IVSD: 1.15 CM
BH CV ECHO MEAS - LA DIMENSION: 2.8 CM
BH CV ECHO MEAS - LV MASS(C)D: 122.7 GRAMS
BH CV ECHO MEAS - LV MAX PG: 3 MMHG
BH CV ECHO MEAS - LV MEAN PG: 1.63 MMHG
BH CV ECHO MEAS - LV V1 MAX: 86.2 CM/SEC
BH CV ECHO MEAS - LV V1 VTI: 17.3 CM
BH CV ECHO MEAS - LVIDD: 3.4 CM
BH CV ECHO MEAS - LVIDS: 2.7 CM
BH CV ECHO MEAS - LVOT AREA: 3.4 CM2
BH CV ECHO MEAS - LVOT DIAM: 2.07 CM
BH CV ECHO MEAS - LVPWD: 1.15 CM
BH CV ECHO MEAS - MR MAX PG: 84 MMHG
BH CV ECHO MEAS - MR MAX VEL: 456.9 CM/SEC
BH CV ECHO MEAS - MV A MAX VEL: 59.1 CM/SEC
BH CV ECHO MEAS - MV DEC SLOPE: 257.2 CM/SEC2
BH CV ECHO MEAS - MV DEC TIME: 0.19 SEC
BH CV ECHO MEAS - MV E MAX VEL: 48.6 CM/SEC
BH CV ECHO MEAS - MV E/A: 0.82
BH CV ECHO MEAS - MV MAX PG: 2.22 MMHG
BH CV ECHO MEAS - MV MEAN PG: 0.96 MMHG
BH CV ECHO MEAS - MV V2 VTI: 19.5 CM
BH CV ECHO MEAS - MVA(VTI): 3 CM2
BH CV ECHO MEAS - PA ACC TIME: 0.15 SEC
BH CV ECHO MEAS - PA V2 MAX: 95.7 CM/SEC
BH CV ECHO MEAS - PULM DIAS VEL: 47.5 CM/SEC
BH CV ECHO MEAS - PULM S/D: 0.9
BH CV ECHO MEAS - PULM SYS VEL: 42.7 CM/SEC
BH CV ECHO MEAS - RAP SYSTOLE: 8 MMHG
BH CV ECHO MEAS - RV MAX PG: 2.14 MMHG
BH CV ECHO MEAS - RV V1 MAX: 73.2 CM/SEC
BH CV ECHO MEAS - RV V1 VTI: 15.1 CM
BH CV ECHO MEAS - RVDD: 2.8 CM
BH CV ECHO MEAS - RVSP: 22.4 MMHG
BH CV ECHO MEAS - SV(LVOT): 58.3 ML
BH CV ECHO MEAS - SV(MOD-SP4): 39 ML
BH CV ECHO MEAS - TR MAX PG: 14.4 MMHG
BH CV ECHO MEAS - TR MAX VEL: 190 CM/SEC
BH CV REST NUCLEAR ISOTOPE DOSE: 10.8 MCI
BH CV STRESS BP STAGE 1: NORMAL
BH CV STRESS BP STAGE 2: NORMAL
BH CV STRESS BP STAGE 3: NORMAL
BH CV STRESS BP STAGE 4: NORMAL
BH CV STRESS DURATION MIN STAGE 1: 3
BH CV STRESS DURATION MIN STAGE 2: 3
BH CV STRESS DURATION MIN STAGE 3: 3
BH CV STRESS DURATION MIN STAGE 4: 1
BH CV STRESS DURATION SEC STAGE 1: 0
BH CV STRESS DURATION SEC STAGE 2: 0
BH CV STRESS DURATION SEC STAGE 3: 0
BH CV STRESS DURATION SEC STAGE 4: 21
BH CV STRESS GRADE STAGE 1: 10
BH CV STRESS GRADE STAGE 2: 12
BH CV STRESS GRADE STAGE 3: 14
BH CV STRESS GRADE STAGE 4: 16
BH CV STRESS HR STAGE 1: 105
BH CV STRESS HR STAGE 2: 112
BH CV STRESS HR STAGE 3: 134
BH CV STRESS HR STAGE 4: 142
BH CV STRESS METS STAGE 1: 5
BH CV STRESS METS STAGE 2: 7.5
BH CV STRESS METS STAGE 3: 10
BH CV STRESS METS STAGE 4: 13.5
BH CV STRESS NUCLEAR ISOTOPE DOSE: 31.8 MCI
BH CV STRESS PROTOCOL 1: NORMAL
BH CV STRESS RECOVERY BP: NORMAL MMHG
BH CV STRESS RECOVERY HR: 86 BPM
BH CV STRESS SPEED STAGE 1: 1.7
BH CV STRESS SPEED STAGE 2: 2.5
BH CV STRESS SPEED STAGE 3: 3.4
BH CV STRESS SPEED STAGE 4: 4.2
BH CV STRESS STAGE 1: 1
BH CV STRESS STAGE 2: 2
BH CV STRESS STAGE 3: 3
BH CV STRESS STAGE 4: 4
LV EF NUC BP: 72 %
MAXIMAL PREDICTED HEART RATE: 166 BPM
PERCENT MAX PREDICTED HR: 85.54 %
STRESS BASELINE BP: NORMAL MMHG
STRESS BASELINE HR: 73 BPM
STRESS PERCENT HR: 101 %
STRESS POST ESTIMATED WORKLOAD: 11.8 METS
STRESS POST EXERCISE DUR MIN: 10 MIN
STRESS POST EXERCISE DUR SEC: 21 SEC
STRESS POST PEAK BP: NORMAL MMHG
STRESS POST PEAK HR: 142 BPM
STRESS TARGET HR: 141 BPM

## 2024-05-13 PROCEDURE — 78452 HT MUSCLE IMAGE SPECT MULT: CPT | Performed by: INTERNAL MEDICINE

## 2024-05-13 PROCEDURE — 93306 TTE W/DOPPLER COMPLETE: CPT

## 2024-05-13 PROCEDURE — 93356 MYOCRD STRAIN IMG SPCKL TRCK: CPT | Performed by: INTERNAL MEDICINE

## 2024-05-13 PROCEDURE — 93017 CV STRESS TEST TRACING ONLY: CPT

## 2024-05-13 PROCEDURE — 78452 HT MUSCLE IMAGE SPECT MULT: CPT

## 2024-05-13 PROCEDURE — 93016 CV STRESS TEST SUPVJ ONLY: CPT | Performed by: INTERNAL MEDICINE

## 2024-05-13 PROCEDURE — 93306 TTE W/DOPPLER COMPLETE: CPT | Performed by: INTERNAL MEDICINE

## 2024-05-13 PROCEDURE — 93356 MYOCRD STRAIN IMG SPCKL TRCK: CPT

## 2024-05-13 PROCEDURE — 0 TECHNETIUM SESTAMIBI

## 2024-05-13 PROCEDURE — A9500 TC99M SESTAMIBI: HCPCS

## 2024-05-13 PROCEDURE — 93018 CV STRESS TEST I&R ONLY: CPT | Performed by: INTERNAL MEDICINE

## 2024-05-13 RX ADMIN — TECHNETIUM TC 99M SESTAMIBI 1 DOSE: 1 INJECTION INTRAVENOUS at 08:06

## 2024-05-13 RX ADMIN — TECHNETIUM TC 99M SESTAMIBI 1 DOSE: 1 INJECTION INTRAVENOUS at 10:01

## 2024-05-14 ENCOUNTER — TELEPHONE (OUTPATIENT)
Dept: CARDIOLOGY | Facility: CLINIC | Age: 54
End: 2024-05-14
Payer: COMMERCIAL

## 2024-05-14 DIAGNOSIS — I25.9 CHEST PAIN DUE TO MYOCARDIAL ISCHEMIA, UNSPECIFIED ISCHEMIC CHEST PAIN TYPE: Primary | ICD-10-CM

## 2024-05-14 DIAGNOSIS — R94.39 ABNORMAL NUCLEAR STRESS TEST: ICD-10-CM

## 2024-05-14 NOTE — TELEPHONE ENCOUNTER
Returned call and reviewed results of recent stress test and echocardiogram with patient's wife. Questions answered. She verbalized understanding.

## 2024-05-14 NOTE — TELEPHONE ENCOUNTER
Please schedule patient for a left cardiac catheterization with Dr. Nagy. Patient prefers 5/23/24 if possible. Pre-procedure labs ordered. He should hold his diabetes medications on the day of his procedure.

## 2024-05-14 NOTE — TELEPHONE ENCOUNTER
"  Caller: Ricci Ruiz \"Arslan\"    Relationship: Self    Best call back number: [phone]    Caller requesting test results: AMISHA    What test was performed: STRESS TEST    When was the test performed: 05/13/2024    Where was the test performed:     Additional notes: PT WIFE CALLED AND THEY SAW IN MY CHART, THE STRESS TEST WAS ABNORMAL. DR NELSON HAD DISCUSSED A HEART CATH IF ANYTHING WAS ABNORMAL.   WHAT IS THE NEXT STEP?  HE IS CHANGING JOBS AND INSURANCE, HE WILL HAVE HIS CIGNA UNTIL 06/27/2024  PLEASE CALL TO DISCUSS      "

## 2024-05-14 NOTE — TELEPHONE ENCOUNTER
Patient's wife calling re: whether he needs a heart cath.     Stress test with myocardial perfusion one day (05/13/2024 10:01)     And for results of echo    Adult Transthoracic Echo Complete W/ Cont if Necessary Per Protocol (05/13/2024 09:05)

## 2024-05-15 PROBLEM — I25.9 CHEST PAIN DUE TO MYOCARDIAL ISCHEMIA: Status: ACTIVE | Noted: 2024-05-14

## 2024-05-15 PROBLEM — R94.39 ABNORMAL NUCLEAR STRESS TEST: Status: ACTIVE | Noted: 2024-05-14

## 2024-05-21 ENCOUNTER — LAB (OUTPATIENT)
Dept: LAB | Facility: HOSPITAL | Age: 54
End: 2024-05-21
Payer: COMMERCIAL

## 2024-05-21 DIAGNOSIS — R94.39 ABNORMAL NUCLEAR STRESS TEST: ICD-10-CM

## 2024-05-21 DIAGNOSIS — I25.9 CHEST PAIN DUE TO MYOCARDIAL ISCHEMIA, UNSPECIFIED ISCHEMIC CHEST PAIN TYPE: ICD-10-CM

## 2024-05-21 LAB
ANION GAP SERPL CALCULATED.3IONS-SCNC: 11 MMOL/L (ref 5–15)
BUN SERPL-MCNC: 19 MG/DL (ref 6–20)
BUN/CREAT SERPL: 14.8 (ref 7–25)
CALCIUM SPEC-SCNC: 9.4 MG/DL (ref 8.6–10.5)
CHLORIDE SERPL-SCNC: 106 MMOL/L (ref 98–107)
CHOLEST SERPL-MCNC: 151 MG/DL (ref 0–200)
CO2 SERPL-SCNC: 25 MMOL/L (ref 22–29)
CREAT SERPL-MCNC: 1.28 MG/DL (ref 0.76–1.27)
DEPRECATED RDW RBC AUTO: 40.6 FL (ref 37–54)
EGFRCR SERPLBLD CKD-EPI 2021: 66.5 ML/MIN/1.73
ERYTHROCYTE [DISTWIDTH] IN BLOOD BY AUTOMATED COUNT: 12 % (ref 12.3–15.4)
GLUCOSE SERPL-MCNC: 147 MG/DL (ref 65–99)
HCT VFR BLD AUTO: 41.8 % (ref 37.5–51)
HDLC SERPL-MCNC: 51 MG/DL (ref 40–60)
HGB BLD-MCNC: 14.4 G/DL (ref 13–17.7)
INR PPP: 0.98 (ref 0.93–1.1)
LDLC SERPL CALC-MCNC: 82 MG/DL (ref 0–100)
LDLC/HDLC SERPL: 1.58 {RATIO}
MCH RBC QN AUTO: 31.9 PG (ref 26.6–33)
MCHC RBC AUTO-ENTMCNC: 34.4 G/DL (ref 31.5–35.7)
MCV RBC AUTO: 92.7 FL (ref 79–97)
PLATELET # BLD AUTO: 215 10*3/MM3 (ref 140–450)
PMV BLD AUTO: 10.9 FL (ref 6–12)
POTASSIUM SERPL-SCNC: 4.5 MMOL/L (ref 3.5–5.2)
PROTHROMBIN TIME: 10.7 SECONDS (ref 9.6–11.7)
RBC # BLD AUTO: 4.51 10*6/MM3 (ref 4.14–5.8)
SODIUM SERPL-SCNC: 142 MMOL/L (ref 136–145)
TRIGL SERPL-MCNC: 97 MG/DL (ref 0–150)
VLDLC SERPL-MCNC: 18 MG/DL (ref 5–40)
WBC NRBC COR # BLD AUTO: 5.86 10*3/MM3 (ref 3.4–10.8)

## 2024-05-21 PROCEDURE — 85610 PROTHROMBIN TIME: CPT

## 2024-05-21 PROCEDURE — 85027 COMPLETE CBC AUTOMATED: CPT

## 2024-05-21 PROCEDURE — 36415 COLL VENOUS BLD VENIPUNCTURE: CPT

## 2024-05-21 PROCEDURE — 80061 LIPID PANEL: CPT

## 2024-05-21 PROCEDURE — 80048 BASIC METABOLIC PNL TOTAL CA: CPT

## 2024-05-23 ENCOUNTER — HOSPITAL ENCOUNTER (INPATIENT)
Facility: HOSPITAL | Age: 54
LOS: 6 days | Discharge: HOME | DRG: 234 | End: 2024-05-29
Attending: INTERNAL MEDICINE
Payer: COMMERCIAL

## 2024-05-23 ENCOUNTER — APPOINTMENT (OUTPATIENT)
Dept: GENERAL RADIOLOGY | Facility: HOSPITAL | Age: 54
DRG: 234 | End: 2024-05-23
Payer: COMMERCIAL

## 2024-05-23 ENCOUNTER — APPOINTMENT (OUTPATIENT)
Dept: CARDIOLOGY | Facility: HOSPITAL | Age: 54
DRG: 234 | End: 2024-05-23
Payer: COMMERCIAL

## 2024-05-23 DIAGNOSIS — N18.2 CKD (CHRONIC KIDNEY DISEASE) STAGE 2, GFR 60-89 ML/MIN: ICD-10-CM

## 2024-05-23 DIAGNOSIS — I25.10 CORONARY ARTERY DISEASE INVOLVING NATIVE CORONARY ARTERY OF NATIVE HEART, UNSPECIFIED WHETHER ANGINA PRESENT: Primary | ICD-10-CM

## 2024-05-23 DIAGNOSIS — I25.9 CHEST PAIN DUE TO MYOCARDIAL ISCHEMIA, UNSPECIFIED ISCHEMIC CHEST PAIN TYPE: ICD-10-CM

## 2024-05-23 DIAGNOSIS — R94.39 ABNORMAL NUCLEAR STRESS TEST: ICD-10-CM

## 2024-05-23 DIAGNOSIS — Z95.1 S/P CABG X 4: ICD-10-CM

## 2024-05-23 LAB
ABO GROUP BLD: NORMAL
ALBUMIN SERPL-MCNC: 4.3 G/DL (ref 3.5–5.2)
ALBUMIN/GLOB SERPL: 1.7 G/DL
ALP SERPL-CCNC: 70 U/L (ref 39–117)
ALT SERPL W P-5'-P-CCNC: 18 U/L (ref 1–41)
ANION GAP SERPL CALCULATED.3IONS-SCNC: 9 MMOL/L (ref 5–15)
APTT PPP: 26.2 SECONDS (ref 24–31)
ARTERIAL PATENCY WRIST A: POSITIVE
AST SERPL-CCNC: 15 U/L (ref 1–40)
ATMOSPHERIC PRESS: ABNORMAL MM[HG]
BASE EXCESS BLDA CALC-SCNC: 2.4 MMOL/L (ref 0–3)
BASOPHILS # BLD AUTO: 0.08 10*3/MM3 (ref 0–0.2)
BASOPHILS NFR BLD AUTO: 1.3 % (ref 0–1.5)
BDY SITE: ABNORMAL
BH CV XLRA MEAS - DIST GSV CALF DIST LEFT: 0.16 CM
BH CV XLRA MEAS - DIST GSV CALF DIST RIGHT: 0.18 CM
BH CV XLRA MEAS - DIST GSV THIGH DIST LEFT: 0.39 CM
BH CV XLRA MEAS - DIST GSV THIGH DIST RIGHT: 0.39 CM
BH CV XLRA MEAS - MID GSV CALF LEFT: 0.18 CM
BH CV XLRA MEAS - MID GSV CALF RIGHT: 0.19 CM
BH CV XLRA MEAS - MID GSV THIGH  LEFT: 0.39 CM
BH CV XLRA MEAS - MID GSV THIGH  RIGHT: 0.36 CM
BH CV XLRA MEAS - PROX GSV CALF DIST LEFT: 0.27 CM
BH CV XLRA MEAS - PROX GSV CALF DIST RIGHT: 0.25 CM
BH CV XLRA MEAS - PROX GSV THIGH  LEFT: 0.52 CM
BH CV XLRA MEAS - PROX GSV THIGH  RIGHT: 0.65 CM
BH CV XLRA MEAS LEFT CAROTID BULB PSV: -79.2 CM/SEC
BH CV XLRA MEAS LEFT DIST CCA EDV: -28 CM/SEC
BH CV XLRA MEAS LEFT DIST CCA PSV: -78.5 CM/SEC
BH CV XLRA MEAS LEFT DIST ICA EDV: -30.8 CM/SEC
BH CV XLRA MEAS LEFT DIST ICA PSV: -77.8 CM/SEC
BH CV XLRA MEAS LEFT ICA/CCA RATIO: -0.73
BH CV XLRA MEAS LEFT PROX CCA EDV: 30.8 CM/SEC
BH CV XLRA MEAS LEFT PROX CCA PSV: 106 CM/SEC
BH CV XLRA MEAS LEFT PROX ECA PSV: -72.9 CM/SEC
BH CV XLRA MEAS LEFT PROX ICA EDV: -28 CM/SEC
BH CV XLRA MEAS LEFT PROX ICA PSV: -64.5 CM/SEC
BH CV XLRA MEAS LEFT PROX SCLA PSV: 156 CM/SEC
BH CV XLRA MEAS LEFT VERTEBRAL A PSV: -45.7 CM/SEC
BH CV XLRA MEAS RIGHT CAROTID BULB PSV: -48.5 CM/SEC
BH CV XLRA MEAS RIGHT DIST CCA EDV: -21.7 CM/SEC
BH CV XLRA MEAS RIGHT DIST CCA PSV: -75.2 CM/SEC
BH CV XLRA MEAS RIGHT DIST ICA EDV: -39.1 CM/SEC
BH CV XLRA MEAS RIGHT DIST ICA PSV: -96.3 CM/SEC
BH CV XLRA MEAS RIGHT ICA/CCA RATIO: -0.99
BH CV XLRA MEAS RIGHT PROX CCA EDV: 27.3 CM/SEC
BH CV XLRA MEAS RIGHT PROX CCA PSV: 97.5 CM/SEC
BH CV XLRA MEAS RIGHT PROX ECA PSV: -70.2 CM/SEC
BH CV XLRA MEAS RIGHT PROX ICA EDV: -21.1 CM/SEC
BH CV XLRA MEAS RIGHT PROX ICA PSV: -63.4 CM/SEC
BH CV XLRA MEAS RIGHT PROX SCLA PSV: 117 CM/SEC
BH CV XLRA MEAS RIGHT VERTEBRAL A PSV: -49.7 CM/SEC
BILIRUB SERPL-MCNC: 0.4 MG/DL (ref 0–1.2)
BILIRUB UR QL STRIP: NEGATIVE
BLD GP AB SCN SERPL QL: NEGATIVE
BUN SERPL-MCNC: 16 MG/DL (ref 6–20)
BUN/CREAT SERPL: 13.3 (ref 7–25)
CALCIUM SPEC-SCNC: 8.9 MG/DL (ref 8.6–10.5)
CHLORIDE SERPL-SCNC: 103 MMOL/L (ref 98–107)
CLARITY UR: CLEAR
CLOSE TME COLL+ADP + EPINEP PNL BLD: 96 % (ref 86–100)
CO2 BLDA-SCNC: 27.6 MMOL/L (ref 22–29)
CO2 SERPL-SCNC: 27 MMOL/L (ref 22–29)
COLOR UR: YELLOW
CREAT SERPL-MCNC: 1.2 MG/DL (ref 0.76–1.27)
DEPRECATED RDW RBC AUTO: 41.8 FL (ref 37–54)
EGFRCR SERPLBLD CKD-EPI 2021: 71.9 ML/MIN/1.73
EOSINOPHIL # BLD AUTO: 0.11 10*3/MM3 (ref 0–0.4)
EOSINOPHIL NFR BLD AUTO: 1.8 % (ref 0.3–6.2)
ERYTHROCYTE [DISTWIDTH] IN BLOOD BY AUTOMATED COUNT: 12 % (ref 12.3–15.4)
GLOBULIN UR ELPH-MCNC: 2.6 GM/DL
GLUCOSE BLDC GLUCOMTR-MCNC: 137 MG/DL (ref 70–105)
GLUCOSE SERPL-MCNC: 176 MG/DL (ref 65–99)
GLUCOSE UR STRIP-MCNC: ABNORMAL MG/DL
HBA1C MFR BLD: 7.15 % (ref 4.8–5.6)
HCO3 BLDA-SCNC: 26.5 MMOL/L (ref 21–28)
HCT VFR BLD AUTO: 41.6 % (ref 37.5–51)
HEMODILUTION: NO
HGB BLD-MCNC: 13.7 G/DL (ref 13–17.7)
HGB UR QL STRIP.AUTO: NEGATIVE
IMM GRANULOCYTES # BLD AUTO: 0.02 10*3/MM3 (ref 0–0.05)
IMM GRANULOCYTES NFR BLD AUTO: 0.3 % (ref 0–0.5)
INHALED O2 CONCENTRATION: 21 %
INR PPP: 1 (ref 0.93–1.1)
KETONES UR QL STRIP: NEGATIVE
LEFT ARM BP: NORMAL MMHG
LEUKOCYTE ESTERASE UR QL STRIP.AUTO: NEGATIVE
LYMPHOCYTES # BLD AUTO: 2.21 10*3/MM3 (ref 0.7–3.1)
LYMPHOCYTES NFR BLD AUTO: 37.1 % (ref 19.6–45.3)
MCH RBC QN AUTO: 31.3 PG (ref 26.6–33)
MCHC RBC AUTO-ENTMCNC: 32.9 G/DL (ref 31.5–35.7)
MCV RBC AUTO: 95 FL (ref 79–97)
MODALITY: ABNORMAL
MONOCYTES # BLD AUTO: 0.56 10*3/MM3 (ref 0.1–0.9)
MONOCYTES NFR BLD AUTO: 9.4 % (ref 5–12)
MRSA DNA SPEC QL NAA+PROBE: NORMAL
NEUTROPHILS NFR BLD AUTO: 2.97 10*3/MM3 (ref 1.7–7)
NEUTROPHILS NFR BLD AUTO: 50.1 % (ref 42.7–76)
NITRITE UR QL STRIP: NEGATIVE
NRBC BLD AUTO-RTO: 0 /100 WBC (ref 0–0.2)
PCO2 BLDA: 38.2 MM HG (ref 35–48)
PH BLDA: 7.45 PH UNITS (ref 7.35–7.45)
PH UR STRIP.AUTO: 7 [PH] (ref 5–8)
PLATELET # BLD AUTO: 174 10*3/MM3 (ref 140–450)
PMV BLD AUTO: 10.5 FL (ref 6–12)
PO2 BLDA: 77.3 MM HG (ref 83–108)
POTASSIUM SERPL-SCNC: 3.5 MMOL/L (ref 3.5–5.2)
PROT SERPL-MCNC: 6.9 G/DL (ref 6–8.5)
PROT UR QL STRIP: NEGATIVE
PROTHROMBIN TIME: 10.9 SECONDS (ref 9.6–11.7)
RBC # BLD AUTO: 4.38 10*6/MM3 (ref 4.14–5.8)
RH BLD: POSITIVE
SAO2 % BLDCOA: 95.9 % (ref 94–98)
SARS-COV-2 RNA RESP QL NAA+PROBE: NOT DETECTED
SODIUM SERPL-SCNC: 139 MMOL/L (ref 136–145)
SP GR UR STRIP: 1.02 (ref 1–1.03)
T&S EXPIRATION DATE: NORMAL
UROBILINOGEN UR QL STRIP: ABNORMAL
WBC NRBC COR # BLD AUTO: 5.95 10*3/MM3 (ref 3.4–10.8)

## 2024-05-23 PROCEDURE — C1769 GUIDE WIRE: HCPCS | Performed by: INTERNAL MEDICINE

## 2024-05-23 PROCEDURE — 25010000002 HEPARIN (PORCINE) PER 1000 UNITS: Performed by: INTERNAL MEDICINE

## 2024-05-23 PROCEDURE — 36600 WITHDRAWAL OF ARTERIAL BLOOD: CPT

## 2024-05-23 PROCEDURE — 93010 ELECTROCARDIOGRAM REPORT: CPT | Performed by: STUDENT IN AN ORGANIZED HEALTH CARE EDUCATION/TRAINING PROGRAM

## 2024-05-23 PROCEDURE — C1894 INTRO/SHEATH, NON-LASER: HCPCS | Performed by: INTERNAL MEDICINE

## 2024-05-23 PROCEDURE — 87635 SARS-COV-2 COVID-19 AMP PRB: CPT | Performed by: NURSE PRACTITIONER

## 2024-05-23 PROCEDURE — 86900 BLOOD TYPING SEROLOGIC ABO: CPT

## 2024-05-23 PROCEDURE — 87641 MR-STAPH DNA AMP PROBE: CPT | Performed by: NURSE PRACTITIONER

## 2024-05-23 PROCEDURE — 99152 MOD SED SAME PHYS/QHP 5/>YRS: CPT | Performed by: INTERNAL MEDICINE

## 2024-05-23 PROCEDURE — 94761 N-INVAS EAR/PLS OXIMETRY MLT: CPT

## 2024-05-23 PROCEDURE — 93970 EXTREMITY STUDY: CPT

## 2024-05-23 PROCEDURE — 85730 THROMBOPLASTIN TIME PARTIAL: CPT | Performed by: NURSE PRACTITIONER

## 2024-05-23 PROCEDURE — 93458 L HRT ARTERY/VENTRICLE ANGIO: CPT | Performed by: INTERNAL MEDICINE

## 2024-05-23 PROCEDURE — 86850 RBC ANTIBODY SCREEN: CPT | Performed by: NURSE PRACTITIONER

## 2024-05-23 PROCEDURE — 25010000002 LIDOCAINE 1 % SOLUTION: Performed by: INTERNAL MEDICINE

## 2024-05-23 PROCEDURE — 25810000003 SODIUM CHLORIDE 0.9 % SOLUTION: Performed by: INTERNAL MEDICINE

## 2024-05-23 PROCEDURE — 85025 COMPLETE CBC W/AUTO DIFF WBC: CPT | Performed by: NURSE PRACTITIONER

## 2024-05-23 PROCEDURE — 25010000002 NICARDIPINE 2.5 MG/ML SOLUTION: Performed by: INTERNAL MEDICINE

## 2024-05-23 PROCEDURE — 82803 BLOOD GASES ANY COMBINATION: CPT

## 2024-05-23 PROCEDURE — 71045 X-RAY EXAM CHEST 1 VIEW: CPT

## 2024-05-23 PROCEDURE — 93005 ELECTROCARDIOGRAM TRACING: CPT | Performed by: NURSE PRACTITIONER

## 2024-05-23 PROCEDURE — 94799 UNLISTED PULMONARY SVC/PX: CPT

## 2024-05-23 PROCEDURE — 93880 EXTRACRANIAL BILAT STUDY: CPT | Performed by: SURGERY

## 2024-05-23 PROCEDURE — 81003 URINALYSIS AUTO W/O SCOPE: CPT | Performed by: NURSE PRACTITIONER

## 2024-05-23 PROCEDURE — 93880 EXTRACRANIAL BILAT STUDY: CPT

## 2024-05-23 PROCEDURE — 85610 PROTHROMBIN TIME: CPT | Performed by: NURSE PRACTITIONER

## 2024-05-23 PROCEDURE — 25010000002 MIDAZOLAM PER 1 MG: Performed by: INTERNAL MEDICINE

## 2024-05-23 PROCEDURE — 82948 REAGENT STRIP/BLOOD GLUCOSE: CPT

## 2024-05-23 PROCEDURE — 86900 BLOOD TYPING SEROLOGIC ABO: CPT | Performed by: NURSE PRACTITIONER

## 2024-05-23 PROCEDURE — 86901 BLOOD TYPING SEROLOGIC RH(D): CPT | Performed by: NURSE PRACTITIONER

## 2024-05-23 PROCEDURE — 86901 BLOOD TYPING SEROLOGIC RH(D): CPT

## 2024-05-23 PROCEDURE — 86923 COMPATIBILITY TEST ELECTRIC: CPT

## 2024-05-23 PROCEDURE — 4A023N7 MEASUREMENT OF CARDIAC SAMPLING AND PRESSURE, LEFT HEART, PERCUTANEOUS APPROACH: ICD-10-PCS | Performed by: INTERNAL MEDICINE

## 2024-05-23 PROCEDURE — 83036 HEMOGLOBIN GLYCOSYLATED A1C: CPT

## 2024-05-23 PROCEDURE — 25510000001 IOPAMIDOL PER 1 ML: Performed by: INTERNAL MEDICINE

## 2024-05-23 PROCEDURE — 93970 EXTREMITY STUDY: CPT | Performed by: STUDENT IN AN ORGANIZED HEALTH CARE EDUCATION/TRAINING PROGRAM

## 2024-05-23 PROCEDURE — 25010000002 FENTANYL CITRATE (PF) 100 MCG/2ML SOLUTION: Performed by: INTERNAL MEDICINE

## 2024-05-23 PROCEDURE — 80053 COMPREHEN METABOLIC PANEL: CPT | Performed by: NURSE PRACTITIONER

## 2024-05-23 PROCEDURE — B2111ZZ FLUOROSCOPY OF MULTIPLE CORONARY ARTERIES USING LOW OSMOLAR CONTRAST: ICD-10-PCS | Performed by: INTERNAL MEDICINE

## 2024-05-23 PROCEDURE — 85576 BLOOD PLATELET AGGREGATION: CPT | Performed by: NURSE PRACTITIONER

## 2024-05-23 RX ORDER — HEPARIN SODIUM 1000 [USP'U]/ML
INJECTION, SOLUTION INTRAVENOUS; SUBCUTANEOUS
Status: DISCONTINUED | OUTPATIENT
Start: 2024-05-23 | End: 2024-05-23 | Stop reason: HOSPADM

## 2024-05-23 RX ORDER — ISOSORBIDE MONONITRATE 30 MG/1
30 TABLET, EXTENDED RELEASE ORAL EVERY MORNING
Qty: 90 TABLET | Refills: 3 | Status: SHIPPED | OUTPATIENT
Start: 2024-05-23 | End: 2024-05-29 | Stop reason: HOSPADM

## 2024-05-23 RX ORDER — ALPRAZOLAM 0.25 MG/1
0.25 TABLET ORAL EVERY 8 HOURS PRN
Status: DISCONTINUED | OUTPATIENT
Start: 2024-05-23 | End: 2024-05-24

## 2024-05-23 RX ORDER — ATORVASTATIN CALCIUM 20 MG/1
20 TABLET, FILM COATED ORAL DAILY
Status: DISCONTINUED | OUTPATIENT
Start: 2024-05-24 | End: 2024-05-24

## 2024-05-23 RX ORDER — DEXTROSE MONOHYDRATE 25 G/50ML
10-50 INJECTION, SOLUTION INTRAVENOUS
Status: DISCONTINUED | OUTPATIENT
Start: 2024-05-23 | End: 2024-05-24 | Stop reason: HOSPADM

## 2024-05-23 RX ORDER — FENTANYL CITRATE 50 UG/ML
INJECTION, SOLUTION INTRAMUSCULAR; INTRAVENOUS
Status: DISCONTINUED | OUTPATIENT
Start: 2024-05-23 | End: 2024-05-23 | Stop reason: HOSPADM

## 2024-05-23 RX ORDER — ACETAMINOPHEN 325 MG/1
650 TABLET ORAL EVERY 4 HOURS PRN
Status: DISCONTINUED | OUTPATIENT
Start: 2024-05-23 | End: 2024-05-24

## 2024-05-23 RX ORDER — CHLORHEXIDINE GLUCONATE 500 MG/1
CLOTH TOPICAL EVERY 12 HOURS
Status: COMPLETED | OUTPATIENT
Start: 2024-05-23 | End: 2024-05-24

## 2024-05-23 RX ORDER — SODIUM CHLORIDE 9 MG/ML
INJECTION, SOLUTION INTRAVENOUS
Status: COMPLETED | OUTPATIENT
Start: 2024-05-23 | End: 2024-05-23

## 2024-05-23 RX ORDER — SEMAGLUTIDE 1.34 MG/ML
0.5 INJECTION, SOLUTION SUBCUTANEOUS WEEKLY
COMMUNITY

## 2024-05-23 RX ORDER — SODIUM CHLORIDE 9 MG/ML
30 INJECTION, SOLUTION INTRAVENOUS CONTINUOUS PRN
Status: DISCONTINUED | OUTPATIENT
Start: 2024-05-23 | End: 2024-05-24

## 2024-05-23 RX ORDER — ONDANSETRON 4 MG/1
4 TABLET, ORALLY DISINTEGRATING ORAL EVERY 6 HOURS PRN
Status: DISCONTINUED | OUTPATIENT
Start: 2024-05-23 | End: 2024-05-24

## 2024-05-23 RX ORDER — IBUPROFEN 600 MG/1
1 TABLET ORAL
Status: DISCONTINUED | OUTPATIENT
Start: 2024-05-23 | End: 2024-05-24 | Stop reason: HOSPADM

## 2024-05-23 RX ORDER — LIDOCAINE HYDROCHLORIDE 10 MG/ML
INJECTION, SOLUTION INFILTRATION; PERINEURAL
Status: DISCONTINUED | OUTPATIENT
Start: 2024-05-23 | End: 2024-05-23 | Stop reason: HOSPADM

## 2024-05-23 RX ORDER — NICOTINE POLACRILEX 4 MG
15 LOZENGE BUCCAL
Status: DISCONTINUED | OUTPATIENT
Start: 2024-05-23 | End: 2024-05-24 | Stop reason: HOSPADM

## 2024-05-23 RX ORDER — SODIUM CHLORIDE 0.9 % (FLUSH) 0.9 %
30 SYRINGE (ML) INJECTION ONCE AS NEEDED
Status: DISCONTINUED | OUTPATIENT
Start: 2024-05-23 | End: 2024-05-24 | Stop reason: HOSPADM

## 2024-05-23 RX ORDER — BUPROPION HYDROCHLORIDE 150 MG/1
150 TABLET ORAL DAILY
Status: DISCONTINUED | OUTPATIENT
Start: 2024-05-24 | End: 2024-05-24

## 2024-05-23 RX ORDER — NICARDIPINE HYDROCHLORIDE 2.5 MG/ML
INJECTION INTRAVENOUS
Status: DISCONTINUED | OUTPATIENT
Start: 2024-05-23 | End: 2024-05-23 | Stop reason: HOSPADM

## 2024-05-23 RX ORDER — CHLORHEXIDINE GLUCONATE ORAL RINSE 1.2 MG/ML
15 SOLUTION DENTAL EVERY 12 HOURS SCHEDULED
Status: COMPLETED | OUTPATIENT
Start: 2024-05-23 | End: 2024-05-24

## 2024-05-23 RX ORDER — ONDANSETRON 2 MG/ML
4 INJECTION INTRAMUSCULAR; INTRAVENOUS EVERY 6 HOURS PRN
Status: DISCONTINUED | OUTPATIENT
Start: 2024-05-23 | End: 2024-05-24

## 2024-05-23 RX ORDER — ASPIRIN 81 MG/1
81 TABLET ORAL DAILY
Status: DISCONTINUED | OUTPATIENT
Start: 2024-05-24 | End: 2024-05-24

## 2024-05-23 RX ORDER — DIPHENHYDRAMINE HCL 25 MG
25 CAPSULE ORAL EVERY 6 HOURS PRN
Status: DISCONTINUED | OUTPATIENT
Start: 2024-05-23 | End: 2024-05-24

## 2024-05-23 RX ORDER — NITROGLYCERIN 0.4 MG/1
0.4 TABLET SUBLINGUAL
Status: DISCONTINUED | OUTPATIENT
Start: 2024-05-23 | End: 2024-05-24

## 2024-05-23 RX ORDER — MIDAZOLAM HYDROCHLORIDE 1 MG/ML
INJECTION INTRAMUSCULAR; INTRAVENOUS
Status: DISCONTINUED | OUTPATIENT
Start: 2024-05-23 | End: 2024-05-23 | Stop reason: HOSPADM

## 2024-05-23 RX ADMIN — ALPRAZOLAM 0.25 MG: 0.25 TABLET ORAL at 23:30

## 2024-05-23 RX ADMIN — MUPIROCIN 1 APPLICATION: 20 OINTMENT TOPICAL at 20:47

## 2024-05-23 RX ADMIN — CHLORHEXIDINE GLUCONATE: 500 CLOTH TOPICAL at 20:48

## 2024-05-23 RX ADMIN — CHLORHEXIDINE GLUCONATE, 0.12% ORAL RINSE 15 ML: 1.2 SOLUTION DENTAL at 20:47

## 2024-05-23 NOTE — INTERVAL H&P NOTE
H&P reviewed. The patient was examined and there are no changes to the H&P.      Proceed with coronary angiography.

## 2024-05-23 NOTE — CONSULTS
Patient Care Team:  Rain Paniagua MD as PCP - General (Family Medicine)  Referring Provider:  Dr. Nagy  Reason for consultation:  MV CAD    Chief complaint:  chest pain    Subjective     History of Present Illness:  52 y/o gentleman with HTN, HLD, DM type 2, and strong family hx of CAD recently established care with Dr. Nagy.  At his appointment he had complaints of chest pain which had been progressively worsening chest pain for the past 2-3 weeks.  Associated symptoms include diaphoresis and fatigue.  He underwent a stress test and subsequently presented to the hospital for outpatient cardiac catheterization which revealed severe multivessel CAD.  We were asked to see for cardiac surgery evaluation.     He denies significant ETOH use, tobacco abuse, and illicit drugs     Review of Systems   Constitutional:  Positive for activity change, diaphoresis and fatigue.   Respiratory:  Positive for chest tightness and shortness of breath.    Skin:  Positive for pallor. Negative for color change.        Past Medical History:   Diagnosis Date    Colon polyp 2019    Diabetes mellitus     Type 2    Hyperlipidemia      Past Surgical History:   Procedure Laterality Date    COLONOSCOPY  2019     Family History   Problem Relation Age of Onset    Arthritis Mother         Lupus    Diabetes Brother     Heart disease Father     Heart disease Brother      Social History     Tobacco Use    Smoking status: Never    Smokeless tobacco: Never   Vaping Use    Vaping status: Never Used   Substance Use Topics    Alcohol use: Yes     Alcohol/week: 1.0 standard drink of alcohol     Types: 1 Glasses of wine per week    Drug use: Never     Medications Prior to Admission   Medication Sig Dispense Refill Last Dose    aspirin 81 MG tablet Take 1 tablet by mouth Daily.   5/23/2024    Blood Glucose Monitoring Suppl (Accu-Chek Gladis Plus) w/Device kit 1 kit Daily. 1 kit 1     buPROPion XL (WELLBUTRIN XL) 150 MG 24 hr tablet TAKE 1 TABLET BY MOUTH  "EVERY DAY 90 tablet 1 5/23/2024    glucose blood (Accu-Chek Gladis Plus) test strip Use daily E11.9 100 each 12     lisinopril (PRINIVIL,ZESTRIL) 5 MG tablet TAKE 1 TABLET BY MOUTH EVERY DAY 90 tablet 1 5/23/2024    loratadine (Claritin) 10 MG tablet Take 1 tablet by mouth Daily.   5/23/2024    metFORMIN (GLUCOPHAGE) 1000 MG tablet TAKE 1 TABLET BY MOUTH TWICE A DAY WITH MEALS 180 tablet 1 5/22/2024 at 0800    Multiple Vitamin (MULTIVITAMIN) capsule Take 1 capsule by mouth Daily.   5/22/2024    nitroglycerin (NITROSTAT) 0.4 MG SL tablet Place 1 tablet under the tongue Every 5 (Five) Minutes As Needed for Chest Pain.       Semaglutide, 1 MG/DOSE, (Ozempic, 1 MG/DOSE,) 2 MG/1.5ML solution pen-injector Inject 0.5 mg under the skin into the appropriate area as directed 1 (One) Time Per Week.   5/9/2024    simvastatin (ZOCOR) 40 MG tablet TAKE 1 TABLET BY MOUTH EVERY DAY 90 tablet 2 5/23/2024    vitamin D3 125 MCG (5000 UT) capsule capsule Take 1 capsule by mouth Daily. 90 capsule 9 5/22/2024        Allergies:  Patient has no known allergies.    Objective      Vital Signs  Temp:  [98.4 °F (36.9 °C)] 98.4 °F (36.9 °C)  Heart Rate:  [71-85] 85  Resp:  [16-18] 18  BP: (123-137)/(75-94) 123/75    Flowsheet Rows      Flowsheet Row First Filed Value   Admission Height 181.6 cm (71.5\") Documented at 05/23/2024 1104   Admission Weight 86.6 kg (190 lb 14.7 oz) Documented at 05/23/2024 1104          181.6 cm (71.5\")    Physical Exam  Vitals and nursing note reviewed.   Constitutional:       General: He is awake.      Appearance: Normal appearance. He is well-developed and well-groomed.      Comments: Family at bedside   HENT:      Head: Normocephalic and atraumatic.      Nose: Nose normal.      Mouth/Throat:      Lips: Pink.      Mouth: Mucous membranes are moist.      Pharynx: Uvula midline.   Eyes:      General: Lids are normal. No scleral icterus.     Extraocular Movements: Extraocular movements intact.      Conjunctiva/sclera: " Conjunctivae normal.      Pupils: Pupils are equal, round, and reactive to light.      Comments: Wearing glasses   Neck:      Thyroid: No thyroid mass or thyromegaly.      Vascular: Normal carotid pulses. No carotid bruit, hepatojugular reflux or JVD.      Trachea: Trachea normal.   Cardiovascular:      Rate and Rhythm: Normal rate and regular rhythm.      Pulses:           Carotid pulses are 2+ on the right side and 2+ on the left side.       Radial pulses are 2+ on the right side and 2+ on the left side.        Femoral pulses are 2+ on the right side and 2+ on the left side.       Popliteal pulses are 2+ on the right side and 2+ on the left side.        Dorsalis pedis pulses are 2+ on the right side and 2+ on the left side.        Posterior tibial pulses are 2+ on the right side and 2+ on the left side.      Heart sounds: Normal heart sounds. No murmur heard.     Comments: Right radial cath site with compression band in place  Pulmonary:      Effort: Pulmonary effort is normal.      Breath sounds: Normal breath sounds.      Comments: 99% on room air  Abdominal:      General: Abdomen is protuberant. Bowel sounds are normal. There is no distension.      Palpations: Abdomen is soft.      Tenderness: There is no abdominal tenderness.   Musculoskeletal:      Cervical back: Neck supple.      Right lower leg: No edema.      Left lower leg: No edema.      Comments: Gait steady and strong without use of assistive devices   Lymphadenopathy:      Cervical: No cervical adenopathy.      Upper Body:      Right upper body: No supraclavicular adenopathy.      Left upper body: No supraclavicular adenopathy.   Skin:     General: Skin is warm and dry.      Capillary Refill: Capillary refill takes less than 2 seconds.      Findings: No erythema or rash.      Nails: There is no clubbing.   Neurological:      Mental Status: He is alert and oriented to person, place, and time.      GCS: GCS eye subscore is 4. GCS verbal subscore is 5.  GCS motor subscore is 6.   Psychiatric:         Attention and Perception: Attention and perception normal.         Mood and Affect: Mood and affect normal.         Speech: Speech normal.         Behavior: Behavior normal. Behavior is cooperative.         Thought Content: Thought content normal.         Cognition and Memory: Cognition and memory normal.         Judgment: Judgment normal.         Results Review:   Lab Results (last 24 hours)       Procedure Component Value Units Date/Time    POC Glucose Once [[id number]]  (Abnormal) Collected: 05/23/24 1116    Specimen: Blood Updated: 05/23/24 1117     Glucose 137 mg/dL      Comment: Serial Number: [id number]:  [id number]               Cardiac cath per Dr. Nagy 5/23/2024  HEMODYNAMICS.  LV: 135/8, 17 mmHg  AO: 136/82, 105 mmHg  No significant gradient across the aortic valve during pullback of JR4 catheter.  LV gram was not performed due to recent echocardiogram.     Coronary Angiogram.    Left main. Left main is a large-caliber vessel which gives rise to the Left Anterior Descending, ramus and the Left circumflex.  Left main is angiographically free from any significant disease     Left Anterior Descending Artery. LAD is a large vessel which gives rise to several septal perforators and several diagonal branches.  Mid LAD has 80% stenosis at the origin of diagonal vessel which also has 70 to 80% stenosis.     Ramus intermedius has proximal segment 70% stenosis.     Left Circumflex. The LCx is a medium caliber which gives rise to marginals.  OM1 has proximal 50% stenosis.  OM 2 has proximal segment 70% stenosis.     Right Coronary Artery. The RCA is a dominant vessel which gives rise to several small caliber branches including PDA and PLV.  Mid RCA has 99% long tubular stenosis.  PLV has mid segment 50% stenosis.     IMPRESSIONS.  1 Multivessel obstructive coronary artery disease including LAD, ramus and RCA.  2.  Mildly elevated LVEDP     RECOMMENDATIONS.  1.   Consult cardiac surgery for CABG  2.  Start Imdur      Assessment & Plan       Chest pain due to myocardial ischemia    Abnormal nuclear stress test      Assessment & Plan    - MV CAD, EF 50-55% (echo)--plans for CABG tomorrow  - HTN--stable  - HLD--statin  - DM type 2--A1c 7.1  - Ozempic use--last dose 5/9  - CKD, stage 2--eGFR 66.5    54 y/o gentleman with strong family history of CAD was found to have severe MV CAD.  His last A1c was 7.1.  His last dose of Ozempic was 5/9/2024.  He has never smoked, rarely drinks.  He works in computer software.  Lives with his wife.  Plans for CABG tomorrow TF case.  Preop orders have been placed.  Preop antibiotic is Kefzol.  Thank you for allowing us to participate in the care of this patient.      Blanca Redmond, KASIE  05/23/24  13:05 EDT    **all problems new to this examiner  **EKG and CXR independently reviewed and interpreted

## 2024-05-24 ENCOUNTER — OFFICE VISIT (OUTPATIENT)
Dept: PERIOP | Facility: HOSPITAL | Age: 54
DRG: 234 | End: 2024-05-24
Payer: COMMERCIAL

## 2024-05-24 ENCOUNTER — ANESTHESIA EVENT (OUTPATIENT)
Dept: PERIOP | Facility: HOSPITAL | Age: 54
End: 2024-05-24
Payer: COMMERCIAL

## 2024-05-24 ENCOUNTER — APPOINTMENT (OUTPATIENT)
Dept: GENERAL RADIOLOGY | Facility: HOSPITAL | Age: 54
DRG: 234 | End: 2024-05-24
Payer: COMMERCIAL

## 2024-05-24 ENCOUNTER — ANESTHESIA (OUTPATIENT)
Dept: PERIOP | Facility: HOSPITAL | Age: 54
End: 2024-05-24
Payer: COMMERCIAL

## 2024-05-24 LAB
ACT BLD: 122 SECONDS (ref 89–137)
ACT BLD: 134 SECONDS (ref 89–137)
ACT BLD: 427 SECONDS (ref 89–137)
ACT BLD: 495 SECONDS (ref 89–137)
ACT BLD: 690 SECONDS (ref 89–137)
ALBUMIN SERPL-MCNC: 4.7 G/DL (ref 3.5–5.2)
ALBUMIN SERPL-MCNC: 4.9 G/DL (ref 3.5–5.2)
ANION GAP SERPL CALCULATED.3IONS-SCNC: 13 MMOL/L (ref 5–15)
ANION GAP SERPL CALCULATED.3IONS-SCNC: 17 MMOL/L (ref 5–15)
APTT PPP: 25.3 SECONDS (ref 24–31)
ARTERIAL PATENCY WRIST A: ABNORMAL
ARTERIAL PATENCY WRIST A: ABNORMAL
ATMOSPHERIC PRESS: ABNORMAL MM[HG]
ATMOSPHERIC PRESS: ABNORMAL MM[HG]
BASE DEFICIT: ABNORMAL
BASE EXCESS BLDA CALC-SCNC: -1.4 MMOL/L (ref 0–3)
BASE EXCESS BLDA CALC-SCNC: -4.9 MMOL/L (ref 0–3)
BASE EXCESS BLDA CALC-SCNC: 0 MMOL/L (ref 0–3)
BASE EXCESS BLDA CALC-SCNC: 1 MMOL/L (ref 0–3)
BASE EXCESS BLDA CALC-SCNC: 2 MMOL/L (ref 0–3)
BASE EXCESS BLDA CALC-SCNC: <0 MMOL/L (ref 0–3)
BASE EXCESS BLDV CALC-SCNC: ABNORMAL MMOL/L
BASOPHILS # BLD AUTO: 0.05 10*3/MM3 (ref 0–0.2)
BASOPHILS NFR BLD AUTO: 0.3 % (ref 0–1.5)
BDY SITE: ABNORMAL
BDY SITE: ABNORMAL
BH BB BLOOD EXPIRATION DATE: NORMAL
BH BB BLOOD EXPIRATION DATE: NORMAL
BH BB BLOOD TYPE BARCODE: 6200
BH BB BLOOD TYPE BARCODE: 6200
BH BB DISPENSE STATUS: NORMAL
BH BB DISPENSE STATUS: NORMAL
BH BB PRODUCT CODE: NORMAL
BH BB PRODUCT CODE: NORMAL
BH BB UNIT NUMBER: NORMAL
BH BB UNIT NUMBER: NORMAL
BUN SERPL-MCNC: 15 MG/DL (ref 6–20)
BUN SERPL-MCNC: 17 MG/DL (ref 6–20)
BUN/CREAT SERPL: 12.2 (ref 7–25)
BUN/CREAT SERPL: 13.5 (ref 7–25)
CA-I BLDA-SCNC: 0.96 MMOL/L (ref 1.12–1.32)
CA-I BLDA-SCNC: 0.99 MMOL/L (ref 1.12–1.32)
CA-I BLDA-SCNC: 0.99 MMOL/L (ref 1.12–1.32)
CA-I BLDA-SCNC: 1.15 MMOL/L (ref 1.15–1.33)
CA-I BLDA-SCNC: 1.17 MMOL/L (ref 1.15–1.33)
CA-I BLDA-SCNC: 1.18 MMOL/L (ref 1.12–1.32)
CA-I BLDA-SCNC: 1.2 MMOL/L (ref 1.12–1.32)
CA-I SERPL ISE-MCNC: 1.19 MMOL/L (ref 1.2–1.3)
CALCIUM SPEC-SCNC: 8.9 MG/DL (ref 8.6–10.5)
CALCIUM SPEC-SCNC: 8.9 MG/DL (ref 8.6–10.5)
CHLORIDE SERPL-SCNC: 104 MMOL/L (ref 98–107)
CHLORIDE SERPL-SCNC: 105 MMOL/L (ref 98–107)
CO2 BLDA-SCNC: 25 MMOL/L (ref 23–27)
CO2 BLDA-SCNC: 28 MMOL/L (ref 23–27)
CO2 BLDA-SCNC: 28 MMOL/L (ref 23–27)
CO2 BLDA-SCNC: 30 MMOL/L (ref 23–27)
CO2 CONTENT VENOUS: ABNORMAL
CO2 SERPL-SCNC: 21 MMOL/L (ref 22–29)
CO2 SERPL-SCNC: 23 MMOL/L (ref 22–29)
CREAT BLDA-MCNC: 1.13 MG/DL (ref 0.6–1.3)
CREAT BLDA-MCNC: 1.43 MG/DL (ref 0.6–1.3)
CREAT SERPL-MCNC: 1.23 MG/DL (ref 0.76–1.27)
CREAT SERPL-MCNC: 1.26 MG/DL (ref 0.76–1.27)
CROSSMATCH INTERPRETATION: NORMAL
CROSSMATCH INTERPRETATION: NORMAL
DEPRECATED RDW RBC AUTO: 41.6 FL (ref 37–54)
EGFRCR SERPLBLD CKD-EPI 2021: 58.2 ML/MIN/1.73
EGFRCR SERPLBLD CKD-EPI 2021: 67.8 ML/MIN/1.73
EGFRCR SERPLBLD CKD-EPI 2021: 69.8 ML/MIN/1.73
EGFRCR SERPLBLD CKD-EPI 2021: 77.2 ML/MIN/1.73
EOSINOPHIL # BLD AUTO: 0.1 10*3/MM3 (ref 0–0.4)
EOSINOPHIL NFR BLD AUTO: 0.7 % (ref 0.3–6.2)
ERYTHROCYTE [DISTWIDTH] IN BLOOD BY AUTOMATED COUNT: 12 % (ref 12.3–15.4)
FIBRINOGEN PPP-MCNC: 262 MG/DL (ref 210–450)
GLUCOSE BLDC GLUCOMTR-MCNC: 105 MG/DL (ref 70–105)
GLUCOSE BLDC GLUCOMTR-MCNC: 106 MG/DL (ref 70–105)
GLUCOSE BLDC GLUCOMTR-MCNC: 107 MG/DL (ref 70–105)
GLUCOSE BLDC GLUCOMTR-MCNC: 129 MG/DL (ref 70–105)
GLUCOSE BLDC GLUCOMTR-MCNC: 132 MG/DL (ref 70–105)
GLUCOSE BLDC GLUCOMTR-MCNC: 136 MG/DL (ref 70–105)
GLUCOSE BLDC GLUCOMTR-MCNC: 168 MG/DL (ref 74–100)
GLUCOSE BLDC GLUCOMTR-MCNC: 168 MG/DL (ref 74–100)
GLUCOSE BLDC GLUCOMTR-MCNC: 181 MG/DL (ref 70–105)
GLUCOSE BLDC GLUCOMTR-MCNC: 213 MG/DL (ref 74–100)
GLUCOSE BLDC GLUCOMTR-MCNC: 213 MG/DL (ref 74–100)
GLUCOSE SERPL-MCNC: 140 MG/DL (ref 65–99)
GLUCOSE SERPL-MCNC: 195 MG/DL (ref 65–99)
HCO3 BLDA-SCNC: 19.6 MMOL/L (ref 21–28)
HCO3 BLDA-SCNC: 23.3 MMOL/L (ref 21–28)
HCO3 BLDA-SCNC: 23.5 MMOL/L (ref 22–26)
HCO3 BLDA-SCNC: 26.1 MMOL/L (ref 22–26)
HCO3 BLDA-SCNC: 26.5 MMOL/L (ref 22–26)
HCO3 BLDA-SCNC: 28.2 MMOL/L (ref 22–26)
HCO3 BLDV-SCNC: 28.4 MMOL/L (ref 23–28)
HCT VFR BLD AUTO: 36.2 % (ref 37.5–51)
HCT VFR BLDA CALC: 28 % (ref 38–51)
HCT VFR BLDA CALC: 29 % (ref 38–51)
HCT VFR BLDA CALC: 30 % (ref 38–51)
HCT VFR BLDA CALC: 30 % (ref 38–51)
HCT VFR BLDA CALC: 34 % (ref 38–51)
HCT VFR BLDA CALC: 35 % (ref 38–51)
HCT VFR BLDA CALC: 38 % (ref 38–51)
HEMODILUTION: YES
HEMODILUTION: YES
HGB BLD-MCNC: 12.3 G/DL (ref 13–17.7)
HGB BLDA-MCNC: 10.2 G/DL (ref 12–17)
HGB BLDA-MCNC: 10.2 G/DL (ref 12–17)
HGB BLDA-MCNC: 11.6 G/DL (ref 12–17)
HGB BLDA-MCNC: 12 G/DL (ref 12–17)
HGB BLDA-MCNC: 12.9 G/DL (ref 12–17)
HGB BLDA-MCNC: 9.5 G/DL (ref 12–17)
HGB BLDA-MCNC: 9.9 G/DL (ref 12–17)
IMM GRANULOCYTES # BLD AUTO: 0.05 10*3/MM3 (ref 0–0.05)
IMM GRANULOCYTES NFR BLD AUTO: 0.3 % (ref 0–0.5)
INHALED O2 CONCENTRATION: 40 %
INHALED O2 CONCENTRATION: 70 %
INR PPP: 1.1 (ref 0.93–1.1)
LYMPHOCYTES # BLD AUTO: 1.16 10*3/MM3 (ref 0.7–3.1)
LYMPHOCYTES NFR BLD AUTO: 7.6 % (ref 19.6–45.3)
MAGNESIUM SERPL-MCNC: 2.5 MG/DL (ref 1.6–2.6)
MAGNESIUM SERPL-MCNC: 2.9 MG/DL (ref 1.6–2.6)
MCH RBC QN AUTO: 31.7 PG (ref 26.6–33)
MCHC RBC AUTO-ENTMCNC: 34 G/DL (ref 31.5–35.7)
MCV RBC AUTO: 93.3 FL (ref 79–97)
MODALITY: ABNORMAL
MODALITY: ABNORMAL
MONOCYTES # BLD AUTO: 0.81 10*3/MM3 (ref 0.1–0.9)
MONOCYTES NFR BLD AUTO: 5.3 % (ref 5–12)
NEUTROPHILS NFR BLD AUTO: 13.05 10*3/MM3 (ref 1.7–7)
NEUTROPHILS NFR BLD AUTO: 85.8 % (ref 42.7–76)
NRBC BLD AUTO-RTO: 0 /100 WBC (ref 0–0.2)
PCO2 BLDA: 33.5 MM HG (ref 35–48)
PCO2 BLDA: 38.2 MM HG (ref 35–48)
PCO2 BLDA: 45 MM HG (ref 35–45)
PCO2 BLDA: 46.3 MM HG (ref 35–45)
PCO2 BLDA: 50.5 MM HG (ref 35–45)
PCO2 BLDA: 55.6 MM HG (ref 35–45)
PCO2 BLDV: 57.3 MM HG (ref 41–51)
PEEP RESPIRATORY: 5 CM[H2O]
PEEP RESPIRATORY: 8 CM[H2O]
PH BLDA: 7.31 PH UNITS (ref 7.35–7.45)
PH BLDA: 7.32 PH UNITS (ref 7.35–7.45)
PH BLDA: 7.33 PH UNITS (ref 7.35–7.45)
PH BLDA: 7.37 PH UNITS (ref 7.35–7.45)
PH BLDA: 7.37 PH UNITS (ref 7.35–7.45)
PH BLDA: 7.39 PH UNITS (ref 7.35–7.45)
PH BLDV: 7.3 PH UNITS (ref 7.31–7.41)
PHOSPHATE SERPL-MCNC: 1.9 MG/DL (ref 2.5–4.5)
PHOSPHATE SERPL-MCNC: 2.5 MG/DL (ref 2.5–4.5)
PLATELET # BLD AUTO: 133 10*3/MM3 (ref 140–450)
PMV BLD AUTO: 10.4 FL (ref 6–12)
PO2 BLDA: 109.1 MM HG (ref 83–108)
PO2 BLDA: 258 MM HG (ref 80–105)
PO2 BLDA: 302.9 MM HG (ref 83–108)
PO2 BLDA: 349 MM HG (ref 80–105)
PO2 BLDA: 413 MM HG (ref 80–105)
PO2 BLDA: 450 MM HG (ref 80–105)
PO2 BLDV: 49 MM HG (ref 35–42)
POTASSIUM BLDA-SCNC: 4.1 MMOL/L (ref 3.5–4.9)
POTASSIUM BLDA-SCNC: 4.3 MMOL/L (ref 3.5–4.5)
POTASSIUM BLDA-SCNC: 4.4 MMOL/L (ref 3.5–4.9)
POTASSIUM BLDA-SCNC: 4.6 MMOL/L (ref 3.5–4.5)
POTASSIUM BLDA-SCNC: 4.7 MMOL/L (ref 3.5–4.9)
POTASSIUM BLDA-SCNC: 4.7 MMOL/L (ref 3.5–4.9)
POTASSIUM BLDA-SCNC: 5.2 MMOL/L (ref 3.5–4.9)
POTASSIUM SERPL-SCNC: 4.2 MMOL/L (ref 3.5–5.2)
POTASSIUM SERPL-SCNC: 4.4 MMOL/L (ref 3.5–5.2)
PROTHROMBIN TIME: 11.9 SECONDS (ref 9.6–11.7)
QT INTERVAL: 371 MS
QTC INTERVAL: 413 MS
RBC # BLD AUTO: 3.88 10*6/MM3 (ref 4.14–5.8)
RESPIRATORY RATE: 14
RESPIRATORY RATE: 14
SAO2 % BLDCOA: 100 % (ref 95–98)
SAO2 % BLDCOA: 98.2 % (ref 94–98)
SAO2 % BLDCOA: 99.9 % (ref 94–98)
SAO2 % BLDCOV: 30 % (ref 45–75)
SODIUM BLD-SCNC: 135 MMOL/L (ref 138–146)
SODIUM BLD-SCNC: 135 MMOL/L (ref 138–146)
SODIUM BLD-SCNC: 138 MMOL/L (ref 138–146)
SODIUM BLD-SCNC: 139 MMOL/L (ref 138–146)
SODIUM BLD-SCNC: 139 MMOL/L (ref 138–146)
SODIUM BLD-SCNC: 142 MMOL/L (ref 138–146)
SODIUM BLD-SCNC: 143 MMOL/L (ref 138–146)
SODIUM SERPL-SCNC: 140 MMOL/L (ref 136–145)
SODIUM SERPL-SCNC: 143 MMOL/L (ref 136–145)
UNIT  ABO: NORMAL
UNIT  ABO: NORMAL
UNIT  RH: NORMAL
UNIT  RH: NORMAL
VENTILATOR MODE: AC
VENTILATOR MODE: AC
VT ON VENT VENT: 700 ML
VT ON VENT VENT: 700 ML
WBC NRBC COR # BLD AUTO: 15.22 10*3/MM3 (ref 3.4–10.8)

## 2024-05-24 PROCEDURE — 71045 X-RAY EXAM CHEST 1 VIEW: CPT

## 2024-05-24 PROCEDURE — B24BZZ4 ULTRASONOGRAPHY OF HEART WITH AORTA, TRANSESOPHAGEAL: ICD-10-PCS | Performed by: ANESTHESIOLOGY

## 2024-05-24 PROCEDURE — 82947 ASSAY GLUCOSE BLOOD QUANT: CPT

## 2024-05-24 PROCEDURE — 33534 CABG ARTERIAL TWO: CPT | Performed by: SPECIALIST/TECHNOLOGIST, OTHER

## 2024-05-24 PROCEDURE — C1751 CATH, INF, PER/CENT/MIDLINE: HCPCS | Performed by: ANESTHESIOLOGY

## 2024-05-24 PROCEDURE — 25010000002 CEFAZOLIN PER 500 MG: Performed by: NURSE PRACTITIONER

## 2024-05-24 PROCEDURE — 85014 HEMATOCRIT: CPT

## 2024-05-24 PROCEDURE — 82330 ASSAY OF CALCIUM: CPT

## 2024-05-24 PROCEDURE — 94002 VENT MGMT INPAT INIT DAY: CPT

## 2024-05-24 PROCEDURE — 5A1221Z PERFORMANCE OF CARDIAC OUTPUT, CONTINUOUS: ICD-10-PCS

## 2024-05-24 PROCEDURE — 82803 BLOOD GASES ANY COMBINATION: CPT

## 2024-05-24 PROCEDURE — 82565 ASSAY OF CREATININE: CPT

## 2024-05-24 PROCEDURE — 83735 ASSAY OF MAGNESIUM: CPT

## 2024-05-24 PROCEDURE — A4648 IMPLANTABLE TISSUE MARKER: HCPCS

## 2024-05-24 PROCEDURE — 94799 UNLISTED PULMONARY SVC/PX: CPT

## 2024-05-24 PROCEDURE — 94761 N-INVAS EAR/PLS OXIMETRY MLT: CPT

## 2024-05-24 PROCEDURE — 85384 FIBRINOGEN ACTIVITY: CPT

## 2024-05-24 PROCEDURE — C1713 ANCHOR/SCREW BN/BN,TIS/BN: HCPCS

## 2024-05-24 PROCEDURE — 25010000002 MIDAZOLAM PER 1 MG: Performed by: ANESTHESIOLOGY

## 2024-05-24 PROCEDURE — 06BQ4ZZ EXCISION OF LEFT SAPHENOUS VEIN, PERCUTANEOUS ENDOSCOPIC APPROACH: ICD-10-PCS

## 2024-05-24 PROCEDURE — 25010000002 CEFAZOLIN PER 500 MG: Performed by: ANESTHESIOLOGY

## 2024-05-24 PROCEDURE — 25010000002 HEPARIN (PORCINE) PER 1000 UNITS: Performed by: ANESTHESIOLOGY

## 2024-05-24 PROCEDURE — 02100Z9 BYPASS CORONARY ARTERY, ONE ARTERY FROM LEFT INTERNAL MAMMARY, OPEN APPROACH: ICD-10-PCS

## 2024-05-24 PROCEDURE — 33508 ENDOSCOPIC VEIN HARVEST: CPT

## 2024-05-24 PROCEDURE — C1751 CATH, INF, PER/CENT/MIDLINE: HCPCS

## 2024-05-24 PROCEDURE — 25010000002 FENTANYL CITRATE (PF) 250 MCG/5ML SOLUTION: Performed by: ANESTHESIOLOGY

## 2024-05-24 PROCEDURE — 25810000003 SODIUM CHLORIDE 0.9 % SOLUTION: Performed by: ANESTHESIOLOGY

## 2024-05-24 PROCEDURE — 85610 PROTHROMBIN TIME: CPT

## 2024-05-24 PROCEDURE — 021109W BYPASS CORONARY ARTERY, TWO ARTERIES FROM AORTA WITH AUTOLOGOUS VENOUS TISSUE, OPEN APPROACH: ICD-10-PCS

## 2024-05-24 PROCEDURE — 25010000002 HEPARIN (PORCINE) PER 1000 UNITS

## 2024-05-24 PROCEDURE — 85347 COAGULATION TIME ACTIVATED: CPT

## 2024-05-24 PROCEDURE — 82948 REAGENT STRIP/BLOOD GLUCOSE: CPT

## 2024-05-24 PROCEDURE — 25010000002 PAPAVERINE PER 60 MG

## 2024-05-24 PROCEDURE — 99233 SBSQ HOSP IP/OBS HIGH 50: CPT | Performed by: INTERNAL MEDICINE

## 2024-05-24 PROCEDURE — 85018 HEMOGLOBIN: CPT

## 2024-05-24 PROCEDURE — 33518 CABG ARTERY-VEIN TWO: CPT

## 2024-05-24 PROCEDURE — 25010000002 MORPHINE PER 10 MG

## 2024-05-24 PROCEDURE — 25010000002 ONDANSETRON PER 1 MG: Performed by: ANESTHESIOLOGY

## 2024-05-24 PROCEDURE — 33508 ENDOSCOPIC VEIN HARVEST: CPT | Performed by: SPECIALIST/TECHNOLOGIST, OTHER

## 2024-05-24 PROCEDURE — C1729 CATH, DRAINAGE: HCPCS

## 2024-05-24 PROCEDURE — 93005 ELECTROCARDIOGRAM TRACING: CPT

## 2024-05-24 PROCEDURE — 80051 ELECTROLYTE PANEL: CPT

## 2024-05-24 PROCEDURE — 02100Z8 BYPASS CORONARY ARTERY, ONE ARTERY FROM RIGHT INTERNAL MAMMARY, OPEN APPROACH: ICD-10-PCS

## 2024-05-24 PROCEDURE — 84295 ASSAY OF SERUM SODIUM: CPT

## 2024-05-24 PROCEDURE — 25010000002 NICARDIPINE 2.5 MG/ML SOLUTION: Performed by: ANESTHESIOLOGY

## 2024-05-24 PROCEDURE — 85025 COMPLETE CBC W/AUTO DIFF WBC: CPT

## 2024-05-24 PROCEDURE — 25810000003 SODIUM CHLORIDE PER 500 ML

## 2024-05-24 PROCEDURE — C1887 CATHETER, GUIDING: HCPCS

## 2024-05-24 PROCEDURE — 33518 CABG ARTERY-VEIN TWO: CPT | Performed by: SPECIALIST/TECHNOLOGIST, OTHER

## 2024-05-24 PROCEDURE — 84132 ASSAY OF SERUM POTASSIUM: CPT

## 2024-05-24 PROCEDURE — 25010000002 ACETAMINOPHEN 10 MG/ML SOLUTION

## 2024-05-24 PROCEDURE — 80069 RENAL FUNCTION PANEL: CPT

## 2024-05-24 PROCEDURE — 33534 CABG ARTERIAL TWO: CPT

## 2024-05-24 PROCEDURE — 25010000002 ACETAMINOPHEN 10 MG/ML SOLUTION: Performed by: ANESTHESIOLOGY

## 2024-05-24 PROCEDURE — 06BP4ZZ EXCISION OF RIGHT SAPHENOUS VEIN, PERCUTANEOUS ENDOSCOPIC APPROACH: ICD-10-PCS

## 2024-05-24 PROCEDURE — 25010000002 PROTAMINE SULFATE PER 10 MG: Performed by: ANESTHESIOLOGY

## 2024-05-24 PROCEDURE — 25010000002 PHENYLEPHRINE 10 MG/ML SOLUTION: Performed by: ANESTHESIOLOGY

## 2024-05-24 PROCEDURE — 25010000002 NITROGLYCERIN 200 MCG/ML SOLUTION: Performed by: ANESTHESIOLOGY

## 2024-05-24 PROCEDURE — 25010000002 METOCLOPRAMIDE PER 10 MG

## 2024-05-24 PROCEDURE — 85730 THROMBOPLASTIN TIME PARTIAL: CPT

## 2024-05-24 PROCEDURE — 25010000002 MAGNESIUM SULFATE PER 500 MG OF MAGNESIUM: Performed by: ANESTHESIOLOGY

## 2024-05-24 DEVICE — SS SUTURE, 6 PER SLEEVE
Type: IMPLANTABLE DEVICE | Site: STERNUM | Status: FUNCTIONAL
Brand: MYO/WIRE II

## 2024-05-24 DEVICE — DEV CONTRL TISS STRATAFIX SPIRAL MNCRYL UD 3/0 PLS 30CM: Type: IMPLANTABLE DEVICE | Site: CHEST | Status: FUNCTIONAL

## 2024-05-24 DEVICE — CLIP LIGAT VASC HORIZON TI SM/WD RED 24CT: Type: IMPLANTABLE DEVICE | Site: MEDIASTINUM | Status: FUNCTIONAL

## 2024-05-24 DEVICE — ABSORBABLE HEMOSTAT (OXIDIZED REGENERATED CELLULOSE)
Type: IMPLANTABLE DEVICE | Site: MEDIASTINUM | Status: FUNCTIONAL
Brand: SURGICEL

## 2024-05-24 DEVICE — SS SUTURE, 3 PER SLEEVE
Type: IMPLANTABLE DEVICE | Site: STERNUM | Status: FUNCTIONAL
Brand: MYO/WIRE II

## 2024-05-24 DEVICE — WAX,BONE,NATURAL
Type: IMPLANTABLE DEVICE | Site: STERNUM | Status: FUNCTIONAL
Brand: MEDLINE INDUSTRIES

## 2024-05-24 DEVICE — TEMP PACING WIRE
Type: IMPLANTABLE DEVICE | Site: HEART | Status: FUNCTIONAL
Brand: MYO/WIRE

## 2024-05-24 RX ORDER — ACETAMINOPHEN 650 MG/1
650 SUPPOSITORY RECTAL EVERY 4 HOURS PRN
Status: DISCONTINUED | OUTPATIENT
Start: 2024-05-25 | End: 2024-05-27

## 2024-05-24 RX ORDER — SODIUM CHLORIDE 9 MG/ML
INJECTION, SOLUTION INTRAVENOUS CONTINUOUS PRN
Status: DISCONTINUED | OUTPATIENT
Start: 2024-05-24 | End: 2024-05-24 | Stop reason: SURG

## 2024-05-24 RX ORDER — NITROGLYCERIN 20 MG/100ML
INJECTION INTRAVENOUS CONTINUOUS PRN
Status: DISCONTINUED | OUTPATIENT
Start: 2024-05-24 | End: 2024-05-24 | Stop reason: SURG

## 2024-05-24 RX ORDER — NOREPINEPHRINE BITARTRATE 0.03 MG/ML
.02-.3 INJECTION, SOLUTION INTRAVENOUS CONTINUOUS PRN
Status: DISCONTINUED | OUTPATIENT
Start: 2024-05-24 | End: 2024-05-25

## 2024-05-24 RX ORDER — ENOXAPARIN SODIUM 100 MG/ML
40 INJECTION SUBCUTANEOUS EVERY 24 HOURS
Status: DISCONTINUED | OUTPATIENT
Start: 2024-05-25 | End: 2024-05-29 | Stop reason: HOSPADM

## 2024-05-24 RX ORDER — ACETAMINOPHEN 10 MG/ML
INJECTION, SOLUTION INTRAVENOUS AS NEEDED
Status: DISCONTINUED | OUTPATIENT
Start: 2024-05-24 | End: 2024-05-24 | Stop reason: SURG

## 2024-05-24 RX ORDER — MAGNESIUM SULFATE 1 G/100ML
1 INJECTION INTRAVENOUS EVERY 8 HOURS
Qty: 300 ML | Refills: 0 | Status: COMPLETED | OUTPATIENT
Start: 2024-05-25 | End: 2024-05-25

## 2024-05-24 RX ORDER — MIDAZOLAM HYDROCHLORIDE 1 MG/ML
INJECTION INTRAMUSCULAR; INTRAVENOUS AS NEEDED
Status: DISCONTINUED | OUTPATIENT
Start: 2024-05-24 | End: 2024-05-24 | Stop reason: SURG

## 2024-05-24 RX ORDER — SODIUM CHLORIDE 9 MG/ML
INJECTION, SOLUTION INTRAVENOUS AS NEEDED
Status: DISCONTINUED | OUTPATIENT
Start: 2024-05-24 | End: 2024-05-24 | Stop reason: HOSPADM

## 2024-05-24 RX ORDER — VECURONIUM BROMIDE 1 MG/ML
INJECTION, POWDER, LYOPHILIZED, FOR SOLUTION INTRAVENOUS AS NEEDED
Status: DISCONTINUED | OUTPATIENT
Start: 2024-05-24 | End: 2024-05-24 | Stop reason: SURG

## 2024-05-24 RX ORDER — ALBUMIN, HUMAN INJ 5% 5 %
1500 SOLUTION INTRAVENOUS AS NEEDED
Status: DISCONTINUED | OUTPATIENT
Start: 2024-05-24 | End: 2024-05-25

## 2024-05-24 RX ORDER — ONDANSETRON 2 MG/ML
4 INJECTION INTRAMUSCULAR; INTRAVENOUS EVERY 6 HOURS PRN
Status: DISCONTINUED | OUTPATIENT
Start: 2024-05-24 | End: 2024-05-29 | Stop reason: HOSPADM

## 2024-05-24 RX ORDER — NICARDIPINE HYDROCHLORIDE 2.5 MG/ML
INJECTION INTRAVENOUS AS NEEDED
Status: DISCONTINUED | OUTPATIENT
Start: 2024-05-24 | End: 2024-05-24 | Stop reason: SURG

## 2024-05-24 RX ORDER — MEPERIDINE HYDROCHLORIDE 25 MG/ML
25 INJECTION INTRAMUSCULAR; INTRAVENOUS; SUBCUTANEOUS EVERY 4 HOURS PRN
Status: DISCONTINUED | OUTPATIENT
Start: 2024-05-24 | End: 2024-05-25

## 2024-05-24 RX ORDER — VECURONIUM BROMIDE 1 MG/ML
INJECTION, POWDER, LYOPHILIZED, FOR SOLUTION INTRAVENOUS AS NEEDED
Status: DISCONTINUED | OUTPATIENT
Start: 2024-05-24 | End: 2024-05-24

## 2024-05-24 RX ORDER — PANTOPRAZOLE SODIUM 40 MG/10ML
40 INJECTION, POWDER, LYOPHILIZED, FOR SOLUTION INTRAVENOUS ONCE
Status: COMPLETED | OUTPATIENT
Start: 2024-05-24 | End: 2024-05-24

## 2024-05-24 RX ORDER — MORPHINE SULFATE 2 MG/ML
1 INJECTION, SOLUTION INTRAMUSCULAR; INTRAVENOUS EVERY 4 HOURS PRN
Status: DISCONTINUED | OUTPATIENT
Start: 2024-05-24 | End: 2024-05-25

## 2024-05-24 RX ORDER — MAGNESIUM HYDROXIDE 1200 MG/15ML
LIQUID ORAL AS NEEDED
Status: DISCONTINUED | OUTPATIENT
Start: 2024-05-24 | End: 2024-05-24 | Stop reason: HOSPADM

## 2024-05-24 RX ORDER — DEXMEDETOMIDINE HYDROCHLORIDE 4 UG/ML
.2-1.5 INJECTION, SOLUTION INTRAVENOUS
Status: DISCONTINUED | OUTPATIENT
Start: 2024-05-24 | End: 2024-05-25

## 2024-05-24 RX ORDER — ONDANSETRON 2 MG/ML
INJECTION INTRAMUSCULAR; INTRAVENOUS AS NEEDED
Status: DISCONTINUED | OUTPATIENT
Start: 2024-05-24 | End: 2024-05-24 | Stop reason: SURG

## 2024-05-24 RX ORDER — AMOXICILLIN 250 MG
2 CAPSULE ORAL 2 TIMES DAILY
Status: DISCONTINUED | OUTPATIENT
Start: 2024-05-24 | End: 2024-05-26

## 2024-05-24 RX ORDER — NICOTINE POLACRILEX 4 MG
15 LOZENGE BUCCAL
Status: ACTIVE | OUTPATIENT
Start: 2024-05-24 | End: 2024-05-27

## 2024-05-24 RX ORDER — DEXTROSE MONOHYDRATE 25 G/50ML
10-50 INJECTION, SOLUTION INTRAVENOUS
Status: ACTIVE | OUTPATIENT
Start: 2024-05-24 | End: 2024-05-27

## 2024-05-24 RX ORDER — MILRINONE LACTATE 0.2 MG/ML
.25-.75 INJECTION, SOLUTION INTRAVENOUS CONTINUOUS PRN
Status: DISCONTINUED | OUTPATIENT
Start: 2024-05-24 | End: 2024-05-25

## 2024-05-24 RX ORDER — CYCLOBENZAPRINE HCL 10 MG
10 TABLET ORAL EVERY 8 HOURS PRN
Status: DISCONTINUED | OUTPATIENT
Start: 2024-05-25 | End: 2024-05-29 | Stop reason: HOSPADM

## 2024-05-24 RX ORDER — METOCLOPRAMIDE HYDROCHLORIDE 5 MG/ML
10 INJECTION INTRAMUSCULAR; INTRAVENOUS EVERY 6 HOURS
Status: COMPLETED | OUTPATIENT
Start: 2024-05-24 | End: 2024-05-25

## 2024-05-24 RX ORDER — HEPARIN SODIUM 1000 [USP'U]/ML
INJECTION, SOLUTION INTRAVENOUS; SUBCUTANEOUS AS NEEDED
Status: DISCONTINUED | OUTPATIENT
Start: 2024-05-24 | End: 2024-05-24 | Stop reason: SURG

## 2024-05-24 RX ORDER — FENTANYL CITRATE 50 UG/ML
INJECTION, SOLUTION INTRAMUSCULAR; INTRAVENOUS AS NEEDED
Status: DISCONTINUED | OUTPATIENT
Start: 2024-05-24 | End: 2024-05-24 | Stop reason: SURG

## 2024-05-24 RX ORDER — PHENYLEPHRINE HYDROCHLORIDE 10 MG/ML
INJECTION INTRAVENOUS AS NEEDED
Status: DISCONTINUED | OUTPATIENT
Start: 2024-05-24 | End: 2024-05-24 | Stop reason: SURG

## 2024-05-24 RX ORDER — IBUPROFEN 600 MG/1
1 TABLET ORAL
Status: DISCONTINUED | OUTPATIENT
Start: 2024-05-24 | End: 2024-05-29 | Stop reason: HOSPADM

## 2024-05-24 RX ORDER — MAGNESIUM SULFATE HEPTAHYDRATE 500 MG/ML
INJECTION, SOLUTION INTRAMUSCULAR; INTRAVENOUS AS NEEDED
Status: DISCONTINUED | OUTPATIENT
Start: 2024-05-24 | End: 2024-05-24 | Stop reason: SURG

## 2024-05-24 RX ORDER — ACETAMINOPHEN 160 MG/5ML
650 SOLUTION ORAL EVERY 4 HOURS PRN
Status: DISCONTINUED | OUTPATIENT
Start: 2024-05-25 | End: 2024-05-27

## 2024-05-24 RX ORDER — ATORVASTATIN CALCIUM 40 MG/1
40 TABLET, FILM COATED ORAL NIGHTLY
Status: DISCONTINUED | OUTPATIENT
Start: 2024-05-24 | End: 2024-05-29 | Stop reason: HOSPADM

## 2024-05-24 RX ORDER — BISACODYL 10 MG
10 SUPPOSITORY, RECTAL RECTAL DAILY PRN
Status: DISCONTINUED | OUTPATIENT
Start: 2024-05-25 | End: 2024-05-29 | Stop reason: HOSPADM

## 2024-05-24 RX ORDER — NALOXONE HCL 0.4 MG/ML
0.4 VIAL (ML) INJECTION
Status: DISCONTINUED | OUTPATIENT
Start: 2024-05-24 | End: 2024-05-25

## 2024-05-24 RX ORDER — ACETAMINOPHEN 325 MG/1
650 TABLET ORAL EVERY 4 HOURS PRN
Status: DISCONTINUED | OUTPATIENT
Start: 2024-05-25 | End: 2024-05-29 | Stop reason: HOSPADM

## 2024-05-24 RX ORDER — BISACODYL 5 MG/1
10 TABLET, DELAYED RELEASE ORAL DAILY PRN
Status: DISCONTINUED | OUTPATIENT
Start: 2024-05-24 | End: 2024-05-29 | Stop reason: HOSPADM

## 2024-05-24 RX ORDER — KETAMINE HCL IN NACL, ISO-OSM 100MG/10ML
SYRINGE (ML) INJECTION AS NEEDED
Status: DISCONTINUED | OUTPATIENT
Start: 2024-05-24 | End: 2024-05-24 | Stop reason: SURG

## 2024-05-24 RX ORDER — AMINOCAPROIC ACID 250 MG/ML
INJECTION, SOLUTION INTRAVENOUS AS NEEDED
Status: DISCONTINUED | OUTPATIENT
Start: 2024-05-24 | End: 2024-05-24 | Stop reason: SURG

## 2024-05-24 RX ORDER — ACETAMINOPHEN 10 MG/ML
1000 INJECTION, SOLUTION INTRAVENOUS EVERY 8 HOURS
Qty: 200 ML | Refills: 0 | Status: COMPLETED | OUTPATIENT
Start: 2024-05-24 | End: 2024-05-25

## 2024-05-24 RX ORDER — NOREPINEPHRINE BITARTRATE 0.03 MG/ML
INJECTION, SOLUTION INTRAVENOUS CONTINUOUS PRN
Status: DISCONTINUED | OUTPATIENT
Start: 2024-05-24 | End: 2024-05-24 | Stop reason: SURG

## 2024-05-24 RX ORDER — CHLORHEXIDINE GLUCONATE ORAL RINSE 1.2 MG/ML
15 SOLUTION DENTAL EVERY 12 HOURS
Status: DISCONTINUED | OUTPATIENT
Start: 2024-05-24 | End: 2024-05-29 | Stop reason: HOSPADM

## 2024-05-24 RX ORDER — HYDROCODONE BITARTRATE AND ACETAMINOPHEN 10; 325 MG/1; MG/1
1 TABLET ORAL EVERY 4 HOURS PRN
Status: DISCONTINUED | OUTPATIENT
Start: 2024-05-24 | End: 2024-05-25

## 2024-05-24 RX ORDER — ASPIRIN 81 MG/1
81 TABLET ORAL DAILY
Status: DISCONTINUED | OUTPATIENT
Start: 2024-05-25 | End: 2024-05-29 | Stop reason: HOSPADM

## 2024-05-24 RX ORDER — POLYETHYLENE GLYCOL 3350 17 G/17G
17 POWDER, FOR SOLUTION ORAL DAILY PRN
Status: DISCONTINUED | OUTPATIENT
Start: 2024-05-24 | End: 2024-05-25

## 2024-05-24 RX ORDER — PANTOPRAZOLE SODIUM 40 MG/1
40 TABLET, DELAYED RELEASE ORAL EVERY MORNING
Status: DISCONTINUED | OUTPATIENT
Start: 2024-05-25 | End: 2024-05-25

## 2024-05-24 RX ORDER — NITROGLYCERIN 0.4 MG/1
0.4 TABLET SUBLINGUAL
Status: DISCONTINUED | OUTPATIENT
Start: 2024-05-24 | End: 2024-05-29 | Stop reason: HOSPADM

## 2024-05-24 RX ORDER — NITROGLYCERIN 20 MG/100ML
5-200 INJECTION INTRAVENOUS
Status: DISCONTINUED | OUTPATIENT
Start: 2024-05-24 | End: 2024-05-25

## 2024-05-24 RX ADMIN — NICARDIPINE HYDROCHLORIDE 0.5 MG: 25 INJECTION INTRAVENOUS at 16:32

## 2024-05-24 RX ADMIN — FENTANYL CITRATE 250 MCG: 50 INJECTION, SOLUTION INTRAMUSCULAR; INTRAVENOUS at 13:11

## 2024-05-24 RX ADMIN — PHENYLEPHRINE HYDROCHLORIDE 100 MCG: 10 INJECTION INTRAVENOUS at 13:56

## 2024-05-24 RX ADMIN — PROTAMINE SULFATE 300 MG: 10 INJECTION, SOLUTION INTRAVENOUS at 16:04

## 2024-05-24 RX ADMIN — MIDAZOLAM 5 MG: 1 INJECTION INTRAMUSCULAR; INTRAVENOUS at 12:18

## 2024-05-24 RX ADMIN — FENTANYL CITRATE 250 MCG: 50 INJECTION, SOLUTION INTRAMUSCULAR; INTRAVENOUS at 12:46

## 2024-05-24 RX ADMIN — METOCLOPRAMIDE 10 MG: 5 INJECTION, SOLUTION INTRAMUSCULAR; INTRAVENOUS at 19:49

## 2024-05-24 RX ADMIN — VECURONIUM BROMIDE 10 MG: 1 INJECTION, POWDER, LYOPHILIZED, FOR SOLUTION INTRAVENOUS at 12:46

## 2024-05-24 RX ADMIN — VECURONIUM BROMIDE 4 MG: 1 INJECTION, POWDER, LYOPHILIZED, FOR SOLUTION INTRAVENOUS at 13:57

## 2024-05-24 RX ADMIN — Medication 12.5 MG: at 09:59

## 2024-05-24 RX ADMIN — NICARDIPINE HYDROCHLORIDE 0.5 MG: 25 INJECTION INTRAVENOUS at 13:16

## 2024-05-24 RX ADMIN — VECURONIUM BROMIDE 3 MG: 1 INJECTION, POWDER, LYOPHILIZED, FOR SOLUTION INTRAVENOUS at 15:25

## 2024-05-24 RX ADMIN — ACETAMINOPHEN 1000 MG: 1000 INJECTION INTRAVENOUS at 19:27

## 2024-05-24 RX ADMIN — ACETAMINOPHEN 1000 MG: 10 INJECTION, SOLUTION INTRAVENOUS at 16:16

## 2024-05-24 RX ADMIN — CEFAZOLIN 2 G: 2 INJECTION, POWDER, LYOPHILIZED, FOR SOLUTION INTRAVENOUS at 16:03

## 2024-05-24 RX ADMIN — VECURONIUM BROMIDE 2 MG: 1 INJECTION, POWDER, LYOPHILIZED, FOR SOLUTION INTRAVENOUS at 16:45

## 2024-05-24 RX ADMIN — CHLORHEXIDINE GLUCONATE, 0.12% ORAL RINSE 15 ML: 1.2 SOLUTION DENTAL at 09:59

## 2024-05-24 RX ADMIN — CHLORHEXIDINE GLUCONATE, 0.12% ORAL RINSE 15 ML: 1.2 SOLUTION DENTAL at 21:17

## 2024-05-24 RX ADMIN — PANTOPRAZOLE SODIUM 40 MG: 40 INJECTION, POWDER, FOR SOLUTION INTRAVENOUS at 19:50

## 2024-05-24 RX ADMIN — MAGNESIUM SULFATE HEPTAHYDRATE 2 G: 500 INJECTION, SOLUTION INTRAMUSCULAR; INTRAVENOUS at 15:50

## 2024-05-24 RX ADMIN — AMINOCAPROIC ACID 10 G: 250 INJECTION, SOLUTION INTRAVENOUS at 13:01

## 2024-05-24 RX ADMIN — CHLORHEXIDINE GLUCONATE: 500 CLOTH TOPICAL at 10:40

## 2024-05-24 RX ADMIN — MUPIROCIN 1 APPLICATION: 20 OINTMENT TOPICAL at 09:59

## 2024-05-24 RX ADMIN — NICARDIPINE HYDROCHLORIDE 1 MG: 25 INJECTION INTRAVENOUS at 14:36

## 2024-05-24 RX ADMIN — SODIUM CHLORIDE: 9 INJECTION, SOLUTION INTRAVENOUS at 12:13

## 2024-05-24 RX ADMIN — INSULIN HUMAN 2 UNITS/HR: 1 INJECTION, SOLUTION INTRAVENOUS at 13:07

## 2024-05-24 RX ADMIN — Medication 25 MG: at 12:18

## 2024-05-24 RX ADMIN — ONDANSETRON 4 MG: 2 INJECTION INTRAMUSCULAR; INTRAVENOUS at 16:16

## 2024-05-24 RX ADMIN — VECURONIUM BROMIDE 4 MG: 1 INJECTION, POWDER, LYOPHILIZED, FOR SOLUTION INTRAVENOUS at 14:38

## 2024-05-24 RX ADMIN — VECURONIUM BROMIDE 4 MG: 1 INJECTION, POWDER, LYOPHILIZED, FOR SOLUTION INTRAVENOUS at 13:24

## 2024-05-24 RX ADMIN — DEXMEDETOMIDINE HYDROCHLORIDE 1.5 MCG/KG/HR: 4 INJECTION, SOLUTION INTRAVENOUS at 21:47

## 2024-05-24 RX ADMIN — Medication 0.03 MCG/KG/MIN: at 14:59

## 2024-05-24 RX ADMIN — VECURONIUM BROMIDE 2 MG: 1 INJECTION, POWDER, LYOPHILIZED, FOR SOLUTION INTRAVENOUS at 16:15

## 2024-05-24 RX ADMIN — MUPIROCIN 1 APPLICATION: 20 OINTMENT TOPICAL at 21:17

## 2024-05-24 RX ADMIN — PHENYLEPHRINE HYDROCHLORIDE 100 MCG: 10 INJECTION INTRAVENOUS at 12:58

## 2024-05-24 RX ADMIN — MORPHINE SULFATE 1 MG: 2 INJECTION, SOLUTION INTRAMUSCULAR; INTRAVENOUS at 18:00

## 2024-05-24 RX ADMIN — HEPARIN SODIUM 26000 UNITS: 1000 INJECTION INTRAVENOUS; SUBCUTANEOUS at 14:33

## 2024-05-24 RX ADMIN — SODIUM CHLORIDE 2 G: 900 INJECTION INTRAVENOUS at 12:57

## 2024-05-24 RX ADMIN — PHENYLEPHRINE HYDROCHLORIDE 50 MCG: 10 INJECTION INTRAVENOUS at 16:06

## 2024-05-24 RX ADMIN — Medication 25 MG: at 12:46

## 2024-05-24 RX ADMIN — FENTANYL CITRATE 250 MCG: 50 INJECTION, SOLUTION INTRAMUSCULAR; INTRAVENOUS at 13:12

## 2024-05-24 RX ADMIN — NITROGLYCERIN 20 MCG/MIN: 20 INJECTION INTRAVENOUS at 16:21

## 2024-05-24 RX ADMIN — MIDAZOLAM 5 MG: 1 INJECTION INTRAMUSCULAR; INTRAVENOUS at 12:46

## 2024-05-24 RX ADMIN — DEXMEDETOMIDINE HYDROCHLORIDE 0.5 MCG/KG/HR: 100 INJECTION, SOLUTION INTRAVENOUS at 12:18

## 2024-05-24 RX ADMIN — DEXMEDETOMIDINE HYDROCHLORIDE 1.5 MCG/KG/HR: 4 INJECTION, SOLUTION INTRAVENOUS at 18:00

## 2024-05-24 RX ADMIN — PHENYLEPHRINE HYDROCHLORIDE 100 MCG: 10 INJECTION INTRAVENOUS at 14:47

## 2024-05-24 RX ADMIN — SODIUM CHLORIDE: 9 INJECTION, SOLUTION INTRAVENOUS at 13:59

## 2024-05-24 RX ADMIN — FENTANYL CITRATE 250 MCG: 50 INJECTION, SOLUTION INTRAMUSCULAR; INTRAVENOUS at 16:15

## 2024-05-24 NOTE — ANESTHESIA POSTPROCEDURE EVALUATION
Patient: Ricci Ruiz    Procedure Summary       Date: 05/24/24 Room / Location: [facility] CVOR 02 / [facility] CVOR    Anesthesia Start: 1213 Anesthesia Stop: 1721    Procedures:       CORONARY ARTERY BYPASS WITH INTERNAL MAMMARY ARTERY GRAFT with intraop SOFIA (Chest)      TRANSESOPHAGEAL ECHOCARDIOGRAM WITH ANESTHESIA (Chest) Diagnosis:       Coronary artery disease involving native coronary artery of native heart, unspecified whether angina present      (Coronary artery disease involving native coronary artery of native heart, unspecified whether angina present [I25.10])    Surgeons: Poncho Alvarez MD Provider: Juan Moya MD    Anesthesia Type: general, Berkley, CVL, PAC ASA Status: 4            Anesthesia Type: general, Berkley, CVL, PAC    Vitals  Vitals Value Taken Time   BP     Temp 96.26 °F (35.7 °C) 05/24/24 1733   Pulse 86 05/24/24 1733   Resp     SpO2 99 % 05/24/24 1733   Vitals shown include unfiled device data.        Post Anesthesia Care and Evaluation    Patient location during evaluation: ICU  Patient participation: complete - patient cannot participate  Level of consciousness: obtunded/minimal responses  Pain scale: See nurse's notes for pain score.  Pain management: adequate    Airway patency: patent  Anesthetic complications: No anesthetic complications  PONV Status: none  Cardiovascular status: acceptable  Respiratory status: acceptable, ventilator and ETT  Hydration status: acceptable    Comments: Patient seen and examined postoperatively; vital signs stable; SpO2 greater than or equal to 90%; cardiopulmonary status stable; nausea/vomiting adequately controlled; pain adequately controlled; no apparent anesthesia complications; patient discharged from anesthesia care when discharge criteria were met

## 2024-05-24 NOTE — ANESTHESIA PROCEDURE NOTES
Arterial Line      Patient reassessed immediately prior to procedure    Start time: 5/24/2024 12:17 PM  Stop Time:5/24/2024 12:22 PM       Line placed for hemodynamic monitoring and ABGs/Labs/ISTAT.  Performed By   Anesthesiologist: Juan Moya MD   Preanesthetic Checklist  Completed: patient identified, IV checked, site marked, risks and benefits discussed, surgical consent, monitors and equipment checked, pre-op evaluation and timeout performed  Arterial Line Prep    Sterile Tech: cap, gloves, sterile barriers, gown and mask  Prep: ChloraPrep  Patient monitoring: EKG, continuous pulse oximetry and blood pressure monitoring  Arterial Line Procedure   Laterality:left  Location:  radial artery  Catheter size: 20 G   Guidance: palpation technique  Number of attempts: 1  Successful placement: yes   Post Assessment   Dressing Type: wrist guard applied, secured with tape and occlusive dressing applied.   Complications no  Circ/Move/Sens Assessment: normal and unchanged.   Patient Tolerance: patient tolerated the procedure well with no apparent complications  Additional Notes  Sterile seldinger technique, tolerated well

## 2024-05-24 NOTE — ANESTHESIA PROCEDURE NOTES
Central Line      Patient reassessed immediately prior to procedure    Staff  Anesthesiologist: Juan Moya MD  Preanesthetic Checklist  Completed: patient identified, IV checked, site marked, risks and benefits discussed, surgical consent, monitors and equipment checked, pre-op evaluation and timeout performed  Central Line Prep  Sterile Tech:gloves, cap, gown, mask and sterile barriers  Prep: chloraprep  Patient monitoring: blood pressure monitoring, continuous pulse oximetry and EKG  Central Line Procedure  Laterality:right  Location:internal jugular  Catheter Type:[place]-Bill  Catheter Size:9 Fr  Guidance:landmark technique and palpation technique  Assessment  Post procedure:biopatch applied, line sutured and occlusive dressing applied  Assessement:blood return through all ports, free fluid flow, chest x-ray ordered and Nicola Test  Complications:no  Patient Tolerance:patient tolerated the procedure well with no apparent complications  Additional Notes  Floated 1 attempt no wedge no resistance

## 2024-05-24 NOTE — ANESTHESIA PREPROCEDURE EVALUATION
Anesthesia Evaluation     Patient summary reviewed and Nursing notes reviewed   NPO Solid Status: > 8 hours  NPO Liquid Status: > 8 hours           Airway   Mallampati: II  TM distance: >3 FB  Neck ROM: full  No difficulty expected  Dental - normal exam     Pulmonary - normal exam   Cardiovascular - normal exam    ECG reviewed    (+) hypertension, CAD, hyperlipidemia      Neuro/Psych  (+) numbness, psychiatric history  GI/Hepatic/Renal/Endo    (+) renal disease-, diabetes mellitus    Musculoskeletal     Abdominal  - normal exam    Bowel sounds: normal.   Substance History      OB/GYN          Other        ROS/Med Hx Other: SR     Left main. Left main is a large-caliber vessel which gives rise to the Left Anterior Descending, ramus and the Left circumflex.  Left main is angiographically free from any significant disease     Left Anterior Descending Artery. LAD is a large vessel which gives rise to several septal perforators and several diagonal branches.  Mid LAD has 80% stenosis at the origin of diagonal vessel which also has 70 to 80% stenosis.     Ramus intermedius has proximal segment 70% stenosis.     Left Circumflex. The LCx is a medium caliber which gives rise to marginals.  OM1 has proximal 50% stenosis.  OM 2 has proximal segment 70% stenosis.     Right Coronary Artery. The RCA is a dominant vessel which gives rise to several small caliber branches including PDA and PLV.  Mid RCA has 99% long tubular stenosis.  PLV has mid segment 50% stenosis.      ·  Patient exercised for 10 minutes 21 seconds achieving 11.8 METS.  ·  Findings consistent with an abnormal ECG stress test.  ST depressions in lateral leads.  ·  Left ventricular ejection fraction is hyperdynamic (Calculated EF > 70%).  ·  Myocardial perfusion imaging indicates a moderate-sized, severe area of ischemia located in the inferior wall and septal wall.  ·  Impressions are consistent with an intermediate risk study.        ·  Left ventricular  systolic function is normal. Calculated left ventricular EF = 54% Left ventricular ejection fraction appears to be 51 - 55%.  ·  Left ventricular diastolic function is consistent with (grade I) impaired relaxation. GLS -14.2%.  ·  Estimated right ventricular systolic pressure from tricuspid regurgitation is normal (<35 mmHg).  ·  Mild mitral valve regurgitation is present.      IMPRESSION:  Impression:  1. No acute cardiopulmonary abnormality.                      Anesthesia Plan    ASA 4     general, [place], CVL and PAC     intravenous induction     Anesthetic plan, risks, benefits, and alternatives have been provided, discussed and informed consent has been obtained with: patient.        CODE STATUS:

## 2024-05-24 NOTE — ANESTHESIA PROCEDURE NOTES
Central Line      Start time: 5/24/2024 12:25 PM  Stop Time:5/24/2024 12:40 PM  Staff  Anesthesiologist: Juan Moya MD  Preanesthetic Checklist  Completed: patient identified, IV checked, site marked, risks and benefits discussed, surgical consent, monitors and equipment checked, pre-op evaluation and timeout performed  Central Line Prep  Sterile Tech:gloves, cap, gown, mask and sterile barriers  Prep: chloraprep  Patient monitoring: blood pressure monitoring, continuous pulse oximetry and EKG  Central Line Procedure  Laterality:right  Location:internal jugular  Catheter Type:Cordis  Catheter Size:9 Fr  Guidance:ultrasound guided, landmark technique and palpation technique  PROCEDURE NOTE/ULTRASOUND INTERPRETATION.  Using ultrasound guidance the potential vascular sites for insertion of the catheter were visualized to determine the patency of the vessel to be used for vascular access.  After selecting the appropriate site for insertion, the needle was visualized under ultrasound being inserted into the internal jugular vein, followed by ultrasound confirmation of wire and catheter placement. There were no abnormalities seen on ultrasound; an image was taken; and the patient tolerated the procedure with no complications. Images: still images not obtained  Assessment  Post procedure:biopatch applied, line sutured and occlusive dressing applied  Assessement:blood return through all ports, free fluid flow, chest x-ray ordered and Nicola Test  Complications:no  Patient Tolerance:patient tolerated the procedure well with no apparent complications  Additional Notes  Sterile prep, drape, gown and gloves.  Negative nicola negative carotid, no difficulties.  Tolerated well.

## 2024-05-24 NOTE — PROGRESS NOTES
Referring Provider: Poncho Alvarez, *    Reason for follow-up: Multivessel coronary artery disease     Patient Care Team:  Rain Paniagua MD as PCP - General (Family Medicine)      SUBJECTIVE  Patient is laying comfortably in bed and denies any chest pain this morning.     ROS  Review of all systems negative except as indicated.    Since I have last seen, the patient has been without any chest discomfort, shortness of breath, palpitations, dizziness or syncope.  Denies having any headache, abdominal pain, nausea, vomiting, diarrhea, constipation, loss of weight or loss of appetite.  Denies having any excessive bruising, hematuria or blood in the stool.  ROS      Personal History:    Past Medical History:   Diagnosis Date    Colon polyp 2019    Diabetes mellitus     Type 2    Hyperlipidemia        Past Surgical History:   Procedure Laterality Date    CARDIAC CATHETERIZATION N/A 5/23/2024    Procedure: Left Heart Cath with Coronary Angiography;  Surgeon: Alejandro Nagy MD;  Location: [facility] CATH INVASIVE LOCATION;  Service: Cardiovascular;  Laterality: N/A;    COLONOSCOPY  2019       Family History   Problem Relation Age of Onset    Arthritis Mother         Lupus    Diabetes Brother     Heart disease Father     Heart disease Brother        Social History     Tobacco Use    Smoking status: Never    Smokeless tobacco: Never   Vaping Use    Vaping status: Never Used   Substance Use Topics    Alcohol use: Yes     Alcohol/week: 1.0 standard drink of alcohol     Types: 1 Glasses of wine per week    Drug use: Never        Home meds:  Prior to Admission medications    Medication Sig Start Date End Date Taking? Authorizing Provider   aspirin 81 MG tablet Take 1 tablet by mouth Daily. 3/27/15  Yes ProviderLinda MD   Blood Glucose Monitoring Suppl (Accu-Chek Gladis Plus) w/Device kit 1 kit Daily. 10/26/20  Yes Karen Pickard MD   buPROPion XL (WELLBUTRIN XL) 150 MG 24 hr tablet TAKE 1 TABLET BY MOUTH EVERY  DAY 6/6/23  Yes Karen Pickard MD   glucose blood (Accu-Chek Gladis Plus) test strip Use daily E11.9 9/9/22  Yes Karen Pickard MD   lisinopril (PRINIVIL,ZESTRIL) 5 MG tablet TAKE 1 TABLET BY MOUTH EVERY DAY 6/6/23  Yes Karen Pickard MD   loratadine (Claritin) 10 MG tablet Take 1 tablet by mouth Daily.   Yes Linda Banks MD   metFORMIN (GLUCOPHAGE) 1000 MG tablet TAKE 1 TABLET BY MOUTH TWICE A DAY WITH MEALS 6/6/23  Yes Karen Pickard MD   Multiple Vitamin (MULTIVITAMIN) capsule Take 1 capsule by mouth Daily. 2/10/16  Yes Linda Banks MD   nitroglycerin (NITROSTAT) 0.4 MG SL tablet Place 1 tablet under the tongue Every 5 (Five) Minutes As Needed for Chest Pain. 5/1/24  Yes Linda Banks MD   Semaglutide, 1 MG/DOSE, (Ozempic, 1 MG/DOSE,) 2 MG/1.5ML solution pen-injector Inject 0.5 mg under the skin into the appropriate area as directed 1 (One) Time Per Week.   Yes Linda Banks MD   simvastatin (ZOCOR) 40 MG tablet TAKE 1 TABLET BY MOUTH EVERY DAY 12/27/22  Yes Karen Pickard MD   vitamin D3 125 MCG (5000 UT) capsule capsule Take 1 capsule by mouth Daily. 3/10/23  Yes Karen Pickard MD   isosorbide mononitrate (IMDUR) 30 MG 24 hr tablet Take 1 tablet by mouth Every Morning. 5/23/24   Alejandro Nagy MD       Allergies:  Patient has no known allergies.    Scheduled Meds:aspirin, 81 mg, Oral, Daily  atorvastatin, 20 mg, Oral, Daily  buPROPion XL, 150 mg, Oral, Daily  ceFAZolin, 2,000 mg, Intravenous, Once  chlorhexidine, 15 mL, Mouth/Throat, Q12H  Chlorhexidine Gluconate Cloth, , Topical, Q12H  metoprolol tartrate, 12.5 mg, Oral, Once  mupirocin, , Each Nare, Q12H      Continuous Infusions:insulin, 0-100 Units/hr  sodium chloride, 30 mL/hr      PRN Meds:.  acetaminophen    ALPRAZolam    dextrose    dextrose    diphenhydrAMINE    glucagon (human recombinant)    nitroglycerin    ondansetron ODT **OR** ondansetron    sodium chloride    sodium  "chloride      OBJECTIVE    Vital Signs  Vitals:    05/24/24 0400 05/24/24 0500 05/24/24 0600 05/24/24 0740   BP: 124/76  129/84 135/71   BP Location:    Left arm   Patient Position:    Lying   Pulse: 53  81 91   Resp:    12   Temp:    97.6 °F (36.4 °C)   TempSrc:    Oral   SpO2: 100%  98% 99%   Weight:  84.7 kg (186 lb 11.7 oz)     Height:           Flowsheet Rows      Flowsheet Row First Filed Value   Admission Height 181.6 cm (71.5\") Documented at 05/23/2024 1104   Admission Weight 86.6 kg (190 lb 14.7 oz) Documented at 05/23/2024 1104              Intake/Output Summary (Last 24 hours) at 5/24/2024 0746  Last data filed at 5/24/2024 0600  Gross per 24 hour   Intake 1190 ml   Output 0 ml   Net 1190 ml          Telemetry: Normal sinus rhythm    Physical Exam:  The patient is alert, oriented and in no distress.  Vital signs as noted above.  Head and neck revealed no carotid bruits or jugular venous distention.  No thyromegaly or lymphadenopathy is present  Lungs clear.  No wheezing.  Breath sounds are normal bilaterally.  Heart normal first and second heart sounds.  No murmur. No precordial rub is present.  No gallop is present.  Abdomen soft and nontender.  No organomegaly is present.  Extremities with good peripheral pulses without any pedal edema.  Skin warm and dry.  Musculoskeletal system is grossly normal.  CNS grossly normal.       Results Review:  I have personally reviewed the results from the time of this admission to 5/24/2024 07:46 EDT and agree with these findings:  []  Laboratory  []  Microbiology  []  Radiology  []  EKG/Telemetry   []  Cardiology/Vascular   []  Pathology  []  Old records  []  Other:    Most notable findings include:    Lab Results (last 24 hours)       Procedure Component Value Units Date/Time    Hemoglobin A1c [[id number]]  (Abnormal) Collected: 05/23/24 1707    Specimen: Blood Updated: 05/23/24 1849     Hemoglobin A1C 7.15 %     MRSA Screen, PCR (Inpatient) - Swab, Nares [[id number]]  " (Normal) Collected: 05/23/24 1723    Specimen: Swab from Nares Updated: 05/23/24 1844     MRSA PCR No MRSA Detected    Narrative:      The negative predictive value of this diagnostic test is high and should only be used to consider de-escalating anti-MRSA therapy. A positive result may indicate colonization with MRSA and must be correlated clinically.    COVID PRE-OP / PRE-PROCEDURE SCREENING ORDER (NO ISOLATION) - Swab, Nasopharynx [[id number]]  (Normal) Collected: 05/23/24 1723    Specimen: Swab from Nasopharynx Updated: 05/23/24 1753    Narrative:      The following orders were created for panel order COVID PRE-OP / PRE-PROCEDURE SCREENING ORDER (NO ISOLATION) - Swab, Nasopharynx.  Procedure                               Abnormality         Status                     ---------                               -----------         ------                     COVID-19,CEPHEID/CAROL,CO...[[id number]]  Normal              Final result                 Please view results for these tests on the individual orders.    COVID-19,CEPHEID/CAROL,COR/RAH/PAD/MAGY/LAG/[facility] IN-HOUSE,NP SWAB IN TRANSPORT MEDIA 1 HR TAT, RT-PCR - Swab, Nasopharynx [[id number]]  (Normal) Collected: 05/23/24 1723    Specimen: Swab from Nasopharynx Updated: 05/23/24 1753     COVID19 Not Detected    Narrative:      Fact sheet for providers: https://www.fda.gov/media/822950/download     Fact sheet for patients: https://www.fda.gov/media/380350/download  Fact sheet for providers: https://www.fda.gov/media/581913/download    Fact sheet for patients: https://www.fda.gov/media/263056/download    Test performed by PCR.    Comprehensive Metabolic Panel [[id number]]  (Abnormal) Collected: 05/23/24 1707    Specimen: Blood Updated: 05/23/24 1743     Glucose 176 mg/dL      BUN 16 mg/dL      Creatinine 1.20 mg/dL      Sodium 139 mmol/L      Potassium 3.5 mmol/L      Chloride 103 mmol/L      CO2 27.0 mmol/L      Calcium 8.9 mg/dL      Total Protein 6.9 g/dL      Albumin 4.3  g/dL      ALT (SGPT) 18 U/L      AST (SGOT) 15 U/L      Alkaline Phosphatase 70 U/L      Total Bilirubin 0.4 mg/dL      Globulin 2.6 gm/dL      A/G Ratio 1.7 g/dL      BUN/Creatinine Ratio 13.3     Anion Gap 9.0 mmol/L      eGFR 71.9 mL/min/1.73     Narrative:      GFR Normal >60  Chronic Kidney Disease <60  Kidney Failure <15      Urinalysis With Culture If Indicated - Urine, Clean Catch [[id number]]  (Abnormal) Collected: 05/23/24 1731    Specimen: Urine, Clean Catch Updated: 05/23/24 1743     Color, UA Yellow     Appearance, UA Clear     pH, UA 7.0     Specific Gravity, UA 1.018     Glucose,  mg/dL (2+)     Ketones, UA Negative     Bilirubin, UA Negative     Blood, UA Negative     Protein, UA Negative     Leuk Esterase, UA Negative     Nitrite, UA Negative     Urobilinogen, UA 1.0 E.U./dL    Narrative:      In absence of clinical symptoms, the presence of pyuria, bacteria, and/or nitrites on the urinalysis result does not correlate with infection.  Urine microscopic not indicated.    aPTT [[id number]]  (Normal) Collected: 05/23/24 1707    Specimen: Blood Updated: 05/23/24 1729     PTT 26.2 seconds     Protime-INR [[id number]]  (Normal) Collected: 05/23/24 1707    Specimen: Blood Updated: 05/23/24 1729     Protime 10.9 Seconds      INR 1.00    CBC & Differential [[id number]]  (Abnormal) Collected: 05/23/24 1707    Specimen: Blood Updated: 05/23/24 1717    Narrative:      The following orders were created for panel order CBC & Differential.  Procedure                               Abnormality         Status                     ---------                               -----------         ------                     CBC Auto Differential[[id number]]        Abnormal            Final result                 Please view results for these tests on the individual orders.    CBC Auto Differential [[id number]]  (Abnormal) Collected: 05/23/24 1707    Specimen: Blood Updated: 05/23/24 1717     WBC 5.95 10*3/mm3      RBC 4.38  10*6/mm3      Hemoglobin 13.7 g/dL      Hematocrit 41.6 %      MCV 95.0 fL      MCH 31.3 pg      MCHC 32.9 g/dL      RDW 12.0 %      RDW-SD 41.8 fl      MPV 10.5 fL      Platelets 174 10*3/mm3      Neutrophil % 50.1 %      Lymphocyte % 37.1 %      Monocyte % 9.4 %      Eosinophil % 1.8 %      Basophil % 1.3 %      Immature Grans % 0.3 %      Neutrophils, Absolute 2.97 10*3/mm3      Lymphocytes, Absolute 2.21 10*3/mm3      Monocytes, Absolute 0.56 10*3/mm3      Eosinophils, Absolute 0.11 10*3/mm3      Basophils, Absolute 0.08 10*3/mm3      Immature Grans, Absolute 0.02 10*3/mm3      nRBC 0.0 /100 WBC     Platelet Function ADP [[id number]]  (Normal) Collected: 05/23/24 1710    Specimen: Blood Updated: 05/23/24 1716     ADP Aggregation, % Platelet 96 %     Blood Gas, Arterial - [[id number]]  (Abnormal) Collected: 05/23/24 1655    Specimen: Arterial Blood Updated: 05/23/24 1658     Site Left Radial     Darin's Test Positive     pH, Arterial 7.448 pH units      pCO2, Arterial 38.2 mm Hg      pO2, Arterial 77.3 mm Hg      HCO3, Arterial 26.5 mmol/L      Base Excess, Arterial 2.4 mmol/L      Comment: Serial Number: [id number]:  [id number]        O2 Saturation, Arterial 95.9 %      CO2 Content 27.6 mmol/L      Barometric Pressure for Blood Gas --     Comment: N/A        Modality Room Air     FIO2 21 %      Hemodilution No    POC Glucose Once [[id number]]  (Abnormal) Collected: 05/23/24 1116    Specimen: Blood Updated: 05/23/24 1117     Glucose 137 mg/dL      Comment: Serial Number: [id number]:  [id number]               Imaging Results (Last 24 Hours)       Procedure Component Value Units Date/Time    XR Chest 1 View [[id number]] Collected: 05/23/24 2021     Updated: 05/23/24 2024    Narrative:      XR CHEST 1 VW    Date of Exam: 5/23/2024 3:40 PM EDT    Indication: Preop cardiac surgery    Comparison: Chest radiograph dated 10/14/2020    Findings:  The cardiomediastinal silhouette is within normal limits. Pulmonary  vascularity appears normal. There is no focal airspace consolidation, pleural effusion, or pneumothorax. There are multilevel degenerative changes of the thoracic spine.      Impression:      Impression:  1. No acute cardiopulmonary abnormality.      Electronically Signed: Alexandru Arun    5/23/2024 8:22 PM EDT    Workstation ID: [id number]            LAB RESULTS (LAST 7 DAYS)    CBC  Results from last 7 days   Lab Units 05/23/24  1707 05/21/24  0844   WBC 10*3/mm3 5.95 5.86   RBC 10*6/mm3 4.38 4.51   HEMOGLOBIN g/dL 13.7 14.4   HEMATOCRIT % 41.6 41.8   MCV fL 95.0 92.7   PLATELETS 10*3/mm3 174 215       BMP  Results from last 7 days   Lab Units 05/23/24  1707 05/21/24  0844   SODIUM mmol/L 139 142   POTASSIUM mmol/L 3.5 4.5   CHLORIDE mmol/L 103 106   CO2 mmol/L 27.0 25.0   BUN mg/dL 16 19   CREATININE mg/dL 1.20 1.28*   GLUCOSE mg/dL 176* 147*       CMP   Results from last 7 days   Lab Units 05/23/24  1707 05/21/24  0844   SODIUM mmol/L 139 142   POTASSIUM mmol/L 3.5 4.5   CHLORIDE mmol/L 103 106   CO2 mmol/L 27.0 25.0   BUN mg/dL 16 19   CREATININE mg/dL 1.20 1.28*   GLUCOSE mg/dL 176* 147*   ALBUMIN g/dL 4.3  --    BILIRUBIN mg/dL 0.4  --    ALK PHOS U/L 70  --    AST (SGOT) U/L 15  --    ALT (SGPT) U/L 18  --        BNP        TROPONIN        CoAg  Results from last 7 days   Lab Units 05/23/24  1707 05/21/24  0844   INR  1.00 0.98   APTT seconds 26.2  --        Creatinine Clearance  Estimated Creatinine Clearance: 84.3 mL/min (by C-G formula based on SCr of 1.2 mg/dL).    ABG  Results from last 7 days   Lab Units 05/23/24  1655   PH, ARTERIAL pH units 7.448   PCO2, ARTERIAL mm Hg 38.2   PO2 ART mm Hg 77.3*   O2 SATURATION ART % 95.9   BASE EXCESS ART mmol/L 2.4       Radiology  XR Chest 1 View    Result Date: 5/23/2024  Impression: 1. No acute cardiopulmonary abnormality. Electronically Signed: Alexandru Dias  5/23/2024 8:22 PM EDT  Workstation ID: [id number]       EKG  I personally viewed and interpreted the  patient's EKG/Telemetry data:  ECG 12 Lead Pre-Op / Pre-Procedure   Final Result   HEART RATE= 74  bpm   RR Interval= 808  ms   CA Interval= 184  ms   P Horizontal Axis= 10  deg   P Front Axis= 42  deg   QRSD Interval= 87  ms   QT Interval= 371  ms   QTcB= 413  ms   QRS Axis= 8  deg   T Wave Axis= -3  deg   - NORMAL ECG -   Sinus rhythm   When compared with ECG of 14-Oct-2020 14:36:03,   Significant rate decrease   Significant repolarization change   Electronically Signed By: Vinicio Lino (RA[facility]) 24-May-2024 07:39:42   Date and Time of Study: 2024-05-23 22:22:51            Echocardiogram:    Results for orders placed during the hospital encounter of 05/13/24    Adult Transthoracic Echo Complete W/ Cont if Necessary Per Protocol    Interpretation Summary    Left ventricular systolic function is normal. Calculated left ventricular EF = 54% Left ventricular ejection fraction appears to be 51 - 55%.    Left ventricular diastolic function is consistent with (grade I) impaired relaxation. GLS -14.2%.    Estimated right ventricular systolic pressure from tricuspid regurgitation is normal (<35 mmHg).    Mild mitral valve regurgitation is present.        Stress Test:  Results for orders placed during the hospital encounter of 05/13/24    Stress test with myocardial perfusion one day    Interpretation Summary    Patient exercised for 10 minutes 21 seconds achieving 11.8 METS.    Findings consistent with an abnormal ECG stress test.  ST depressions in lateral leads.    Left ventricular ejection fraction is hyperdynamic (Calculated EF > 70%).    Myocardial perfusion imaging indicates a moderate-sized, severe area of ischemia located in the inferior wall and septal wall.    Impressions are consistent with an intermediate risk study.         Cardiac Catheterization:  Results for orders placed during the hospital encounter of 05/23/24    Cardiac Catheterization/Vascular Study    Conclusion  OPERATORS:  1. Alejandro Nagy M.D.,  Attending Cardiologist      PROCEDURE PERFORMED.  Ultrasound guided vascular access  Left heart catheterization  Coronary Angiogram 07815  Moderate Sedation    INDICATIONS FOR PROCEDURE.  53 years old man with unstable angina and abnormal nuclear stress test.  After discussing the risk and benefit of the procedure he was brought into the Cath Lab for coronary angiography.    PROCEDURE IN DETAIL.  Informed consent was obtained from the patient after explaining the risks, benefits, and alternative options of the procedure. After obtaining informed consent, the patient was brought to the cath lab and was prepped in a sterile fashion. Lidocaine 1% was used for local anesthesia into the right radial access site. The right radial artery was accessed under direct ultrasound visualization  with an angiocath needle via modified Seldinger technique. A 6F slender sheath was inserted successfully. Afterwards, 6F JR4  was advanced over a wire into the ascending aorta and used to cross the AV and obtain LV pressures.  AV gradient obtained via pullback technique. JR4 and JL3.5 diagnostic catheters were used to engage the ostia of the RCA and LM respectively. Images of the right and left coronary systems were obtained. All the catheters were exchanged over a wire and subsequently removed. The patient tolerated the procedure well without any complications. The pictures were reviewed at the end of the procedure. TR band applied to right wrist for hemostasis and inflated with 15 cc of air. No complications were encountered.    HEMODYNAMICS.  LV: 135/8, 17 mmHg  AO: 136/82, 105 mmHg  No significant gradient across the aortic valve during pullback of JR4 catheter.  LV gram was not performed due to recent echocardiogram.    FINDINGS.    Coronary Angiogram.    Left main. Left main is a large-caliber vessel which gives rise to the Left Anterior Descending, ramus and the Left circumflex.  Left main is angiographically free from any  significant disease    Left Anterior Descending Artery. LAD is a large vessel which gives rise to several septal perforators and several diagonal branches.  Mid LAD has 80% stenosis at the origin of diagonal vessel which also has 70 to 80% stenosis.    Ramus intermedius has proximal segment 70% stenosis.    Left Circumflex. The LCx is a medium caliber which gives rise to marginals.  OM1 has proximal 50% stenosis.  OM 2 has proximal segment 70% stenosis.    Right Coronary Artery. The RCA is a dominant vessel which gives rise to several small caliber branches including PDA and PLV.  Mid RCA has 99% long tubular stenosis.  PLV has mid segment 50% stenosis.    IMPRESSIONS.  1 Multivessel obstructive coronary artery disease including LAD, ramus and RCA.  2.  Mildly elevated LVEDP    RECOMMENDATIONS.  1.  Consult cardiac surgery for CABG  2.  Start Imdur    Electronically signed by Alejandro Nagy MD, 05/23/24, 12:30 PM EDT.         Other:         ASSESSMENT & PLAN:    Principal Problem:    Coronary artery disease involving native coronary artery of native heart  Active Problems:    Chest pain due to myocardial ischemia    Abnormal nuclear stress test    CAD (coronary artery disease)    Unstable angina/coronary artery disease (Primary)  Multiple cardiovascular risk factors including hypertension, hyperlipidemia, diabetes, family history of premature coronary disease.  ECG is normal.  Abnormal nuclear stress test leading to cardiac catheterization  Cardiac cath 5/23/2024 showed multivessel coronary artery disease including 99% RCA and 80% LAD.  Plan is to proceed with urgent CABG today  He is on aspirin and Imdur    Primary hypertension  Blood pressure is normal on lisinopril  Plan is to start beta-blocker after CABG    Mixed hyperlipidemia  LDL 80, HDL 48, triglyceride 98 and total cholesterol 146  Goal LDL is less than 50  Start high intensity statin    Type 2 diabetes mellitus without complication, without long-term  current use of insulin  Patient is typically on metformin and Trulicity  A1c 7.1    Anxiety and depression  He is on bupropion      Alejandro Nagy MD  05/24/24  07:46 EDT

## 2024-05-24 NOTE — CASE MANAGEMENT/SOCIAL WORK
Discharge Planning Assessment   [facility]     Patient Name: Ricci Ruiz  MRN: [id number]  Today's Date: 5/24/2024    Admit Date: 5/23/2024  Plan: Retrun home with wife. CABG 5/24/2024.   Discharge Needs Assessment       Row Name 05/24/24 1121       Living Environment    People in Home spouse    Name(s) of People in Home Leann Malloy    Current Living Arrangements home    Potentially Unsafe Housing Conditions none    In the past 12 months has the electric, gas, oil, or water company threatened to shut off services in your home? No    Primary Care Provided by self    Provides Primary Care For no one    Family Caregiver if Needed spouse    Family Caregiver Names Mellissa    Quality of Family Relationships helpful;involved;supportive    Able to Return to Prior Arrangements yes       Resource/Environmental Concerns    Resource/Environmental Concerns none    Transportation Concerns none       Transportation Needs    In the past 12 months, has lack of transportation kept you from medical appointments or from getting medications? no    In the past 12 months, has lack of transportation kept you from meetings, work, or from getting things needed for daily living? No       Food Insecurity    Within the past 12 months, you worried that your food would run out before you got the money to buy more. Never true    Within the past 12 months, the food you bought just didn't last and you didn't have money to get more. Never true       Transition Planning    Patient/Family Anticipates Transition to home with family    Patient/Family Anticipated Services at Transition none    Transportation Anticipated car, drives self;family or friend will provide       Discharge Needs Assessment    Readmission Within the Last 30 Days no previous admission in last 30 days    Equipment Currently Used at Home none    Anticipated Changes Related to Illness none    Equipment Needed After Discharge none    Provided Post Acute Provider List? N/A     Provided Post Acute Provider Quality & Resource List? N/A                   Discharge Plan       Row Name 05/24/24 1122       Plan    Plan Retrun home with wife. CABG 5/24/2024.    Patient/Family in Agreement with Plan yes    Provided Post Acute Provider List? N/A    Provided Post Acute Provider Quality & Resource List? N/A    Plan Comments CM met with patient and wife at bedside. Patient states he lives at home with wife, is independent with ADLs, drives self, and denies current DME. Patient confirms PCP [facility] and utilizes [facility] in [place], [place] for preferred pharmacy. Patient agreeable to enrollment in [id number] program. Patient denies difficulty affording medications, food, or utilities. Patient denies current HHC or therapy. Patient states a time of discharge his wife will provide transportation. DC barriers: CABG schedule for today 5/24/2024.             Expected Discharge Date and Time       Expected Discharge Date Expected Discharge Time    May 28, 2024            Demographic Summary       Row Name 05/24/24 1118       General Information    Admission Type inpatient    Arrived From emergency department    Referral Source admission list    Reason for Consult discharge planning;care coordination/care conference    Preferred Language English       Contact Information    Permission Granted to Share Info With                 Functional Status       Row Name 05/24/24 1121       Functional Status    Usual Activity Tolerance good    Current Activity Tolerance good       Functional Status, IADL    Medications independent    Meal Preparation independent    Housekeeping independent    Laundry independent    Shopping independent       Mental Status    General Appearance WDL WDL       Mental Status Summary    Recent Changes in Mental Status/Cognitive Functioning no changes             Wendi Mejia, ALVA      Office Phone [phone]

## 2024-05-24 NOTE — PAYOR COMM NOTE
"Clinical for case# [id number]     Outpatient order 5/23   - admit for scheduled cardiac cath.   Patient has required urgent CABG procedure.    Inpatient order 5/24/24    MD notes and clinical attached.   CABG 5/24 - OR note still pending at this time.   =============  AUTHORIZATION PENDING:   PLEASE FAX OR CALL DETERMINATION TO CONTACT BELOW:       THANK YOU,    YADY Martinez, RN  Utilization Review  [facility]  Phone: [phone]  Fax: [phone]      NPI: [id number]  Tax ID: [id number]          Ricci Ruiz \"Arslan\" (53 y.o. Male)       Date of Birth   1970    Social Security Number       Address   [address] [place] [address] [place] [facility]    Home Phone   [phone]    MRN   [id number]       [facility]   None    Marital Status                               Admission Date   5/23/24    Admission Type   Elective    Admitting Provider   Poncho Alvarez MD    Attending Provider   Poncho Alvarez MD    Department, Room/Bed   [facility] CARDIOVASCULAR OPERATING ROOM, [facility] CVOR/CVOR       Discharge Date       Discharge Disposition       Discharge Destination                                 Attending Provider: Poncho Alvarez MD    Allergies: No Known Allergies    Isolation: None   Infection: None   Code Status: Prior    Ht: 181.6 cm (71.5\")   Wt: 84.7 kg (186 lb 11.7 oz)    Admission Cmt: None   Principal Problem: Coronary artery disease involving native coronary artery of native heart [I25.10]                   Active Insurance as of 5/23/2024       Primary Coverage       Payor Plan Insurance Group Employer/Plan Group    CLAIRE RANGEL [id number]       Payor Plan Address Payor Plan Phone Number Payor Plan Fax Number Effective Dates    [address] [phone]  1/1/2024 - None Entered    [place]OURT[place] CLAROS [id number]         Subscriber Name Subscriber Birth Date Member ID       RICCI RUIZ 1970 [id number]                     Emergency " Contacts        (Rel.) Home Phone Work Phone Mobile Phone    AMISHA RUIZ (Spouse) [phone] -- --                 History & Physical        Alejandro Nagy MD at 05/23/24 1020            H&P reviewed. The patient was examined and there are no changes to the H&P.      Proceed with coronary angiography.        Electronically signed by Alejandro Nagy MD at 05/23/24 1020   Source Note: H&P (View-Only)        Post-procedure Diagnoses    1. Unstable angina [I20.0]                 Encounter Date:05/06/2024        Patient ID: Ricci Ruiz is a 53 y.o. male.      Chief Complaint:      History of Present Illness  53 years old man with hypertension, hyperlipidemia and diabetes who presents to cardiology office to establish care.  He complains of worsening chest pain ongoing for the last 2 to 3 weeks.  He also reports episodes when he gets pale and diaphoretic.  He also has a strong family history of coronary disease.  His current medications include lisinopril, simvastatin, metformin, Trulicity.    He is accompanied by his wife who is also my patient.  He reports shoulder pain, facial pain including jaw pain and neck pain with exertion.  The symptoms occur mostly when he walks uphill or fast.  Symptoms resolved with rest.  They have specially worsened over the last 2 weeks.  He has not taken nitroglycerin yet.  He has significant family history of premature coronary disease in his father and brother.    The following portions of the patient's history were reviewed and updated as appropriate: allergies, current medications, past family history, past medical history, past social history, past surgical history, and problem list.    Review of Systems   Constitutional: Positive for malaise/fatigue.   Cardiovascular:  Positive for chest pain and dyspnea on exertion. Negative for leg swelling and palpitations.   Respiratory:  Positive for shortness of breath. Negative for cough.    Gastrointestinal:   Negative for abdominal pain, nausea and vomiting.   Neurological:  Positive for dizziness, light-headedness and numbness. Negative for focal weakness and headaches.   All other systems reviewed and are negative.        Current Outpatient Medications:     aspirin 81 MG tablet, Take 1 tablet by mouth Daily., Disp: , Rfl:     Blood Glucose Monitoring Suppl (Accu-Chek Gladis Plus) w/Device kit, 1 kit Daily., Disp: 1 kit, Rfl: 1    buPROPion XL (WELLBUTRIN XL) 150 MG 24 hr tablet, TAKE 1 TABLET BY MOUTH EVERY DAY, Disp: 90 tablet, Rfl: 1    Dulaglutide (Trulicity) 3 MG/0.5ML solution pen-injector, Inject 0.5 mL under the skin into the appropriate area as directed 1 (One) Time Per Week., Disp: 2 mL, Rfl: 5    glucose blood (Accu-Chek Gladis Plus) test strip, Use daily E11.9, Disp: 100 each, Rfl: 12    lisinopril (PRINIVIL,ZESTRIL) 5 MG tablet, TAKE 1 TABLET BY MOUTH EVERY DAY, Disp: 90 tablet, Rfl: 1    loratadine (Claritin) 10 MG tablet, Take 1 tablet by mouth Daily., Disp: , Rfl:     metFORMIN (GLUCOPHAGE) 1000 MG tablet, TAKE 1 TABLET BY MOUTH TWICE A DAY WITH MEALS, Disp: 180 tablet, Rfl: 1    Multiple Vitamin (MULTIVITAMIN) capsule, Take 1 capsule by mouth Daily., Disp: , Rfl:     nitroglycerin (NITROSTAT) 0.4 MG SL tablet, Place 1 tablet under the tongue Every 5 (Five) Minutes As Needed for Chest Pain., Disp: , Rfl:     simvastatin (ZOCOR) 40 MG tablet, TAKE 1 TABLET BY MOUTH EVERY DAY, Disp: 90 tablet, Rfl: 2    vitamin D3 125 MCG (5000 UT) capsule capsule, Take 1 capsule by mouth Daily., Disp: 90 capsule, Rfl: 9    Current outpatient and discharge medications have been reconciled for the patient.  Reviewed by: Alejandro Nagy MD       No Known Allergies    Family History   Problem Relation Age of Onset    Arthritis Mother         Lupus    Diabetes Brother     Heart disease Father     Heart disease Brother        Past Surgical History:   Procedure Laterality Date    COLONOSCOPY  2019       Past Medical History:  "  Diagnosis Date    Colon polyp 2019    Diabetes mellitus     Type 2    Hyperlipidemia        Family History   Problem Relation Age of Onset    Arthritis Mother         Lupus    Diabetes Brother     Heart disease Father     Heart disease Brother        Social History     Socioeconomic History    Marital status:    Tobacco Use    Smoking status: Never    Smokeless tobacco: Never   Vaping Use    Vaping status: Never Used   Substance and Sexual Activity    Alcohol use: Yes     Alcohol/week: 1.0 standard drink of alcohol     Types: 1 Glasses of wine per week    Drug use: Never    Sexual activity: Yes     Partners: Female     Birth control/protection: Post-menopausal, None               Objective:       Physical Exam    /78 (BP Location: Left arm, Patient Position: Sitting)   Pulse 66   Ht 185.4 cm (73\")   Wt 86.2 kg (190 lb)   SpO2 98%   BMI 25.07 kg/m²   The patient is alert, oriented and in no distress.    Vital signs as noted above.    Head and neck revealed no carotid bruits or jugular venous distension.  No thyromegaly or lymphadenopathy is present.    Lungs clear.  No wheezing.  Breath sounds are normal bilaterally.    Heart normal first and second heart sounds.  No murmur..  No pericardial rub is present.  No gallop is present.    Abdomen soft and nontender.  No organomegaly is present.    Extremities revealed good peripheral pulses without any pedal edema.    Skin warm and dry.    Musculoskeletal system is grossly normal.    CNS grossly normal.           Diagnosis Plan   1. Unstable angina        2. Primary hypertension        3. Mixed hyperlipidemia        4. Type 2 diabetes mellitus without complication, without long-term current use of insulin        5. Anxiety and depression        LAB RESULTS (LAST 7 DAYS)    CBC        BMP        CMP         BNP        TROPONIN        CoAg        Creatinine Clearance  CrCl cannot be calculated (Patient's most recent lab result is older than the maximum 30 " days allowed.).    ABG        Radiology  No radiology results for the last day    EKG    ECG 12 Lead    Date/Time: 5/6/2024 3:42 PM  Performed by: Alejandro Nagy MD    Authorized by: Alejandro Nagy MD  Previous ECG: no previous ECG available  Comments: ECG shows normal sinus rhythm.  Nonspecific ST changes.  Heart rate 66, SC interval 165, QRS duration 88 and QTc 401 ms.          Stress test      Echocardiogram      Cardiac catheterization  No results found for this or any previous visit.          Assessment and Plan       Diagnoses and all orders for this visit:    1. Unstable angina (Primary)  Multiple cardiovascular risk factors including hypertension, hyperlipidemia, diabetes, family history of premature coronary disease.  ECG is normal.  He is awaiting stress test and echocardiogram  I have prescribed him nitroglycerin  I think given his symptoms he should undergo cardiac catheterization however patient is reluctant currently and would like to undergo stress test first.  2. Primary hypertension  Blood pressure is normal on lisinopril  We will start beta-blocker after stress testing has been completed.  3. Mixed hyperlipidemia  Currently on simvastatin  Obtain a lipid panel  4. Type 2 diabetes mellitus without complication, without long-term current use of insulin  Currently on metformin and Trulicity  Obtain an A1c  5. Anxiety and depression  He is on bupropion and  Other orders  -     ECG 12 Lead         Electronically signed by Alejandro Nagy MD at 05/06/24 1557                 Alejandro Nagy MD at 05/06/24 1515        Post-procedure Diagnoses    1. Unstable angina [I20.0]                 Encounter Date:05/06/2024        Patient ID: Ricci Ruiz is a 53 y.o. male.      Chief Complaint:      History of Present Illness  53 years old man with hypertension, hyperlipidemia and diabetes who presents to cardiology office to establish care.  He complains of worsening chest pain ongoing for the last 2 to 3  weeks.  He also reports episodes when he gets pale and diaphoretic.  He also has a strong family history of coronary disease.  His current medications include lisinopril, simvastatin, metformin, Trulicity.    He is accompanied by his wife who is also my patient.  He reports shoulder pain, facial pain including jaw pain and neck pain with exertion.  The symptoms occur mostly when he walks uphill or fast.  Symptoms resolved with rest.  They have specially worsened over the last 2 weeks.  He has not taken nitroglycerin yet.  He has significant family history of premature coronary disease in his father and brother.    The following portions of the patient's history were reviewed and updated as appropriate: allergies, current medications, past family history, past medical history, past social history, past surgical history, and problem list.    Review of Systems   Constitutional: Positive for malaise/fatigue.   Cardiovascular:  Positive for chest pain and dyspnea on exertion. Negative for leg swelling and palpitations.   Respiratory:  Positive for shortness of breath. Negative for cough.    Gastrointestinal:  Negative for abdominal pain, nausea and vomiting.   Neurological:  Positive for dizziness, light-headedness and numbness. Negative for focal weakness and headaches.   All other systems reviewed and are negative.        Current Outpatient Medications:     aspirin 81 MG tablet, Take 1 tablet by mouth Daily., Disp: , Rfl:     Blood Glucose Monitoring Suppl (Accu-Chek Gladis Plus) w/Device kit, 1 kit Daily., Disp: 1 kit, Rfl: 1    buPROPion XL (WELLBUTRIN XL) 150 MG 24 hr tablet, TAKE 1 TABLET BY MOUTH EVERY DAY, Disp: 90 tablet, Rfl: 1    Dulaglutide (Trulicity) 3 MG/0.5ML solution pen-injector, Inject 0.5 mL under the skin into the appropriate area as directed 1 (One) Time Per Week., Disp: 2 mL, Rfl: 5    glucose blood (Accu-Chek Gladis Plus) test strip, Use daily E11.9, Disp: 100 each, Rfl: 12    lisinopril  (PRINIVIL,ZESTRIL) 5 MG tablet, TAKE 1 TABLET BY MOUTH EVERY DAY, Disp: 90 tablet, Rfl: 1    loratadine (Claritin) 10 MG tablet, Take 1 tablet by mouth Daily., Disp: , Rfl:     metFORMIN (GLUCOPHAGE) 1000 MG tablet, TAKE 1 TABLET BY MOUTH TWICE A DAY WITH MEALS, Disp: 180 tablet, Rfl: 1    Multiple Vitamin (MULTIVITAMIN) capsule, Take 1 capsule by mouth Daily., Disp: , Rfl:     nitroglycerin (NITROSTAT) 0.4 MG SL tablet, Place 1 tablet under the tongue Every 5 (Five) Minutes As Needed for Chest Pain., Disp: , Rfl:     simvastatin (ZOCOR) 40 MG tablet, TAKE 1 TABLET BY MOUTH EVERY DAY, Disp: 90 tablet, Rfl: 2    vitamin D3 125 MCG (5000 UT) capsule capsule, Take 1 capsule by mouth Daily., Disp: 90 capsule, Rfl: 9    Current outpatient and discharge medications have been reconciled for the patient.  Reviewed by: Alejandro Nagy MD       No Known Allergies    Family History   Problem Relation Age of Onset    Arthritis Mother         Lupus    Diabetes Brother     Heart disease Father     Heart disease Brother        Past Surgical History:   Procedure Laterality Date    COLONOSCOPY  2019       Past Medical History:   Diagnosis Date    Colon polyp 2019    Diabetes mellitus     Type 2    Hyperlipidemia        Family History   Problem Relation Age of Onset    Arthritis Mother         Lupus    Diabetes Brother     Heart disease Father     Heart disease Brother        Social History     Socioeconomic History    Marital status:    Tobacco Use    Smoking status: Never    Smokeless tobacco: Never   Vaping Use    Vaping status: Never Used   Substance and Sexual Activity    Alcohol use: Yes     Alcohol/week: 1.0 standard drink of alcohol     Types: 1 Glasses of wine per week    Drug use: Never    Sexual activity: Yes     Partners: Female     Birth control/protection: Post-menopausal, None               Objective:       Physical Exam    /78 (BP Location: Left arm, Patient Position: Sitting)   Pulse 66   Ht 185.4 cm  "(73\")   Wt 86.2 kg (190 lb)   SpO2 98%   BMI 25.07 kg/m²   The patient is alert, oriented and in no distress.    Vital signs as noted above.    Head and neck revealed no carotid bruits or jugular venous distension.  No thyromegaly or lymphadenopathy is present.    Lungs clear.  No wheezing.  Breath sounds are normal bilaterally.    Heart normal first and second heart sounds.  No murmur..  No pericardial rub is present.  No gallop is present.    Abdomen soft and nontender.  No organomegaly is present.    Extremities revealed good peripheral pulses without any pedal edema.    Skin warm and dry.    Musculoskeletal system is grossly normal.    CNS grossly normal.           Diagnosis Plan   1. Unstable angina        2. Primary hypertension        3. Mixed hyperlipidemia        4. Type 2 diabetes mellitus without complication, without long-term current use of insulin        5. Anxiety and depression        LAB RESULTS (LAST 7 DAYS)    CBC        BMP        CMP         BNP        TROPONIN        CoAg        Creatinine Clearance  CrCl cannot be calculated (Patient's most recent lab result is older than the maximum 30 days allowed.).    ABG        Radiology  No radiology results for the last day    EKG    ECG 12 Lead    Date/Time: 5/6/2024 3:42 PM  Performed by: Alejandro Nagy MD    Authorized by: Alejandro Nagy MD  Previous ECG: no previous ECG available  Comments: ECG shows normal sinus rhythm.  Nonspecific ST changes.  Heart rate 66, IL interval 165, QRS duration 88 and QTc 401 ms.          Stress test      Echocardiogram      Cardiac catheterization  No results found for this or any previous visit.          Assessment and Plan       Diagnoses and all orders for this visit:    1. Unstable angina (Primary)  Multiple cardiovascular risk factors including hypertension, hyperlipidemia, diabetes, family history of premature coronary disease.  ECG is normal.  He is awaiting stress test and echocardiogram  I have prescribed him " nitroglycerin  I think given his symptoms he should undergo cardiac catheterization however patient is reluctant currently and would like to undergo stress test first.  2. Primary hypertension  Blood pressure is normal on lisinopril  We will start beta-blocker after stress testing has been completed.  3. Mixed hyperlipidemia  Currently on simvastatin  Obtain a lipid panel  4. Type 2 diabetes mellitus without complication, without long-term current use of insulin  Currently on metformin and Trulicity  Obtain an A1c  5. Anxiety and depression  He is on bupropion and  Other orders  -     ECG 12 Lead         Electronically signed by Alejandro Nagy MD at 05/06/24 1557       Vital Signs (last day)       Date/Time Temp Temp src Pulse Resp BP Patient Position SpO2    05/24/24 1137 97.9 (36.6) Oral 76 12 126/84 Lying 96    05/24/24 1131 -- -- 67 -- 126/84 -- 97    05/24/24 1000 -- -- 82 -- 139/80 -- --    05/24/24 0959 -- -- 84 -- 124/76 -- --    05/24/24 0900 -- -- 71 -- 124/76 -- 97    05/24/24 0800 -- -- 72 -- 140/83 -- 100    05/24/24 0740 97.6 (36.4) Oral 91 12 135/71 Lying 99    05/24/24 0700 -- -- 81 -- 135/71 -- 97    05/24/24 0600 -- -- 81 -- 129/84 -- 98    05/24/24 0400 -- -- 53 -- 124/76 -- 100    05/24/24 0344 97.4 (36.3) Oral 50 9 135/86 Lying 100    05/24/24 0300 -- -- 69 -- 108/70 -- 97    05/24/24 0200 -- -- 62 -- 114/70 -- 97    05/24/24 0100 -- -- 70 -- 113/71 -- 96    05/24/24 0008 97.8 (36.6) Oral 73 19 137/72 Lying 99    05/23/24 2300 -- -- 73 -- 117/73 -- 96    05/23/24 2200 -- -- 76 -- 126/74 -- 96    05/23/24 2000 98.1 (36.7) Oral 74 13 141/78 Lying 99    05/23/24 1900 -- -- 80 -- 149/86 -- 99    05/23/24 1716 97.8 (36.6) Oral 74 12 129/84 Lying 99    05/23/24 1655 -- -- 77 16 -- -- 99    05/23/24 1600 -- -- 80 -- 130/83 -- 99    05/23/24 1500 -- -- 82 -- 136/76 -- 99    05/23/24 1445 -- -- 84 -- 131/72 -- 99    05/23/24 1430 -- -- 93 -- 137/72 -- 97    05/23/24 1415 -- -- 85 -- 147/91 -- 99     05/23/24 1400 -- -- 73 -- 130/74 -- 97    05/23/24 1345 -- -- 88 -- 138/77 -- 95    05/23/24 1330 -- -- 71 -- 124/86 -- 98    05/23/24 1315 -- -- 80 -- 121/76 -- 98    05/23/24 1300 -- -- 82 -- 116/79 -- 99    05/23/24 1245 -- -- 81 -- 119/76 -- 98    05/23/24 1230 -- -- 85 16 123/75 -- 97    05/23/24 12:15:53 -- -- 72 18 134/88 -- 100    05/23/24 12:10:35 -- -- 76 18 131/88 -- 100    05/23/24 12:00:27 -- -- 71 18 127/82 -- 100    05/23/24 1153 -- -- 74 16 137/94 -- 100    05/23/24 1104 98.4 (36.9) Oral 79 16 125/79 Lying 99          Facility-Administered Medications as of 5/24/2024   Medication Dose Route Frequency Provider Last Rate Last Admin    [Transfer Hold] acetaminophen (TYLENOL) tablet 650 mg  650 mg Oral Q4H PRN Alejandro Nagy MD        [Transfer Hold] ALPRAZolam (XANAX) tablet 0.25 mg  0.25 mg Oral Q8H PRN Blanca Redmond APRN   0.25 mg at 05/23/24 2330    aspirin EC tablet 81 mg  81 mg Oral Daily SatterBlanca Gill APRN        atorvastatin (LIPITOR) tablet 20 mg  20 mg Oral Daily SatterBlanca Gill APRN        buPROPion XL (WELLBUTRIN XL) 24 hr tablet 150 mg  150 mg Oral Daily Blanca Redmond APRN        [COMPLETED] ceFAZolin 2000 mg IVPB in 100 mL NS (MBP)  2,000 mg Intravenous Once SatterlyBlanca Henriquez APRN   2 g at 05/24/24 1257    [COMPLETED] chlorhexidine (PERIDEX) 0.12 % solution 15 mL  15 mL Mouth/Throat Q12H SatterBlanca Gill L, APRN   15 mL at 05/24/24 0959    [COMPLETED] Chlorhexidine Gluconate Cloth 2 % pads   Topical Q12H Blanca Redmond APRN   Given at 05/24/24 1040    dextrose (D50W) (25 g/50 mL) IV injection 10-50 mL  10-50 mL Intravenous Q15 Min PRN Blanca Redmond APRN        dextrose (GLUTOSE) oral gel 15 g  15 g Oral Q15 Min PRN Blanca Redmond APRN        [Transfer Hold] diphenhydrAMINE (BENADRYL) capsule 25 mg  25 mg Oral Q6H PRN Alejandro Nagy MD        gelatin absorbable (GELFOAM)  sponge    PRN Poncho Alvarez MD   1 each at 05/24/24 1313    glucagon (GLUCAGEN) injection 1 mg  1 mg Intramuscular Q15 Min PRN Blanca Redmond APRN        insulin regular 1 unit/mL in 0.9% sodium chloride (Glucommander)  0-100 Units/hr Intravenous Titrated Blanca Redmond APRN        [COMPLETED] metoprolol tartrate (LOPRESSOR) half tablet 12.5 mg  12.5 mg Oral Once Blanca Redmond APRN   12.5 mg at 05/24/24 0959    [COMPLETED] mupirocin (BACTROBAN) 2 % nasal ointment   Each Nare Q12H Blanca Redmond APRN   1 Application at 05/24/24 0959    [Transfer Hold] nitroglycerin (NITROSTAT) SL tablet 0.4 mg  0.4 mg Sublingual Q5 Min PRN Alejandro Nagy MD        [Transfer Hold] ondansetron ODT (ZOFRAN-ODT) disintegrating tablet 4 mg  4 mg Oral Q6H PRN Alejandro Nagy MD        Or    [Transfer Hold] ondansetron (ZOFRAN) injection 4 mg  4 mg Intravenous Q6H PRN Alejandro Nagy MD        sodium chloride (NS) irrigation solution    PRN Poncho Alvarez MD   3,000 mL at 05/24/24 1313    sodium chloride 0.9 % flush 30 mL  30 mL Intravenous Once PRN Blanca Redmond APRN        [COMPLETED] sodium chloride 0.9 % infusion    Code / Trauma / Sedation Continuous Med Alejandro Nagy MD 75 mL/hr at 05/23/24 1155 75 mL/hr at 05/23/24 1155    sodium chloride 0.9 % infusion  30 mL/hr Intravenous Continuous PRN Blanca Redmond APRN        sodium chloride 0.9 % solution    PRN Poncho Alvarez MD   2,000 mL at 05/24/24 1313    sodium chloride 500 mL with heparin (porcine) 10,000 Units mixture    PRN Poncho Alvarez MD   Given at 05/24/24 1313    sodium chloride 60 mL with papaverine 2 mL mixture    Poncho Banks MD   Given at 05/24/24 1313     Lab Results (last 24 hours)       Procedure Component Value Units Date/Time    POC Glucose Once [[id number]]  (Abnormal) Collected: 05/24/24 1105    Specimen: Blood Updated: 05/24/24 1102      Glucose 132 mg/dL      Comment: Serial Number: [id number]:  [id number]       Hemoglobin A1c [[id number]]  (Abnormal) Collected: 05/23/24 1707    Specimen: Blood Updated: 05/23/24 1849     Hemoglobin A1C 7.15 %     MRSA Screen, PCR (Inpatient) - Swab, Nares [[id number]]  (Normal) Collected: 05/23/24 1723    Specimen: Swab from Nares Updated: 05/23/24 1844     MRSA PCR No MRSA Detected    Narrative:      The negative predictive value of this diagnostic test is high and should only be used to consider de-escalating anti-MRSA therapy. A positive result may indicate colonization with MRSA and must be correlated clinically.    COVID PRE-OP / PRE-PROCEDURE SCREENING ORDER (NO ISOLATION) - Swab, Nasopharynx [[id number]]  (Normal) Collected: 05/23/24 1723    Specimen: Swab from Nasopharynx Updated: 05/23/24 1753    Narrative:      The following orders were created for panel order COVID PRE-OP / PRE-PROCEDURE SCREENING ORDER (NO ISOLATION) - Swab, Nasopharynx.  Procedure                               Abnormality         Status                     ---------                               -----------         ------                     COVID-19,CEPHEID/CAROL,CO...[[id number]]  Normal              Final result                 Please view results for these tests on the individual orders.    COVID-19,CEPHEID/CAROL,COR/RAH/PAD/MAGY/LAG/F[facility]ERICO IN-HOUSE,NP SWAB IN TRANSPORT MEDIA 1 HR TAT, RT-PCR - Swab, Nasopharynx [[id number]]  (Normal) Collected: 05/23/24 1723    Specimen: Swab from Nasopharynx Updated: 05/23/24 1753     COVID19 Not Detected    Narrative:      Fact sheet for providers: https://www.fda.gov/media/362456/download     Fact sheet for patients: https://www.fda.gov/media/148471/download  Fact sheet for providers: https://www.fda.gov/media/352293/download    Fact sheet for patients: https://www.fda.gov/media/085085/download    Test performed by PCR.    Comprehensive Metabolic Panel [[id number]]  (Abnormal) Collected: 05/23/24  1707    Specimen: Blood Updated: 05/23/24 1743     Glucose 176 mg/dL      BUN 16 mg/dL      Creatinine 1.20 mg/dL      Sodium 139 mmol/L      Potassium 3.5 mmol/L      Chloride 103 mmol/L      CO2 27.0 mmol/L      Calcium 8.9 mg/dL      Total Protein 6.9 g/dL      Albumin 4.3 g/dL      ALT (SGPT) 18 U/L      AST (SGOT) 15 U/L      Alkaline Phosphatase 70 U/L      Total Bilirubin 0.4 mg/dL      Globulin 2.6 gm/dL      A/G Ratio 1.7 g/dL      BUN/Creatinine Ratio 13.3     Anion Gap 9.0 mmol/L      eGFR 71.9 mL/min/1.73     Narrative:      GFR Normal >60  Chronic Kidney Disease <60  Kidney Failure <15      Urinalysis With Culture If Indicated - Urine, Clean Catch [[id number]]  (Abnormal) Collected: 05/23/24 1731    Specimen: Urine, Clean Catch Updated: 05/23/24 1743     Color, UA Yellow     Appearance, UA Clear     pH, UA 7.0     Specific Gravity, UA 1.018     Glucose,  mg/dL (2+)     Ketones, UA Negative     Bilirubin, UA Negative     Blood, UA Negative     Protein, UA Negative     Leuk Esterase, UA Negative     Nitrite, UA Negative     Urobilinogen, UA 1.0 E.U./dL    Narrative:      In absence of clinical symptoms, the presence of pyuria, bacteria, and/or nitrites on the urinalysis result does not correlate with infection.  Urine microscopic not indicated.    aPTT [[id number]]  (Normal) Collected: 05/23/24 1707    Specimen: Blood Updated: 05/23/24 1729     PTT 26.2 seconds     Protime-INR [[id number]]  (Normal) Collected: 05/23/24 1707    Specimen: Blood Updated: 05/23/24 1729     Protime 10.9 Seconds      INR 1.00    CBC & Differential [[id number]]  (Abnormal) Collected: 05/23/24 1707    Specimen: Blood Updated: 05/23/24 1717    Narrative:      The following orders were created for panel order CBC & Differential.  Procedure                               Abnormality         Status                     ---------                               -----------         ------                     CBC Auto  Differential[[id number]]        Abnormal            Final result                 Please view results for these tests on the individual orders.    CBC Auto Differential [[id number]]  (Abnormal) Collected: 05/23/24 1707    Specimen: Blood Updated: 05/23/24 1717     WBC 5.95 10*3/mm3      RBC 4.38 10*6/mm3      Hemoglobin 13.7 g/dL      Hematocrit 41.6 %      MCV 95.0 fL      MCH 31.3 pg      MCHC 32.9 g/dL      RDW 12.0 %      RDW-SD 41.8 fl      MPV 10.5 fL      Platelets 174 10*3/mm3      Neutrophil % 50.1 %      Lymphocyte % 37.1 %      Monocyte % 9.4 %      Eosinophil % 1.8 %      Basophil % 1.3 %      Immature Grans % 0.3 %      Neutrophils, Absolute 2.97 10*3/mm3      Lymphocytes, Absolute 2.21 10*3/mm3      Monocytes, Absolute 0.56 10*3/mm3      Eosinophils, Absolute 0.11 10*3/mm3      Basophils, Absolute 0.08 10*3/mm3      Immature Grans, Absolute 0.02 10*3/mm3      nRBC 0.0 /100 WBC     Platelet Function ADP [[id number]]  (Normal) Collected: 05/23/24 1710    Specimen: Blood Updated: 05/23/24 1716     ADP Aggregation, % Platelet 96 %     Blood Gas, Arterial - [[id number]]  (Abnormal) Collected: 05/23/24 1655    Specimen: Arterial Blood Updated: 05/23/24 1658     Site Left Radial     Darin's Test Positive     pH, Arterial 7.448 pH units      pCO2, Arterial 38.2 mm Hg      pO2, Arterial 77.3 mm Hg      HCO3, Arterial 26.5 mmol/L      Base Excess, Arterial 2.4 mmol/L      Comment: Serial Number: [id number]:  [id number]        O2 Saturation, Arterial 95.9 %      CO2 Content 27.6 mmol/L      Barometric Pressure for Blood Gas --     Comment: N/A        Modality Room Air     FIO2 21 %      Hemodilution No          Imaging Results (Last 48 Hours)       Procedure Component Value Units Date/Time    XR Chest 1 View [[id number]] Collected: 05/23/24 2021     Updated: 05/23/24 2024    Narrative:      XR CHEST 1 VW    Date of Exam: 5/23/2024 3:40 PM EDT    Indication: Preop cardiac surgery    Comparison: Chest radiograph dated  10/14/2020    Findings:  The cardiomediastinal silhouette is within normal limits. Pulmonary vascularity appears normal. There is no focal airspace consolidation, pleural effusion, or pneumothorax. There are multilevel degenerative changes of the thoracic spine.      Impression:      Impression:  1. No acute cardiopulmonary abnormality.      Electronically Signed: Alexandru Dias    5/23/2024 8:22 PM EDT    Workstation ID: [id number]               Physician Progress Notes (last 48 hours)        Alejandro Nagy MD at 05/24/24 0783          Referring Provider: Poncho Alvarez, *    Reason for follow-up: Multivessel coronary artery disease     Patient Care Team:  Rain Paniagua MD as PCP - General (Family Medicine)      SUBJECTIVE  Patient is laying comfortably in bed and denies any chest pain this morning.     ROS  Review of all systems negative except as indicated.    Since I have last seen, the patient has been without any chest discomfort, shortness of breath, palpitations, dizziness or syncope.  Denies having any headache, abdominal pain, nausea, vomiting, diarrhea, constipation, loss of weight or loss of appetite.  Denies having any excessive bruising, hematuria or blood in the stool.  ROS      Personal History:    Past Medical History:   Diagnosis Date    Colon polyp 2019    Diabetes mellitus     Type 2    Hyperlipidemia        Past Surgical History:   Procedure Laterality Date    CARDIAC CATHETERIZATION N/A 5/23/2024    Procedure: Left Heart Cath with Coronary Angiography;  Surgeon: Alejandro Nagy MD;  Location: [facility] CATH INVASIVE LOCATION;  Service: Cardiovascular;  Laterality: N/A;    COLONOSCOPY  2019       Family History   Problem Relation Age of Onset    Arthritis Mother         Lupus    Diabetes Brother     Heart disease Father     Heart disease Brother        Social History     Tobacco Use    Smoking status: Never    Smokeless tobacco: Never   Vaping Use    Vaping status: Never Used   Substance Use  Topics    Alcohol use: Yes     Alcohol/week: 1.0 standard drink of alcohol     Types: 1 Glasses of wine per week    Drug use: Never        Home meds:  Prior to Admission medications    Medication Sig Start Date End Date Taking? Authorizing Provider   aspirin 81 MG tablet Take 1 tablet by mouth Daily. 3/27/15  Yes Linda Banks MD   Blood Glucose Monitoring Suppl (Accu-Chek Gladis Plus) w/Device kit 1 kit Daily. 10/26/20  Yes Karen Pickard MD   buPROPion XL (WELLBUTRIN XL) 150 MG 24 hr tablet TAKE 1 TABLET BY MOUTH EVERY DAY 6/6/23  Yes Kaern Pickard MD   glucose blood (Accu-Chek Gladis Plus) test strip Use daily E11.9 9/9/22  Yes Karen Pickard MD   lisinopril (PRINIVIL,ZESTRIL) 5 MG tablet TAKE 1 TABLET BY MOUTH EVERY DAY 6/6/23  Yes Karen Pickard MD   loratadine (Claritin) 10 MG tablet Take 1 tablet by mouth Daily.   Yes Linda Banks MD   metFORMIN (GLUCOPHAGE) 1000 MG tablet TAKE 1 TABLET BY MOUTH TWICE A DAY WITH MEALS 6/6/23  Yes Karen Pickard MD   Multiple Vitamin (MULTIVITAMIN) capsule Take 1 capsule by mouth Daily. 2/10/16  Yes Linda Banks MD   nitroglycerin (NITROSTAT) 0.4 MG SL tablet Place 1 tablet under the tongue Every 5 (Five) Minutes As Needed for Chest Pain. 5/1/24  Yes Linda Banks MD   Semaglutide, 1 MG/DOSE, (Ozempic, 1 MG/DOSE,) 2 MG/1.5ML solution pen-injector Inject 0.5 mg under the skin into the appropriate area as directed 1 (One) Time Per Week.   Yes Linda Banks MD   simvastatin (ZOCOR) 40 MG tablet TAKE 1 TABLET BY MOUTH EVERY DAY 12/27/22  Yes Karen Pickard MD   vitamin D3 125 MCG (5000 UT) capsule capsule Take 1 capsule by mouth Daily. 3/10/23  Yes Karen Pickard MD   isosorbide mononitrate (IMDUR) 30 MG 24 hr tablet Take 1 tablet by mouth Every Morning. 5/23/24   Alejandro Nagy MD       Allergies:  Patient has no known allergies.    Scheduled Meds:aspirin, 81 mg, Oral, Daily  atorvastatin, 20  "mg, Oral, Daily  buPROPion XL, 150 mg, Oral, Daily  ceFAZolin, 2,000 mg, Intravenous, Once  chlorhexidine, 15 mL, Mouth/Throat, Q12H  Chlorhexidine Gluconate Cloth, , Topical, Q12H  metoprolol tartrate, 12.5 mg, Oral, Once  mupirocin, , Each Nare, Q12H      Continuous Infusions:insulin, 0-100 Units/hr  sodium chloride, 30 mL/hr      PRN Meds:.  acetaminophen    ALPRAZolam    dextrose    dextrose    diphenhydrAMINE    glucagon (human recombinant)    nitroglycerin    ondansetron ODT **OR** ondansetron    sodium chloride    sodium chloride      OBJECTIVE    Vital Signs  Vitals:    05/24/24 0400 05/24/24 0500 05/24/24 0600 05/24/24 0740   BP: 124/76  129/84 135/71   BP Location:    Left arm   Patient Position:    Lying   Pulse: 53  81 91   Resp:    12   Temp:    97.6 °F (36.4 °C)   TempSrc:    Oral   SpO2: 100%  98% 99%   Weight:  84.7 kg (186 lb 11.7 oz)     Height:           Flowsheet Rows      Flowsheet Row First Filed Value   Admission Height 181.6 cm (71.5\") Documented at 05/23/2024 1104   Admission Weight 86.6 kg (190 lb 14.7 oz) Documented at 05/23/2024 1104              Intake/Output Summary (Last 24 hours) at 5/24/2024 0746  Last data filed at 5/24/2024 0600  Gross per 24 hour   Intake 1190 ml   Output 0 ml   Net 1190 ml          Telemetry: Normal sinus rhythm    Physical Exam:  The patient is alert, oriented and in no distress.  Vital signs as noted above.  Head and neck revealed no carotid bruits or jugular venous distention.  No thyromegaly or lymphadenopathy is present  Lungs clear.  No wheezing.  Breath sounds are normal bilaterally.  Heart normal first and second heart sounds.  No murmur. No precordial rub is present.  No gallop is present.  Abdomen soft and nontender.  No organomegaly is present.  Extremities with good peripheral pulses without any pedal edema.  Skin warm and dry.  Musculoskeletal system is grossly normal.  CNS grossly normal.       Results Review:  I have personally reviewed the results " from the time of this admission to 5/24/2024 07:46 EDT and agree with these findings:  []  Laboratory  []  Microbiology  []  Radiology  []  EKG/Telemetry   []  Cardiology/Vascular   []  Pathology  []  Old records  []  Other:    Most notable findings include:    Lab Results (last 24 hours)       Procedure Component Value Units Date/Time    Hemoglobin A1c [[id number]]  (Abnormal) Collected: 05/23/24 1707    Specimen: Blood Updated: 05/23/24 1849     Hemoglobin A1C 7.15 %     MRSA Screen, PCR (Inpatient) - Swab, Nares [[id number]]  (Normal) Collected: 05/23/24 1723    Specimen: Swab from Nares Updated: 05/23/24 1844     MRSA PCR No MRSA Detected    Narrative:      The negative predictive value of this diagnostic test is high and should only be used to consider de-escalating anti-MRSA therapy. A positive result may indicate colonization with MRSA and must be correlated clinically.    COVID PRE-OP / PRE-PROCEDURE SCREENING ORDER (NO ISOLATION) - Swab, Nasopharynx [[id number]]  (Normal) Collected: 05/23/24 1723    Specimen: Swab from Nasopharynx Updated: 05/23/24 1753    Narrative:      The following orders were created for panel order COVID PRE-OP / PRE-PROCEDURE SCREENING ORDER (NO ISOLATION) - Swab, Nasopharynx.  Procedure                               Abnormality         Status                     ---------                               -----------         ------                     COVID-19,CEPHEID/CAROL,CO...[[id number]]  Normal              Final result                 Please view results for these tests on the individual orders.    COVID-19,CEPHEID/CAROL,COR/RAH/PAD/MAGY/LAG/FEDERICO IN-HOUSE,NP SWAB IN TRANSPORT MEDIA 1 HR TAT, RT-PCR - Swab, Nasopharynx [[id number]]  (Normal) Collected: 05/23/24 1723    Specimen: Swab from Nasopharynx Updated: 05/23/24 1753     COVID19 Not Detected    Narrative:      Fact sheet for providers: https://www.fda.gov/media/526143/download     Fact sheet for patients:  https://www.fda.gov/media/040036/download  Fact sheet for providers: https://www.fda.gov/media/288894/download    Fact sheet for patients: https://www.fda.gov/media/030908/download    Test performed by PCR.    Comprehensive Metabolic Panel [[id number]]  (Abnormal) Collected: 05/23/24 1707    Specimen: Blood Updated: 05/23/24 1743     Glucose 176 mg/dL      BUN 16 mg/dL      Creatinine 1.20 mg/dL      Sodium 139 mmol/L      Potassium 3.5 mmol/L      Chloride 103 mmol/L      CO2 27.0 mmol/L      Calcium 8.9 mg/dL      Total Protein 6.9 g/dL      Albumin 4.3 g/dL      ALT (SGPT) 18 U/L      AST (SGOT) 15 U/L      Alkaline Phosphatase 70 U/L      Total Bilirubin 0.4 mg/dL      Globulin 2.6 gm/dL      A/G Ratio 1.7 g/dL      BUN/Creatinine Ratio 13.3     Anion Gap 9.0 mmol/L      eGFR 71.9 mL/min/1.73     Narrative:      GFR Normal >60  Chronic Kidney Disease <60  Kidney Failure <15      Urinalysis With Culture If Indicated - Urine, Clean Catch [[id number]]  (Abnormal) Collected: 05/23/24 1731    Specimen: Urine, Clean Catch Updated: 05/23/24 1743     Color, UA Yellow     Appearance, UA Clear     pH, UA 7.0     Specific Gravity, UA 1.018     Glucose,  mg/dL (2+)     Ketones, UA Negative     Bilirubin, UA Negative     Blood, UA Negative     Protein, UA Negative     Leuk Esterase, UA Negative     Nitrite, UA Negative     Urobilinogen, UA 1.0 E.U./dL    Narrative:      In absence of clinical symptoms, the presence of pyuria, bacteria, and/or nitrites on the urinalysis result does not correlate with infection.  Urine microscopic not indicated.    aPTT [[id number]]  (Normal) Collected: 05/23/24 1707    Specimen: Blood Updated: 05/23/24 1729     PTT 26.2 seconds     Protime-INR [[id number]]  (Normal) Collected: 05/23/24 1707    Specimen: Blood Updated: 05/23/24 1729     Protime 10.9 Seconds      INR 1.00    CBC & Differential [[id number]]  (Abnormal) Collected: 05/23/24 1707    Specimen: Blood Updated: 05/23/24 6539     Narrative:      The following orders were created for panel order CBC & Differential.  Procedure                               Abnormality         Status                     ---------                               -----------         ------                     CBC Auto Differential[[id number]]        Abnormal            Final result                 Please view results for these tests on the individual orders.    CBC Auto Differential [[id number]]  (Abnormal) Collected: 05/23/24 1707    Specimen: Blood Updated: 05/23/24 1717     WBC 5.95 10*3/mm3      RBC 4.38 10*6/mm3      Hemoglobin 13.7 g/dL      Hematocrit 41.6 %      MCV 95.0 fL      MCH 31.3 pg      MCHC 32.9 g/dL      RDW 12.0 %      RDW-SD 41.8 fl      MPV 10.5 fL      Platelets 174 10*3/mm3      Neutrophil % 50.1 %      Lymphocyte % 37.1 %      Monocyte % 9.4 %      Eosinophil % 1.8 %      Basophil % 1.3 %      Immature Grans % 0.3 %      Neutrophils, Absolute 2.97 10*3/mm3      Lymphocytes, Absolute 2.21 10*3/mm3      Monocytes, Absolute 0.56 10*3/mm3      Eosinophils, Absolute 0.11 10*3/mm3      Basophils, Absolute 0.08 10*3/mm3      Immature Grans, Absolute 0.02 10*3/mm3      nRBC 0.0 /100 WBC     Platelet Function ADP [[id number]]  (Normal) Collected: 05/23/24 1710    Specimen: Blood Updated: 05/23/24 1716     ADP Aggregation, % Platelet 96 %     Blood Gas, Arterial - [[id number]]  (Abnormal) Collected: 05/23/24 1655    Specimen: Arterial Blood Updated: 05/23/24 1658     Site Left Radial     Darin's Test Positive     pH, Arterial 7.448 pH units      pCO2, Arterial 38.2 mm Hg      pO2, Arterial 77.3 mm Hg      HCO3, Arterial 26.5 mmol/L      Base Excess, Arterial 2.4 mmol/L      Comment: Serial Number: [id number]:  [id number]        O2 Saturation, Arterial 95.9 %      CO2 Content 27.6 mmol/L      Barometric Pressure for Blood Gas --     Comment: N/A        Modality Room Air     FIO2 21 %      Hemodilution No    POC Glucose Once [[id number]]  (Abnormal)  Collected: 05/23/24 1116    Specimen: Blood Updated: 05/23/24 1117     Glucose 137 mg/dL      Comment: Serial Number: [id number]:  [id number]               Imaging Results (Last 24 Hours)       Procedure Component Value Units Date/Time    XR Chest 1 View [[id number]] Collected: 05/23/24 2021     Updated: 05/23/24 2024    Narrative:      XR CHEST 1 VW    Date of Exam: 5/23/2024 3:40 PM EDT    Indication: Preop cardiac surgery    Comparison: Chest radiograph dated 10/14/2020    Findings:  The cardiomediastinal silhouette is within normal limits. Pulmonary vascularity appears normal. There is no focal airspace consolidation, pleural effusion, or pneumothorax. There are multilevel degenerative changes of the thoracic spine.      Impression:      Impression:  1. No acute cardiopulmonary abnormality.      Electronically Signed: Alexandru Alfaroelor    5/23/2024 8:22 PM EDT    Workstation ID: [id number]            LAB RESULTS (LAST 7 DAYS)    CBC  Results from last 7 days   Lab Units 05/23/24  1707 05/21/24  0844   WBC 10*3/mm3 5.95 5.86   RBC 10*6/mm3 4.38 4.51   HEMOGLOBIN g/dL 13.7 14.4   HEMATOCRIT % 41.6 41.8   MCV fL 95.0 92.7   PLATELETS 10*3/mm3 174 215       BMP  Results from last 7 days   Lab Units 05/23/24  1707 05/21/24  0844   SODIUM mmol/L 139 142   POTASSIUM mmol/L 3.5 4.5   CHLORIDE mmol/L 103 106   CO2 mmol/L 27.0 25.0   BUN mg/dL 16 19   CREATININE mg/dL 1.20 1.28*   GLUCOSE mg/dL 176* 147*       CMP   Results from last 7 days   Lab Units 05/23/24  1707 05/21/24  0844   SODIUM mmol/L 139 142   POTASSIUM mmol/L 3.5 4.5   CHLORIDE mmol/L 103 106   CO2 mmol/L 27.0 25.0   BUN mg/dL 16 19   CREATININE mg/dL 1.20 1.28*   GLUCOSE mg/dL 176* 147*   ALBUMIN g/dL 4.3  --    BILIRUBIN mg/dL 0.4  --    ALK PHOS U/L 70  --    AST (SGOT) U/L 15  --    ALT (SGPT) U/L 18  --        BNP        TROPONIN        CoAg  Results from last 7 days   Lab Units 05/23/24  1707 05/21/24  0844   INR  1.00 0.98   APTT seconds 26.2  --         Creatinine Clearance  Estimated Creatinine Clearance: 84.3 mL/min (by C-G formula based on SCr of 1.2 mg/dL).    ABG  Results from last 7 days   Lab Units 05/23/24  1655   PH, ARTERIAL pH units 7.448   PCO2, ARTERIAL mm Hg 38.2   PO2 ART mm Hg 77.3*   O2 SATURATION ART % 95.9   BASE EXCESS ART mmol/L 2.4       Radiology  XR Chest 1 View    Result Date: 5/23/2024  Impression: 1. No acute cardiopulmonary abnormality. Electronically Signed: Alexandru Dias  5/23/2024 8:22 PM EDT  Workstation ID: [id number]       EKG  I personally viewed and interpreted the patient's EKG/Telemetry data:  ECG 12 Lead Pre-Op / Pre-Procedure   Final Result   HEART RATE= 74  bpm   RR Interval= 808  ms   FL Interval= 184  ms   P Horizontal Axis= 10  deg   P Front Axis= 42  deg   QRSD Interval= 87  ms   QT Interval= 371  ms   QTcB= 413  ms   QRS Axis= 8  deg   T Wave Axis= -3  deg   - NORMAL ECG -   Sinus rhythm   When compared with ECG of 14-Oct-2020 14:36:03,   Significant rate decrease   Significant repolarization change   Electronically Signed By: Vinicio Lino ([facility]) 24-May-2024 07:39:42   Date and Time of Study: 2024-05-23 22:22:51            Echocardiogram:    Results for orders placed during the hospital encounter of 05/13/24    Adult Transthoracic Echo Complete W/ Cont if Necessary Per Protocol    Interpretation Summary    Left ventricular systolic function is normal. Calculated left ventricular EF = 54% Left ventricular ejection fraction appears to be 51 - 55%.    Left ventricular diastolic function is consistent with (grade I) impaired relaxation. GLS -14.2%.    Estimated right ventricular systolic pressure from tricuspid regurgitation is normal (<35 mmHg).    Mild mitral valve regurgitation is present.        Stress Test:  Results for orders placed during the hospital encounter of 05/13/24    Stress test with myocardial perfusion one day    Interpretation Summary    Patient exercised for 10 minutes 21 seconds achieving 11.8  METS.    Findings consistent with an abnormal ECG stress test.  ST depressions in lateral leads.    Left ventricular ejection fraction is hyperdynamic (Calculated EF > 70%).    Myocardial perfusion imaging indicates a moderate-sized, severe area of ischemia located in the inferior wall and septal wall.    Impressions are consistent with an intermediate risk study.         Cardiac Catheterization:  Results for orders placed during the hospital encounter of 05/23/24    Cardiac Catheterization/Vascular Study    Conclusion  OPERATORS:  1. Alejandro Nagy M.D., Attending Cardiologist      PROCEDURE PERFORMED.  Ultrasound guided vascular access  Left heart catheterization  Coronary Angiogram 28180  Moderate Sedation    INDICATIONS FOR PROCEDURE.  53 years old man with unstable angina and abnormal nuclear stress test.  After discussing the risk and benefit of the procedure he was brought into the Cath Lab for coronary angiography.    PROCEDURE IN DETAIL.  Informed consent was obtained from the patient after explaining the risks, benefits, and alternative options of the procedure. After obtaining informed consent, the patient was brought to the cath lab and was prepped in a sterile fashion. Lidocaine 1% was used for local anesthesia into the right radial access site. The right radial artery was accessed under direct ultrasound visualization  with an angiocath needle via modified Seldinger technique. A 6F slender sheath was inserted successfully. Afterwards, 6F JR4  was advanced over a wire into the ascending aorta and used to cross the AV and obtain LV pressures.  AV gradient obtained via pullback technique. JR4 and JL3.5 diagnostic catheters were used to engage the ostia of the RCA and LM respectively. Images of the right and left coronary systems were obtained. All the catheters were exchanged over a wire and subsequently removed. The patient tolerated the procedure well without any complications. The pictures were reviewed  at the end of the procedure. TR band applied to right wrist for hemostasis and inflated with 15 cc of air. No complications were encountered.    HEMODYNAMICS.  LV: 135/8, 17 mmHg  AO: 136/82, 105 mmHg  No significant gradient across the aortic valve during pullback of JR4 catheter.  LV gram was not performed due to recent echocardiogram.    FINDINGS.    Coronary Angiogram.    Left main. Left main is a large-caliber vessel which gives rise to the Left Anterior Descending, ramus and the Left circumflex.  Left main is angiographically free from any significant disease    Left Anterior Descending Artery. LAD is a large vessel which gives rise to several septal perforators and several diagonal branches.  Mid LAD has 80% stenosis at the origin of diagonal vessel which also has 70 to 80% stenosis.    Ramus intermedius has proximal segment 70% stenosis.    Left Circumflex. The LCx is a medium caliber which gives rise to marginals.  OM1 has proximal 50% stenosis.  OM 2 has proximal segment 70% stenosis.    Right Coronary Artery. The RCA is a dominant vessel which gives rise to several small caliber branches including PDA and PLV.  Mid RCA has 99% long tubular stenosis.  PLV has mid segment 50% stenosis.    IMPRESSIONS.  1 Multivessel obstructive coronary artery disease including LAD, ramus and RCA.  2.  Mildly elevated LVEDP    RECOMMENDATIONS.  1.  Consult cardiac surgery for CABG  2.  Start Imdur    Electronically signed by Aljeandro Nagy MD, 05/23/24, 12:30 PM EDT.         Other:         ASSESSMENT & PLAN:    Principal Problem:    Coronary artery disease involving native coronary artery of native heart  Active Problems:    Chest pain due to myocardial ischemia    Abnormal nuclear stress test    CAD (coronary artery disease)    Unstable angina/coronary artery disease (Primary)  Multiple cardiovascular risk factors including hypertension, hyperlipidemia, diabetes, family history of premature coronary disease.  ECG is  normal.  Abnormal nuclear stress test leading to cardiac catheterization  Cardiac cath 5/23/2024 showed multivessel coronary artery disease including 99% RCA and 80% LAD.  Plan is to proceed with urgent CABG today  He is on aspirin and Imdur    Primary hypertension  Blood pressure is normal on lisinopril  Plan is to start beta-blocker after CABG    Mixed hyperlipidemia  LDL 80, HDL 48, triglyceride 98 and total cholesterol 146  Goal LDL is less than 50  Start high intensity statin    Type 2 diabetes mellitus without complication, without long-term current use of insulin  Patient is typically on metformin and Trulicity  A1c 7.1    Anxiety and depression  He is on bupropion      Alejandro Nagy MD  05/24/24  07:46 EDT                  Electronically signed by Alejandro Nagy MD at 05/24/24 0936          Consult Notes (last 48 hours)        Satterly-Blanca Steinberg APRN at 05/23/24 1305            Patient Care Team:  Rain Paniagua MD as PCP - General (Family Medicine)  Referring Provider:  Dr. Nagy  Reason for consultation:  MV CAD    Chief complaint:  chest pain    Subjective     History of Present Illness:  54 y/o gentleman with HTN, HLD, DM type 2, and strong family hx of CAD recently established care with Dr. Nagy.  At his appointment he had complaints of chest pain which had been progressively worsening chest pain for the past 2-3 weeks.  Associated symptoms include diaphoresis and fatigue.  He underwent a stress test and subsequently presented to the hospital for outpatient cardiac catheterization which revealed severe multivessel CAD.  We were asked to see for cardiac surgery evaluation.     He denies significant ETOH use, tobacco abuse, and illicit drugs     Review of Systems   Constitutional:  Positive for activity change, diaphoresis and fatigue.   Respiratory:  Positive for chest tightness and shortness of breath.    Skin:  Positive for pallor. Negative for color change.        Past Medical History:    Diagnosis Date    Colon polyp 2019    Diabetes mellitus     Type 2    Hyperlipidemia      Past Surgical History:   Procedure Laterality Date    COLONOSCOPY  2019     Family History   Problem Relation Age of Onset    Arthritis Mother         Lupus    Diabetes Brother     Heart disease Father     Heart disease Brother      Social History     Tobacco Use    Smoking status: Never    Smokeless tobacco: Never   Vaping Use    Vaping status: Never Used   Substance Use Topics    Alcohol use: Yes     Alcohol/week: 1.0 standard drink of alcohol     Types: 1 Glasses of wine per week    Drug use: Never     Medications Prior to Admission   Medication Sig Dispense Refill Last Dose    aspirin 81 MG tablet Take 1 tablet by mouth Daily.   5/23/2024    Blood Glucose Monitoring Suppl (Accu-Chek Gladis Plus) w/Device kit 1 kit Daily. 1 kit 1     buPROPion XL (WELLBUTRIN XL) 150 MG 24 hr tablet TAKE 1 TABLET BY MOUTH EVERY DAY 90 tablet 1 5/23/2024    glucose blood (Accu-Chek Gladis Plus) test strip Use daily E11.9 100 each 12     lisinopril (PRINIVIL,ZESTRIL) 5 MG tablet TAKE 1 TABLET BY MOUTH EVERY DAY 90 tablet 1 5/23/2024    loratadine (Claritin) 10 MG tablet Take 1 tablet by mouth Daily.   5/23/2024    metFORMIN (GLUCOPHAGE) 1000 MG tablet TAKE 1 TABLET BY MOUTH TWICE A DAY WITH MEALS 180 tablet 1 5/22/2024 at 0800    Multiple Vitamin (MULTIVITAMIN) capsule Take 1 capsule by mouth Daily.   5/22/2024    nitroglycerin (NITROSTAT) 0.4 MG SL tablet Place 1 tablet under the tongue Every 5 (Five) Minutes As Needed for Chest Pain.       Semaglutide, 1 MG/DOSE, (Ozempic, 1 MG/DOSE,) 2 MG/1.5ML solution pen-injector Inject 0.5 mg under the skin into the appropriate area as directed 1 (One) Time Per Week.   5/9/2024    simvastatin (ZOCOR) 40 MG tablet TAKE 1 TABLET BY MOUTH EVERY DAY 90 tablet 2 5/23/2024    vitamin D3 125 MCG (5000 UT) capsule capsule Take 1 capsule by mouth Daily. 90 capsule 9 5/22/2024        Allergies:  Patient has no  "known allergies.    Objective      Vital Signs  Temp:  [98.4 °F (36.9 °C)] 98.4 °F (36.9 °C)  Heart Rate:  [71-85] 85  Resp:  [16-18] 18  BP: (123-137)/(75-94) 123/75    Flowsheet Rows      Flowsheet Row First Filed Value   Admission Height 181.6 cm (71.5\") Documented at 05/23/2024 1104   Admission Weight 86.6 kg (190 lb 14.7 oz) Documented at 05/23/2024 1104          181.6 cm (71.5\")    Physical Exam  Vitals and nursing note reviewed.   Constitutional:       General: He is awake.      Appearance: Normal appearance. He is well-developed and well-groomed.      Comments: Family at bedside   HENT:      Head: Normocephalic and atraumatic.      Nose: Nose normal.      Mouth/Throat:      Lips: Pink.      Mouth: Mucous membranes are moist.      Pharynx: Uvula midline.   Eyes:      General: Lids are normal. No scleral icterus.     Extraocular Movements: Extraocular movements intact.      Conjunctiva/sclera: Conjunctivae normal.      Pupils: Pupils are equal, round, and reactive to light.      Comments: Wearing glasses   Neck:      Thyroid: No thyroid mass or thyromegaly.      Vascular: Normal carotid pulses. No carotid bruit, hepatojugular reflux or JVD.      Trachea: Trachea normal.   Cardiovascular:      Rate and Rhythm: Normal rate and regular rhythm.      Pulses:           Carotid pulses are 2+ on the right side and 2+ on the left side.       Radial pulses are 2+ on the right side and 2+ on the left side.        Femoral pulses are 2+ on the right side and 2+ on the left side.       Popliteal pulses are 2+ on the right side and 2+ on the left side.        Dorsalis pedis pulses are 2+ on the right side and 2+ on the left side.        Posterior tibial pulses are 2+ on the right side and 2+ on the left side.      Heart sounds: Normal heart sounds. No murmur heard.     Comments: Right radial cath site with compression band in place  Pulmonary:      Effort: Pulmonary effort is normal.      Breath sounds: Normal breath sounds. "      Comments: 99% on room air  Abdominal:      General: Abdomen is protuberant. Bowel sounds are normal. There is no distension.      Palpations: Abdomen is soft.      Tenderness: There is no abdominal tenderness.   Musculoskeletal:      Cervical back: Neck supple.      Right lower leg: No edema.      Left lower leg: No edema.      Comments: Gait steady and strong without use of assistive devices   Lymphadenopathy:      Cervical: No cervical adenopathy.      Upper Body:      Right upper body: No supraclavicular adenopathy.      Left upper body: No supraclavicular adenopathy.   Skin:     General: Skin is warm and dry.      Capillary Refill: Capillary refill takes less than 2 seconds.      Findings: No erythema or rash.      Nails: There is no clubbing.   Neurological:      Mental Status: He is alert and oriented to person, place, and time.      GCS: GCS eye subscore is 4. GCS verbal subscore is 5. GCS motor subscore is 6.   Psychiatric:         Attention and Perception: Attention and perception normal.         Mood and Affect: Mood and affect normal.         Speech: Speech normal.         Behavior: Behavior normal. Behavior is cooperative.         Thought Content: Thought content normal.         Cognition and Memory: Cognition and memory normal.         Judgment: Judgment normal.         Results Review:   Lab Results (last 24 hours)       Procedure Component Value Units Date/Time    POC Glucose Once [[id number]]  (Abnormal) Collected: 05/23/24 1116    Specimen: Blood Updated: 05/23/24 1117     Glucose 137 mg/dL      Comment: Serial Number: [id number]:  [id number]               Cardiac cath per Dr. Nagy 5/23/2024  HEMODYNAMICS.  LV: 135/8, 17 mmHg  AO: 136/82, 105 mmHg  No significant gradient across the aortic valve during pullback of JR4 catheter.  LV gram was not performed due to recent echocardiogram.     Coronary Angiogram.    Left main. Left main is a large-caliber vessel which gives rise to the Left  Anterior Descending, ramus and the Left circumflex.  Left main is angiographically free from any significant disease     Left Anterior Descending Artery. LAD is a large vessel which gives rise to several septal perforators and several diagonal branches.  Mid LAD has 80% stenosis at the origin of diagonal vessel which also has 70 to 80% stenosis.     Ramus intermedius has proximal segment 70% stenosis.     Left Circumflex. The LCx is a medium caliber which gives rise to marginals.  OM1 has proximal 50% stenosis.  OM 2 has proximal segment 70% stenosis.     Right Coronary Artery. The RCA is a dominant vessel which gives rise to several small caliber branches including PDA and PLV.  Mid RCA has 99% long tubular stenosis.  PLV has mid segment 50% stenosis.     IMPRESSIONS.  1 Multivessel obstructive coronary artery disease including LAD, ramus and RCA.  2.  Mildly elevated LVEDP     RECOMMENDATIONS.  1.  Consult cardiac surgery for CABG  2.  Start Imdur      Assessment & Plan       Chest pain due to myocardial ischemia    Abnormal nuclear stress test      Assessment & Plan    - MV CAD, EF 50-55% (echo)--plans for CABG tomorrow  - HTN--stable  - HLD--statin  - DM type 2--A1c 7.1  - Ozempic use--last dose 5/9  - CKD, stage 2--eGFR 66.5    52 y/o gentleman with strong family history of CAD was found to have severe MV CAD.  His last A1c was 7.1.  His last dose of Ozempic was 5/9/2024.  He has never smoked, rarely drinks.  He works in computer software.  Lives with his wife.  Plans for CABG tomorrow TF case.  Preop orders have been placed.  Preop antibiotic is Kefzol.  Thank you for allowing us to participate in the care of this patient.      KASIE Zee  05/23/24  13:05 EDT    **all problems new to this examiner  **EKG and CXR independently reviewed and interpreted            Electronically signed by Blanca Redmond APRN at 05/23/24 4825

## 2024-05-24 NOTE — ANESTHESIA PROCEDURE NOTES
Intra-Op Anesthesia SOFIA    Procedure Performed: Intra-Op Anesthesia SOFIA       Start Time:        End Time:      Preanesthesia Checklist:  Patient identified, IV assessed, risks and benefits discussed, monitors and equipment assessed, procedure being performed at surgeon's request and anesthesia consent obtained.    General Procedure Information  Diagnostic Indications for Echo:  assessment of surgical repair and hemodynamic monitoring  Location performed:  OR  Intubated  Bite block placed  Heart visualized  Probe Insertion:  Easy  Probe Type:  Biplane and multiplane  Modalities:  Color flow mapping, pulse wave Doppler and continuous wave Doppler    Echocardiographic and Doppler Measurements    Ventricles    Right Ventricle:  Cavity size normal.  Hypertrophy not present.  Thrombus not present.  Global function normal.  Ejection Fraction 60%.    Left Ventricle:  Cavity size normal.  Thrombus not present.  Global Function normal.  Ejection Fraction 60%.      Ventricular Regional Function:  1- Basal Anteroseptal:  normal  2- Basal Anterior:  normal  3- Basal Anterolateral:  normal  4- Basal Inferolateral:  normal  5- Basal Inferior:  normal  6- Basal Inferoseptal:  normal  7- Mid Anteroseptal:  normal  8- Mid Anterior:  normal  9- Mid Anterolateral:  normal  10- Mid Inferolateral:  normal  11- Mid Inferior:  normal  12- Mid Inferoseptal:  normal  13- Apical Anterior:  normal  14- Apical Lateral:  normal  15- Apical Inferior:  normal  16- Apical Septal:  normal  17- [place]:  normal      Valves    Aortic Valve:  Annulus normal.  Stenosis not present.  Regurgitation absent.  Leaflets normal.  Leaflet motions normal.      Mitral Valve:  Annulus normal.  Stenosis not present.  Regurgitation absent.  Leaflets normal.  Leaflet motions normal.      Tricuspid Valve:  Annulus normal.  Stenosis not present.  Regurgitation absent.  Leaflets normal.  Leaflet motions normal.    Pulmonic Valve:  Annulus normal.  Stenosis not present.   Regurgitation absent.        Aorta    Ascending Aorta:  Size normal.  Dissection not present.  Plaque thickness less than 3 mm.  Mobile plaque not present.    Aortic Arch:  Size normal.  Dissection not present.  Plaque thickness less than 3 mm.  Mobile plaque not present.    Descending Aorta:  Size normal.  Dissection not present.  Plaque thickness less than 3 mm.  Mobile plaque not present.        Atria    Right Atrium:  Size normal.    Left Atrium:  Size normal.  Spontaneous echo contrast not present.  Thrombus not present.  Tumor not present.  Device not present.    Left atrial appendage normal.      Septa        Ventricular Septum:  Intra-ventricular septum morphology normal.          Other Findings  Pleural Effusion:  none  Pulmonary Arteries:  normal      Anesthesia Information  Performed Personally  Anesthesiologist:  Juan Moya MD      Echocardiogram Comments:       SOFIA placed easily, no resistance, bite guard, lubricated      Pre cpb   LV no wma ef 60  RV nl sf   MV AV TV wnl       S/p cabg x 4   No change

## 2024-05-24 NOTE — PROGRESS NOTES
S/P POD# 1 CABG x4 with bilateral IMAs--[place]  EF 50-55% (echo)    Subjective:  reports surgical pain    Extubated ~ 0345  Drips:  insulin, on/off norepi/Cardene  CI 3.6, CVP 7,   Wt is up 2 kgs from preop  UF - 2.3L  CXR:  atel/ vasc congestion  MVO2 sat:  72.5      Intake/Output Summary (Last 24 hours) at 5/25/2024 0825  Last data filed at 5/25/2024 0600  Gross per 24 hour   Intake 2315 ml   Output 5670 ml   Net -3355 ml     Temp:  [96.3 °F (35.7 °C)-100 °F (37.8 °C)] 99.5 °F (37.5 °C)  Heart Rate:  [] 89  Resp:  [12-14] 14  BP: ()/(52-84) 88/52  FiO2 (%):  [40 %-70 %] 40 %  Bilat PCT 15/8  MCT  82/8    Results from last 7 days   Lab Units 05/25/24  0448 05/25/24  0225 05/25/24  0152 05/24/24  1900 05/24/24  1729 05/24/24  1305 05/23/24  1707   WBC 10*3/mm3  --   --  9.93  --  15.22*  --  5.95   HEMOGLOBIN g/dL  --   --  10.1*  --  12.3*  --  13.7   HEMOGLOBIN, POC g/dL 10.0* 11.0*  --    < >  --    < >  --    HEMATOCRIT %  --   --  30.9*  --  36.2*  --  41.6   HEMATOCRIT POC % 30* 32*  --    < >  --    < >  --    PLATELETS 10*3/mm3  --   --  123*  --  133*  --  174   INR   --   --  1.10  --  1.10  --  1.00    < > = values in this interval not displayed.     Results from last 7 days   Lab Units 05/25/24 0448 05/24/24  2257 05/24/24  2125   CREATININE mg/dL 1.45*   < > 1.26   POTASSIUM mmol/L  --   --  4.2   SODIUM mmol/L  --   --  143   MAGNESIUM mg/dL  --   --  2.5   PHOSPHORUS mg/dL  --   --  2.5    < > = values in this interval not displayed.     ica 1.17    Physical Exam:  Neuro intact, nad, resting in bed, wife at bedside  Tele:  SR 80s  Diminished bases, 99% 3L  dsg c/d/i, no drainage  Benign abd, no BM  No edema    Assessment/Plan:  Principal Problem:    Coronary artery disease involving native coronary artery of native heart  Active Problems:    Chest pain due to myocardial ischemia    Abnormal nuclear stress test    CAD (coronary artery disease)    - MV CAD, EF 50-55%  (echo)--s/p CABG x4 with LIMA to LAD, PRASAD to RCA, SVG to ramus, SVG to OM (Kim)  - HTN--stable  - HLD--statin  - DM type 2--A1c 7.1, insulin dripwatch closely  - Ozempic use--last dose 5/9  - CKD, stage 2--eGFR 66.5  - Postop ABLA, expected--watch closely  - Postop TCP, consumptive--watch closely    POD# 1.  Doing well.  Slight elevation in creatinine.  Pressor turned off this morning, will hold bb for now.  Surgical discomfort as expected.  On asa/stain, hold bb.  Restarted home Wellbutrin.  Replete e-.  DC swan/adonis.  Re-eval chest tubes, need for bb and diuretics later today.  Mobilize.    Addendum:  Endo consulted, goal is to dc chest tubes today for better pain mmgt.    Routine care--as above  De-escal  D/w pt/family, nsg, Dr. Alvarez   Anticipate home at discharge    Blanca Redmond, KASIE  5/25/2024  08:25 EDT

## 2024-05-24 NOTE — OP NOTE
Operative Note    Date of Dictation: 05/24/24    Date of Procedure: 05/24/24    Referring Physician: Alejandro Nagy MD    Preoperative diagnosis: Multivessel CAD    Postoperative diagnosis: Same    Procedure:   1. CABG x 4 (LIMA to LAD, PRASAD to RCA, SVG to Ramus, SVG to OM)  2. EVH of the left legs    Surgeon: Poncho Alvarez MD     Assistants: YOVANY Smith was responsible for performing the following activities: Cardiac Surgery First assist, Endoscopic Vein [place] for CABG, surgical wound closure and their skilled assistance was necessary for the success of this case.     Anesthesia: General endotracheal anesthesia and SOFIA    Findings:  The saphenous vein was harvested endoscopically form the both  leg. The vein had a diameter of 4 mm and was of good quality. The coronaries had a diameter of 2 mm and were of good quality.      Estimated Blood Loss:  200 mL of Cell Saver returned.    STS Data: The STS Risk score discussed with the patient and family.  Counseling was done regarding abuse of tobacco, alcohol and drugs as needed. They understand and wish to proceed. The antibiotics and b blockers were given in the STS required window.          Description of the procedure:     The patient was placed supine on the operative table. General anesthesia was given and lines placed. The patient was prepped and draped using the usual sterile technique. A median sternotomy was performed with a scalpel and the layers carried down to the sternum using the electrocautery. The sternum was split in the midline using a vertical oscillating saw. Hemostasis was achieved. The LIMA and PRASAD were harvested skeletonized and prepared with papaverine. They were of good quality. 300 units/kg of IV heparin was given to an ACT over 400. A Favaloro retractor was placed and the mediastinum exposed, the pericardium was opened and the edges tacked to the wound. Cannulation sutures were placed in the ascending aorta and right atrium.  Small cannulas were placed and aorto right atrial cardiopulmonary bypass was started. Cardioplegia cannulas were placed. Cardiopulmonary bypass was then established for 68 minutes drifting to 34°C at appropriate flow rates.  The aorta was crossclamped for 54 minutes and we gave 1000 cc of antegrade cold blood cardioplegia then 200L of retrograde cold blood cardioplegia and then repeated doses every 10 to 15 minutes to good effect. 2veins were anastomosed to the ascending aorta with 6-0 Prolene and marked with washers.  The first vein was sewn to the ramus intermedius with 7-0 Prolene.  The next vein was sewn to obtuse marginal of the circunflex artery with 7-0 Prolene. The LIMA was sewn to the distal LAD with 7-0 Prolene. The PRASAD was sewn to the distal RCA with 7-0 Prolene. A warm dose of cardioplegia was given and then the aortic clamp removed as well as the LIMA and PRASAD bulldog clamp. All anastomoses were checked and had good flow and morphology. The left and right pleural space were suctioned and the lungs ventilated. The heart was paced till regular atrial rhythm resumed. I allowed the heart to eject and once hemodynamics were acceptable, then the CPB was discontinued and the venous and cardioplegia cannulas removed. The matching dose of protamine was given and the aortic cannula removed as well. AV temporary wires and pleural and mediastinal chest tubes were placed and the wound sprayed with platelet rich plasma. The sternum was closed with single and double wires and soft tissue in layers of reabsorbable material. The wounds were covered with sterile dressings.       Specimen removed:  none    CPB time:  68 minutes.    Aortic clamp time:  54 minutes    Complications:  none           Disposition: Cardiovascular recovery room           Condition: Critical but stable.

## 2024-05-24 NOTE — ANESTHESIA PROCEDURE NOTES
Airway  Urgency: elective    Date/Time: 5/24/2024 12:51 PM  Airway not difficult    General Information and Staff    Patient location during procedure: OR  Anesthesiologist: Shelton Davila MD    Indications and Patient Condition  Indications for airway management: airway protection    Preoxygenated: yes  MILS not maintained throughout  Mask difficulty assessment: 1 - vent by mask    Final Airway Details  Final airway type: endotracheal airway      Successful airway: ETT  Cuffed: yes   Successful intubation technique: direct laryngoscopy  Facilitating devices/methods: anterior pressure/BURP  Endotracheal tube insertion site: oral  Blade: Jennifer  Blade size: 4  ETT size (mm): 7.5  Cormack-Lehane Classification: grade I - full view of glottis  Placement verified by: capnometry and palpation of cuff   Measured from: lips  ETT/EBT  to lips (cm): 21  Number of attempts at approach: 1  Assessment: lips, teeth, and gum same as pre-op and atraumatic intubation    Additional Comments  ASA monitors applied; preoxygenated with 100% FiO2 via anesthesia face mask; induction of general anesthesia; bag-mask ventilation; patient's position optimized; laryngoscopy; thyroid cartilage pressure applied to enhance view of glottis; cuffed ETT lubricated and correctly placed into the trachea; cuff inflated to seal with minimally occlusive airway cuff pressure; ETT connected to anesthesia circuit; atraumatic/dentition in preoperative condition; ETT well secured in place; correct placement in the trachea confirmed by bilateral chest rise, tube condensation, and return of EtCO2 > 30 mmHg x3

## 2024-05-25 ENCOUNTER — APPOINTMENT (OUTPATIENT)
Dept: GENERAL RADIOLOGY | Facility: HOSPITAL | Age: 54
DRG: 234 | End: 2024-05-25
Payer: COMMERCIAL

## 2024-05-25 LAB
ARTERIAL PATENCY WRIST A: ABNORMAL
ATMOSPHERIC PRESS: ABNORMAL MM[HG]
ATMOSPHERIC PRESS: ABNORMAL MM[HG]
BASE DEFICIT: -3.1 MEQ/LITER
BASE EXCESS BLDA CALC-SCNC: -1.3 MMOL/L (ref 0–3)
BASE EXCESS BLDA CALC-SCNC: -2.7 MMOL/L (ref 0–3)
BASE EXCESS BLDA CALC-SCNC: -2.8 MMOL/L (ref 0–3)
BASOPHILS # BLD AUTO: 0.04 10*3/MM3 (ref 0–0.2)
BASOPHILS NFR BLD AUTO: 0.4 % (ref 0–1.5)
BDY SITE: ABNORMAL
CA-I BLDA-SCNC: 1.05 MMOL/L (ref 1.15–1.33)
CA-I BLDA-SCNC: 1.17 MMOL/L (ref 1.15–1.33)
CA-I SERPL ISE-MCNC: 1.15 MMOL/L (ref 1.2–1.3)
CO2 BLDA-SCNC: 23.2 MMOL/L (ref 22–29)
CREAT BLDA-MCNC: 1.33 MG/DL (ref 0.6–1.3)
CREAT BLDA-MCNC: 1.45 MG/DL (ref 0.6–1.3)
DEPRECATED RDW RBC AUTO: 41.5 FL (ref 37–54)
EGFRCR SERPLBLD CKD-EPI 2021: 57.3 ML/MIN/1.73
EGFRCR SERPLBLD CKD-EPI 2021: 63.5 ML/MIN/1.73
EOSINOPHIL # BLD AUTO: 0.02 10*3/MM3 (ref 0–0.4)
EOSINOPHIL NFR BLD AUTO: 0.2 % (ref 0.3–6.2)
ERYTHROCYTE [DISTWIDTH] IN BLOOD BY AUTOMATED COUNT: 11.9 % (ref 12.3–15.4)
GLUCOSE BLDC GLUCOMTR-MCNC: 122 MG/DL (ref 70–105)
GLUCOSE BLDC GLUCOMTR-MCNC: 131 MG/DL (ref 70–105)
GLUCOSE BLDC GLUCOMTR-MCNC: 146 MG/DL (ref 70–105)
GLUCOSE BLDC GLUCOMTR-MCNC: 149 MG/DL (ref 70–105)
GLUCOSE BLDC GLUCOMTR-MCNC: 149 MG/DL (ref 70–105)
GLUCOSE BLDC GLUCOMTR-MCNC: 154 MG/DL (ref 74–100)
GLUCOSE BLDC GLUCOMTR-MCNC: 154 MG/DL (ref 74–100)
GLUCOSE BLDC GLUCOMTR-MCNC: 155 MG/DL (ref 70–105)
GLUCOSE BLDC GLUCOMTR-MCNC: 158 MG/DL (ref 74–100)
GLUCOSE BLDC GLUCOMTR-MCNC: 158 MG/DL (ref 74–100)
GLUCOSE BLDC GLUCOMTR-MCNC: 161 MG/DL (ref 70–105)
GLUCOSE BLDC GLUCOMTR-MCNC: 169 MG/DL (ref 70–105)
GLUCOSE BLDC GLUCOMTR-MCNC: 170 MG/DL (ref 70–105)
GLUCOSE BLDC GLUCOMTR-MCNC: 172 MG/DL (ref 70–105)
GLUCOSE BLDC GLUCOMTR-MCNC: 204 MG/DL (ref 70–105)
GLUCOSE BLDC GLUCOMTR-MCNC: 223 MG/DL (ref 70–105)
GLUCOSE SERPL-MCNC: 159 MG/DL (ref 65–99)
HCO3 BLDA-SCNC: 19.2 MMOL/L (ref 21–28)
HCO3 BLDA-SCNC: 22.4 MMOL/L (ref 21–28)
HCO3 MIXED: 22 MMOL/L (ref 21–29)
HCT VFR BLD AUTO: 30.9 % (ref 37.5–51)
HCT VFR BLDA CALC: 30 % (ref 38–51)
HCT VFR BLDA CALC: 32 % (ref 38–51)
HEMODILUTION: NO
HEMODILUTION: YES
HEMODILUTION: YES
HGB BLD-MCNC: 10.1 G/DL (ref 13–17.7)
HGB BLDA-MCNC: 10 G/DL (ref 12–17)
HGB BLDA-MCNC: 11 G/DL (ref 12–17)
IMM GRANULOCYTES # BLD AUTO: 0.05 10*3/MM3 (ref 0–0.05)
IMM GRANULOCYTES NFR BLD AUTO: 0.5 % (ref 0–0.5)
INHALED O2 CONCENTRATION: 32 %
INHALED O2 CONCENTRATION: 36 %
INHALED O2 CONCENTRATION: 40 %
INR PPP: 1.1 (ref 0.93–1.1)
LYMPHOCYTES # BLD AUTO: 0.49 10*3/MM3 (ref 0.7–3.1)
LYMPHOCYTES NFR BLD AUTO: 4.9 % (ref 19.6–45.3)
MCH RBC QN AUTO: 30.8 PG (ref 26.6–33)
MCHC RBC AUTO-ENTMCNC: 32.7 G/DL (ref 31.5–35.7)
MCV RBC AUTO: 94.2 FL (ref 79–97)
MODALITY: ABNORMAL
MONOCYTES # BLD AUTO: 0.66 10*3/MM3 (ref 0.1–0.9)
MONOCYTES NFR BLD AUTO: 6.6 % (ref 5–12)
NEUTROPHILS NFR BLD AUTO: 8.67 10*3/MM3 (ref 1.7–7)
NEUTROPHILS NFR BLD AUTO: 87.4 % (ref 42.7–76)
NRBC BLD AUTO-RTO: 0 /100 WBC (ref 0–0.2)
O2 SATURATION MIXED: 72.5 %
PCO2 BLDA: 20.7 MM HG (ref 35–48)
PCO2 BLDA: 39.1 MM HG (ref 35–48)
PCO2 MIXED: 37.6 MMHG (ref 35–51)
PEEP RESPIRATORY: 5 CM[H2O]
PH BLDA: 7.37 PH UNITS (ref 7.35–7.45)
PH BLDA: 7.57 PH UNITS (ref 7.35–7.45)
PH MIXED: 7.38 PH UNITS (ref 7.32–7.45)
PLATELET # BLD AUTO: 123 10*3/MM3 (ref 140–450)
PMV BLD AUTO: 10.2 FL (ref 6–12)
PO2 BLDA: 115.9 MM HG (ref 83–108)
PO2 BLDA: 179.2 MM HG (ref 83–108)
PO2 MIXED: 38.9 MMHG
POTASSIUM BLDA-SCNC: 3.4 MMOL/L (ref 3.5–4.5)
POTASSIUM BLDA-SCNC: 3.5 MMOL/L (ref 3.5–4.5)
POTASSIUM SERPL-SCNC: 4 MMOL/L (ref 3.5–5.2)
PROTHROMBIN TIME: 11.9 SECONDS (ref 9.6–11.7)
PSV: 10 CMH2O
QT INTERVAL: 358 MS
QT INTERVAL: 400 MS
QTC INTERVAL: 448 MS
QTC INTERVAL: 478 MS
RBC # BLD AUTO: 3.28 10*6/MM3 (ref 4.14–5.8)
SAO2 % BLDCOA: 98.4 % (ref 94–98)
SAO2 % BLDCOA: 99.8 % (ref 94–98)
SODIUM BLD-SCNC: 146 MMOL/L (ref 138–146)
SODIUM BLD-SCNC: 149 MMOL/L (ref 138–146)
VENTILATOR MODE: ABNORMAL
WBC NRBC COR # BLD AUTO: 9.93 10*3/MM3 (ref 3.4–10.8)

## 2024-05-25 PROCEDURE — 80051 ELECTROLYTE PANEL: CPT

## 2024-05-25 PROCEDURE — 25010000002 METOCLOPRAMIDE PER 10 MG

## 2024-05-25 PROCEDURE — 71045 X-RAY EXAM CHEST 1 VIEW: CPT

## 2024-05-25 PROCEDURE — 25010000002 CALCIUM GLUCONATE 2-0.675 GM/100ML-% SOLUTION: Performed by: NURSE PRACTITIONER

## 2024-05-25 PROCEDURE — 82803 BLOOD GASES ANY COMBINATION: CPT

## 2024-05-25 PROCEDURE — 25010000002 MAGNESIUM SULFATE IN D5W 1G/100ML (PREMIX) 1-5 GM/100ML-% SOLUTION

## 2024-05-25 PROCEDURE — 93005 ELECTROCARDIOGRAM TRACING: CPT

## 2024-05-25 PROCEDURE — 85610 PROTHROMBIN TIME: CPT

## 2024-05-25 PROCEDURE — 25010000002 MORPHINE PER 10 MG: Performed by: NURSE PRACTITIONER

## 2024-05-25 PROCEDURE — 82948 REAGENT STRIP/BLOOD GLUCOSE: CPT

## 2024-05-25 PROCEDURE — 94799 UNLISTED PULMONARY SVC/PX: CPT

## 2024-05-25 PROCEDURE — 99232 SBSQ HOSP IP/OBS MODERATE 35: CPT | Performed by: INTERNAL MEDICINE

## 2024-05-25 PROCEDURE — 25010000002 ACETAMINOPHEN 10 MG/ML SOLUTION

## 2024-05-25 PROCEDURE — 85025 COMPLETE CBC W/AUTO DIFF WBC: CPT

## 2024-05-25 PROCEDURE — 25010000002 CEFAZOLIN PER 500 MG

## 2024-05-25 PROCEDURE — 84132 ASSAY OF SERUM POTASSIUM: CPT

## 2024-05-25 PROCEDURE — 93010 ELECTROCARDIOGRAM REPORT: CPT | Performed by: STUDENT IN AN ORGANIZED HEALTH CARE EDUCATION/TRAINING PROGRAM

## 2024-05-25 PROCEDURE — 82330 ASSAY OF CALCIUM: CPT

## 2024-05-25 PROCEDURE — 97163 PT EVAL HIGH COMPLEX 45 MIN: CPT

## 2024-05-25 PROCEDURE — 25010000002 POTASSIUM CHLORIDE PER 2 MEQ

## 2024-05-25 PROCEDURE — 99222 1ST HOSP IP/OBS MODERATE 55: CPT | Performed by: INTERNAL MEDICINE

## 2024-05-25 PROCEDURE — 25010000002 MORPHINE PER 10 MG

## 2024-05-25 PROCEDURE — 82947 ASSAY GLUCOSE BLOOD QUANT: CPT

## 2024-05-25 PROCEDURE — 25010000002 ENOXAPARIN PER 10 MG

## 2024-05-25 PROCEDURE — 82565 ASSAY OF CREATININE: CPT

## 2024-05-25 PROCEDURE — 85018 HEMOGLOBIN: CPT

## 2024-05-25 RX ORDER — POLYETHYLENE GLYCOL 3350 17 G/17G
17 POWDER, FOR SOLUTION ORAL 2 TIMES DAILY
Status: DISCONTINUED | OUTPATIENT
Start: 2024-05-25 | End: 2024-05-26

## 2024-05-25 RX ORDER — BUPROPION HYDROCHLORIDE 150 MG/1
150 TABLET ORAL DAILY
Status: DISCONTINUED | OUTPATIENT
Start: 2024-05-25 | End: 2024-05-29 | Stop reason: HOSPADM

## 2024-05-25 RX ORDER — MORPHINE SULFATE 2 MG/ML
2 INJECTION, SOLUTION INTRAMUSCULAR; INTRAVENOUS
Status: DISCONTINUED | OUTPATIENT
Start: 2024-05-25 | End: 2024-05-27

## 2024-05-25 RX ORDER — CETIRIZINE HYDROCHLORIDE 10 MG/1
10 TABLET ORAL DAILY
Status: DISCONTINUED | OUTPATIENT
Start: 2024-05-25 | End: 2024-05-29 | Stop reason: HOSPADM

## 2024-05-25 RX ORDER — NALOXONE HCL 0.4 MG/ML
0.4 VIAL (ML) INJECTION
Status: DISCONTINUED | OUTPATIENT
Start: 2024-05-25 | End: 2024-05-25

## 2024-05-25 RX ORDER — ACETAMINOPHEN 10 MG/ML
1000 INJECTION, SOLUTION INTRAVENOUS ONCE
Status: COMPLETED | OUTPATIENT
Start: 2024-05-25 | End: 2024-05-25

## 2024-05-25 RX ORDER — PANTOPRAZOLE SODIUM 40 MG/1
40 TABLET, DELAYED RELEASE ORAL
Status: DISCONTINUED | OUTPATIENT
Start: 2024-05-25 | End: 2024-05-29 | Stop reason: HOSPADM

## 2024-05-25 RX ORDER — MORPHINE SULFATE 2 MG/ML
2 INJECTION, SOLUTION INTRAMUSCULAR; INTRAVENOUS
Status: DISCONTINUED | OUTPATIENT
Start: 2024-05-25 | End: 2024-05-25

## 2024-05-25 RX ORDER — NALOXONE HCL 0.4 MG/ML
0.4 VIAL (ML) INJECTION
Status: DISCONTINUED | OUTPATIENT
Start: 2024-05-25 | End: 2024-05-29 | Stop reason: HOSPADM

## 2024-05-25 RX ORDER — POTASSIUM CHLORIDE 29.8 MG/ML
20 INJECTION INTRAVENOUS
Qty: 100 ML | Refills: 0 | Status: DISPENSED | OUTPATIENT
Start: 2024-05-25 | End: 2024-05-25

## 2024-05-25 RX ORDER — CALCIUM GLUCONATE 20 MG/ML
2000 INJECTION, SOLUTION INTRAVENOUS ONCE
Status: COMPLETED | OUTPATIENT
Start: 2024-05-25 | End: 2024-05-25

## 2024-05-25 RX ORDER — MORPHINE SULFATE 2 MG/ML
1 INJECTION, SOLUTION INTRAMUSCULAR; INTRAVENOUS ONCE
Status: COMPLETED | OUTPATIENT
Start: 2024-05-25 | End: 2024-05-25

## 2024-05-25 RX ORDER — OXYCODONE HYDROCHLORIDE 5 MG/1
5 TABLET ORAL EVERY 4 HOURS PRN
Status: DISCONTINUED | OUTPATIENT
Start: 2024-05-25 | End: 2024-05-26

## 2024-05-25 RX ADMIN — MORPHINE SULFATE 2 MG: 2 INJECTION, SOLUTION INTRAMUSCULAR; INTRAVENOUS at 13:09

## 2024-05-25 RX ADMIN — MORPHINE SULFATE 2 MG: 2 INJECTION, SOLUTION INTRAMUSCULAR; INTRAVENOUS at 11:13

## 2024-05-25 RX ADMIN — MAGNESIUM SULFATE IN DEXTROSE 1 G: 10 INJECTION, SOLUTION INTRAVENOUS at 09:14

## 2024-05-25 RX ADMIN — CHLORHEXIDINE GLUCONATE, 0.12% ORAL RINSE 15 ML: 1.2 SOLUTION DENTAL at 20:34

## 2024-05-25 RX ADMIN — CALCIUM GLUCONATE 2000 MG: 20 INJECTION, SOLUTION INTRAVENOUS at 09:14

## 2024-05-25 RX ADMIN — MUPIROCIN 1 APPLICATION: 20 OINTMENT TOPICAL at 20:34

## 2024-05-25 RX ADMIN — ACETAMINOPHEN 1000 MG: 1000 INJECTION INTRAVENOUS at 04:18

## 2024-05-25 RX ADMIN — HYDROCODONE BITARTRATE AND ACETAMINOPHEN 1 TABLET: 10; 325 TABLET ORAL at 09:29

## 2024-05-25 RX ADMIN — SODIUM CHLORIDE 2 G: 900 INJECTION INTRAVENOUS at 08:23

## 2024-05-25 RX ADMIN — MORPHINE SULFATE 1 MG: 2 INJECTION, SOLUTION INTRAMUSCULAR; INTRAVENOUS at 09:15

## 2024-05-25 RX ADMIN — OXYCODONE 5 MG: 5 TABLET ORAL at 22:28

## 2024-05-25 RX ADMIN — SODIUM CHLORIDE 2 G: 900 INJECTION INTRAVENOUS at 23:28

## 2024-05-25 RX ADMIN — PANTOPRAZOLE SODIUM 40 MG: 40 TABLET, DELAYED RELEASE ORAL at 06:34

## 2024-05-25 RX ADMIN — ACETAMINOPHEN 1000 MG: 10 INJECTION INTRAVENOUS at 11:11

## 2024-05-25 RX ADMIN — MORPHINE SULFATE 2 MG: 2 INJECTION, SOLUTION INTRAMUSCULAR; INTRAVENOUS at 20:52

## 2024-05-25 RX ADMIN — METOCLOPRAMIDE 10 MG: 5 INJECTION, SOLUTION INTRAMUSCULAR; INTRAVENOUS at 06:34

## 2024-05-25 RX ADMIN — OXYCODONE 5 MG: 5 TABLET ORAL at 18:21

## 2024-05-25 RX ADMIN — METOCLOPRAMIDE 10 MG: 5 INJECTION, SOLUTION INTRAMUSCULAR; INTRAVENOUS at 00:22

## 2024-05-25 RX ADMIN — POLYETHYLENE GLYCOL 3350 17 G: 17 POWDER, FOR SOLUTION ORAL at 20:34

## 2024-05-25 RX ADMIN — CETIRIZINE HYDROCHLORIDE 10 MG: 10 TABLET, FILM COATED ORAL at 20:34

## 2024-05-25 RX ADMIN — MAGNESIUM SULFATE IN DEXTROSE 1 G: 10 INJECTION, SOLUTION INTRAVENOUS at 16:39

## 2024-05-25 RX ADMIN — CYCLOBENZAPRINE 10 MG: 10 TABLET, FILM COATED ORAL at 07:27

## 2024-05-25 RX ADMIN — MORPHINE SULFATE 1 MG: 2 INJECTION, SOLUTION INTRAMUSCULAR; INTRAVENOUS at 05:20

## 2024-05-25 RX ADMIN — PANTOPRAZOLE SODIUM 40 MG: 40 TABLET, DELAYED RELEASE ORAL at 09:14

## 2024-05-25 RX ADMIN — HYDROCODONE BITARTRATE AND ACETAMINOPHEN 1 TABLET: 10; 325 TABLET ORAL at 05:20

## 2024-05-25 RX ADMIN — OXYCODONE 5 MG: 5 TABLET ORAL at 13:53

## 2024-05-25 RX ADMIN — CHLORHEXIDINE GLUCONATE, 0.12% ORAL RINSE 15 ML: 1.2 SOLUTION DENTAL at 08:28

## 2024-05-25 RX ADMIN — ENOXAPARIN SODIUM 40 MG: 100 INJECTION SUBCUTANEOUS at 16:39

## 2024-05-25 RX ADMIN — SENNOSIDES AND DOCUSATE SODIUM 2 TABLET: 50; 8.6 TABLET ORAL at 20:34

## 2024-05-25 RX ADMIN — BUPROPION HYDROCHLORIDE 150 MG: 150 TABLET, EXTENDED RELEASE ORAL at 09:14

## 2024-05-25 RX ADMIN — POTASSIUM CHLORIDE 20 MEQ: 400 INJECTION, SOLUTION INTRAVENOUS at 09:09

## 2024-05-25 RX ADMIN — MUPIROCIN 1 APPLICATION: 20 OINTMENT TOPICAL at 08:29

## 2024-05-25 RX ADMIN — MORPHINE SULFATE 1 MG: 2 INJECTION, SOLUTION INTRAMUSCULAR; INTRAVENOUS at 01:27

## 2024-05-25 RX ADMIN — ASPIRIN 81 MG: 81 TABLET, COATED ORAL at 08:29

## 2024-05-25 RX ADMIN — MAGNESIUM SULFATE IN DEXTROSE 1 G: 10 INJECTION, SOLUTION INTRAVENOUS at 00:22

## 2024-05-25 RX ADMIN — SENNOSIDES AND DOCUSATE SODIUM 2 TABLET: 50; 8.6 TABLET ORAL at 08:28

## 2024-05-25 RX ADMIN — MORPHINE SULFATE 1 MG: 2 INJECTION, SOLUTION INTRAMUSCULAR; INTRAVENOUS at 08:20

## 2024-05-25 RX ADMIN — CYCLOBENZAPRINE 10 MG: 10 TABLET, FILM COATED ORAL at 16:39

## 2024-05-25 RX ADMIN — METOCLOPRAMIDE 10 MG: 5 INJECTION, SOLUTION INTRAMUSCULAR; INTRAVENOUS at 12:41

## 2024-05-25 RX ADMIN — SODIUM CHLORIDE 2 G: 900 INJECTION INTRAVENOUS at 16:39

## 2024-05-25 RX ADMIN — POLYETHYLENE GLYCOL 3350 17 G: 17 POWDER, FOR SOLUTION ORAL at 09:14

## 2024-05-25 RX ADMIN — ATORVASTATIN CALCIUM 40 MG: 40 TABLET, FILM COATED ORAL at 20:34

## 2024-05-25 RX ADMIN — SODIUM CHLORIDE 2 G: 900 INJECTION INTRAVENOUS at 00:32

## 2024-05-25 NOTE — CONSULTS
Inpatient Endocrine Consult  Endocrine consultation requested by cardiothoracic surgery team for uncontrolled type 2 diabetes  Patient Care Team:  Rain Paniagua MD as PCP - General (Family Medicine)    Chief Complaint: Uncontrolled type 2 diabetes    HPI: This is a 53-year-old male with history of type 2 diabetes, hypertension, hyperlipidemia with a strong family history of CAD apparently came in with chest pain initially subsequently underwent further testing including stress test and cardiac cath.  Cath did reveal multivessel CAD and he underwent cardiothoracic surgery evaluation and subsequently had a four-vessel bypass surgery done on May 24, 2024.  He is currently on IV insulin.  His A1c was 7.15.  At home for diabetes is on metformin and Ozempic.  He only took 1 injection of Ozempic as usual he is on Trulicity but that has been out for at least a month and finally was switched to Ozempic.  He is currently on IV insulin.    Past Medical History:   Diagnosis Date    Colon polyp 2019    Diabetes mellitus     Type 2    Hyperlipidemia        Social History     Socioeconomic History    Marital status:    Tobacco Use    Smoking status: Never    Smokeless tobacco: Never   Vaping Use    Vaping status: Never Used   Substance and Sexual Activity    Alcohol use: Yes     Alcohol/week: 1.0 standard drink of alcohol     Types: 1 Glasses of wine per week    Drug use: Never    Sexual activity: Yes     Partners: Female     Birth control/protection: Post-menopausal, None       Family History   Problem Relation Age of Onset    Arthritis Mother         Lupus    Diabetes Brother     Heart disease Father     Heart disease Brother        No Known Allergies    ROS:   Constitutional:  Denies fatigue, tiredness.    Eyes:  Denies change in visual acuity   HENT:  Denies nasal congestion or sore throat   Respiratory: Denies cough, shortness of breath.   Cardiovascular:  Denies chest pain, edema   GI:  Denies abdominal pain,  nausea, vomiting.   :  Denies polyuria and polydipsia  Musculoskeletal:  Denies back pain or joint pain   Integument:  Denies dry skin, rash   Neurologic:  Denies headache, focal weakness or sensory changes   Endocrine:  Denies polyuria or polydipsia   Psychiatric:  Denies depression or anxiety      Vitals:    05/25/24 1200   BP:    Pulse:    Resp: 13   Temp: 97.7 °F (36.5 °C)   SpO2:       Body mass index is 26.77 kg/m².     Physical Exam:  GEN: NAD, conversant  EYES: EOMI, PERRL  NECK: no thyromegaly  CV: RRR  LUNG: CTA  SKIN: no rashes, no acanthosis  MSK: no deformities,   NEURO: no tremors, DTR normal  PSYCH: Awake and coherent      Results Review:     I reviewed the patient's new clinical results.    Lab Results   Component Value Date    GLUCOSE 195 (H) 05/24/2024    BUN 17 05/24/2024    CREATININE 1.45 (H) 05/25/2024    EGFRIFNONA 60 (L) 09/01/2021    EGFRIFAFRI 97 03/02/2016    BCR 13.5 05/24/2024    K 4.2 05/24/2024    CO2 21.0 (L) 05/24/2024    CALCIUM 8.9 05/24/2024    ALBUMIN 4.9 05/24/2024    LABIL2 1.3 05/30/2018    AST 15 05/23/2024    ALT 18 05/23/2024       Lab Results   Component Value Date    HGBA1C 7.15 (H) 05/23/2024    HGBA1C 7.10 (H) 05/09/2024    HGBA1C 6.30 (H) 03/10/2023     Lab Results   Component Value Date    MICROALBUR 3.8 09/09/2022    CREATININE 1.45 (H) 05/25/2024     Results from last 7 days   Lab Units 05/25/24  1238 05/25/24  1055 05/25/24  0833 05/25/24  0632 05/25/24  0607 05/25/24  0448   GLUCOSE mg/dL 170* 169* 131* 149* 146* 158*  158*       Medication Review: Reviewed.       Current Facility-Administered Medications:     acetaminophen (TYLENOL) tablet 650 mg, 650 mg, Oral, Q4H PRN **OR** acetaminophen (TYLENOL) 160 MG/5ML oral solution 650 mg, 650 mg, Oral, Q4H PRN **OR** acetaminophen (TYLENOL) suppository 650 mg, 650 mg, Rectal, Q4H PRN, Poncho Alvarez MD    aspirin EC tablet 81 mg, 81 mg, Oral, Daily, Poncho Alvarez MD, 81 mg at 05/25/24 4891     atorvastatin (LIPITOR) tablet 40 mg, 40 mg, Oral, Nightly, Poncho Alvarez MD    bisacodyl (DULCOLAX) EC tablet 10 mg, 10 mg, Oral, Daily PRN, Poncho Alvarez MD    bisacodyl (DULCOLAX) suppository 10 mg, 10 mg, Rectal, Daily PRN, Poncho Alvarez MD    buPROPion XL (WELLBUTRIN XL) 24 hr tablet 150 mg, 150 mg, Oral, Daily, Satterly-Blanca tSeinberg, APRN, 150 mg at 05/25/24 0914    Calcium Replacement - Follow Nurse / BPA Driven Protocol, , Does not apply, PRN, Poncho Alvarez MD    ceFAZolin 2000 mg IVPB in 100 mL NS (MBP), 2 g, Intravenous, Q8H, Poncho Alvarez MD, 2 g at 05/25/24 0823    chlorhexidine (PERIDEX) 0.12 % solution 15 mL, 15 mL, Mouth/Throat, Q12H, Poncho Alvarez MD, 15 mL at 05/25/24 0828    cyclobenzaprine (FLEXERIL) tablet 10 mg, 10 mg, Oral, Q8H PRN, Poncho Alvarez MD, 10 mg at 05/25/24 0727    dextrose (D50W) (25 g/50 mL) IV injection 10-50 mL, 10-50 mL, Intravenous, Q15 Min PRN, Poncho Alvarez MD    dextrose (GLUTOSE) oral gel 15 g, 15 g, Oral, Q15 Min PRN, Poncho Alvarez MD    Enoxaparin Sodium (LOVENOX) syringe 40 mg, 40 mg, Subcutaneous, Q24H, Poncho Alvarez MD    glucagon (GLUCAGEN) injection 1 mg, 1 mg, Intramuscular, Q15 Min PRN, Poncho Alvarez MD    insulin regular 1 unit/mL in 0.9% sodium chloride (Glucommander), 0-100 Units/hr, Intravenous, Titrated, Poncho Alvarez MD, Last Rate: 2.2 mL/hr at 05/25/24 1056, 2.2 Units/hr at 05/25/24 1056    magnesium hydroxide (MILK OF MAGNESIA) suspension 10 mL, 10 mL, Oral, Daily PRN, Poncho Alvarez MD    Magnesium Standard Dose Replacement - Follow Nurse / BPA Driven Protocol, , Does not apply, PRN, Poncho Alvarez MD    magnesium sulfate in D5W 1g/100mL (PREMIX), 1 g, Intravenous, Q8H, Poncho Alvarez MD, 1 g at 05/25/24 0914    morphine injection 2 mg, 2 mg, Intravenous, Q2H PRN, 2 mg at 05/25/24 1113 **AND** naloxone  (NARCAN) injection 0.4 mg, 0.4 mg, Intravenous, Q5 Min PRN, Blanca Redmond APRN    mupirocin (BACTROBAN) 2 % nasal ointment, , Each Nare, BID, Poncho Alvarez MD, 1 Application at 05/25/24 0829    nitroglycerin (NITROSTAT) SL tablet 0.4 mg, 0.4 mg, Sublingual, Q5 Min PRN, Poncho Alvarez MD    ondansetron (ZOFRAN) injection 4 mg, 4 mg, Intravenous, Q6H PRN, Poncho Alvarez MD    oxyCODONE (ROXICODONE) immediate release tablet 5 mg, 5 mg, Oral, Q4H PRN, Blanca Redmond APRN    [COMPLETED] pantoprazole (PROTONIX) injection 40 mg, 40 mg, Intravenous, Once, 40 mg at 05/24/24 1950 **FOLLOWED BY** pantoprazole (PROTONIX) EC tablet 40 mg, 40 mg, Oral, Q AM, Blanca Redmond APRN, 40 mg at 05/25/24 0914    Phosphorus Replacement - Follow Nurse / BPA Driven Protocol, , Does not apply, PRN, Poncho Alvarez MD    polyethylene glycol (MIRALAX) packet 17 g, 17 g, Oral, BID, Blanca Redmond APRN, 17 g at 05/25/24 0914    Potassium Replacement - Follow Nurse / BPA Driven Protocol, , Does not apply, PRN, Poncho Alvarez MD    sennosides-docusate (PERICOLACE) 8.6-50 MG per tablet 2 tablet, 2 tablet, Oral, BID, Poncho Alvarez MD, 2 tablet at 05/25/24 0828          Assessment and plan:  Diabetes mellitus type 2 with hyperglycemia: Uncontrolled, at this time I will continue IV insulin per CABG protocol for another 48 hours.  Subsequently he will be started on metformin.  Would consider SGLT2 blockers in addition to metformin.  He can resume GLP-1 agonist like Ozempic or Trulicity at home.  If needed we will consider subcutaneous insulin injections.  For now we will continue IV insulin.    CAD: Status post CABG on May 24, 2024    Hypertension/hyperlipidemia: Overall stable.    Thank you very much for the consultation.      Eyad Martinez MD FACE.

## 2024-05-25 NOTE — THERAPY EVALUATION
Patient Name: Ricci Ruiz  : 1970    MRN: [id number]                              Today's Date: 2024       Admit Date: 2024    Visit Dx:     ICD-10-CM ICD-9-CM   1. Coronary artery disease involving native coronary artery of native heart, unspecified whether angina present  I25.10 414.01   2. Chest pain due to myocardial ischemia, unspecified ischemic chest pain type  I25.9 786.50   3. Abnormal nuclear stress test  R94.39 794.39     Patient Active Problem List   Diagnosis    Spinal stenosis of cervical region    Cervical radiculopathy    Diabetes mellitus, type II    Prostate cancer screening    Cervical disc disorder with radiculopathy    Hyperlipidemia    Hypertension    Peroneal tendon rupture    Type 2 diabetes mellitus with hyperglycemia    Tachycardia    COVID-19 virus detected    COVID-19    Depression    Vitamin D deficiency    Hypoxemia    Renal insufficiency    Screening for prostate cancer    Preventative health care    Chronic chest pain with high risk for CAD    Chest pain due to myocardial ischemia    Abnormal nuclear stress test    Coronary artery disease involving native coronary artery of native heart    CAD (coronary artery disease)     Past Medical History:   Diagnosis Date    Colon polyp     Diabetes mellitus     Type 2    Hyperlipidemia      Past Surgical History:   Procedure Laterality Date    CARDIAC CATHETERIZATION N/A 2024    Procedure: Left Heart Cath with Coronary Angiography;  Surgeon: Alejandro Nagy MD;  Location: [facility] CATH INVASIVE LOCATION;  Service: Cardiovascular;  Laterality: N/A;    COLONOSCOPY        General Information       Row Name 24 1253          Physical Therapy Time and Intention    Document Type evaluation  -EL     Mode of Treatment individual therapy;physical therapy  -EL       Row Name 24 1253          General Information    Patient Profile Reviewed yes  -EL     Prior Level of Function independent:;all household  mobility;ADL's;driving;work  office job  -[facility] Name 05/25/24 1253          Living Environment    People in Home spouse  -[facility] Name 05/25/24 1253          Home Main Entrance    Number of Stairs, Main Entrance three  -     Stair Railings, Main Entrance railings safe and in good condition  -[facility] Name 05/25/24 1253          Stairs Within Home, Primary    Number of Stairs, Within Home, Primary other (see comments)  Has basement and upstairs, but can reside on main level  -[facility] Name 05/25/24 1253          Cognition    Orientation Status (Cognition) oriented x 4  -[facility] Name 05/25/24 1253          Safety Issues, Functional Mobility    Impairments Affecting Function (Mobility) endurance/activity tolerance;shortness of breath;strength;pain  -EL               User Key  (r) = Recorded By, (t) = Taken By, (c) = Cosigned By      Initials Name Provider Type    Gerardo Duenas PT Physical Therapist                   Mobility       Row Name 05/25/24 1254          Bed Mobility    Comment, (Bed Mobility) In chair when PT entered  -[facility] Name 05/25/24 1254          Sit-Stand Transfer    Sit-Stand [place] (Transfers) contact guard  -EL     Assistive Device (Sit-Stand Transfers) walker, front-wheeled  -EL     Comment, (Sit-Stand Transfer) Cueing for sternal precautions.  -[facility] Name 05/25/24 1254          Gait/Stairs (Locomotion)    [place] Level (Gait) contact guard  -EL     Assistive Device (Gait) walker, front-wheeled  -EL     Patient was able to Ambulate yes  -EL     Distance in Feet (Gait) 120  -EL     Deviations/Abnormal Patterns (Gait) gait speed decreased  -EL     Bilateral Gait Deviations forward flexed posture  -EL     Comment, (Gait/Stairs) Ambulated with RA, O2 sats maintained 89-93%  -EL               User Key  (r) = Recorded By, (t) = Taken By, (c) = Cosigned By      Initials Name Provider Type    Gerardo Duenas PT Physical Therapist                    Obj/Interventions       Row Name 05/25/24 1257          Range of Motion Comprehensive    General Range of Motion bilateral lower extremity ROM WFL  -EL       Row Name 05/25/24 1257          Strength Comprehensive (MMT)    General Manual Muscle Testing (MMT) Assessment no strength deficits identified  -EL       Row Name 05/25/24 1257          Balance    Balance Assessment sitting static balance;sit to stand dynamic balance;standing static balance;standing dynamic balance  -EL     Static Sitting Balance independent  -EL     Sit to Stand Dynamic Balance contact guard  -EL     Static Standing Balance contact guard  -EL     Dynamic Standing Balance contact guard  -EL     Position/Device Used, Standing Balance walker, front-wheeled  -EL               User Key  (r) = Recorded By, (t) = Taken By, (c) = Cosigned By      Initials Name Provider Type    EL Gerardo Flannery, PT Physical Therapist                   Goals/Plan       Row Name 05/25/24 1303          Bed Mobility Goal 1 (PT)    Activity/Assistive Device (Bed Mobility Goal 1, PT) bed mobility activities, all  -EL     [place] Level/Cues Needed (Bed Mobility Goal 1, PT) modified independence  -EL     Time Frame (Bed Mobility Goal 1, PT) long term goal (LTG);2 weeks  -EL       Row Name 05/25/24 1303          Transfer Goal 1 (PT)    Activity/Assistive Device (Transfer Goal 1, PT) transfers, all  -EL     [place] Level/Cues Needed (Transfer Goal 1, PT) independent  -EL     Time Frame (Transfer Goal 1, PT) long term goal (LTG);2 weeks  -EL       Row Name 05/25/24 1303          Gait Training Goal 1 (PT)    Activity/Assistive Device (Gait Training Goal 1, PT) gait (walking locomotion)  -EL     [place] Level (Gait Training Goal 1, PT) independent  -EL     Distance (Gait Training Goal 1, PT) 400  -EL     Time Frame (Gait Training Goal 1, PT) long term goal (LTG);2 weeks  -EL       Row Name 05/25/24 1303          Stairs Goal 1 (PT)    Activity/Assistive Device (Stairs  Goal 1, PT) stairs, all skills  -EL     [place] Level/Cues Needed (Stairs Goal 1, PT) modified independence  -EL     Number of Stairs (Stairs Goal 1, PT) 3  -EL     Time Frame (Stairs Goal 1, PT) long term goal (LTG);2 weeks  -       Row Name 05/25/24 3260          Therapy Assessment/Plan (PT)    Planned Therapy Interventions (PT) neuromuscular re-education;balance training;bed mobility training;transfer training;gait training;patient/family education;strengthening;stair training  -EL               User Key  (r) = Recorded By, (t) = Taken By, (c) = Cosigned By      Initials Name Provider Type    EL Gerardo Flannery, PT Physical Therapist                   Clinical Impression       Row Name 05/25/24 1251          Pain    Pretreatment Pain Rating 5/10  -EL     Posttreatment Pain Rating 5/10  -EL     Pain Location incisional  -EL     Pain Location - chest;other (see comments)  Chest tube site  -EL     Additional Documentation Pain Scale: Numbers Pre/Post-Treatment (Group)  -       Row Name 05/25/24 7825          Plan of Care Review    Plan of Care Reviewed With patient;spouse  -EL     Outcome Evaluation Pt is a 52 YO M POD 1 CABGx4. Pt reports living home with spouse, typically is independent with all ADLs, ambulation without AD and no recent falls. Pt has 3 MERRITT, and a basement in home, however can remain on first level. Pt this date on 4L O2 when PT entered but O2 sats at 99%. PT removed O2 and Pt remained above 89% on room air. Pt educated on sternal precautions and verbalizes understanding, spouse educated as well. This date pt requires CGA to come to standing and ambulate with RWx. BP assessed in sitting and standing and remained stable, and pt with no c/o dizziness/lightheadedness.Pt likely to progress well and recommendation is return home with family assist. PT to continue to follow while admitted.  -       Row Name 05/25/24 8413          Therapy Assessment/Plan (PT)    Rehab Potential (PT) good, to achieve  stated therapy goals  -EL     Criteria for Skilled Interventions Met (PT) yes  -EL     Therapy Frequency (PT) 5 times/wk  -EL     Predicted Duration of Therapy Intervention (PT) Until d/c  -EL       Row Name 05/25/24 1259          Vital Signs    Pre SpO2 (%) 98  -EL     O2 Delivery Pre Treatment supplemental O2  4L O2 NC  -EL     Intra SpO2 (%) 89  -EL     O2 Delivery Intra Treatment room air  -EL     Post SpO2 (%) 96  -EL     O2 Delivery Post Treatment room air  -EL     Pre Patient Position Sitting  -EL     Intra Patient Position Standing  -EL     Post Patient Position Sitting  -EL       Row Name 05/25/24 1259          Positioning and Restraints    Pre-Treatment Position sitting in chair/recliner  -EL     Post Treatment Position chair  -EL     In Chair notified nsg;reclined;call light within reach;encouraged to call for assist;with family/caregiver  -EL               User Key  (r) = Recorded By, (t) = Taken By, (c) = Cosigned By      Initials Name Provider Type    Gerardo Duenas PT Physical Therapist                   Outcome Measures       Row Name 05/25/24 1305          How much help from another person do you currently need...    Turning from your back to your side while in flat bed without using bedrails? 3  -EL     Moving from lying on back to sitting on the side of a flat bed without bedrails? 2  -EL     Moving to and from a bed to a chair (including a wheelchair)? 3  -EL     Standing up from a chair using your arms (e.g., wheelchair, bedside chair)? 3  -EL     Climbing 3-5 steps with a railing? 2  -EL     To walk in hospital room? 3  -EL     AM-PAC 6 Clicks Score (PT) 16  -EL     Highest Level of Mobility Goal 5 --> Static standing  -EL       Row Name 05/25/24 1305          Functional Assessment    Outcome Measure Options AM-PAC 6 Clicks Basic Mobility (PT)  -EL               User Key  (r) = Recorded By, (t) = Taken By, (c) = Cosigned By      Initials Name Provider Type    Gerardo Duenas PT Physical  Therapist                                 Physical Therapy Education       Title: PT OT SLP Therapies (Done)       Topic: Physical Therapy (Done)       Point: Mobility training (Done)       Learning Progress Summary             Patient Acceptance, E,TB, VU by  at 5/25/2024 1307                         Point: Precautions (Done)       Learning Progress Summary             Patient Acceptance, E,TB, VU by  at 5/25/2024 1307                                         User Key       Initials Effective Dates Name Provider Type Discipline     06/23/20 -  Gerardo Flannery, PT Physical Therapist PT                  PT Recommendation and Plan  Planned Therapy Interventions (PT): neuromuscular re-education, balance training, bed mobility training, transfer training, gait training, patient/family education, strengthening, stair training  Plan of Care Reviewed With: patient, spouse  Outcome Evaluation: Pt is a 54 YO M POD 1 CABGx4. Pt reports living home with spouse, typically is independent with all ADLs, ambulation without AD and no recent falls. Pt has 3 MERRITT, and a basement in home, however can remain on first level. Pt this date on 4L O2 when PT entered but O2 sats at 99%. PT removed O2 and Pt remained above 89% on room air. Pt educated on sternal precautions and verbalizes understanding, spouse educated as well. This date pt requires CGA to come to standing and ambulate with RWx. BP assessed in sitting and standing and remained stable, and pt with no c/o dizziness/lightheadedness.Pt likely to progress well and recommendation is return home with family assist. PT to continue to follow while admitted.     Time Calculation:   PT Evaluation Complexity  History, PT Evaluation Complexity: 3 or more personal factors and/or comorbidities  Examination of Body Systems (PT Eval Complexity): total of 4 or more elements  Clinical Presentation (PT Evaluation Complexity): unstable  Clinical Decision Making (PT Evaluation Complexity): high  complexity  Overall Complexity (PT Evaluation Complexity): high complexity     PT Charges       Row Name 05/25/24 1309             Time Calculation    Start Time 1133  -EL      Stop Time 1158  -EL      Time Calculation (min) 25 min  -EL      PT Received On 05/25/24  -EL      PT - Next Appointment 05/28/24  -EL      PT Goal Re-Cert Due Date 06/08/24  -EL                User Key  (r) = Recorded By, (t) = Taken By, (c) = Cosigned By      Initials Name Provider Type    Gerardo Duenas PT Physical Therapist                  Therapy Charges for Today       Code Description Service Date Service Provider Modifiers Qty    88906156159 HC PT EVAL HIGH COMPLEXITY 4 5/25/2024 Gerardo Flannery, PT GP 1            PT G-Codes  Outcome Measure Options: AM-PAC 6 Clicks Basic Mobility (PT)  AM-PAC 6 Clicks Score (PT): 16  PT Discharge Summary  Anticipated Discharge Disposition (PT): home with assist    Gerardo Flannery PT  5/25/2024

## 2024-05-25 NOTE — PLAN OF CARE
Goal Outcome Evaluation:  Plan of Care Reviewed With: patient, spouse           Outcome Evaluation: Pt is a 54 YO M POD 1 CABGx4. Pt reports living home with spouse, typically is independent with all ADLs, ambulation without AD and no recent falls. Pt has 3 MERRITT, and a basement in home, however can remain on first level. Pt this date on 4L O2 when PT entered but O2 sats at 99%. PT removed O2 and Pt remained above 89% on room air. Pt educated on sternal precautions and verbalizes understanding, spouse educated as well. This date pt requires CGA to come to standing and ambulate with RWx. BP assessed in sitting and standing and remained stable, and pt with no c/o dizziness/lightheadedness.Pt likely to progress well and recommendation is return home with family assist. PT to continue to follow while admitted.      Anticipated Discharge Disposition (PT): home with assist

## 2024-05-26 ENCOUNTER — APPOINTMENT (OUTPATIENT)
Dept: GENERAL RADIOLOGY | Facility: HOSPITAL | Age: 54
DRG: 234 | End: 2024-05-26
Payer: COMMERCIAL

## 2024-05-26 LAB
ANION GAP SERPL CALCULATED.3IONS-SCNC: 10.3 MMOL/L (ref 5–15)
BUN SERPL-MCNC: 16 MG/DL (ref 6–20)
BUN/CREAT SERPL: 14.3 (ref 7–25)
CALCIUM SPEC-SCNC: 8.7 MG/DL (ref 8.6–10.5)
CHLORIDE SERPL-SCNC: 106 MMOL/L (ref 98–107)
CO2 SERPL-SCNC: 22.7 MMOL/L (ref 22–29)
CREAT SERPL-MCNC: 1.12 MG/DL (ref 0.76–1.27)
DEPRECATED RDW RBC AUTO: 45.3 FL (ref 37–54)
EGFRCR SERPLBLD CKD-EPI 2021: 78.1 ML/MIN/1.73
ERYTHROCYTE [DISTWIDTH] IN BLOOD BY AUTOMATED COUNT: 12.8 % (ref 12.3–15.4)
GLUCOSE BLDC GLUCOMTR-MCNC: 118 MG/DL (ref 70–105)
GLUCOSE BLDC GLUCOMTR-MCNC: 127 MG/DL (ref 70–105)
GLUCOSE BLDC GLUCOMTR-MCNC: 135 MG/DL (ref 70–105)
GLUCOSE BLDC GLUCOMTR-MCNC: 135 MG/DL (ref 70–105)
GLUCOSE BLDC GLUCOMTR-MCNC: 137 MG/DL (ref 70–105)
GLUCOSE BLDC GLUCOMTR-MCNC: 141 MG/DL (ref 70–105)
GLUCOSE BLDC GLUCOMTR-MCNC: 142 MG/DL (ref 70–105)
GLUCOSE BLDC GLUCOMTR-MCNC: 150 MG/DL (ref 70–105)
GLUCOSE BLDC GLUCOMTR-MCNC: 153 MG/DL (ref 70–105)
GLUCOSE BLDC GLUCOMTR-MCNC: 156 MG/DL (ref 70–105)
GLUCOSE BLDC GLUCOMTR-MCNC: 164 MG/DL (ref 70–105)
GLUCOSE BLDC GLUCOMTR-MCNC: 169 MG/DL (ref 70–105)
GLUCOSE BLDC GLUCOMTR-MCNC: 170 MG/DL (ref 70–105)
GLUCOSE BLDC GLUCOMTR-MCNC: 184 MG/DL (ref 70–105)
GLUCOSE BLDC GLUCOMTR-MCNC: 193 MG/DL (ref 70–105)
GLUCOSE BLDC GLUCOMTR-MCNC: 200 MG/DL (ref 70–105)
GLUCOSE BLDC GLUCOMTR-MCNC: 206 MG/DL (ref 70–105)
GLUCOSE BLDC GLUCOMTR-MCNC: 209 MG/DL (ref 70–105)
GLUCOSE BLDC GLUCOMTR-MCNC: 255 MG/DL (ref 70–105)
GLUCOSE SERPL-MCNC: 133 MG/DL (ref 65–99)
HCT VFR BLD AUTO: 30.1 % (ref 37.5–51)
HGB BLD-MCNC: 9.8 G/DL (ref 13–17.7)
MCH RBC QN AUTO: 31.7 PG (ref 26.6–33)
MCHC RBC AUTO-ENTMCNC: 32.6 G/DL (ref 31.5–35.7)
MCV RBC AUTO: 97.4 FL (ref 79–97)
PLATELET # BLD AUTO: 101 10*3/MM3 (ref 140–450)
PMV BLD AUTO: 10.6 FL (ref 6–12)
POTASSIUM SERPL-SCNC: 3.4 MMOL/L (ref 3.5–5.2)
POTASSIUM SERPL-SCNC: 3.7 MMOL/L (ref 3.5–5.2)
POTASSIUM SERPL-SCNC: 3.9 MMOL/L (ref 3.5–5.2)
POTASSIUM SERPL-SCNC: 3.9 MMOL/L (ref 3.5–5.2)
RBC # BLD AUTO: 3.09 10*6/MM3 (ref 4.14–5.8)
SODIUM SERPL-SCNC: 139 MMOL/L (ref 136–145)
WBC NRBC COR # BLD AUTO: 10.94 10*3/MM3 (ref 3.4–10.8)

## 2024-05-26 PROCEDURE — 25010000002 MORPHINE PER 10 MG: Performed by: NURSE PRACTITIONER

## 2024-05-26 PROCEDURE — 25010000002 ENOXAPARIN PER 10 MG

## 2024-05-26 PROCEDURE — 93010 ELECTROCARDIOGRAM REPORT: CPT | Performed by: STUDENT IN AN ORGANIZED HEALTH CARE EDUCATION/TRAINING PROGRAM

## 2024-05-26 PROCEDURE — 99232 SBSQ HOSP IP/OBS MODERATE 35: CPT | Performed by: INTERNAL MEDICINE

## 2024-05-26 PROCEDURE — 25010000002 CEFAZOLIN PER 500 MG

## 2024-05-26 PROCEDURE — 84132 ASSAY OF SERUM POTASSIUM: CPT

## 2024-05-26 PROCEDURE — 93005 ELECTROCARDIOGRAM TRACING: CPT

## 2024-05-26 PROCEDURE — 85027 COMPLETE CBC AUTOMATED: CPT

## 2024-05-26 PROCEDURE — 71045 X-RAY EXAM CHEST 1 VIEW: CPT

## 2024-05-26 PROCEDURE — 82948 REAGENT STRIP/BLOOD GLUCOSE: CPT

## 2024-05-26 PROCEDURE — 80048 BASIC METABOLIC PNL TOTAL CA: CPT

## 2024-05-26 RX ORDER — COLCHICINE 0.6 MG/1
0.6 TABLET ORAL EVERY 12 HOURS SCHEDULED
Status: DISCONTINUED | OUTPATIENT
Start: 2024-05-26 | End: 2024-05-27

## 2024-05-26 RX ORDER — HYDROCODONE BITARTRATE AND ACETAMINOPHEN 5; 325 MG/1; MG/1
1 TABLET ORAL EVERY 4 HOURS PRN
Status: DISCONTINUED | OUTPATIENT
Start: 2024-05-26 | End: 2024-05-29 | Stop reason: HOSPADM

## 2024-05-26 RX ORDER — POTASSIUM CHLORIDE 20 MEQ/1
20 TABLET, EXTENDED RELEASE ORAL ONCE
Status: COMPLETED | OUTPATIENT
Start: 2024-05-26 | End: 2024-05-26

## 2024-05-26 RX ORDER — POTASSIUM CHLORIDE 20 MEQ/1
40 TABLET, EXTENDED RELEASE ORAL EVERY 4 HOURS
Qty: 4 TABLET | Refills: 0 | Status: COMPLETED | OUTPATIENT
Start: 2024-05-27 | End: 2024-05-27

## 2024-05-26 RX ORDER — POLYETHYLENE GLYCOL 3350 17 G/17G
17 POWDER, FOR SOLUTION ORAL DAILY PRN
Status: DISCONTINUED | OUTPATIENT
Start: 2024-05-26 | End: 2024-05-29 | Stop reason: HOSPADM

## 2024-05-26 RX ORDER — AMOXICILLIN 250 MG
2 CAPSULE ORAL DAILY PRN
Status: DISCONTINUED | OUTPATIENT
Start: 2024-05-26 | End: 2024-05-29 | Stop reason: HOSPADM

## 2024-05-26 RX ADMIN — SODIUM CHLORIDE 2 G: 900 INJECTION INTRAVENOUS at 07:39

## 2024-05-26 RX ADMIN — CHLORHEXIDINE GLUCONATE, 0.12% ORAL RINSE 15 ML: 1.2 SOLUTION DENTAL at 20:35

## 2024-05-26 RX ADMIN — OXYCODONE 5 MG: 5 TABLET ORAL at 06:30

## 2024-05-26 RX ADMIN — Medication 12.5 MG: at 20:35

## 2024-05-26 RX ADMIN — ASPIRIN 81 MG: 81 TABLET, COATED ORAL at 08:00

## 2024-05-26 RX ADMIN — MORPHINE SULFATE 2 MG: 2 INJECTION, SOLUTION INTRAMUSCULAR; INTRAVENOUS at 06:26

## 2024-05-26 RX ADMIN — MORPHINE SULFATE 2 MG: 2 INJECTION, SOLUTION INTRAMUSCULAR; INTRAVENOUS at 02:15

## 2024-05-26 RX ADMIN — Medication 12.5 MG: at 11:30

## 2024-05-26 RX ADMIN — COLCHICINE 0.6 MG: 0.6 TABLET, FILM COATED ORAL at 10:07

## 2024-05-26 RX ADMIN — POLYETHYLENE GLYCOL 3350 17 G: 17 POWDER, FOR SOLUTION ORAL at 08:00

## 2024-05-26 RX ADMIN — PANTOPRAZOLE SODIUM 40 MG: 40 TABLET, DELAYED RELEASE ORAL at 05:42

## 2024-05-26 RX ADMIN — ATORVASTATIN CALCIUM 40 MG: 40 TABLET, FILM COATED ORAL at 20:35

## 2024-05-26 RX ADMIN — CHLORHEXIDINE GLUCONATE, 0.12% ORAL RINSE 15 ML: 1.2 SOLUTION DENTAL at 08:00

## 2024-05-26 RX ADMIN — INSULIN HUMAN 1 UNITS/HR: 1 INJECTION, SOLUTION INTRAVENOUS at 06:23

## 2024-05-26 RX ADMIN — BUPROPION HYDROCHLORIDE 150 MG: 150 TABLET, EXTENDED RELEASE ORAL at 08:00

## 2024-05-26 RX ADMIN — HYDROCODONE BITARTRATE AND ACETAMINOPHEN 1 TABLET: 5; 325 TABLET ORAL at 22:53

## 2024-05-26 RX ADMIN — POTASSIUM CHLORIDE 20 MEQ: 1500 TABLET, EXTENDED RELEASE ORAL at 12:22

## 2024-05-26 RX ADMIN — HYDROCODONE BITARTRATE AND ACETAMINOPHEN 1 TABLET: 5; 325 TABLET ORAL at 15:10

## 2024-05-26 RX ADMIN — POTASSIUM CHLORIDE 20 MEQ: 1500 TABLET, EXTENDED RELEASE ORAL at 05:42

## 2024-05-26 RX ADMIN — ENOXAPARIN SODIUM 40 MG: 100 INJECTION SUBCUTANEOUS at 15:11

## 2024-05-26 RX ADMIN — MUPIROCIN 1 APPLICATION: 20 OINTMENT TOPICAL at 20:35

## 2024-05-26 RX ADMIN — OXYCODONE 5 MG: 5 TABLET ORAL at 02:29

## 2024-05-26 RX ADMIN — POTASSIUM CHLORIDE 20 MEQ: 1500 TABLET, EXTENDED RELEASE ORAL at 17:44

## 2024-05-26 RX ADMIN — MUPIROCIN 1 APPLICATION: 20 OINTMENT TOPICAL at 08:00

## 2024-05-26 RX ADMIN — SENNOSIDES AND DOCUSATE SODIUM 2 TABLET: 50; 8.6 TABLET ORAL at 08:00

## 2024-05-26 RX ADMIN — COLCHICINE 0.6 MG: 0.6 TABLET, FILM COATED ORAL at 20:35

## 2024-05-26 RX ADMIN — ACETAMINOPHEN 650 MG: 325 TABLET, FILM COATED ORAL at 07:39

## 2024-05-26 NOTE — PROGRESS NOTES
Referring Provider: Poncho Alvarez, *    Reason for follow-up: Multivessel coronary artery disease     Patient Care Team:  Rain Paniagua MD as PCP - General (Family Medicine)      SUBJECTIVE  No chest pain or shortness of breath.  Sitting in chair comfortably.    Review of Systems   Constitutional: Negative for fever and malaise/fatigue.   HENT:  Negative for ear pain and nosebleeds.    Eyes:  Negative for blurred vision and double vision.   Cardiovascular:  Negative for chest pain, dyspnea on exertion and palpitations.   Respiratory:  Negative for cough and shortness of breath.    Skin:  Negative for rash.   Musculoskeletal:  Negative for joint pain.   Gastrointestinal:  Negative for abdominal pain, nausea and vomiting.   Neurological:  Negative for focal weakness and headaches.   Psychiatric/Behavioral:  Negative for depression. The patient is not nervous/anxious.    All other systems reviewed and are negative.        Personal History:    Past Medical History:   Diagnosis Date    Colon polyp 2019    Diabetes mellitus     Type 2    Hyperlipidemia        Past Surgical History:   Procedure Laterality Date    CARDIAC CATHETERIZATION N/A 5/23/2024    Procedure: Left Heart Cath with Coronary Angiography;  Surgeon: Alejandro Nagy MD;  Location: [facility] CATH INVASIVE LOCATION;  Service: Cardiovascular;  Laterality: N/A;    COLONOSCOPY  2019       Family History   Problem Relation Age of Onset    Arthritis Mother         Lupus    Diabetes Brother     Heart disease Father     Heart disease Brother        Social History     Tobacco Use    Smoking status: Never    Smokeless tobacco: Never   Vaping Use    Vaping status: Never Used   Substance Use Topics    Alcohol use: Yes     Alcohol/week: 1.0 standard drink of alcohol     Types: 1 Glasses of wine per week    Drug use: Never        Home meds:  Prior to Admission medications    Medication Sig Start Date End Date Taking? Authorizing Provider   aspirin 81 MG tablet Take  1 tablet by mouth Daily. 3/27/15  Yes Linda Banks MD   Blood Glucose Monitoring Suppl (Accu-Chek Gladis Plus) w/Device kit 1 kit Daily. 10/26/20  Yes Karen Pickard MD   buPROPion XL (WELLBUTRIN XL) 150 MG 24 hr tablet TAKE 1 TABLET BY MOUTH EVERY DAY 6/6/23  Yes Karen Pickard MD   glucose blood (Accu-Chek Gladis Plus) test strip Use daily E11.9 9/9/22  Yes Karen Pickard MD   lisinopril (PRINIVIL,ZESTRIL) 5 MG tablet TAKE 1 TABLET BY MOUTH EVERY DAY 6/6/23  Yes Karen Pickard MD   loratadine (Claritin) 10 MG tablet Take 1 tablet by mouth Daily.   Yes Linda Banks MD   metFORMIN (GLUCOPHAGE) 1000 MG tablet TAKE 1 TABLET BY MOUTH TWICE A DAY WITH MEALS 6/6/23  Yes Karen Pickard MD   Multiple Vitamin (MULTIVITAMIN) capsule Take 1 capsule by mouth Daily. 2/10/16  Yes Linda Banks MD   nitroglycerin (NITROSTAT) 0.4 MG SL tablet Place 1 tablet under the tongue Every 5 (Five) Minutes As Needed for Chest Pain. 5/1/24  Yes Linda Banks MD   Semaglutide, 1 MG/DOSE, (Ozempic, 1 MG/DOSE,) 2 MG/1.5ML solution pen-injector Inject 0.5 mg under the skin into the appropriate area as directed 1 (One) Time Per Week.   Yes Linda Banks MD   simvastatin (ZOCOR) 40 MG tablet TAKE 1 TABLET BY MOUTH EVERY DAY 12/27/22  Yes Karen Pickard MD   vitamin D3 125 MCG (5000 UT) capsule capsule Take 1 capsule by mouth Daily. 3/10/23  Yes Karen Pickard MD   isosorbide mononitrate (IMDUR) 30 MG 24 hr tablet Take 1 tablet by mouth Every Morning. 5/23/24   Alejandro Nagy MD       Allergies:  Patient has no known allergies.    Scheduled Meds:aspirin, 81 mg, Oral, Daily  atorvastatin, 40 mg, Oral, Nightly  buPROPion XL, 150 mg, Oral, Daily  cetirizine, 10 mg, Oral, Daily  chlorhexidine, 15 mL, Mouth/Throat, Q12H  colchicine, 0.6 mg, Oral, Q12H  enoxaparin, 40 mg, Subcutaneous, Q24H  metoprolol tartrate, 12.5 mg, Oral, Q12H  mupirocin, , Each Nare,  "BID  pantoprazole, 40 mg, Oral, Q AM  polyethylene glycol, 17 g, Oral, BID  senna-docusate sodium, 2 tablet, Oral, BID      Continuous Infusions:insulin, 0-100 Units/hr, Last Rate: 3.5 Units/hr (05/26/24 1549)      PRN Meds:.  acetaminophen **OR** acetaminophen **OR** acetaminophen    bisacodyl    bisacodyl    Calcium Replacement - Follow Nurse / BPA Driven Protocol    cyclobenzaprine    dextrose    dextrose    glucagon (human recombinant)    HYDROcodone-acetaminophen    magnesium hydroxide    Magnesium Standard Dose Replacement - Follow Nurse / BPA Driven Protocol    Morphine **AND** naloxone    nitroglycerin    ondansetron    Phosphorus Replacement - Follow Nurse / BPA Driven Protocol    Potassium Replacement - Follow Nurse / BPA Driven Protocol      OBJECTIVE    Vital Signs  Vitals:    05/26/24 1450 05/26/24 1500 05/26/24 1510 05/26/24 1520   BP:   151/83    BP Location:   Left arm    Patient Position:   Sitting    Pulse: 110 114 102 100   Resp:   18    Temp:   98.5 °F (36.9 °C)    TempSrc:   Oral    SpO2:   96% 96%   Weight:       Height:           Flowsheet Rows      Flowsheet Row First Filed Value   Admission Height 181.6 cm (71.5\") Documented at 05/23/2024 1104   Admission Weight 86.6 kg (190 lb 14.7 oz) Documented at 05/23/2024 1104              Intake/Output Summary (Last 24 hours) at 5/26/2024 1629  Last data filed at 5/26/2024 1400  Gross per 24 hour   Intake 2138 ml   Output 295 ml   Net 1843 ml          Telemetry: Normal sinus rhythm    Physical Exam:  The patient is alert, oriented and in no distress.  Vital signs as noted above.  Head and neck revealed no carotid bruits or jugular venous distention.  No thyromegaly or lymphadenopathy is present  Lungs clear.  No wheezing.  Breath sounds are normal bilaterally.  Heart normal first and second heart sounds.  No murmur. No precordial rub is present.  No gallop is present.  Abdomen soft and nontender.  No organomegaly is present.  Extremities with good " peripheral pulses without any pedal edema.  Skin warm and dry.  Musculoskeletal system is grossly normal.  CNS grossly normal.       Results Review:  I have personally reviewed the results from the time of this admission to 5/26/2024 16:29 EDT and agree with these findings:  []  Laboratory  []  Microbiology  []  Radiology  []  EKG/Telemetry   []  Cardiology/Vascular   []  Pathology  []  Old records  []  Other:    Most notable findings include:    Lab Results (last 24 hours)       Procedure Component Value Units Date/Time    POC Glucose Once [[id number]]  (Abnormal) Collected: 05/26/24 1542    Specimen: Blood Updated: 05/26/24 1544     Glucose 200 mg/dL      Comment: Serial Number: [id number]:  [id number]       POC Glucose Once [[id number]]  (Abnormal) Collected: 05/26/24 1455    Specimen: Blood Updated: 05/26/24 1456     Glucose 206 mg/dL      Comment: Serial Number: [id number]:  [id number]       POC Glucose Once [[id number]]  (Abnormal) Collected: 05/26/24 1346    Specimen: Blood Updated: 05/26/24 1347     Glucose 156 mg/dL      Comment: Serial Number: [id number]:  [id number]       POC Glucose Once [[id number]]  (Abnormal) Collected: 05/26/24 1234    Specimen: Blood Updated: 05/26/24 1235     Glucose 170 mg/dL      Comment: Serial Number: [id number]:  [id number]       POC Glucose Once [[id number]]  (Abnormal) Collected: 05/26/24 1134    Specimen: Blood Updated: 05/26/24 1136     Glucose 193 mg/dL      Comment: Serial Number: [id number]:  [id number]       Potassium [[id number]]  (Normal) Collected: 05/26/24 1016    Specimen: Blood Updated: 05/26/24 1041     Potassium 3.9 mmol/L     POC Glucose Once [[id number]]  (Abnormal) Collected: 05/26/24 1012    Specimen: Blood Updated: 05/26/24 1013     Glucose 184 mg/dL      Comment: Serial Number: [id number]:  [id number]       POC Glucose Once [[id number]]  (Abnormal) Collected: 05/26/24 0813    Specimen: Blood Updated: 05/26/24 0814     Glucose  135 mg/dL      Comment: Serial Number: [id number]:  [id number]       POC Glucose Once [[id number]]  (Abnormal) Collected: 05/26/24 0712    Specimen: Blood Updated: 05/26/24 0713     Glucose 150 mg/dL      Comment: Serial Number: [id number]:  [id number]       POC Glucose Once [[id number]]  (Abnormal) Collected: 05/26/24 0559    Specimen: Blood Updated: 05/26/24 0600     Glucose 141 mg/dL      Comment: Serial Number: [id number]:  [id number]       Basic Metabolic Panel [[id number]]  (Abnormal) Collected: 05/26/24 0349    Specimen: Blood Updated: 05/26/24 0420     Glucose 133 mg/dL      BUN 16 mg/dL      Creatinine 1.12 mg/dL      Sodium 139 mmol/L      Potassium 3.7 mmol/L      Chloride 106 mmol/L      CO2 22.7 mmol/L      Calcium 8.7 mg/dL      BUN/Creatinine Ratio 14.3     Anion Gap 10.3 mmol/L      eGFR 78.1 mL/min/1.73     Narrative:      GFR Normal >60  Chronic Kidney Disease <60  Kidney Failure <15      CBC (No Diff) [[id number]]  (Abnormal) Collected: 05/26/24 0349    Specimen: Blood Updated: 05/26/24 0358     WBC 10.94 10*3/mm3      RBC 3.09 10*6/mm3      Hemoglobin 9.8 g/dL      Hematocrit 30.1 %      MCV 97.4 fL      MCH 31.7 pg      MCHC 32.6 g/dL      RDW 12.8 %      RDW-SD 45.3 fl      MPV 10.6 fL      Platelets 101 10*3/mm3     POC Glucose Once [[id number]]  (Abnormal) Collected: 05/26/24 0348    Specimen: Blood Updated: 05/26/24 0349     Glucose 137 mg/dL      Comment: Serial Number: [id number]:  [id number]       POC Glucose Once [[id number]]  (Abnormal) Collected: 05/26/24 0210    Specimen: Blood Updated: 05/26/24 0211     Glucose 127 mg/dL      Comment: Serial Number: [id number]:  [id number]       POC Glucose Once [[id number]]  (Abnormal) Collected: 05/26/24 0054    Specimen: Blood Updated: 05/26/24 0055     Glucose 135 mg/dL      Comment: Serial Number: [id number]:  [id number]       POC Glucose Once [[id number]]  (Abnormal) Collected: 05/25/24 2248    Specimen: Blood  Updated: 05/25/24 2249     Glucose 122 mg/dL      Comment: Serial Number: [id number]:  [id number]       POC Glucose Once [[id number]]  (Abnormal) Collected: 05/25/24 2121    Specimen: Blood Updated: 05/25/24 2122     Glucose 155 mg/dL      Comment: Serial Number: [id number]:  [id number]       POC Glucose Once [[id number]]  (Abnormal) Collected: 05/25/24 1946    Specimen: Blood Updated: 05/25/24 1948     Glucose 204 mg/dL      Comment: Serial Number: [id number]:  [id number]       POC Glucose Once [[id number]]  (Abnormal) Collected: 05/25/24 1827    Specimen: Blood Updated: 05/25/24 1828     Glucose 161 mg/dL      Comment: Serial Number: [id number]:  [id number]       POC Glucose Once [[id number]]  (Abnormal) Collected: 05/25/24 1708    Specimen: Blood Updated: 05/25/24 1709     Glucose 223 mg/dL      Comment: Serial Number: [id number]:  [id number]               Imaging Results (Last 24 Hours)       Procedure Component Value Units Date/Time    XR Chest 1 View [[id number]] Collected: 05/26/24 0851     Updated: 05/26/24 0855    Narrative:      XR CHEST 1 VW    Date of Exam: 5/26/2024 4:55 AM EDT    Indication: Post-Op Heart Surgery    Comparison: 5/25/2024    Findings:  Patient is status post median sternotomy and CABG. Right IJ vascular sheath overlies the SVC. Cardiac silhouette is magnified but appears enlarged. Pulmonary vasculature is unremarkable. Left basilar atelectasis is seen. No significant pleural effusion   or pneumothorax. Osseous structures are unchanged.      Impression:      Impression:  1.No significant change since 5/25/2024.      Electronically Signed: Grzegorz Mendes MD    5/26/2024 8:53 AM EDT    Workstation ID: [id number]    XR Chest 1 View [[id number]] Collected: 05/25/24 1712     Updated: 05/25/24 1717    Narrative:      XR CHEST 1 VW    Date of Exam: 5/25/2024 4:20 PM EDT    Indication: post chest tube removal    Comparison: 5/25/2024, 5:54 a.m.    Findings:  Current study  is timed 4:19 p.m. PA catheter has been removed. Right IJ sheath remains in the brachiocephalic vein or proximal SVC. Heart is normal in size. Vasculature is at upper limits of normal size. Apparatus shadow overlies the lower chest. Chest   drains have apparently been removed.     There is minimal left basilar discoid atelectasis. There is questionable left midlung discoid atelectasis, or more likely overlie apparatus shadow. No other new pulmonary parenchymal disease is seen. No pneumothorax or effusion is seen. There is   increased upper abdominal bowel gas, whether from air swallowing or mild ileus.          Impression:      Impression:  Interval chest drain removal with mild bilateral discoid atelectasis, but no evidence of pneumothorax.      Electronically Signed: Elias Lo MD    5/25/2024 5:14 PM EDT    Workstation ID: [id number]            LAB RESULTS (LAST 7 DAYS)    CBC  Results from last 7 days   Lab Units 05/26/24  0349 05/25/24  0448 05/25/24  0225 05/25/24  0152 05/24/24  2257 05/24/24  1900 05/24/24  1729 05/24/24  1305 05/23/24  1707 05/21/24  0844   WBC 10*3/mm3 10.94*  --   --  9.93  --   --  15.22*  --  5.95 5.86   RBC 10*6/mm3 3.09*  --   --  3.28*  --   --  3.88*  --  4.38 4.51   HEMOGLOBIN g/dL 9.8*  --   --  10.1*  --   --  12.3*  --  13.7 14.4   HEMOGLOBIN, POC g/dL  --  10.0* 11.0*  --  11.6* 12.0  --    < >  --   --    HEMATOCRIT % 30.1*  --   --  30.9*  --   --  36.2*  --  41.6 41.8   HEMATOCRIT POC %  --  30* 32*  --  34* 35*  --    < >  --   --    MCV fL 97.4*  --   --  94.2  --   --  93.3  --  95.0 92.7   PLATELETS 10*3/mm3 101*  --   --  123*  --   --  133*  --  174 215    < > = values in this interval not displayed.       BMP  Results from last 7 days   Lab Units 05/26/24  1016 05/26/24  0349 05/25/24  1425 05/25/24  0448 05/25/24  0225 05/24/24  2257 05/24/24  2125 05/24/24  1900 05/24/24  1729 05/23/24  1707 05/21/24  0844   SODIUM mmol/L  --  139  --   --   --   --  143  --  140 139  142   POTASSIUM mmol/L 3.9 3.7 4.0  --   --   --  4.2  --  4.4 3.5 4.5   CHLORIDE mmol/L  --  106  --   --   --   --  105  --  104 103 106   CO2 mmol/L  --  22.7  --   --   --   --  21.0*  --  23.0 27.0 25.0   BUN mg/dL  --  16  --   --   --   --  17  --  15 16 19   CREATININE mg/dL  --  1.12  --  1.45* 1.33* 1.43* 1.26 1.13 1.23 1.20 1.28*   GLUCOSE mg/dL  --  133* 159*  --   --   --  195*  --  140* 176* 147*   MAGNESIUM mg/dL  --   --   --   --   --   --  2.5  --  2.9*  --   --    PHOSPHORUS mg/dL  --   --   --   --   --   --  2.5  --  1.9*  --   --        CMP   Results from last 7 days   Lab Units 05/26/24  1016 05/26/24  0349 05/25/24  1425 05/25/24  0448 05/25/24  0225 05/24/24  2257 05/24/24  2125 05/24/24  1900 05/24/24  1729 05/23/24  1707 05/21/24  0844   SODIUM mmol/L  --  139  --   --   --   --  143  --  140 139 142   POTASSIUM mmol/L 3.9 3.7 4.0  --   --   --  4.2  --  4.4 3.5 4.5   CHLORIDE mmol/L  --  106  --   --   --   --  105  --  104 103 106   CO2 mmol/L  --  22.7  --   --   --   --  21.0*  --  23.0 27.0 25.0   BUN mg/dL  --  16  --   --   --   --  17 --  15 16 19   CREATININE mg/dL  --  1.12  --  1.45* 1.33* 1.43* 1.26 1.13 1.23 1.20 1.28*   GLUCOSE mg/dL  --  133* 159*  --   --   --  195*  --  140* 176* 147*   ALBUMIN g/dL  --   --   --   --   --   --  4.9  --  4.7 4.3  --    BILIRUBIN mg/dL  --   --   --   --   --   --   --   --   --  0.4  --    ALK PHOS U/L  --   --   --   --   --   --   --   --   --  70  --    AST (SGOT) U/L  --   --   --   --   --   --   --   --   --  15  --    ALT (SGPT) U/L  --   --   --   --   --   --   --   --   --  18  --        BNP        TROPONIN        CoAg  Results from last 7 days   Lab Units 05/25/24  0152 05/24/24  1729 05/23/24  1707 05/21/24  0844   INR  1.10 1.10 1.00 0.98   APTT seconds  --  25.3 26.2  --        Creatinine Clearance  Estimated Creatinine Clearance: 95.1 mL/min (by C-G formula based on SCr of 1.12 mg/dL).    ABG  Results from last 7 days   Lab  Units 05/25/24  0648 05/25/24  0448 05/25/24  0225 05/24/24  2257 05/24/24  1900 05/24/24  1620 05/24/24  1532 05/24/24  1505   PH, ARTERIAL pH units  --  7.365 7.574* 7.374 7.393 7.370 7.320* 7.310*   PCO2, ARTERIAL mm Hg  --  39.1 20.7* 33.5* 38.2 46.3* 50.5* 55.6*   PO2 ART mm Hg  --  115.9* 179.2* 109.1* 302.9* 258.0* 349.0* 413.0*   O2 SATURATION ART %  --  98.4* 99.8* 98.2* 99.9* 100.0* 100.0* 100.0*   BASE EXCESS ART mmol/L -2.8* -2.7* -1.3* -4.9* -1.4* 1.0 0.0 2.0       Radiology  XR Chest 1 View    Result Date: 5/26/2024  Impression: 1.No significant change since 5/25/2024. Electronically Signed: Grzegorz Mendes MD  5/26/2024 8:53 AM EDT  Workstation ID: [id number]    XR Chest 1 View    Result Date: 5/25/2024  Impression: Interval chest drain removal with mild bilateral discoid atelectasis, but no evidence of pneumothorax. Electronically Signed: Elias Lo MD  5/25/2024 5:14 PM EDT  Workstation ID: [id number]    XR Chest 1 View    Result Date: 5/25/2024  Impression: Post extubation chest radiograph with no evidence of pneumothorax or other new chest disease. Electronically Signed: Elias Lo MD  5/25/2024 10:55 AM EDT  Workstation ID: [id number]    XR Chest 1 View    Result Date: 5/24/2024  Impression: Postoperative changes of CABG with medical support devices detailed above. No acute cardiopulmonary findings. Electronically Signed: Rey Cornejo MD  5/24/2024 6:13 PM EDT  Workstation ID: [id number]       EKG  I personally viewed and interpreted the patient's EKG/Telemetry data:  ECG 12 Lead Other; Post-op open heart   Preliminary Result   HEART RATE= 95  bpm   RR Interval= 636  ms   ME Interval= 141  ms   P Horizontal Axis= -25  deg   P Front Axis= 44  deg   QRSD Interval= 87  ms   QT Interval= 339  ms   QTcB= 425  ms   QRS Axis= -1  deg   T Wave Axis= 60  deg   - ABNORMAL ECG -   Sinus rhythm   ST elevation suggests acute pericarditis   When compared with ECG of 25-May-2024 4:27:21,   No significant change    Electronically Signed By:    Date and Time of Study: 2024-05-26 05:50:59      ECG 12 Lead Other; Post-op open heart   Final Result   HEART RATE= 93  bpm   RR Interval= 640  ms   MA Interval= 172  ms   P Horizontal Axis= -4  deg   P Front Axis= 47  deg   QRSD Interval= 81  ms   QT Interval= 358  ms   QTcB= 448  ms   QRS Axis= 8  deg   T Wave Axis= 33  deg   - ABNORMAL ECG -   Sinus rhythm   Anterior infarct, possibly acute   ST elevation, consider inferior injury   When compared with ECG of 24-May-2024 17:46:44,   No significant change   Electronically Signed By: Vinicio Lino (RAH) 25-May-2024 07:01:31   Date and Time of Study: 2024-05-25 04:27:21      ECG 12 Lead Other; Post-op open heart   Final Result   HEART RATE= 85  bpm   RR Interval= 700  ms   MA Interval= 170  ms   P Horizontal Axis= -21  deg   P Front Axis= 75  deg   QRSD Interval= 100  ms   QT Interval= 400  ms   QTcB= 478  ms   QRS Axis= -9  deg   T Wave Axis= 47  deg   - NORMAL ECG -   Sinus rhythm   When compared with ECG of 23-May-2024 22:22:51,   No significant change   Electronically Signed By: Vinicio Lino (RAH) 25-May-2024 07:02:14   Date and Time of Study: 2024-05-24 17:46:44      ECG 12 Lead Pre-Op / Pre-Procedure   Final Result   HEART RATE= 74  bpm   RR Interval= 808  ms   MA Interval= 184  ms   P Horizontal Axis= 10  deg   P Front Axis= 42  deg   QRSD Interval= 87  ms   QT Interval= 371  ms   QTcB= 413  ms   QRS Axis= 8  deg   T Wave Axis= -3  deg   - NORMAL ECG -   Sinus rhythm   When compared with ECG of 14-Oct-2020 14:36:03,   Significant rate decrease   Significant repolarization change   Electronically Signed By: Vinicio Lino (RAH) 24-May-2024 07:39:42   Date and Time of Study: 2024-05-23 22:22:51            Echocardiogram:    Results for orders placed in visit on 05/24/24    Intra-Op Anesthesia SOFIA    Narrative  Intra-Op Anesthesia SOFIA    Procedure Performed: Intra-Op Anesthesia SOFIA  Start Time:  End Time:    Preanesthesia  Checklist:  Patient identified, IV assessed, risks and benefits discussed, monitors and equipment assessed, procedure being performed at surgeon's request and anesthesia consent obtained.    General Procedure Information  Diagnostic Indications for Echo:  assessment of surgical repair and hemodynamic monitoring  Location performed:  OR  Intubated  Bite block placed  Heart visualized  Probe Insertion:  Easy  Probe Type:  Biplane and multiplane  Modalities:  Color flow mapping, pulse wave Doppler and continuous wave Doppler    Echocardiographic and Doppler Measurements    Ventricles    Right Ventricle:  Cavity size normal.  Hypertrophy not present.  Thrombus not present.  Global function normal.  Ejection Fraction 60%.  Left Ventricle:  Cavity size normal.  Thrombus not present.  Global Function normal.  Ejection Fraction 60%.    Ventricular Regional Function:  1- Basal Anteroseptal:  normal  2- Basal Anterior:  normal  3- Basal Anterolateral:  normal  4- Basal Inferolateral:  normal  5- Basal Inferior:  normal  6- Basal Inferoseptal:  normal  7- Mid Anteroseptal:  normal  8- Mid Anterior:  normal  9- Mid Anterolateral:  normal  10- Mid Inferolateral:  normal  11- Mid Inferior:  normal  12- Mid Inferoseptal:  normal  13- Apical Anterior:  normal  14- Apical Lateral:  normal  15- Apical Inferior:  normal  16- Apical Septal:  normal  17- Polaris:  normal      Valves    Aortic Valve:  Annulus normal.  Stenosis not present.  Regurgitation absent.  Leaflets normal.  Leaflet motions normal.    Mitral Valve:  Annulus normal.  Stenosis not present.  Regurgitation absent.  Leaflets normal.  Leaflet motions normal.    Tricuspid Valve:  Annulus normal.  Stenosis not present.  Regurgitation absent.  Leaflets normal.  Leaflet motions normal.  Pulmonic Valve:  Annulus normal.  Stenosis not present.  Regurgitation absent.      Aorta    Ascending Aorta:  Size normal.  Dissection not present.  Plaque thickness less than 3 mm.  Mobile  plaque not present.  Aortic Arch:  Size normal.  Dissection not present.  Plaque thickness less than 3 mm.  Mobile plaque not present.  Descending Aorta:  Size normal.  Dissection not present.  Plaque thickness less than 3 mm.  Mobile plaque not present.      Atria    Right Atrium:  Size normal.  Left Atrium:  Size normal.  Spontaneous echo contrast not present.  Thrombus not present.  Tumor not present.  Device not present.  Left atrial appendage normal.      Septa        Ventricular Septum:  Intra-ventricular septum morphology normal.        Other Findings  Pleural Effusion:  none  Pulmonary Arteries:  normal      Anesthesia Information  Performed Personally  Anesthesiologist:  Juan Moya MD      Echocardiogram Comments:  SOFIA placed easily, no resistance, bite guard, lubricated      Pre cpb  LV no wma ef 60  RV nl sf  MV AV TV wnl      S/p cabg x 4  No change        Stress Test:  Results for orders placed during the hospital encounter of 05/13/24    Stress test with myocardial perfusion one day    Interpretation Summary    Patient exercised for 10 minutes 21 seconds achieving 11.8 METS.    Findings consistent with an abnormal ECG stress test.  ST depressions in lateral leads.    Left ventricular ejection fraction is hyperdynamic (Calculated EF > 70%).    Myocardial perfusion imaging indicates a moderate-sized, severe area of ischemia located in the inferior wall and septal wall.    Impressions are consistent with an intermediate risk study.         Cardiac Catheterization:  Results for orders placed during the hospital encounter of 05/23/24    Cardiac Catheterization/Vascular Study    Conclusion  OPERATORS:  1. Alejandro Nagy M.D., Attending Cardiologist      PROCEDURE PERFORMED.  Ultrasound guided vascular access  Left heart catheterization  Coronary Angiogram 90508  Moderate Sedation    INDICATIONS FOR PROCEDURE.  53 years old man with unstable angina and abnormal nuclear stress test.  After discussing the  risk and benefit of the procedure he was brought into the Cath Lab for coronary angiography.    PROCEDURE IN DETAIL.  Informed consent was obtained from the patient after explaining the risks, benefits, and alternative options of the procedure. After obtaining informed consent, the patient was brought to the cath lab and was prepped in a sterile fashion. Lidocaine 1% was used for local anesthesia into the right radial access site. The right radial artery was accessed under direct ultrasound visualization  with an angiocath needle via modified Seldinger technique. A 6F slender sheath was inserted successfully. Afterwards, 6F JR4  was advanced over a wire into the ascending aorta and used to cross the AV and obtain LV pressures.  AV gradient obtained via pullback technique. JR4 and JL3.5 diagnostic catheters were used to engage the ostia of the RCA and LM respectively. Images of the right and left coronary systems were obtained. All the catheters were exchanged over a wire and subsequently removed. The patient tolerated the procedure well without any complications. The pictures were reviewed at the end of the procedure. TR band applied to right wrist for hemostasis and inflated with 15 cc of air. No complications were encountered.    HEMODYNAMICS.  LV: 135/8, 17 mmHg  AO: 136/82, 105 mmHg  No significant gradient across the aortic valve during pullback of JR4 catheter.  LV gram was not performed due to recent echocardiogram.    FINDINGS.    Coronary Angiogram.    Left main. Left main is a large-caliber vessel which gives rise to the Left Anterior Descending, ramus and the Left circumflex.  Left main is angiographically free from any significant disease    Left Anterior Descending Artery. LAD is a large vessel which gives rise to several septal perforators and several diagonal branches.  Mid LAD has 80% stenosis at the origin of diagonal vessel which also has 70 to 80% stenosis.    Ramus intermedius has proximal  segment 70% stenosis.    Left Circumflex. The LCx is a medium caliber which gives rise to marginals.  OM1 has proximal 50% stenosis.  OM 2 has proximal segment 70% stenosis.    Right Coronary Artery. The RCA is a dominant vessel which gives rise to several small caliber branches including PDA and PLV.  Mid RCA has 99% long tubular stenosis.  PLV has mid segment 50% stenosis.    IMPRESSIONS.  1 Multivessel obstructive coronary artery disease including LAD, ramus and RCA.  2.  Mildly elevated LVEDP    RECOMMENDATIONS.  1.  Consult cardiac surgery for CABG  2.  Start Imdur    Electronically signed by Alejandro Nagy MD, 05/23/24, 12:30 PM EDT.         Other:         ASSESSMENT & PLAN:        Unstable angina/coronary artery disease (Primary)  Multiple cardiovascular risk factors including hypertension, hyperlipidemia, diabetes, family history of premature coronary disease.  ECG is normal.  Abnormal nuclear stress test leading to cardiac catheterization  Cardiac cath 5/23/2024 showed multivessel coronary artery disease including 99% RCA and 80% LAD.  Patient underwent cardiac surgery with LIMA to the LAD and PRASAD to RCA and SVG to ramus and OM and is currently stable.  Patient is extubated and his chest tubes are removed   Patient is currently on aspirin statins and beta-blockers and low-dose and is tolerating well with blood pressure and heart rate being stable      Primary hypertension  Patient is on low-dose beta-blockers and tolerating very well.    Mixed hyperlipidemia  LDL 80, HDL 48, triglyceride 98 and total cholesterol 146  Goal LDL is less than 50  Start high intensity statin    Type 2 diabetes mellitus without complication, without long-term current use of insulin  Patient is typically on metformin and Trulicity  A1c 7.1    Anxiety and depression  He is on bupropion      Cordell Soriano MD  05/26/24  16:29 EDT

## 2024-05-26 NOTE — PROGRESS NOTES
Referring Provider: Poncho Alvarez, *    Reason for follow-up: Multivessel coronary artery disease     Patient Care Team:  Rain Paniagua MD as PCP - General (Family Medicine)      SUBJECTIVE  Patient is laying comfortably in bed and denies any chest pain this morning.    Review of Systems   Constitutional: Negative for fever and malaise/fatigue.   HENT:  Negative for ear pain and nosebleeds.    Eyes:  Negative for blurred vision and double vision.   Cardiovascular:  Negative for chest pain, dyspnea on exertion and palpitations.   Respiratory:  Negative for cough and shortness of breath.    Skin:  Negative for rash.   Musculoskeletal:  Negative for joint pain.   Gastrointestinal:  Negative for abdominal pain, nausea and vomiting.   Neurological:  Negative for focal weakness and headaches.   Psychiatric/Behavioral:  Negative for depression. The patient is not nervous/anxious.    All other systems reviewed and are negative.        Personal History:    Past Medical History:   Diagnosis Date    Colon polyp 2019    Diabetes mellitus     Type 2    Hyperlipidemia        Past Surgical History:   Procedure Laterality Date    CARDIAC CATHETERIZATION N/A 5/23/2024    Procedure: Left Heart Cath with Coronary Angiography;  Surgeon: Alejandro Nagy MD;  Location: [facility] CATH INVASIVE LOCATION;  Service: Cardiovascular;  Laterality: N/A;    COLONOSCOPY  2019       Family History   Problem Relation Age of Onset    Arthritis Mother         Lupus    Diabetes Brother     Heart disease Father     Heart disease Brother        Social History     Tobacco Use    Smoking status: Never    Smokeless tobacco: Never   Vaping Use    Vaping status: Never Used   Substance Use Topics    Alcohol use: Yes     Alcohol/week: 1.0 standard drink of alcohol     Types: 1 Glasses of wine per week    Drug use: Never        Home meds:  Prior to Admission medications    Medication Sig Start Date End Date Taking? Authorizing Provider   aspirin 81 MG  tablet Take 1 tablet by mouth Daily. 3/27/15  Yes Linda Banks MD   Blood Glucose Monitoring Suppl (Accu-Chek Gladis Plus) w/Device kit 1 kit Daily. 10/26/20  Yes Karen Pickard MD   buPROPion XL (WELLBUTRIN XL) 150 MG 24 hr tablet TAKE 1 TABLET BY MOUTH EVERY DAY 6/6/23  Yes Karen Pickard MD   glucose blood (Accu-Chek Gladis Plus) test strip Use daily E11.9 9/9/22  Yes Karen Pickard MD   lisinopril (PRINIVIL,ZESTRIL) 5 MG tablet TAKE 1 TABLET BY MOUTH EVERY DAY 6/6/23  Yes Karen Pickard MD   loratadine (Claritin) 10 MG tablet Take 1 tablet by mouth Daily.   Yes Linda Banks MD   metFORMIN (GLUCOPHAGE) 1000 MG tablet TAKE 1 TABLET BY MOUTH TWICE A DAY WITH MEALS 6/6/23  Yes Karen Pickard MD   Multiple Vitamin (MULTIVITAMIN) capsule Take 1 capsule by mouth Daily. 2/10/16  Yes Linda Banks MD   nitroglycerin (NITROSTAT) 0.4 MG SL tablet Place 1 tablet under the tongue Every 5 (Five) Minutes As Needed for Chest Pain. 5/1/24  Yes Linda Banks MD   Semaglutide, 1 MG/DOSE, (Ozempic, 1 MG/DOSE,) 2 MG/1.5ML solution pen-injector Inject 0.5 mg under the skin into the appropriate area as directed 1 (One) Time Per Week.   Yes Linda Banks MD   simvastatin (ZOCOR) 40 MG tablet TAKE 1 TABLET BY MOUTH EVERY DAY 12/27/22  Yes Karen Pickard MD   vitamin D3 125 MCG (5000 UT) capsule capsule Take 1 capsule by mouth Daily. 3/10/23  Yes Karen Pickard MD   isosorbide mononitrate (IMDUR) 30 MG 24 hr tablet Take 1 tablet by mouth Every Morning. 5/23/24   Alejandro Nagy MD       Allergies:  Patient has no known allergies.    Scheduled Meds:aspirin, 81 mg, Oral, Daily  atorvastatin, 40 mg, Oral, Nightly  buPROPion XL, 150 mg, Oral, Daily  ceFAZolin, 2 g, Intravenous, Q8H  cetirizine, 10 mg, Oral, Daily  chlorhexidine, 15 mL, Mouth/Throat, Q12H  enoxaparin, 40 mg, Subcutaneous, Q24H  mupirocin, , Each Nare, BID  pantoprazole, 40 mg, Oral, Q  "AM  polyethylene glycol, 17 g, Oral, BID  senna-docusate sodium, 2 tablet, Oral, BID      Continuous Infusions:insulin, 0-100 Units/hr, Last Rate: 4.5 Units/hr (05/25/24 1953)      PRN Meds:.  acetaminophen **OR** acetaminophen **OR** acetaminophen    bisacodyl    bisacodyl    Calcium Replacement - Follow Nurse / BPA Driven Protocol    cyclobenzaprine    dextrose    dextrose    glucagon (human recombinant)    magnesium hydroxide    Magnesium Standard Dose Replacement - Follow Nurse / BPA Driven Protocol    Morphine **AND** naloxone    nitroglycerin    ondansetron    oxyCODONE    Phosphorus Replacement - Follow Nurse / BPA Driven Protocol    Potassium Replacement - Follow Nurse / BPA Driven Protocol      OBJECTIVE    Vital Signs  Vitals:    05/25/24 1700 05/25/24 1800 05/25/24 1900 05/25/24 2005   BP: 113/70 108/63 108/66 115/64   BP Location:    Left arm   Patient Position:    Lying   Pulse: 93 91 90 95   Resp:    20   Temp:    98.3 °F (36.8 °C)   TempSrc:    Oral   SpO2: 94% 94% 96% 96%   Weight:       Height:           Flowsheet Rows      Flowsheet Row First Filed Value   Admission Height 181.6 cm (71.5\") Documented at 05/23/2024 1104   Admission Weight 86.6 kg (190 lb 14.7 oz) Documented at 05/23/2024 1104              Intake/Output Summary (Last 24 hours) at 5/25/2024 2118  Last data filed at 5/25/2024 1800  Gross per 24 hour   Intake 1335 ml   Output 1342 ml   Net -7 ml          Telemetry: Normal sinus rhythm    Physical Exam:  The patient is alert, oriented and in no distress.  Vital signs as noted above.  Head and neck revealed no carotid bruits or jugular venous distention.  No thyromegaly or lymphadenopathy is present  Lungs clear.  No wheezing.  Breath sounds are normal bilaterally.  Heart normal first and second heart sounds.  No murmur. No precordial rub is present.  No gallop is present.  Abdomen soft and nontender.  No organomegaly is present.  Extremities with good peripheral pulses without any pedal " edema.  Skin warm and dry.  Musculoskeletal system is grossly normal.  CNS grossly normal.       Results Review:  I have personally reviewed the results from the time of this admission to 5/25/2024 21:18 EDT and agree with these findings:  []  Laboratory  []  Microbiology  []  Radiology  []  EKG/Telemetry   []  Cardiology/Vascular   []  Pathology  []  Old records  []  Other:    Most notable findings include:    Lab Results (last 24 hours)       Procedure Component Value Units Date/Time    POC Glucose Once [[id number]]  (Abnormal) Collected: 05/25/24 1946    Specimen: Blood Updated: 05/25/24 1948     Glucose 204 mg/dL      Comment: Serial Number: [id number]:  [id number]       POC Glucose Once [[id number]]  (Abnormal) Collected: 05/25/24 1827    Specimen: Blood Updated: 05/25/24 1828     Glucose 161 mg/dL      Comment: Serial Number: [id number]:  [id number]       POC Glucose Once [[id number]]  (Abnormal) Collected: 05/25/24 1708    Specimen: Blood Updated: 05/25/24 1709     Glucose 223 mg/dL      Comment: Serial Number: [id number]:  [id number]       Potassium [[id number]]  (Normal) Collected: 05/25/24 1425    Specimen: Blood Updated: 05/25/24 1458     Potassium 4.0 mmol/L     Glucose, Random [[id number]]  (Abnormal) Collected: 05/25/24 1425    Specimen: Blood Updated: 05/25/24 1458     Glucose 159 mg/dL     POC Glucose Once [[id number]]  (Abnormal) Collected: 05/25/24 1238    Specimen: Blood Updated: 05/25/24 1239     Glucose 170 mg/dL      Comment: Serial Number: [id number]:  [id number]       POC Glucose Once [[id number]]  (Abnormal) Collected: 05/25/24 1055    Specimen: Blood Updated: 05/25/24 1057     Glucose 169 mg/dL      Comment: Serial Number: [id number]:  [id number]       POC Glucose Once [[id number]]  (Abnormal) Collected: 05/25/24 0833    Specimen: Blood Updated: 05/25/24 0835     Glucose 131 mg/dL      Comment: Serial Number: [id number]:  [id number]       Blood Gas, Mixed  [[id number]]  (Abnormal) Collected: 05/25/24 0648    Specimen: Blood Updated: 05/25/24 0809     pH, mixed 7.38 pH units      PCO2 MIXED 37.6 mmHg      PO2 MIXED 38.9 mmHg      HCO3 MIXED 22.0 mmol/L      CO2 Content 23.2 mmol/L      Base Excess, Arterial -2.8 mmol/L      Comment: Serial Number: [id number]:  [id number]        O2 SATURATION MIXED 72.5 %      Hemodilution No     Site Swanz Bill     Darin's Test N/A     Modality Cannula     FIO2 32 %      Base Deficit -3.1 mEq/liter     POC Glucose Once [[id number]]  (Abnormal) Collected: 05/25/24 0632    Specimen: Blood Updated: 05/25/24 0634     Glucose 149 mg/dL      Comment: Serial Number: [id number]:  [id number]       POC Glucose Once [[id number]]  (Abnormal) Collected: 05/25/24 0607    Specimen: Blood Updated: 05/25/24 0609     Glucose 146 mg/dL      Comment: Serial Number: [id number]:  [id number]       POC Creatinine [[id number]]  (Abnormal) Collected: 05/25/24 0448    Specimen: Arterial Blood Updated: 05/25/24 0451     Creatinine 1.45 mg/dL      Comment: Serial Number: [id number]:  [id number]        eGFR 57.3 mL/min/1.73     Blood Gas, Arterial - [[id number]]  (Abnormal) Collected: 05/25/24 0448    Specimen: Arterial Blood Updated: 05/25/24 0451     Site Arterial Line     Darin's Test N/A     pH, Arterial 7.365 pH units      pCO2, Arterial 39.1 mm Hg      pO2, Arterial 115.9 mm Hg      HCO3, Arterial 22.4 mmol/L      Base Excess, Arterial -2.7 mmol/L      Comment: Serial Number: [id number]:  [id number]        O2 Saturation, Arterial 98.4 %      Barometric Pressure for Blood Gas --     Comment: N/A        Modality Cannula     FIO2 36 %      Hemodilution Yes    POCT Electrolytes +HGB +HCT [[id number]]  (Abnormal) Collected: 05/25/24 0448    Specimen: Arterial Blood Updated: 05/25/24 0451     Sodium 146 mmol/L      POC Potassium 3.5 mmol/L      Ionized Calcium 1.17 mmol/L      Comment: Serial Number: [id number]:  [id number]        Glucose 158 mg/dL       Hematocrit 30 %      Hemoglobin 10.0 g/dL     POC Glucose Once [[id number]]  (Abnormal) Collected: 05/25/24 0448    Specimen: Arterial Blood Updated: 05/25/24 0451     Glucose 158 mg/dL      Comment: Serial Number: [id number]:  [id number]       POC Glucose Once [[id number]]  (Abnormal) Collected: 05/25/24 0414    Specimen: Blood Updated: 05/25/24 0415     Glucose 149 mg/dL      Comment: Serial Number: [id number]:  [id number]       Blood Gas, Arterial - [[id number]]  (Abnormal) Collected: 05/25/24 0225    Specimen: Arterial Blood Updated: 05/25/24 0230     Site Arterial Line     Darin's Test N/A     pH, Arterial 7.574 pH units      pCO2, Arterial 20.7 mm Hg      pO2, Arterial 179.2 mm Hg      HCO3, Arterial 19.2 mmol/L      Base Excess, Arterial -1.3 mmol/L      Comment: Serial Number: [id number]:  [id number]        O2 Saturation, Arterial 99.8 %      Barometric Pressure for Blood Gas --     Comment: N/A        Modality Adult Vent     FIO2 40 %      Ventilator Mode CPAP/PS     PEEP 5     PSV 10 cmH2O      Hemodilution Yes    POCT Electrolytes +HGB +HCT [[id number]]  (Abnormal) Collected: 05/25/24 0225    Specimen: Arterial Blood Updated: 05/25/24 0230     Sodium 149 mmol/L      POC Potassium 3.4 mmol/L      Ionized Calcium 1.05 mmol/L      Comment: Serial Number: [id number]:  [id number]        Glucose 154 mg/dL      Hematocrit 32 %      Hemoglobin 11.0 g/dL     POC Glucose Once [[id number]]  (Abnormal) Collected: 05/25/24 0225    Specimen: Arterial Blood Updated: 05/25/24 0230     Glucose 154 mg/dL      Comment: Serial Number: [id number]:  [id number]       POC Creatinine [[id number]]  (Abnormal) Collected: 05/25/24 0225    Specimen: Arterial Blood Updated: 05/25/24 0230     Creatinine 1.33 mg/dL      Comment: Serial Number: [id number]:  [id number]        eGFR 63.5 mL/min/1.73     Protime-INR [[id number]]  (Abnormal) Collected: 05/25/24 0152    Specimen: Blood Updated: 05/25/24 0208     Protime 11.9 Seconds      INR 1.10     Calcium, Ionized [[id number]]  (Abnormal) Collected: 05/25/24 0152    Specimen: Blood Updated: 05/25/24 0207     Ionized Calcium 1.15 mmol/L     CBC & Differential [[id number]]  (Abnormal) Collected: 05/25/24 0152    Specimen: Blood Updated: 05/25/24 0201    Narrative:      The following orders were created for panel order CBC & Differential.  Procedure                               Abnormality         Status                     ---------                               -----------         ------                     CBC Auto Differential[[id number]]        Abnormal            Final result                 Please view results for these tests on the individual orders.    CBC Auto Differential [[id number]]  (Abnormal) Collected: 05/25/24 0152    Specimen: Blood Updated: 05/25/24 0201     WBC 9.93 10*3/mm3      RBC 3.28 10*6/mm3      Hemoglobin 10.1 g/dL      Comment: Result checked          Hematocrit 30.9 %      MCV 94.2 fL      MCH 30.8 pg      MCHC 32.7 g/dL      RDW 11.9 %      RDW-SD 41.5 fl      MPV 10.2 fL      Platelets 123 10*3/mm3      Neutrophil % 87.4 %      Lymphocyte % 4.9 %      Monocyte % 6.6 %      Eosinophil % 0.2 %      Basophil % 0.4 %      Immature Grans % 0.5 %      Neutrophils, Absolute 8.67 10*3/mm3      Lymphocytes, Absolute 0.49 10*3/mm3      Monocytes, Absolute 0.66 10*3/mm3      Eosinophils, Absolute 0.02 10*3/mm3      Basophils, Absolute 0.04 10*3/mm3      Immature Grans, Absolute 0.05 10*3/mm3      nRBC 0.0 /100 WBC     POC Glucose Once [[id number]]  (Abnormal) Collected: 05/25/24 0019    Specimen: Blood Updated: 05/25/24 0020     Glucose 172 mg/dL      Comment: Serial Number: [id number]:  [id number]       Blood Gas, Arterial - [[id number]]  (Abnormal) Collected: 05/24/24 2257    Specimen: Arterial Blood Updated: 05/24/24 2301     Site Arterial Line     Darin's Test N/A     pH, Arterial 7.374 pH units      pCO2, Arterial 33.5 mm Hg      pO2, Arterial 109.1 mm Hg      HCO3, Arterial 19.6  mmol/L      Base Excess, Arterial -4.9 mmol/L      Comment: Serial Number: [id number]:  [id number]        O2 Saturation, Arterial 98.2 %      Barometric Pressure for Blood Gas --     Comment: N/A        Modality Adult Vent     FIO2 40 %      Ventilator Mode AC     Set Tidal Volume 700     PEEP 5     Hemodilution Yes     Respiratory Rate 14    POCT Electrolytes +HGB +HCT [[id number]]  (Abnormal) Collected: 05/24/24 2257    Specimen: Arterial Blood Updated: 05/24/24 2301     Sodium 143 mmol/L      POC Potassium 4.6 mmol/L      Ionized Calcium 1.17 mmol/L      Comment: Serial Number: [id number]:  [id number]        Glucose 213 mg/dL      Hematocrit 34 %      Hemoglobin 11.6 g/dL     POC Glucose Once [[id number]]  (Abnormal) Collected: 05/24/24 2257    Specimen: Arterial Blood Updated: 05/24/24 2301     Glucose 213 mg/dL      Comment: Serial Number: [id number]:  [id number]       POC Creatinine [[id number]]  (Abnormal) Collected: 05/24/24 2257    Specimen: Arterial Blood Updated: 05/24/24 2301     Creatinine 1.43 mg/dL      Comment: Serial Number: [id number]:  [id number]        eGFR 58.2 mL/min/1.73     Renal Function Panel [[id number]]  (Abnormal) Collected: 05/24/24 2125    Specimen: Blood Updated: 05/24/24 2155     Glucose 195 mg/dL      BUN 17 mg/dL      Creatinine 1.26 mg/dL      Sodium 143 mmol/L      Potassium 4.2 mmol/L      Chloride 105 mmol/L      CO2 21.0 mmol/L      Calcium 8.9 mg/dL      Albumin 4.9 g/dL      Phosphorus 2.5 mg/dL      Anion Gap 17.0 mmol/L      BUN/Creatinine Ratio 13.5     eGFR 67.8 mL/min/1.73     Narrative:      GFR Normal >60  Chronic Kidney Disease <60  Kidney Failure <15      Magnesium [[id number]]  (Normal) Collected: 05/24/24 2125    Specimen: Blood Updated: 05/24/24 2153     Magnesium 2.5 mg/dL     POC Glucose Once [[id number]]  (Abnormal) Collected: 05/24/24 2123    Specimen: Blood Updated: 05/24/24 2124     Glucose 181 mg/dL      Comment: Serial Number: [id number]:  [id number]                Imaging Results (Last 24 Hours)       Procedure Component Value Units Date/Time    XR Chest 1 View [[id number]] Collected: 05/25/24 1712     Updated: 05/25/24 1717    Narrative:      XR CHEST 1 VW    Date of Exam: 5/25/2024 4:20 PM EDT    Indication: post chest tube removal    Comparison: 5/25/2024, 5:54 a.m.    Findings:  Current study is timed 4:19 p.m. PA catheter has been removed. Right IJ sheath remains in the brachiocephalic vein or proximal SVC. Heart is normal in size. Vasculature is at upper limits of normal size. Apparatus shadow overlies the lower chest. Chest   drains have apparently been removed.     There is minimal left basilar discoid atelectasis. There is questionable left midlung discoid atelectasis, or more likely overlie apparatus shadow. No other new pulmonary parenchymal disease is seen. No pneumothorax or effusion is seen. There is   increased upper abdominal bowel gas, whether from air swallowing or mild ileus.          Impression:      Impression:  Interval chest drain removal with mild bilateral discoid atelectasis, but no evidence of pneumothorax.      Electronically Signed: Elias Lo MD    5/25/2024 5:14 PM EDT    Workstation ID: [id number]    XR Chest 1 View [[id number]] Collected: 05/25/24 1054     Updated: 05/25/24 1057    Narrative:      XR CHEST 1 VW    Date of Exam: 5/25/2024 5:45 AM EDT    Indication: Post-Op Heart Surgery    Comparison: 5/24/2024    Findings:  ET tube and NG tube have been withdrawn. PA catheter tip appears to lie in the proximal right pulmonary artery. Heart is upper normal size. Vasculature appears normal and lungs appear clear. Bilateral chest tubes and mediastinal drain remain in place.      Impression:      Impression:  Post extubation chest radiograph with no evidence of pneumothorax or other new chest disease.      Electronically Signed: Elias Lo MD    5/25/2024 10:55 AM EDT    Workstation ID: [id number]            LAB RESULTS (LAST 7  DAYS)    CBC  Results from last 7 days   Lab Units 05/25/24 0448 05/25/24 0225 05/25/24 0152 05/24/24 2257 05/24/24 1900 05/24/24 1729 05/24/24 1620 05/24/24  1305 05/23/24 1707 05/21/24  0844   WBC 10*3/mm3  --   --  9.93  --   --  15.22*  --   --  5.95 5.86   RBC 10*6/mm3  --   --  3.28*  --   --  3.88*  --   --  4.38 4.51   HEMOGLOBIN g/dL  --   --  10.1*  --   --  12.3*  --   --  13.7 14.4   HEMOGLOBIN, POC g/dL 10.0* 11.0*  --  11.6* 12.0  --  10.2*   < >  --   --    HEMATOCRIT %  --   --  30.9*  --   --  36.2*  --   --  41.6 41.8   HEMATOCRIT POC % 30* 32*  --  34* 35*  --  30*   < >  --   --    MCV fL  --   --  94.2  --   --  93.3  --   --  95.0 92.7   PLATELETS 10*3/mm3  --   --  123*  --   --  133*  --   --  174 215    < > = values in this interval not displayed.       BMP  Results from last 7 days   Lab Units 05/25/24  1425 05/25/24 0448 05/25/24 0225 05/24/24 2257 05/24/24 2125 05/24/24 1900 05/24/24 1729 05/23/24  1707 05/21/24  0844   SODIUM mmol/L  --   --   --   --  143  --  140 139 142   POTASSIUM mmol/L 4.0  --   --   --  4.2  --  4.4 3.5 4.5   CHLORIDE mmol/L  --   --   --   --  105  --  104 103 106   CO2 mmol/L  --   --   --   --  21.0*  --  23.0 27.0 25.0   BUN mg/dL  --   --   --   --  17  --  15 16 19   CREATININE mg/dL  --  1.45* 1.33* 1.43* 1.26 1.13 1.23 1.20 1.28*   GLUCOSE mg/dL 159*  --   --   --  195*  --  140* 176* 147*   MAGNESIUM mg/dL  --   --   --   --  2.5  --  2.9*  --   --    PHOSPHORUS mg/dL  --   --   --   --  2.5  --  1.9*  --   --        CMP   Results from last 7 days   Lab Units 05/25/24  1425 05/25/24  0448 05/25/24  0225 05/24/24  2257 05/24/24  2125 05/24/24  1900 05/24/24  1729 05/23/24  1707 05/21/24  0844   SODIUM mmol/L  --   --   --   --  143  --  140 139 142   POTASSIUM mmol/L 4.0  --   --   --  4.2  --  4.4 3.5 4.5   CHLORIDE mmol/L  --   --   --   --  105  --  104 103 106   CO2 mmol/L  --   --   --   --  21.0*  --  23.0 27.0 25.0   BUN mg/dL  --   --    --   --  17  --  15 16 19   CREATININE mg/dL  --  1.45* 1.33* 1.43* 1.26 1.13 1.23 1.20 1.28*   GLUCOSE mg/dL 159*  --   --   --  195*  --  140* 176* 147*   ALBUMIN g/dL  --   --   --   --  4.9  --  4.7 4.3  --    BILIRUBIN mg/dL  --   --   --   --   --   --   --  0.4  --    ALK PHOS U/L  --   --   --   --   --   --   --  70  --    AST (SGOT) U/L  --   --   --   --   --   --   --  15  --    ALT (SGPT) U/L  --   --   --   --   --   --   --  18  --        BNP        TROPONIN        CoAg  Results from last 7 days   Lab Units 05/25/24  0152 05/24/24  1729 05/23/24  1707 05/21/24  0844   INR  1.10 1.10 1.00 0.98   APTT seconds  --  25.3 26.2  --        Creatinine Clearance  Estimated Creatinine Clearance: 72.7 mL/min (A) (by C-G formula based on SCr of 1.45 mg/dL (H)).    ABG  Results from last 7 days   Lab Units 05/25/24  0648 05/25/24  0448 05/25/24  0225 05/24/24  2257 05/24/24  1900 05/24/24  1620 05/24/24  1532 05/24/24  1505   PH, ARTERIAL pH units  --  7.365 7.574* 7.374 7.393 7.370 7.320* 7.310*   PCO2, ARTERIAL mm Hg  --  39.1 20.7* 33.5* 38.2 46.3* 50.5* 55.6*   PO2 ART mm Hg  --  115.9* 179.2* 109.1* 302.9* 258.0* 349.0* 413.0*   O2 SATURATION ART %  --  98.4* 99.8* 98.2* 99.9* 100.0* 100.0* 100.0*   BASE EXCESS ART mmol/L -2.8* -2.7* -1.3* -4.9* -1.4* 1.0 0.0 2.0       Radiology  XR Chest 1 View    Result Date: 5/25/2024  Impression: Interval chest drain removal with mild bilateral discoid atelectasis, but no evidence of pneumothorax. Electronically Signed: Elias Lo MD  5/25/2024 5:14 PM EDT  Workstation ID: [id number]    XR Chest 1 View    Result Date: 5/25/2024  Impression: Post extubation chest radiograph with no evidence of pneumothorax or other new chest disease. Electronically Signed: Elias Lo MD  5/25/2024 10:55 AM EDT  Workstation ID: [id number]    XR Chest 1 View    Result Date: 5/24/2024  Impression: Postoperative changes of CABG with medical support devices detailed above. No acute  cardiopulmonary findings. Electronically Signed: Rey Cornejo MD  5/24/2024 6:13 PM EDT  Workstation ID: [id number]       EKG  I personally viewed and interpreted the patient's EKG/Telemetry data:  ECG 12 Lead Other; Post-op open heart   Final Result   HEART RATE= 93  bpm   RR Interval= 640  ms   HI Interval= 172  ms   P Horizontal Axis= -4  deg   P Front Axis= 47  deg   QRSD Interval= 81  ms   QT Interval= 358  ms   QTcB= 448  ms   QRS Axis= 8  deg   T Wave Axis= 33  deg   - ABNORMAL ECG -   Sinus rhythm   Anterior infarct, possibly acute   ST elevation, consider inferior injury   When compared with ECG of 24-May-2024 17:46:44,   No significant change   Electronically Signed By: Vinicio Lino (RAH) 25-May-2024 07:01:31   Date and Time of Study: 2024-05-25 04:27:21      ECG 12 Lead Other; Post-op open heart   Final Result   HEART RATE= 85  bpm   RR Interval= 700  ms   HI Interval= 170  ms   P Horizontal Axis= -21  deg   P Front Axis= 75  deg   QRSD Interval= 100  ms   QT Interval= 400  ms   QTcB= 478  ms   QRS Axis= -9  deg   T Wave Axis= 47  deg   - NORMAL ECG -   Sinus rhythm   When compared with ECG of 23-May-2024 22:22:51,   No significant change   Electronically Signed By: Vinicio Lino (RAH) 25-May-2024 07:02:14   Date and Time of Study: 2024-05-24 17:46:44      ECG 12 Lead Pre-Op / Pre-Procedure   Final Result   HEART RATE= 74  bpm   RR Interval= 808  ms   HI Interval= 184  ms   P Horizontal Axis= 10  deg   P Front Axis= 42  deg   QRSD Interval= 87  ms   QT Interval= 371  ms   QTcB= 413  ms   QRS Axis= 8  deg   T Wave Axis= -3  deg   - NORMAL ECG -   Sinus rhythm   When compared with ECG of 14-Oct-2020 14:36:03,   Significant rate decrease   Significant repolarization change   Electronically Signed By: Vinicio Lino (RAH) 24-May-2024 07:39:42   Date and Time of Study: 2024-05-23 22:22:51            Echocardiogram:    Results for orders placed in visit on 05/24/24    Intra-Op Anesthesia  SOFIA    Narrative  Intra-Op Anesthesia SOFIA    Procedure Performed: Intra-Op Anesthesia SOFIA  Start Time:  End Time:    Preanesthesia Checklist:  Patient identified, IV assessed, risks and benefits discussed, monitors and equipment assessed, procedure being performed at surgeon's request and anesthesia consent obtained.    General Procedure Information  Diagnostic Indications for Echo:  assessment of surgical repair and hemodynamic monitoring  Location performed:  OR  Intubated  Bite block placed  Heart visualized  Probe Insertion:  Easy  Probe Type:  Biplane and multiplane  Modalities:  Color flow mapping, pulse wave Doppler and continuous wave Doppler    Echocardiographic and Doppler Measurements    Ventricles    Right Ventricle:  Cavity size normal.  Hypertrophy not present.  Thrombus not present.  Global function normal.  Ejection Fraction 60%.  Left Ventricle:  Cavity size normal.  Thrombus not present.  Global Function normal.  Ejection Fraction 60%.    Ventricular Regional Function:  1- Basal Anteroseptal:  normal  2- Basal Anterior:  normal  3- Basal Anterolateral:  normal  4- Basal Inferolateral:  normal  5- Basal Inferior:  normal  6- Basal Inferoseptal:  normal  7- Mid Anteroseptal:  normal  8- Mid Anterior:  normal  9- Mid Anterolateral:  normal  10- Mid Inferolateral:  normal  11- Mid Inferior:  normal  12- Mid Inferoseptal:  normal  13- Apical Anterior:  normal  14- Apical Lateral:  normal  15- Apical Inferior:  normal  16- Apical Septal:  normal  17- [place]:  normal      Valves    Aortic Valve:  Annulus normal.  Stenosis not present.  Regurgitation absent.  Leaflets normal.  Leaflet motions normal.    Mitral Valve:  Annulus normal.  Stenosis not present.  Regurgitation absent.  Leaflets normal.  Leaflet motions normal.    Tricuspid Valve:  Annulus normal.  Stenosis not present.  Regurgitation absent.  Leaflets normal.  Leaflet motions normal.  Pulmonic Valve:  Annulus normal.  Stenosis not present.   Regurgitation absent.      Aorta    Ascending Aorta:  Size normal.  Dissection not present.  Plaque thickness less than 3 mm.  Mobile plaque not present.  Aortic Arch:  Size normal.  Dissection not present.  Plaque thickness less than 3 mm.  Mobile plaque not present.  Descending Aorta:  Size normal.  Dissection not present.  Plaque thickness less than 3 mm.  Mobile plaque not present.      Atria    Right Atrium:  Size normal.  Left Atrium:  Size normal.  Spontaneous echo contrast not present.  Thrombus not present.  Tumor not present.  Device not present.  Left atrial appendage normal.      Septa        Ventricular Septum:  Intra-ventricular septum morphology normal.        Other Findings  Pleural Effusion:  none  Pulmonary Arteries:  normal      Anesthesia Information  Performed Personally  Anesthesiologist:  Juan Moya MD      Echocardiogram Comments:  SOFIA placed easily, no resistance, bite guard, lubricated      Pre cpb  LV no wma ef 60  RV nl sf  MV AV TV wnl      S/p cabg x 4  No change        Stress Test:  Results for orders placed during the hospital encounter of 05/13/24    Stress test with myocardial perfusion one day    Interpretation Summary    Patient exercised for 10 minutes 21 seconds achieving 11.8 METS.    Findings consistent with an abnormal ECG stress test.  ST depressions in lateral leads.    Left ventricular ejection fraction is hyperdynamic (Calculated EF > 70%).    Myocardial perfusion imaging indicates a moderate-sized, severe area of ischemia located in the inferior wall and septal wall.    Impressions are consistent with an intermediate risk study.         Cardiac Catheterization:  Results for orders placed during the hospital encounter of 05/23/24    Cardiac Catheterization/Vascular Study    Conclusion  OPERATORS:  1. Alejandro Nagy M.D., Attending Cardiologist      PROCEDURE PERFORMED.  Ultrasound guided vascular access  Left heart catheterization  Coronary Angiogram 78166  Moderate  Sedation    INDICATIONS FOR PROCEDURE.  53 years old man with unstable angina and abnormal nuclear stress test.  After discussing the risk and benefit of the procedure he was brought into the Cath Lab for coronary angiography.    PROCEDURE IN DETAIL.  Informed consent was obtained from the patient after explaining the risks, benefits, and alternative options of the procedure. After obtaining informed consent, the patient was brought to the cath lab and was prepped in a sterile fashion. Lidocaine 1% was used for local anesthesia into the right radial access site. The right radial artery was accessed under direct ultrasound visualization  with an angiocath needle via modified Seldinger technique. A 6F slender sheath was inserted successfully. Afterwards, 6F JR4  was advanced over a wire into the ascending aorta and used to cross the AV and obtain LV pressures.  AV gradient obtained via pullback technique. JR4 and JL3.5 diagnostic catheters were used to engage the ostia of the RCA and LM respectively. Images of the right and left coronary systems were obtained. All the catheters were exchanged over a wire and subsequently removed. The patient tolerated the procedure well without any complications. The pictures were reviewed at the end of the procedure. TR band applied to right wrist for hemostasis and inflated with 15 cc of air. No complications were encountered.    HEMODYNAMICS.  LV: 135/8, 17 mmHg  AO: 136/82, 105 mmHg  No significant gradient across the aortic valve during pullback of JR4 catheter.  LV gram was not performed due to recent echocardiogram.    FINDINGS.    Coronary Angiogram.    Left main. Left main is a large-caliber vessel which gives rise to the Left Anterior Descending, ramus and the Left circumflex.  Left main is angiographically free from any significant disease    Left Anterior Descending Artery. LAD is a large vessel which gives rise to several septal perforators and several diagonal branches.   Mid LAD has 80% stenosis at the origin of diagonal vessel which also has 70 to 80% stenosis.    Ramus intermedius has proximal segment 70% stenosis.    Left Circumflex. The LCx is a medium caliber which gives rise to marginals.  OM1 has proximal 50% stenosis.  OM 2 has proximal segment 70% stenosis.    Right Coronary Artery. The RCA is a dominant vessel which gives rise to several small caliber branches including PDA and PLV.  Mid RCA has 99% long tubular stenosis.  PLV has mid segment 50% stenosis.    IMPRESSIONS.  1 Multivessel obstructive coronary artery disease including LAD, ramus and RCA.  2.  Mildly elevated LVEDP    RECOMMENDATIONS.  1.  Consult cardiac surgery for CABG  2.  Start Imdur    Electronically signed by Alejandro Nagy MD, 05/23/24, 12:30 PM EDT.         Other:         ASSESSMENT & PLAN:        Unstable angina/coronary artery disease (Primary)  Multiple cardiovascular risk factors including hypertension, hyperlipidemia, diabetes, family history of premature coronary disease.  ECG is normal.  Abnormal nuclear stress test leading to cardiac catheterization  Cardiac cath 5/23/2024 showed multivessel coronary artery disease including 99% RCA and 80% LAD.  Patient underwent cardiac surgery with LIMA to the LAD and PRASAD to RCA and SVG to ramus and OM and is currently stable.  Patient is extubated but the chest tubes are draining well.  Patient is being started on aspirin and statins but beta-blockers are held because of low blood pressure      Primary hypertension  Blood pressure on the lower side and hence we will hold off beta-blockers and ACE inhibitors for now    Mixed hyperlipidemia  LDL 80, HDL 48, triglyceride 98 and total cholesterol 146  Goal LDL is less than 50  Start high intensity statin    Type 2 diabetes mellitus without complication, without long-term current use of insulin  Patient is typically on metformin and Trulicity  A1c 7.1    Anxiety and depression  He is on bupropion      Cordell  MD Bo  05/25/24  21:18 EDT

## 2024-05-26 NOTE — PROGRESS NOTES
S/P POD# 2 CABG x4 with bilateral IMAs--[place]  EF 50-55% (echo)    Subjective:  reports surgical pain has improved    No events overnight  Creatinine down to 1.1 to  Endo consulted yesterday  Drips:  insulin  CVP 9  Wt is up 3 kgs from preop  CXR:  atel/ vasc congestion        Intake/Output Summary (Last 24 hours) at 5/26/2024 0835  Last data filed at 5/26/2024 0623  Gross per 24 hour   Intake 2128 ml   Output 775 ml   Net 1353 ml     Temp:  [97.7 °F (36.5 °C)-100.2 °F (37.9 °C)] 100.2 °F (37.9 °C)  Heart Rate:  [81-98] 96  Resp:  [13-20] 20  BP: ()/(58-72) 118/65      Results from last 7 days   Lab Units 05/26/24  0349 05/25/24  0448 05/25/24  0225 05/25/24  0152 05/24/24  1900 05/24/24  1729 05/24/24  1305 05/23/24  1707   WBC 10*3/mm3 10.94*  --   --  9.93  --  15.22*  --  5.95   HEMOGLOBIN g/dL 9.8*  --   --  10.1*  --  12.3*  --  13.7   HEMOGLOBIN, POC g/dL  --  10.0*   < >  --    < >  --    < >  --    HEMATOCRIT % 30.1*  --   --  30.9*  --  36.2*  --  41.6   HEMATOCRIT POC %  --  30*   < >  --    < >  --    < >  --    PLATELETS 10*3/mm3 101*  --   --  123*  --  133*  --  174   INR   --   --   --  1.10  --  1.10  --  1.00    < > = values in this interval not displayed.     Results from last 7 days   Lab Units 05/26/24  0349 05/24/24  2257 05/24/24  2125   CREATININE mg/dL 1.12   < > 1.26   POTASSIUM mmol/L 3.7   < > 4.2   SODIUM mmol/L 139  --  143   MAGNESIUM mg/dL  --   --  2.5   PHOSPHORUS mg/dL  --   --  2.5    < > = values in this interval not displayed.       Physical Exam:  Neuro intact, nad, resting in recliner, no family seen this morning  Tele:  SR 90s   Diminished bases, 99% 3L  dsg c/d/i, no drainage  Benign abd, no BM  No edema    Assessment/Plan:  Principal Problem:    Coronary artery disease involving native coronary artery of native heart  Active Problems:    Chest pain due to myocardial ischemia    Abnormal nuclear stress test    CAD (coronary artery disease)    - MV CAD, EF  50-55% (echo)--s/p CABG x4 with LIMA to LAD, PRASAD to RCA, SVG to ramus, SVG to OM (Kim)  - HTN--stable  - HLD--statin  - DM type 2--A1c 7.1, insulin dripwatch closely  - Ozempic use--last dose 5/9  - CKD, stage 2--eGFR 66.5  - Postop ABLA, expected--watch closely  - Postop TCP, consumptive--watch closely  - Postop pericarditis--start colchicine    POD# 2.  Doing well.  Creatinine returned to normal.  On asa/stain, start low-dose bb.  Start colchicine for pericarditis.  Hold diuretics at this time per Dr. Alvarez.  Replete e-.  DC central line.      Routine care--as above  D/w pt/family, nsg, Dr. Alvarez   Anticipate home at discharge    Blanca Redmond, KASIE  5/26/2024  08:35 EDT

## 2024-05-26 NOTE — PROGRESS NOTES
ENDOCRINE CONSULT PROGRESS NOTE  DATE OF SERVICE: 24        PATIENT NAME: Ricci Ruiz  PATIENT : 1970 AGE: 53 y.o.  MRN NUMBER: [id number]    ==========================================================================    CHIEF COMPLAINT: Type 2 diabetes management    CARE TEAM:   Patient Care Team:  Rain Paniagua MD as PCP - General (Family Medicine)    SUBJECTIVE  Pt seen and examined.  Tolerating food.    ==========================================================================    CURRENT ACTIVE HOSPITAL MEDICATIONS    Scheduled Medications:  aspirin, 81 mg, Oral, Daily  atorvastatin, 40 mg, Oral, Nightly  buPROPion XL, 150 mg, Oral, Daily  cetirizine, 10 mg, Oral, Daily  chlorhexidine, 15 mL, Mouth/Throat, Q12H  colchicine, 0.6 mg, Oral, Q12H  enoxaparin, 40 mg, Subcutaneous, Q24H  metoprolol tartrate, 12.5 mg, Oral, Q12H  mupirocin, , Each Nare, BID  pantoprazole, 40 mg, Oral, Q AM  polyethylene glycol, 17 g, Oral, BID  senna-docusate sodium, 2 tablet, Oral, BID         PRN Medications:    acetaminophen **OR** acetaminophen **OR** acetaminophen    bisacodyl    bisacodyl    Calcium Replacement - Follow Nurse / BPA Driven Protocol    cyclobenzaprine    dextrose    dextrose    glucagon (human recombinant)    HYDROcodone-acetaminophen    magnesium hydroxide    Magnesium Standard Dose Replacement - Follow Nurse / BPA Driven Protocol    Morphine **AND** naloxone    nitroglycerin    ondansetron    Phosphorus Replacement - Follow Nurse / BPA Driven Protocol    Potassium Replacement - Follow Nurse / BPA Driven Protocol     ==========================================================================    OBJECTIVE    Vitals:    24 1520   BP:    Pulse: 100   Resp:    Temp:    SpO2: 96%      Body mass index is 27.04 kg/m².     General - A&Ox3, NAD, Calm    ==========================================================================    LAB EVALUATION    Lab Results   Component Value Date     GLUCOSE 133 (H) 05/26/2024    BUN 16 05/26/2024    CREATININE 1.12 05/26/2024    EGFRIFNONA 60 (L) 09/01/2021    EGFRIFAFRI 97 03/02/2016    BCR 14.3 05/26/2024    K 3.9 05/26/2024    CO2 22.7 05/26/2024    CALCIUM 8.7 05/26/2024    ALBUMIN 4.9 05/24/2024    LABIL2 1.3 05/30/2018    AST 15 05/23/2024    ALT 18 05/23/2024       Lab Results   Component Value Date    HGBA1C 7.15 (H) 05/23/2024    HGBA1C 7.10 (H) 05/09/2024    HGBA1C 6.30 (H) 03/10/2023     Lab Results   Component Value Date    MICROALBUR 3.8 09/09/2022    CREATININE 1.12 05/26/2024     Results from last 7 days   Lab Units 05/26/24  1653 05/26/24  1542 05/26/24  1455 05/26/24  1346 05/26/24  1234 05/26/24  1134   GLUCOSE mg/dL 209* 200* 206* 156* 170* 193*       ==========================================================================    ASSESSMENT AND PLAN    # Type 2 diabetes management with A1c of 7.15 on 5/23/2024  - Blood sugar reviewed for last 24 hours  - Patient is s/p CABG, POD 2  - Patient currently on insulin drip therapy  - Maintained on metformin and Ozempic therapy at home  - After completion of treatment we will reintroduce metformin therapy and we will also introduce SGLT2 inhibitor therapy  - Patient denies any previous exposure to SGLT2 inhibitor therapy  - Insulin drip for next 24 hours and then most likely will start patient on sliding scale coverage and reintroduction of oral hypoglycemic agent    # Coronary artery disease s/p CABG on 5/24/2024    Will follow with you.  Rest as per primary team.    Part of this note may be an electronic transcription/translation of spoken language to printed text using the Dragon Dictation System.     Note: Portions of this note may have been copied from previous notes but documentation have been reviewed and edited as necessary to support clinical decision making for today's visit.  The time of this note does not reflect the time I saw the patient but the time that this note was  written.    ==========================================================================  Orion Little MD  Department of Endocrine, Diabetes and Metabolism  [facility], [place]  ==========================================================================

## 2024-05-27 ENCOUNTER — APPOINTMENT (OUTPATIENT)
Dept: GENERAL RADIOLOGY | Facility: HOSPITAL | Age: 54
DRG: 234 | End: 2024-05-27
Payer: COMMERCIAL

## 2024-05-27 LAB
ANION GAP SERPL CALCULATED.3IONS-SCNC: 9.5 MMOL/L (ref 5–15)
BUN SERPL-MCNC: 12 MG/DL (ref 6–20)
BUN/CREAT SERPL: 10.9 (ref 7–25)
CALCIUM SPEC-SCNC: 8.8 MG/DL (ref 8.6–10.5)
CHLORIDE SERPL-SCNC: 105 MMOL/L (ref 98–107)
CO2 SERPL-SCNC: 23.5 MMOL/L (ref 22–29)
CREAT SERPL-MCNC: 1.1 MG/DL (ref 0.76–1.27)
DEPRECATED RDW RBC AUTO: 45.3 FL (ref 37–54)
EGFRCR SERPLBLD CKD-EPI 2021: 79.8 ML/MIN/1.73
ERYTHROCYTE [DISTWIDTH] IN BLOOD BY AUTOMATED COUNT: 12.6 % (ref 12.3–15.4)
GLUCOSE BLDC GLUCOMTR-MCNC: 122 MG/DL (ref 70–105)
GLUCOSE BLDC GLUCOMTR-MCNC: 125 MG/DL (ref 70–105)
GLUCOSE BLDC GLUCOMTR-MCNC: 134 MG/DL (ref 70–105)
GLUCOSE BLDC GLUCOMTR-MCNC: 142 MG/DL (ref 70–105)
GLUCOSE BLDC GLUCOMTR-MCNC: 153 MG/DL (ref 70–105)
GLUCOSE BLDC GLUCOMTR-MCNC: 194 MG/DL (ref 70–105)
GLUCOSE BLDC GLUCOMTR-MCNC: 196 MG/DL (ref 70–105)
GLUCOSE BLDC GLUCOMTR-MCNC: 209 MG/DL (ref 70–105)
GLUCOSE BLDC GLUCOMTR-MCNC: 255 MG/DL (ref 70–105)
GLUCOSE SERPL-MCNC: 128 MG/DL (ref 65–99)
HCT VFR BLD AUTO: 33.1 % (ref 37.5–51)
HGB BLD-MCNC: 10.7 G/DL (ref 13–17.7)
MCH RBC QN AUTO: 31.4 PG (ref 26.6–33)
MCHC RBC AUTO-ENTMCNC: 32.3 G/DL (ref 31.5–35.7)
MCV RBC AUTO: 97.1 FL (ref 79–97)
PLATELET # BLD AUTO: 121 10*3/MM3 (ref 140–450)
PMV BLD AUTO: 10.5 FL (ref 6–12)
POTASSIUM SERPL-SCNC: 3.9 MMOL/L (ref 3.5–5.2)
POTASSIUM SERPL-SCNC: 4.1 MMOL/L (ref 3.5–5.2)
RBC # BLD AUTO: 3.41 10*6/MM3 (ref 4.14–5.8)
SODIUM SERPL-SCNC: 138 MMOL/L (ref 136–145)
WBC NRBC COR # BLD AUTO: 10.88 10*3/MM3 (ref 3.4–10.8)

## 2024-05-27 PROCEDURE — 25010000002 ENOXAPARIN PER 10 MG

## 2024-05-27 PROCEDURE — 84132 ASSAY OF SERUM POTASSIUM: CPT

## 2024-05-27 PROCEDURE — 99232 SBSQ HOSP IP/OBS MODERATE 35: CPT | Performed by: INTERNAL MEDICINE

## 2024-05-27 PROCEDURE — 25010000002 FUROSEMIDE PER 20 MG: Performed by: NURSE PRACTITIONER

## 2024-05-27 PROCEDURE — 63710000001 INSULIN LISPRO (HUMAN) PER 5 UNITS: Performed by: INTERNAL MEDICINE

## 2024-05-27 PROCEDURE — 85027 COMPLETE CBC AUTOMATED: CPT

## 2024-05-27 PROCEDURE — 82948 REAGENT STRIP/BLOOD GLUCOSE: CPT

## 2024-05-27 PROCEDURE — 82948 REAGENT STRIP/BLOOD GLUCOSE: CPT | Performed by: INTERNAL MEDICINE

## 2024-05-27 PROCEDURE — 71045 X-RAY EXAM CHEST 1 VIEW: CPT

## 2024-05-27 PROCEDURE — 80048 BASIC METABOLIC PNL TOTAL CA: CPT

## 2024-05-27 RX ORDER — IBUPROFEN 600 MG/1
1 TABLET ORAL
Status: DISCONTINUED | OUTPATIENT
Start: 2024-05-27 | End: 2024-05-29 | Stop reason: HOSPADM

## 2024-05-27 RX ORDER — COLCHICINE 0.6 MG/1
0.6 TABLET ORAL DAILY
Status: DISCONTINUED | OUTPATIENT
Start: 2024-05-28 | End: 2024-05-29 | Stop reason: HOSPADM

## 2024-05-27 RX ORDER — ACETAMINOPHEN 650 MG
TABLET, EXTENDED RELEASE ORAL 2 TIMES DAILY
Status: DISCONTINUED | OUTPATIENT
Start: 2024-05-27 | End: 2024-05-29 | Stop reason: HOSPADM

## 2024-05-27 RX ORDER — INSULIN LISPRO 100 [IU]/ML
2-9 INJECTION, SOLUTION INTRAVENOUS; SUBCUTANEOUS
Status: DISCONTINUED | OUTPATIENT
Start: 2024-05-27 | End: 2024-05-29 | Stop reason: HOSPADM

## 2024-05-27 RX ORDER — FUROSEMIDE 10 MG/ML
20 INJECTION INTRAMUSCULAR; INTRAVENOUS ONCE
Status: COMPLETED | OUTPATIENT
Start: 2024-05-27 | End: 2024-05-27

## 2024-05-27 RX ORDER — NICOTINE POLACRILEX 4 MG
15 LOZENGE BUCCAL
Status: DISCONTINUED | OUTPATIENT
Start: 2024-05-27 | End: 2024-05-29 | Stop reason: HOSPADM

## 2024-05-27 RX ORDER — METFORMIN HYDROCHLORIDE 500 MG/1
500 TABLET, EXTENDED RELEASE ORAL 2 TIMES DAILY WITH MEALS
Status: DISCONTINUED | OUTPATIENT
Start: 2024-05-27 | End: 2024-05-29 | Stop reason: HOSPADM

## 2024-05-27 RX ORDER — POTASSIUM CHLORIDE 750 MG/1
10 TABLET, FILM COATED, EXTENDED RELEASE ORAL ONCE
Status: COMPLETED | OUTPATIENT
Start: 2024-05-27 | End: 2024-05-27

## 2024-05-27 RX ORDER — DEXTROSE MONOHYDRATE 25 G/50ML
25 INJECTION, SOLUTION INTRAVENOUS
Status: DISCONTINUED | OUTPATIENT
Start: 2024-05-27 | End: 2024-05-29 | Stop reason: HOSPADM

## 2024-05-27 RX ADMIN — METFORMIN HYDROCHLORIDE 500 MG: 500 TABLET, EXTENDED RELEASE ORAL at 17:30

## 2024-05-27 RX ADMIN — METFORMIN HYDROCHLORIDE 500 MG: 500 TABLET, EXTENDED RELEASE ORAL at 10:51

## 2024-05-27 RX ADMIN — ATORVASTATIN CALCIUM 40 MG: 40 TABLET, FILM COATED ORAL at 21:26

## 2024-05-27 RX ADMIN — Medication 12.5 MG: at 09:14

## 2024-05-27 RX ADMIN — MUPIROCIN 1 APPLICATION: 20 OINTMENT TOPICAL at 08:30

## 2024-05-27 RX ADMIN — MUPIROCIN 1 APPLICATION: 20 OINTMENT TOPICAL at 21:26

## 2024-05-27 RX ADMIN — METOPROLOL TARTRATE 25 MG: 25 TABLET, FILM COATED ORAL at 21:26

## 2024-05-27 RX ADMIN — BUPROPION HYDROCHLORIDE 150 MG: 150 TABLET, EXTENDED RELEASE ORAL at 08:30

## 2024-05-27 RX ADMIN — COLCHICINE 0.6 MG: 0.6 TABLET, FILM COATED ORAL at 08:30

## 2024-05-27 RX ADMIN — CHLORHEXIDINE GLUCONATE, 0.12% ORAL RINSE 15 ML: 1.2 SOLUTION DENTAL at 08:31

## 2024-05-27 RX ADMIN — POVIDONE-IODINE: 10 SOLUTION TOPICAL at 09:48

## 2024-05-27 RX ADMIN — PANTOPRAZOLE SODIUM 40 MG: 40 TABLET, DELAYED RELEASE ORAL at 05:58

## 2024-05-27 RX ADMIN — FUROSEMIDE 20 MG: 10 INJECTION, SOLUTION INTRAMUSCULAR; INTRAVENOUS at 09:55

## 2024-05-27 RX ADMIN — INSULIN LISPRO 2 UNITS: 100 INJECTION, SOLUTION INTRAVENOUS; SUBCUTANEOUS at 12:53

## 2024-05-27 RX ADMIN — Medication 12.5 MG: at 08:30

## 2024-05-27 RX ADMIN — ENOXAPARIN SODIUM 40 MG: 100 INJECTION SUBCUTANEOUS at 16:10

## 2024-05-27 RX ADMIN — HYDROCODONE BITARTRATE AND ACETAMINOPHEN 1 TABLET: 5; 325 TABLET ORAL at 09:14

## 2024-05-27 RX ADMIN — CHLORHEXIDINE GLUCONATE, 0.12% ORAL RINSE 15 ML: 1.2 SOLUTION DENTAL at 21:26

## 2024-05-27 RX ADMIN — HYDROCODONE BITARTRATE AND ACETAMINOPHEN 1 TABLET: 5; 325 TABLET ORAL at 21:26

## 2024-05-27 RX ADMIN — POTASSIUM CHLORIDE 40 MEQ: 1500 TABLET, EXTENDED RELEASE ORAL at 00:10

## 2024-05-27 RX ADMIN — INSULIN LISPRO 6 UNITS: 100 INJECTION, SOLUTION INTRAVENOUS; SUBCUTANEOUS at 21:44

## 2024-05-27 RX ADMIN — CETIRIZINE HYDROCHLORIDE 10 MG: 10 TABLET, FILM COATED ORAL at 08:30

## 2024-05-27 RX ADMIN — POVIDONE-IODINE: 10 SOLUTION TOPICAL at 21:27

## 2024-05-27 RX ADMIN — INSULIN LISPRO 2 UNITS: 100 INJECTION, SOLUTION INTRAVENOUS; SUBCUTANEOUS at 17:30

## 2024-05-27 RX ADMIN — POTASSIUM CHLORIDE 40 MEQ: 1500 TABLET, EXTENDED RELEASE ORAL at 04:23

## 2024-05-27 RX ADMIN — POTASSIUM CHLORIDE 10 MEQ: 750 TABLET, EXTENDED RELEASE ORAL at 09:14

## 2024-05-27 RX ADMIN — ASPIRIN 81 MG: 81 TABLET, COATED ORAL at 08:30

## 2024-05-27 NOTE — PROGRESS NOTES
Referring Provider: Poncho Alvarez, *    Reason for follow-up: Multivessel coronary artery disease     Patient Care Team:  Rain Paniagua MD as PCP - General (Family Medicine)      SUBJECTIVE  No chest pain or shortness of breath.  Sitting in chair comfortably.    Review of Systems   Constitutional: Negative for fever and malaise/fatigue.   HENT:  Negative for ear pain and nosebleeds.    Eyes:  Negative for blurred vision and double vision.   Cardiovascular:  Negative for chest pain, dyspnea on exertion and palpitations.   Respiratory:  Negative for cough and shortness of breath.    Skin:  Negative for rash.   Musculoskeletal:  Negative for joint pain.   Gastrointestinal:  Negative for abdominal pain, nausea and vomiting.   Neurological:  Negative for focal weakness and headaches.   Psychiatric/Behavioral:  Negative for depression. The patient is not nervous/anxious.    All other systems reviewed and are negative.        Personal History:    Past Medical History:   Diagnosis Date    Colon polyp 2019    Diabetes mellitus     Type 2    Hyperlipidemia        Past Surgical History:   Procedure Laterality Date    CARDIAC CATHETERIZATION N/A 5/23/2024    Procedure: Left Heart Cath with Coronary Angiography;  Surgeon: Alejandro Nagy MD;  Location: [facility] CATH INVASIVE LOCATION;  Service: Cardiovascular;  Laterality: N/A;    COLONOSCOPY  2019       Family History   Problem Relation Age of Onset    Arthritis Mother         Lupus    Diabetes Brother     Heart disease Father     Heart disease Brother        Social History     Tobacco Use    Smoking status: Never    Smokeless tobacco: Never   Vaping Use    Vaping status: Never Used   Substance Use Topics    Alcohol use: Yes     Alcohol/week: 1.0 standard drink of alcohol     Types: 1 Glasses of wine per week    Drug use: Never        Home meds:  Prior to Admission medications    Medication Sig Start Date End Date Taking? Authorizing Provider   aspirin 81 MG tablet Take  Vaccine Information Sheet, \"Influenza - Inactivated\"  given to Beulah Hernandez, or parent/legal guardian of  Beulah December and verbalized understanding. Patient responses:    Have you ever had a reaction to a flu vaccine? No  Are you able to eat eggs without adverse effects? Yes  Do you have any current illness? No  Have you ever had Guillian [place] Syndrome? No    Flu vaccine given per order. Please see immunization tab. 1 tablet by mouth Daily. 3/27/15  Yes Linda Banks MD   Blood Glucose Monitoring Suppl (Accu-Chek Gladis Plus) w/Device kit 1 kit Daily. 10/26/20  Yes Karen Pickard MD   buPROPion XL (WELLBUTRIN XL) 150 MG 24 hr tablet TAKE 1 TABLET BY MOUTH EVERY DAY 6/6/23  Yes Karen Pickard MD   glucose blood (Accu-Chek Gladis Plus) test strip Use daily E11.9 9/9/22  Yes Karen Pickard MD   lisinopril (PRINIVIL,ZESTRIL) 5 MG tablet TAKE 1 TABLET BY MOUTH EVERY DAY 6/6/23  Yes Karen Pickard MD   loratadine (Claritin) 10 MG tablet Take 1 tablet by mouth Daily.   Yes Linda Banks MD   metFORMIN (GLUCOPHAGE) 1000 MG tablet TAKE 1 TABLET BY MOUTH TWICE A DAY WITH MEALS 6/6/23  Yes Karen Pickard MD   Multiple Vitamin (MULTIVITAMIN) capsule Take 1 capsule by mouth Daily. 2/10/16  Yes Linda Banks MD   nitroglycerin (NITROSTAT) 0.4 MG SL tablet Place 1 tablet under the tongue Every 5 (Five) Minutes As Needed for Chest Pain. 5/1/24  Yes Linda Banks MD   Semaglutide, 1 MG/DOSE, (Ozempic, 1 MG/DOSE,) 2 MG/1.5ML solution pen-injector Inject 0.5 mg under the skin into the appropriate area as directed 1 (One) Time Per Week.   Yes iLnda Banks MD   simvastatin (ZOCOR) 40 MG tablet TAKE 1 TABLET BY MOUTH EVERY DAY 12/27/22  Yes Karen Pickard MD   vitamin D3 125 MCG (5000 UT) capsule capsule Take 1 capsule by mouth Daily. 3/10/23  Yes Karen Pickard MD   isosorbide mononitrate (IMDUR) 30 MG 24 hr tablet Take 1 tablet by mouth Every Morning. 5/23/24   Alejandro Nagy MD       Allergies:  Patient has no known allergies.    Scheduled Meds:aspirin, 81 mg, Oral, Daily  atorvastatin, 40 mg, Oral, Nightly  buPROPion XL, 150 mg, Oral, Daily  cetirizine, 10 mg, Oral, Daily  chlorhexidine, 15 mL, Mouth/Throat, Q12H  [START ON 5/28/2024] colchicine, 0.6 mg, Oral, Daily  enoxaparin, 40 mg, Subcutaneous, Q24H  insulin lispro, 2-9 Units, Subcutaneous, 4x Daily AC &  "at Bedtime  metFORMIN ER, 500 mg, Oral, BID With Meals  metoprolol tartrate, 25 mg, Oral, Q12H  mupirocin, , Each Nare, BID  pantoprazole, 40 mg, Oral, Q AM  povidone-iodine, , Topical, BID      Continuous Infusions:insulin, 0-100 Units/hr, Last Rate: Stopped (05/27/24 0945)      PRN Meds:.  acetaminophen **OR** [DISCONTINUED] acetaminophen **OR** [DISCONTINUED] acetaminophen    bisacodyl    bisacodyl    Calcium Replacement - Follow Nurse / BPA Driven Protocol    cyclobenzaprine    dextrose    dextrose    dextrose    dextrose    glucagon (human recombinant)    glucagon (human recombinant)    HYDROcodone-acetaminophen    magnesium hydroxide    Magnesium Standard Dose Replacement - Follow Nurse / BPA Driven Protocol    [DISCONTINUED] Morphine **AND** naloxone    nitroglycerin    ondansetron    Phosphorus Replacement - Follow Nurse / BPA Driven Protocol    polyethylene glycol    Potassium Replacement - Follow Nurse / BPA Driven Protocol    senna-docusate sodium      OBJECTIVE    Vital Signs  Vitals:    05/27/24 1300 05/27/24 1400 05/27/24 1500 05/27/24 1600   BP: 131/75 107/68 147/85 115/74   BP Location:       Patient Position:       Pulse: 95 91 93 92   Resp:       Temp:       TempSrc:       SpO2: 98% 97% 96% 99%   Weight:       Height:           Flowsheet Rows      Flowsheet Row First Filed Value   Admission Height 181.6 cm (71.5\") Documented at 05/23/2024 1104   Admission Weight 86.6 kg (190 lb 14.7 oz) Documented at 05/23/2024 1104              Intake/Output Summary (Last 24 hours) at 5/27/2024 1616  Last data filed at 5/27/2024 1400  Gross per 24 hour   Intake 1277 ml   Output 2350 ml   Net -1073 ml          Telemetry: Normal sinus rhythm    Physical Exam:  The patient is alert, oriented and in no distress.  Vital signs as noted above.  Head and neck revealed no carotid bruits or jugular venous distention.  No thyromegaly or lymphadenopathy is present  Lungs clear.  No wheezing.  Breath sounds are normal " bilaterally.  Heart normal first and second heart sounds.  No murmur. No precordial rub is present.  No gallop is present.  Abdomen soft and nontender.  No organomegaly is present.  Extremities with good peripheral pulses without any pedal edema.  Skin warm and dry.  Musculoskeletal system is grossly normal.  CNS grossly normal.       Results Review:  I have personally reviewed the results from the time of this admission to 5/27/2024 16:16 EDT and agree with these findings:  []  Laboratory  []  Microbiology  []  Radiology  []  EKG/Telemetry   []  Cardiology/Vascular   []  Pathology  []  Old records  []  Other:    Most notable findings include:    Lab Results (last 24 hours)       Procedure Component Value Units Date/Time    POC Glucose 4x Daily Before Meals & at Bedtime [[id number]]  (Abnormal) Collected: 05/27/24 1246    Specimen: Blood Updated: 05/27/24 1248     Glucose 196 mg/dL      Comment: Serial Number: [id number]:  [id number]       Potassium [[id number]]  (Normal) Collected: 05/27/24 0829    Specimen: Blood from Arm, Left Updated: 05/27/24 0903     Potassium 4.1 mmol/L     POC Glucose Once [[id number]]  (Abnormal) Collected: 05/27/24 0829    Specimen: Blood Updated: 05/27/24 0830     Glucose 209 mg/dL      Comment: Serial Number: [id number]:  [id number]       POC Glucose Once [[id number]]  (Abnormal) Collected: 05/27/24 0715    Specimen: Blood Updated: 05/27/24 0716     Glucose 142 mg/dL      Comment: Serial Number: [id number]:  [id number]       POC Glucose Once [[id number]]  (Abnormal) Collected: 05/27/24 0534    Specimen: Blood Updated: 05/27/24 0535     Glucose 122 mg/dL      Comment: Serial Number: [id number]:  [id number]       Basic Metabolic Panel [[id number]]  (Abnormal) Collected: 05/27/24 0437    Specimen: Blood Updated: 05/27/24 0501     Glucose 128 mg/dL      BUN 12 mg/dL      Creatinine 1.10 mg/dL      Sodium 138 mmol/L      Potassium 3.9 mmol/L      Chloride 105 mmol/L      CO2  23.5 mmol/L      Calcium 8.8 mg/dL      BUN/Creatinine Ratio 10.9     Anion Gap 9.5 mmol/L      eGFR 79.8 mL/min/1.73     Narrative:      GFR Normal >60  Chronic Kidney Disease <60  Kidney Failure <15      CBC (No Diff) [[id number]]  (Abnormal) Collected: 05/27/24 0437    Specimen: Blood Updated: 05/27/24 0441     WBC 10.88 10*3/mm3      RBC 3.41 10*6/mm3      Hemoglobin 10.7 g/dL      Hematocrit 33.1 %      MCV 97.1 fL      MCH 31.4 pg      MCHC 32.3 g/dL      RDW 12.6 %      RDW-SD 45.3 fl      MPV 10.5 fL      Platelets 121 10*3/mm3     POC Glucose Once [[id number]]  (Abnormal) Collected: 05/27/24 0434    Specimen: Blood Updated: 05/27/24 0436     Glucose 125 mg/dL      Comment: Serial Number: [id number]:  [id number]       POC Glucose Once [[id number]]  (Abnormal) Collected: 05/27/24 0235    Specimen: Blood Updated: 05/27/24 0237     Glucose 134 mg/dL      Comment: Serial Number: [id number]:  [id number]       POC Glucose Once [[id number]]  (Abnormal) Collected: 05/27/24 0009    Specimen: Blood Updated: 05/27/24 0010     Glucose 153 mg/dL      Comment: Serial Number: [id number]:  [id number]       Potassium [[id number]]  (Abnormal) Collected: 05/26/24 2300    Specimen: Blood Updated: 05/26/24 2317     Potassium 3.4 mmol/L     POC Glucose Once [[id number]]  (Abnormal) Collected: 05/26/24 2230    Specimen: Blood Updated: 05/26/24 2232     Glucose 153 mg/dL      Comment: Serial Number: [id number]:  [id number]       POC Glucose Once [[id number]]  (Abnormal) Collected: 05/26/24 2125    Specimen: Blood Updated: 05/26/24 2126     Glucose 142 mg/dL      Comment: Serial Number: [id number]:  [id number]       POC Glucose Once [[id number]]  (Abnormal) Collected: 05/26/24 2041    Specimen: Blood Updated: 05/26/24 2042     Glucose 118 mg/dL      Comment: Serial Number: [id number]:  [id number]       POC Glucose Once [[id number]]  (Abnormal) Collected: 05/26/24 1934    Specimen: Blood Updated: 05/26/24  1936     Glucose 169 mg/dL      Comment: Serial Number: [id number]:  [id number]       POC Glucose Once [[id number]]  (Abnormal) Collected: 05/26/24 1906    Specimen: Blood Updated: 05/26/24 1908     Glucose 255 mg/dL      Comment: Serial Number: [id number]:  [id number]       POC Glucose Once [[id number]]  (Abnormal) Collected: 05/26/24 1755    Specimen: Blood Updated: 05/26/24 1756     Glucose 164 mg/dL      Comment: Serial Number: [id number]:  [id number]       Potassium [[id number]]  (Normal) Collected: 05/26/24 1659    Specimen: Blood from Arm, Right Updated: 05/26/24 1715     Potassium 3.9 mmol/L     POC Glucose Once [[id number]]  (Abnormal) Collected: 05/26/24 1653    Specimen: Blood Updated: 05/26/24 1654     Glucose 209 mg/dL      Comment: Serial Number: [id number]:  [id number]               Imaging Results (Last 24 Hours)       Procedure Component Value Units Date/Time    XR Chest 1 View [[id number]] Collected: 05/27/24 1039     Updated: 05/27/24 1042    Narrative:      XR CHEST 1 VW    Date of Exam: 5/27/2024 8:26 AM EDT    Indication: post op    Comparison: 5/26/2024    Findings:  Patient is status post median sternotomy and CABG. Cardiac silhouette is magnified. Pulmonary vasculature is unremarkable. Scattered atelectasis is present within the left lung base. No significant pleural effusion or pneumothorax. Osseous structures are   unchanged.      Impression:      Impression:  1.Scattered left basilar atelectasis. No significant change since 5/26/2024.      Electronically Signed: Grzegorz Mendes MD    5/27/2024 10:40 AM EDT    Workstation ID: [id number]            LAB RESULTS (LAST 7 DAYS)    CBC  Results from last 7 days   Lab Units 05/27/24  0437 05/26/24  0349 05/25/24  0448 05/25/24  0225 05/25/24  0152 05/24/24  2257 05/24/24  1900 05/24/24  1729 05/24/24  1305 05/23/24  1707 05/21/24  0844   WBC 10*3/mm3 10.88* 10.94*  --   --  9.93  --   --  15.22*  --  5.95 5.86   RBC 10*6/mm3  3.41* 3.09*  --   --  3.28*  --   --  3.88*  --  4.38 4.51   HEMOGLOBIN g/dL 10.7* 9.8*  --   --  10.1*  --   --  12.3*  --  13.7 14.4   HEMOGLOBIN, POC g/dL  --   --  10.0* 11.0*  --  11.6* 12.0  --    < >  --   --    HEMATOCRIT % 33.1* 30.1*  --   --  30.9*  --   --  36.2*  --  41.6 41.8   HEMATOCRIT POC %  --   --  30* 32*  --  34* 35*  --    < >  --   --    MCV fL 97.1* 97.4*  --   --  94.2  --   --  93.3  --  95.0 92.7   PLATELETS 10*3/mm3 121* 101*  --   --  123*  --   --  133*  --  174 215    < > = values in this interval not displayed.       BMP  Results from last 7 days   Lab Units 05/27/24  0829 05/27/24  0437 05/26/24  2300 05/26/24  1659 05/26/24  1016 05/26/24  0349 05/25/24  1425 05/25/24  0448 05/25/24  0225 05/24/24  2257 05/24/24  2125 05/24/24  1900 05/24/24  1729 05/23/24  1707 05/21/24  0844   SODIUM mmol/L  --  138  --   --   --  139  --   --   --   --  143  --  140 139 142   POTASSIUM mmol/L 4.1 3.9 3.4* 3.9 3.9 3.7 4.0  --   --   --  4.2  --  4.4 3.5 4.5   CHLORIDE mmol/L  --  105  --   --   --  106  --   --   --   --  105  --  104 103 106   CO2 mmol/L  --  23.5  --   --   --  22.7  --   --   --   --  21.0*  --  23.0 27.0 25.0   BUN mg/dL  --  12  --   --   --  16  --   --   --   --  17  --  15 16 19   CREATININE mg/dL  --  1.10  --   --   --  1.12  --  1.45* 1.33* 1.43* 1.26 1.13 1.23 1.20 1.28*   GLUCOSE mg/dL  --  128*  --   --   --  133* 159*  --   --   --  195*  --  140* 176* 147*   MAGNESIUM mg/dL  --   --   --   --   --   --   --   --   --   --  2.5  --  2.9*  --   --    PHOSPHORUS mg/dL  --   --   --   --   --   --   --   --   --   --  2.5  --  1.9*  --   --        CMP   Results from last 7 days   Lab Units 05/27/24  0829 05/27/24  0437 05/26/24  2300 05/26/24  1659 05/26/24  1016 05/26/24  0349 05/25/24  1425 05/25/24  0448 05/25/24  0225 05/24/24  2257 05/24/24  2125 05/24/24  1900 05/24/24  1729 05/23/24  1707 05/21/24  0844   SODIUM mmol/L  --  138  --   --   --  139  --   --   --    --  143  --  140 139 142   POTASSIUM mmol/L 4.1 3.9 3.4* 3.9 3.9 3.7 4.0  --   --   --  4.2  --  4.4 3.5 4.5   CHLORIDE mmol/L  --  105  --   --   --  106  --   --   --   --  105  --  104 103 106   CO2 mmol/L  --  23.5  --   --   --  22.7  --   --   --   --  21.0*  --  23.0 27.0 25.0   BUN mg/dL  --  12  --   --   --  16  --   --   --   --  17  --  15 16 19   CREATININE mg/dL  --  1.10  --   --   --  1.12  --  1.45* 1.33* 1.43* 1.26 1.13 1.23 1.20 1.28*   GLUCOSE mg/dL  --  128*  --   --   --  133* 159*  --   --   --  195*  --  140* 176* 147*   ALBUMIN g/dL  --   --   --   --   --   --   --   --   --   --  4.9  --  4.7 4.3  --    BILIRUBIN mg/dL  --   --   --   --   --   --   --   --   --   --   --   --   --  0.4  --    ALK PHOS U/L  --   --   --   --   --   --   --   --   --   --   --   --   --  70  --    AST (SGOT) U/L  --   --   --   --   --   --   --   --   --   --   --   --   --  15  --    ALT (SGPT) U/L  --   --   --   --   --   --   --   --   --   --   --   --   --  18  --        BNP        TROPONIN        CoAg  Results from last 7 days   Lab Units 05/25/24  0152 05/24/24  1729 05/23/24  1707 05/21/24  0844   INR  1.10 1.10 1.00 0.98   APTT seconds  --  25.3 26.2  --        Creatinine Clearance  Estimated Creatinine Clearance: 97.1 mL/min (by C-G formula based on SCr of 1.1 mg/dL).    ABG  Results from last 7 days   Lab Units 05/25/24  0648 05/25/24  0448 05/25/24  0225 05/24/24  2257 05/24/24  1900 05/24/24  1620 05/24/24  1532 05/24/24  1505   PH, ARTERIAL pH units  --  7.365 7.574* 7.374 7.393 7.370 7.320* 7.310*   PCO2, ARTERIAL mm Hg  --  39.1 20.7* 33.5* 38.2 46.3* 50.5* 55.6*   PO2 ART mm Hg  --  115.9* 179.2* 109.1* 302.9* 258.0* 349.0* 413.0*   O2 SATURATION ART %  --  98.4* 99.8* 98.2* 99.9* 100.0* 100.0* 100.0*   BASE EXCESS ART mmol/L -2.8* -2.7* -1.3* -4.9* -1.4* 1.0 0.0 2.0       Radiology  XR Chest 1 View    Result Date: 5/27/2024  Impression: 1.Scattered left basilar atelectasis. No  significant change since 5/26/2024. Electronically Signed: Grzegorz Mendes MD  5/27/2024 10:40 AM EDT  Workstation ID: [id number]    XR Chest 1 View    Result Date: 5/26/2024  Impression: 1.No significant change since 5/25/2024. Electronically Signed: Grzegorz Mendes MD  5/26/2024 8:53 AM EDT  Workstation ID: [id number]    XR Chest 1 View    Result Date: 5/25/2024  Impression: Interval chest drain removal with mild bilateral discoid atelectasis, but no evidence of pneumothorax. Electronically Signed: Elias Lo MD  5/25/2024 5:14 PM EDT  Workstation ID: [id number]       EKG  I personally viewed and interpreted the patient's EKG/Telemetry data:  ECG 12 Lead Other; Post-op open heart   Preliminary Result   HEART RATE= 95  bpm   RR Interval= 636  ms   MD Interval= 141  ms   P Horizontal Axis= -25  deg   P Front Axis= 44  deg   QRSD Interval= 87  ms   QT Interval= 339  ms   QTcB= 425  ms   QRS Axis= -1  deg   T Wave Axis= 60  deg   - ABNORMAL ECG -   Sinus rhythm   ST elevation suggests acute pericarditis   When compared with ECG of 25-May-2024 4:27:21,   No significant change   Electronically Signed By:    Date and Time of Study: 2024-05-26 05:50:59      ECG 12 Lead Other; Post-op open heart   Final Result   HEART RATE= 93  bpm   RR Interval= 640  ms   MD Interval= 172  ms   P Horizontal Axis= -4  deg   P Front Axis= 47  deg   QRSD Interval= 81  ms   QT Interval= 358  ms   QTcB= 448  ms   QRS Axis= 8  deg   T Wave Axis= 33  deg   - ABNORMAL ECG -   Sinus rhythm   Anterior infarct, possibly acute   ST elevation, consider inferior injury   When compared with ECG of 24-May-2024 17:46:44,   No significant change   Electronically Signed By: Vinicio Lino (RAH) 25-May-2024 07:01:31   Date and Time of Study: 2024-05-25 04:27:21      ECG 12 Lead Other; Post-op open heart   Final Result   HEART RATE= 85  bpm   RR Interval= 700  ms   MD Interval= 170  ms   P Horizontal Axis= -21  deg   P Front Axis= 75  deg   QRSD Interval= 100  ms    QT Interval= 400  ms   QTcB= 478  ms   QRS Axis= -9  deg   T Wave Axis= 47  deg   - NORMAL ECG -   Sinus rhythm   When compared with ECG of 23-May-2024 22:22:51,   No significant change   Electronically Signed By: Vinicio Lino (RAH) 25-May-2024 07:02:14   Date and Time of Study: 2024-05-24 17:46:44      ECG 12 Lead Pre-Op / Pre-Procedure   Final Result   HEART RATE= 74  bpm   RR Interval= 808  ms   MN Interval= 184  ms   P Horizontal Axis= 10  deg   P Front Axis= 42  deg   QRSD Interval= 87  ms   QT Interval= 371  ms   QTcB= 413  ms   QRS Axis= 8  deg   T Wave Axis= -3  deg   - NORMAL ECG -   Sinus rhythm   When compared with ECG of 14-Oct-2020 14:36:03,   Significant rate decrease   Significant repolarization change   Electronically Signed By: Vinicio Lino (RAH) 24-May-2024 07:39:42   Date and Time of Study: 2024-05-23 22:22:51            Echocardiogram:    Results for orders placed in visit on 05/24/24    Intra-Op Anesthesia SOFIA    Narrative  Intra-Op Anesthesia SOFIA    Procedure Performed: Intra-Op Anesthesia SOFIA  Start Time:  End Time:    Preanesthesia Checklist:  Patient identified, IV assessed, risks and benefits discussed, monitors and equipment assessed, procedure being performed at surgeon's request and anesthesia consent obtained.    General Procedure Information  Diagnostic Indications for Echo:  assessment of surgical repair and hemodynamic monitoring  Location performed:  OR  Intubated  Bite block placed  Heart visualized  Probe Insertion:  Easy  Probe Type:  Biplane and multiplane  Modalities:  Color flow mapping, pulse wave Doppler and continuous wave Doppler    Echocardiographic and Doppler Measurements    Ventricles    Right Ventricle:  Cavity size normal.  Hypertrophy not present.  Thrombus not present.  Global function normal.  Ejection Fraction 60%.  Left Ventricle:  Cavity size normal.  Thrombus not present.  Global Function normal.  Ejection Fraction 60%.    Ventricular Regional Function:  1-  Basal Anteroseptal:  normal  2- Basal Anterior:  normal  3- Basal Anterolateral:  normal  4- Basal Inferolateral:  normal  5- Basal Inferior:  normal  6- Basal Inferoseptal:  normal  7- Mid Anteroseptal:  normal  8- Mid Anterior:  normal  9- Mid Anterolateral:  normal  10- Mid Inferolateral:  normal  11- Mid Inferior:  normal  12- Mid Inferoseptal:  normal  13- Apical Anterior:  normal  14- Apical Lateral:  normal  15- Apical Inferior:  normal  16- Apical Septal:  normal  17- [place]:  normal      Valves    Aortic Valve:  Annulus normal.  Stenosis not present.  Regurgitation absent.  Leaflets normal.  Leaflet motions normal.    Mitral Valve:  Annulus normal.  Stenosis not present.  Regurgitation absent.  Leaflets normal.  Leaflet motions normal.    Tricuspid Valve:  Annulus normal.  Stenosis not present.  Regurgitation absent.  Leaflets normal.  Leaflet motions normal.  Pulmonic Valve:  Annulus normal.  Stenosis not present.  Regurgitation absent.      Aorta    Ascending Aorta:  Size normal.  Dissection not present.  Plaque thickness less than 3 mm.  Mobile plaque not present.  Aortic Arch:  Size normal.  Dissection not present.  Plaque thickness less than 3 mm.  Mobile plaque not present.  Descending Aorta:  Size normal.  Dissection not present.  Plaque thickness less than 3 mm.  Mobile plaque not present.      Atria    Right Atrium:  Size normal.  Left Atrium:  Size normal.  Spontaneous echo contrast not present.  Thrombus not present.  Tumor not present.  Device not present.  Left atrial appendage normal.      Septa        Ventricular Septum:  Intra-ventricular septum morphology normal.        Other Findings  Pleural Effusion:  none  Pulmonary Arteries:  normal      Anesthesia Information  Performed Personally  Anesthesiologist:  Juan Moya MD      Echocardiogram Comments:  SOFIA placed easily, no resistance, bite guard, lubricated      Pre cpb  LV no wma ef 60  RV nl sf  MV AV TV wnl      S/p cabg x 4  No  change        Stress Test:  Results for orders placed during the hospital encounter of 05/13/24    Stress test with myocardial perfusion one day    Interpretation Summary    Patient exercised for 10 minutes 21 seconds achieving 11.8 METS.    Findings consistent with an abnormal ECG stress test.  ST depressions in lateral leads.    Left ventricular ejection fraction is hyperdynamic (Calculated EF > 70%).    Myocardial perfusion imaging indicates a moderate-sized, severe area of ischemia located in the inferior wall and septal wall.    Impressions are consistent with an intermediate risk study.         Cardiac Catheterization:  Results for orders placed during the hospital encounter of 05/23/24    Cardiac Catheterization/Vascular Study    Conclusion  OPERATORS:  1. Alejandro Nagy M.D., Attending Cardiologist      PROCEDURE PERFORMED.  Ultrasound guided vascular access  Left heart catheterization  Coronary Angiogram [id number]  Moderate Sedation    INDICATIONS FOR PROCEDURE.  53 years old man with unstable angina and abnormal nuclear stress test.  After discussing the risk and benefit of the procedure he was brought into the Cath Lab for coronary angiography.    PROCEDURE IN DETAIL.  Informed consent was obtained from the patient after explaining the risks, benefits, and alternative options of the procedure. After obtaining informed consent, the patient was brought to the cath lab and was prepped in a sterile fashion. Lidocaine 1% was used for local anesthesia into the right radial access site. The right radial artery was accessed under direct ultrasound visualization  with an angiocath needle via modified Seldinger technique. A 6F slender sheath was inserted successfully. Afterwards, 6F JR4  was advanced over a wire into the ascending aorta and used to cross the AV and obtain LV pressures.  AV gradient obtained via pullback technique. JR4 and JL3.5 diagnostic catheters were used to engage the ostia of the RCA and LM  respectively. Images of the right and left coronary systems were obtained. All the catheters were exchanged over a wire and subsequently removed. The patient tolerated the procedure well without any complications. The pictures were reviewed at the end of the procedure. TR band applied to right wrist for hemostasis and inflated with 15 cc of air. No complications were encountered.    HEMODYNAMICS.  LV: 135/8, 17 mmHg  AO: 136/82, 105 mmHg  No significant gradient across the aortic valve during pullback of JR4 catheter.  LV gram was not performed due to recent echocardiogram.    FINDINGS.    Coronary Angiogram.    Left main. Left main is a large-caliber vessel which gives rise to the Left Anterior Descending, ramus and the Left circumflex.  Left main is angiographically free from any significant disease    Left Anterior Descending Artery. LAD is a large vessel which gives rise to several septal perforators and several diagonal branches.  Mid LAD has 80% stenosis at the origin of diagonal vessel which also has 70 to 80% stenosis.    Ramus intermedius has proximal segment 70% stenosis.    Left Circumflex. The LCx is a medium caliber which gives rise to marginals.  OM1 has proximal 50% stenosis.  OM 2 has proximal segment 70% stenosis.    Right Coronary Artery. The RCA is a dominant vessel which gives rise to several small caliber branches including PDA and PLV.  Mid RCA has 99% long tubular stenosis.  PLV has mid segment 50% stenosis.    IMPRESSIONS.  1 Multivessel obstructive coronary artery disease including LAD, ramus and RCA.  2.  Mildly elevated LVEDP    RECOMMENDATIONS.  1.  Consult cardiac surgery for CABG  2.  Start Imdur    Electronically signed by Alejandro Nagy MD, 05/23/24, 12:30 PM EDT.         Other:         ASSESSMENT & PLAN:        Unstable angina/coronary artery disease (Primary)  Multiple cardiovascular risk factors including hypertension, hyperlipidemia, diabetes, family history of premature coronary  disease.  ECG is normal.  Abnormal nuclear stress test leading to cardiac catheterization  Cardiac cath 5/23/2024 showed multivessel coronary artery disease including 99% RCA and 80% LAD.  Patient underwent cardiac surgery with LIMA to the LAD and PRASAD to RCA and SVG to ramus and OM and is currently stable.  Patient is extubated and his chest tubes are removed   Patient is currently on aspirin statins and beta-blockers  and is tolerating well with blood pressure and heart rate being stable  Patient is ambulating very well and will be discharged home soon.      Primary hypertension  Patient is on low-dose beta-blockers and tolerating very well.    Mixed hyperlipidemia  LDL 80, HDL 48, triglyceride 98 and total cholesterol 146  Goal LDL is less than 50  Start high intensity statin    Type 2 diabetes mellitus without complication, without long-term current use of insulin  Patient is typically on metformin and Trulicity  A1c 7.1    Anxiety and depression  He is on bupropion      Cordell Soriano MD  05/27/24  16:16 EDT

## 2024-05-27 NOTE — PROGRESS NOTES
S/P POD# 3 CABG x4 with bilateral IMAs--Camporrotondo  EF 50-55% (echo)    Subjective:  reports he slept well last night    No events overnight  Sinus tach sitting in chair  Endo following  Drips:  insulin 3u/hr  Wt is up 3 kgs from preop  CXR:  atel/ small left effusion, gas noted in colon        Intake/Output Summary (Last 24 hours) at 5/27/2024 0812  Last data filed at 5/27/2024 0600  Gross per 24 hour   Intake 703 ml   Output 500 ml   Net 203 ml     Temp:  [98 °F (36.7 °C)-99.4 °F (37.4 °C)] 98.7 °F (37.1 °C)  Heart Rate:  [] 92  Resp:  [14-18] 15  BP: (108-151)/(66-91) 121/80      Results from last 7 days   Lab Units 05/27/24  0437 05/26/24  0349 05/25/24  0225 05/25/24  0152 05/24/24  1900 05/24/24  1729 05/24/24  1305 05/23/24  1707   WBC 10*3/mm3 10.88* 10.94*  --  9.93  --  15.22*  --  5.95   HEMOGLOBIN g/dL 10.7* 9.8*  --  10.1*  --  12.3*  --  13.7   HEMOGLOBIN, POC   --   --    < >  --    < >  --    < >  --    HEMATOCRIT % 33.1* 30.1*  --  30.9*  --  36.2*  --  41.6   HEMATOCRIT POC   --   --    < >  --    < >  --    < >  --    PLATELETS 10*3/mm3 121* 101*  --  123*  --  133*  --  174   INR   --   --   --  1.10  --  1.10  --  1.00    < > = values in this interval not displayed.     Results from last 7 days   Lab Units 05/27/24  0437 05/24/24  2257 05/24/24  2125   CREATININE mg/dL 1.10   < > 1.26   POTASSIUM mmol/L 3.9   < > 4.2   SODIUM mmol/L 138   < > 143   MAGNESIUM mg/dL  --   --  2.5   PHOSPHORUS mg/dL  --   --  2.5    < > = values in this interval not displayed.       Physical Exam:  Neuro intact, nad, resting in recliner, no family seen this morning  Tele:  -106  Diminished bases, 99% RA  Sternotomy/ SVHS healing well  Benign abd, no BM, + flatus  No edema    Assessment/Plan:  Principal Problem:    Coronary artery disease involving native coronary artery of native heart  Active Problems:    Chest pain due to myocardial ischemia    Abnormal nuclear stress test    CAD (coronary artery  disease)    - MV CAD, EF 50-55% (echo)--s/p CABG x4 with LIMA to LAD, PRASAD to RCA, SVG to ramus, SVG to OM (Camporrotondo)  - HTN--stable  - HLD--statin  - DM type 2--A1c 7.1, insulin dripwatch closely  - Ozempic use--last dose 5/9  - CKD, stage 2--eGFR 66.5  - Postop ABLA, expected--watch closely  - Postop TCP, consumptive--watch closely  - Postop pericarditis--start colchicine    POD# 3.  Doing well.  Creatinine returned to normal.  On asa/stain, increase bb.  Cont colchicine for pericarditis, will decrease d/t increased stooling.  Gentle diuresis. Replete e-.  DC wires.  Insulin drip/ DM mmgt per endo    Routine care--as above  D/w pt/family, nsg, Dr. Moses  Anticipate home at discharge    Blanca Redmond, KASIE  5/27/2024  08:12 EDT

## 2024-05-27 NOTE — PLAN OF CARE
Goal Outcome Evaluation: Patient doing great today. Metoprolol increased to 25mg BID. Patient diuresed well with 20mg IV lasix with 1850mL urine out by 1400. Pacer wires removed without

## 2024-05-27 NOTE — PROGRESS NOTES
ENDOCRINE CONSULT PROGRESS NOTE  DATE OF SERVICE: 24        PATIENT NAME: Ricci Ruiz  PATIENT : 1970 AGE: 53 y.o.  MRN NUMBER: [id number]    ==========================================================================    CHIEF COMPLAINT: Type 2 diabetes management    CARE TEAM:   Patient Care Team:  Rain Paniagua MD as PCP - General (Family Medicine)    SUBJECTIVE  Patient seen and examined.  Tolerating food.  No acute complaint.    ==========================================================================    CURRENT ACTIVE HOSPITAL MEDICATIONS    Scheduled Medications:  aspirin, 81 mg, Oral, Daily  atorvastatin, 40 mg, Oral, Nightly  buPROPion XL, 150 mg, Oral, Daily  cetirizine, 10 mg, Oral, Daily  chlorhexidine, 15 mL, Mouth/Throat, Q12H  [START ON 2024] colchicine, 0.6 mg, Oral, Daily  [START ON 2024] empagliflozin, 10 mg, Oral, Daily  enoxaparin, 40 mg, Subcutaneous, Q24H  insulin lispro, 2-9 Units, Subcutaneous, 4x Daily AC & at Bedtime  metFORMIN ER, 500 mg, Oral, BID With Meals  metoprolol tartrate, 25 mg, Oral, Q12H  mupirocin, , Each Nare, BID  pantoprazole, 40 mg, Oral, Q AM  povidone-iodine, , Topical, BID         PRN Medications:    acetaminophen **OR** [DISCONTINUED] acetaminophen **OR** [DISCONTINUED] acetaminophen    bisacodyl    bisacodyl    Calcium Replacement - Follow Nurse / BPA Driven Protocol    cyclobenzaprine    dextrose    dextrose    glucagon (human recombinant)    glucagon (human recombinant)    HYDROcodone-acetaminophen    magnesium hydroxide    Magnesium Standard Dose Replacement - Follow Nurse / BPA Driven Protocol    [DISCONTINUED] Morphine **AND** naloxone    nitroglycerin    ondansetron    Phosphorus Replacement - Follow Nurse / BPA Driven Protocol    polyethylene glycol    Potassium Replacement - Follow Nurse / BPA Driven Protocol    senna-docusate sodium      ==========================================================================    OBJECTIVE    Vitals:    05/27/24 1625   BP: 115/74   Pulse:    Resp: 16   Temp: 98.7 °F (37.1 °C)   SpO2:       Body mass index is 27.11 kg/m².     General - A&Ox3, NAD, Calm, sternal wound healing    ==========================================================================    LAB EVALUATION    Lab Results   Component Value Date    GLUCOSE 128 (H) 05/27/2024    BUN 12 05/27/2024    CREATININE 1.10 05/27/2024    EGFRIFNONA 60 (L) 09/01/2021    EGFRIFAFRI 97 03/02/2016    BCR 10.9 05/27/2024    K 4.1 05/27/2024    CO2 23.5 05/27/2024    CALCIUM 8.8 05/27/2024    ALBUMIN 4.9 05/24/2024    LABIL2 1.3 05/30/2018    AST 15 05/23/2024    ALT 18 05/23/2024       Lab Results   Component Value Date    HGBA1C 7.15 (H) 05/23/2024    HGBA1C 7.10 (H) 05/09/2024    HGBA1C 6.30 (H) 03/10/2023     Lab Results   Component Value Date    MICROALBUR 3.8 09/09/2022    CREATININE 1.10 05/27/2024     Results from last 7 days   Lab Units 05/27/24  1615 05/27/24  1246 05/27/24  0829 05/27/24  0715 05/27/24  0534 05/27/24  0434   GLUCOSE mg/dL 194* 196* 209* 142* 122* 125*       ==========================================================================    ASSESSMENT AND PLAN    # Type 2 diabetes melitis with hyperglycemia  - Patient is s/p CABG, POD 3  - Insulin drip discontinued this a.m.  - Restarted patient on metformin 500 mg p.o. twice daily  - Currently on sliding scale coverage  - Patient to restart Ozempic therapy as outpatient  - Start Jardiance 10 mg p.o. daily tomorrow  - Will review blood sugar for next 24 hours and adjust therapy accordingly    # Coronary artery disease s/p CABG on 5/24/2024    Will follow with you.  Rest as per primary team.    Part of this note may be an electronic transcription/translation of spoken language to printed text using the Dragon Dictation System.     Note: Portions of this note may have been copied from previous  notes but documentation have been reviewed and edited as necessary to support clinical decision making for today's visit.  The time of this note does not reflect the time I saw the patient but the time that this note was written.    ==========================================================================  Orion Little MD  Department of Endocrine, Diabetes and Metabolism  [place], [place]  ==========================================================================

## 2024-05-28 LAB
ANION GAP SERPL CALCULATED.3IONS-SCNC: 9.1 MMOL/L (ref 5–15)
BUN SERPL-MCNC: 14 MG/DL (ref 6–20)
BUN/CREAT SERPL: 12.8 (ref 7–25)
CALCIUM SPEC-SCNC: 8.7 MG/DL (ref 8.6–10.5)
CHLORIDE SERPL-SCNC: 106 MMOL/L (ref 98–107)
CO2 SERPL-SCNC: 25.9 MMOL/L (ref 22–29)
CREAT SERPL-MCNC: 1.09 MG/DL (ref 0.76–1.27)
DEPRECATED RDW RBC AUTO: 44.9 FL (ref 37–54)
EGFRCR SERPLBLD CKD-EPI 2021: 80.7 ML/MIN/1.73
ERYTHROCYTE [DISTWIDTH] IN BLOOD BY AUTOMATED COUNT: 12.5 % (ref 12.3–15.4)
GLUCOSE BLDC GLUCOMTR-MCNC: 109 MG/DL (ref 70–105)
GLUCOSE BLDC GLUCOMTR-MCNC: 128 MG/DL (ref 70–105)
GLUCOSE BLDC GLUCOMTR-MCNC: 176 MG/DL (ref 70–105)
GLUCOSE BLDC GLUCOMTR-MCNC: 192 MG/DL (ref 70–105)
GLUCOSE SERPL-MCNC: 127 MG/DL (ref 65–99)
HCT VFR BLD AUTO: 32.8 % (ref 37.5–51)
HGB BLD-MCNC: 10.7 G/DL (ref 13–17.7)
MCH RBC QN AUTO: 31.5 PG (ref 26.6–33)
MCHC RBC AUTO-ENTMCNC: 32.6 G/DL (ref 31.5–35.7)
MCV RBC AUTO: 96.5 FL (ref 79–97)
PLATELET # BLD AUTO: 144 10*3/MM3 (ref 140–450)
PMV BLD AUTO: 10.2 FL (ref 6–12)
POTASSIUM SERPL-SCNC: 3.4 MMOL/L (ref 3.5–5.2)
POTASSIUM SERPL-SCNC: 4.5 MMOL/L (ref 3.5–5.2)
QT INTERVAL: 339 MS
QTC INTERVAL: 425 MS
RBC # BLD AUTO: 3.4 10*6/MM3 (ref 4.14–5.8)
SODIUM SERPL-SCNC: 141 MMOL/L (ref 136–145)
WBC NRBC COR # BLD AUTO: 7.7 10*3/MM3 (ref 3.4–10.8)

## 2024-05-28 PROCEDURE — 97530 THERAPEUTIC ACTIVITIES: CPT

## 2024-05-28 PROCEDURE — 99232 SBSQ HOSP IP/OBS MODERATE 35: CPT | Performed by: INTERNAL MEDICINE

## 2024-05-28 PROCEDURE — 25010000002 ENOXAPARIN PER 10 MG

## 2024-05-28 PROCEDURE — 97116 GAIT TRAINING THERAPY: CPT

## 2024-05-28 PROCEDURE — 82948 REAGENT STRIP/BLOOD GLUCOSE: CPT

## 2024-05-28 PROCEDURE — 85027 COMPLETE CBC AUTOMATED: CPT

## 2024-05-28 PROCEDURE — 80048 BASIC METABOLIC PNL TOTAL CA: CPT

## 2024-05-28 PROCEDURE — 82948 REAGENT STRIP/BLOOD GLUCOSE: CPT | Performed by: INTERNAL MEDICINE

## 2024-05-28 PROCEDURE — 63710000001 INSULIN LISPRO (HUMAN) PER 5 UNITS: Performed by: INTERNAL MEDICINE

## 2024-05-28 PROCEDURE — 84132 ASSAY OF SERUM POTASSIUM: CPT

## 2024-05-28 RX ORDER — POTASSIUM CHLORIDE 20 MEQ/1
40 TABLET, EXTENDED RELEASE ORAL EVERY 4 HOURS
Status: COMPLETED | OUTPATIENT
Start: 2024-05-28 | End: 2024-05-28

## 2024-05-28 RX ORDER — POTASSIUM CHLORIDE 20 MEQ/1
20 TABLET, EXTENDED RELEASE ORAL DAILY
Status: DISCONTINUED | OUTPATIENT
Start: 2024-05-28 | End: 2024-05-29 | Stop reason: HOSPADM

## 2024-05-28 RX ORDER — FUROSEMIDE 40 MG/1
40 TABLET ORAL DAILY
Status: DISCONTINUED | OUTPATIENT
Start: 2024-05-28 | End: 2024-05-29 | Stop reason: HOSPADM

## 2024-05-28 RX ADMIN — METOPROLOL TARTRATE 25 MG: 25 TABLET, FILM COATED ORAL at 10:43

## 2024-05-28 RX ADMIN — METFORMIN HYDROCHLORIDE 500 MG: 500 TABLET, EXTENDED RELEASE ORAL at 18:16

## 2024-05-28 RX ADMIN — METOPROLOL TARTRATE 37.5 MG: 25 TABLET, FILM COATED ORAL at 20:20

## 2024-05-28 RX ADMIN — POTASSIUM CHLORIDE 40 MEQ: 1500 TABLET, EXTENDED RELEASE ORAL at 06:33

## 2024-05-28 RX ADMIN — INSULIN LISPRO 2 UNITS: 100 INJECTION, SOLUTION INTRAVENOUS; SUBCUTANEOUS at 12:50

## 2024-05-28 RX ADMIN — CHLORHEXIDINE GLUCONATE, 0.12% ORAL RINSE 15 ML: 1.2 SOLUTION DENTAL at 10:45

## 2024-05-28 RX ADMIN — METFORMIN HYDROCHLORIDE 500 MG: 500 TABLET, EXTENDED RELEASE ORAL at 10:44

## 2024-05-28 RX ADMIN — ASPIRIN 81 MG: 81 TABLET, COATED ORAL at 10:43

## 2024-05-28 RX ADMIN — INSULIN LISPRO 2 UNITS: 100 INJECTION, SOLUTION INTRAVENOUS; SUBCUTANEOUS at 20:20

## 2024-05-28 RX ADMIN — ATORVASTATIN CALCIUM 40 MG: 40 TABLET, FILM COATED ORAL at 20:21

## 2024-05-28 RX ADMIN — ENOXAPARIN SODIUM 40 MG: 100 INJECTION SUBCUTANEOUS at 15:04

## 2024-05-28 RX ADMIN — POTASSIUM CHLORIDE 40 MEQ: 1500 TABLET, EXTENDED RELEASE ORAL at 10:43

## 2024-05-28 RX ADMIN — MUPIROCIN 1 APPLICATION: 20 OINTMENT TOPICAL at 20:21

## 2024-05-28 RX ADMIN — COLCHICINE 0.6 MG: 0.6 TABLET, FILM COATED ORAL at 10:44

## 2024-05-28 RX ADMIN — PANTOPRAZOLE SODIUM 40 MG: 40 TABLET, DELAYED RELEASE ORAL at 06:33

## 2024-05-28 RX ADMIN — EMPAGLIFLOZIN 10 MG: 10 TABLET, FILM COATED ORAL at 10:43

## 2024-05-28 RX ADMIN — BUPROPION HYDROCHLORIDE 150 MG: 150 TABLET, EXTENDED RELEASE ORAL at 10:44

## 2024-05-28 RX ADMIN — POTASSIUM CHLORIDE 20 MEQ: 1500 TABLET, EXTENDED RELEASE ORAL at 14:55

## 2024-05-28 RX ADMIN — POVIDONE-IODINE: 10 SOLUTION TOPICAL at 20:20

## 2024-05-28 RX ADMIN — CETIRIZINE HYDROCHLORIDE 10 MG: 10 TABLET, FILM COATED ORAL at 10:44

## 2024-05-28 RX ADMIN — MUPIROCIN 1 APPLICATION: 20 OINTMENT TOPICAL at 10:45

## 2024-05-28 RX ADMIN — FUROSEMIDE 40 MG: 40 TABLET ORAL at 14:54

## 2024-05-28 RX ADMIN — CHLORHEXIDINE GLUCONATE, 0.12% ORAL RINSE 15 ML: 1.2 SOLUTION DENTAL at 20:21

## 2024-05-28 RX ADMIN — Medication 12.5 MG: at 18:16

## 2024-05-28 RX ADMIN — POVIDONE-IODINE: 10 SOLUTION TOPICAL at 10:45

## 2024-05-28 NOTE — PROGRESS NOTES
Referring Provider: Poncho Alvarez, *    Reason for follow-up: Coronary artery disease status post CABG     Patient Care Team:  Rain Paniagua MD as PCP - General (Family Medicine)      SUBJECTIVE  Sitting comfortably in chair and denies any chest pain or shortness of breath.  Able to ambulate.  Using incentive spirometry.     ROS  Review of all systems negative except as indicated.    Since I have last seen, the patient has been without any chest discomfort, shortness of breath, palpitations, dizziness or syncope.  Denies having any headache, abdominal pain, nausea, vomiting, diarrhea, constipation, loss of weight or loss of appetite.  Denies having any excessive bruising, hematuria or blood in the stool.  ROS      Personal History:    Past Medical History:   Diagnosis Date    Colon polyp 2019    Diabetes mellitus     Type 2    Hyperlipidemia        Past Surgical History:   Procedure Laterality Date    CARDIAC CATHETERIZATION N/A 5/23/2024    Procedure: Left Heart Cath with Coronary Angiography;  Surgeon: Alejandro Nagy MD;  Location: [facility] CATH INVASIVE LOCATION;  Service: Cardiovascular;  Laterality: N/A;    COLONOSCOPY  2019       Family History   Problem Relation Age of Onset    Arthritis Mother         Lupus    Diabetes Brother     Heart disease Father     Heart disease Brother        Social History     Tobacco Use    Smoking status: Never    Smokeless tobacco: Never   Vaping Use    Vaping status: Never Used   Substance Use Topics    Alcohol use: Yes     Alcohol/week: 1.0 standard drink of alcohol     Types: 1 Glasses of wine per week    Drug use: Never        Home meds:  Prior to Admission medications    Medication Sig Start Date End Date Taking? Authorizing Provider   aspirin 81 MG tablet Take 1 tablet by mouth Daily. 3/27/15  Yes ProviderLinda MD   Blood Glucose Monitoring Suppl (Accu-Chek Gladis Plus) w/Device kit 1 kit Daily. 10/26/20  Yes Karen Pickard MD   buPROPion XL  (WELLBUTRIN XL) 150 MG 24 hr tablet TAKE 1 TABLET BY MOUTH EVERY DAY 6/6/23  Yes Karen Pickard MD   glucose blood (Accu-Chek Gladis Plus) test strip Use daily E11.9 9/9/22  Yes Karen Pickard MD   lisinopril (PRINIVIL,ZESTRIL) 5 MG tablet TAKE 1 TABLET BY MOUTH EVERY DAY 6/6/23  Yes Karen Pickard MD   loratadine (Claritin) 10 MG tablet Take 1 tablet by mouth Daily.   Yes Linda Banks MD   metFORMIN (GLUCOPHAGE) 1000 MG tablet TAKE 1 TABLET BY MOUTH TWICE A DAY WITH MEALS 6/6/23  Yes Karen Pickard MD   Multiple Vitamin (MULTIVITAMIN) capsule Take 1 capsule by mouth Daily. 2/10/16  Yes Linda Banks MD   nitroglycerin (NITROSTAT) 0.4 MG SL tablet Place 1 tablet under the tongue Every 5 (Five) Minutes As Needed for Chest Pain. 5/1/24  Yes Linda Banks MD   Semaglutide, 1 MG/DOSE, (Ozempic, 1 MG/DOSE,) 2 MG/1.5ML solution pen-injector Inject 0.5 mg under the skin into the appropriate area as directed 1 (One) Time Per Week.   Yes Linda Banks MD   simvastatin (ZOCOR) 40 MG tablet TAKE 1 TABLET BY MOUTH EVERY DAY 12/27/22  Yes Karen Pickard MD   vitamin D3 125 MCG (5000 UT) capsule capsule Take 1 capsule by mouth Daily. 3/10/23  Yes Karen Pickard MD   isosorbide mononitrate (IMDUR) 30 MG 24 hr tablet Take 1 tablet by mouth Every Morning. 5/23/24   Alejandro Nagy MD       Allergies:  Patient has no known allergies.    Scheduled Meds:aspirin, 81 mg, Oral, Daily  atorvastatin, 40 mg, Oral, Nightly  buPROPion XL, 150 mg, Oral, Daily  cetirizine, 10 mg, Oral, Daily  chlorhexidine, 15 mL, Mouth/Throat, Q12H  colchicine, 0.6 mg, Oral, Daily  empagliflozin, 10 mg, Oral, Daily  enoxaparin, 40 mg, Subcutaneous, Q24H  insulin lispro, 2-9 Units, Subcutaneous, 4x Daily AC & at Bedtime  metFORMIN ER, 500 mg, Oral, BID With Meals  metoprolol tartrate, 25 mg, Oral, Q12H  mupirocin, , Each Nare, BID  pantoprazole, 40 mg, Oral, Q AM  potassium chloride ER, 40  "mEq, Oral, Q4H  povidone-iodine, , Topical, BID      Continuous Infusions:   PRN Meds:.  acetaminophen **OR** [DISCONTINUED] acetaminophen **OR** [DISCONTINUED] acetaminophen    bisacodyl    bisacodyl    Calcium Replacement - Follow Nurse / BPA Driven Protocol    cyclobenzaprine    dextrose    dextrose    glucagon (human recombinant)    glucagon (human recombinant)    HYDROcodone-acetaminophen    magnesium hydroxide    Magnesium Standard Dose Replacement - Follow Nurse / BPA Driven Protocol    [DISCONTINUED] Morphine **AND** naloxone    nitroglycerin    ondansetron    Phosphorus Replacement - Follow Nurse / BPA Driven Protocol    polyethylene glycol    Potassium Replacement - Follow Nurse / BPA Driven Protocol    senna-docusate sodium      OBJECTIVE    Vital Signs  Vitals:    05/28/24 0500 05/28/24 0600 05/28/24 0700 05/28/24 0718   BP: 112/76 117/79 132/79 132/79   BP Location:    Right arm   Patient Position:    Sitting   Pulse: 79 86 84 88   Resp:    14   Temp:    98.4 °F (36.9 °C)   TempSrc:    Oral   SpO2: 97% 93% 99% 98%   Weight:  87.7 kg (193 lb 5.5 oz)     Height:           Flowsheet Rows      Flowsheet Row First Filed Value   Admission Height 181.6 cm (71.5\") Documented at 05/23/2024 1104   Admission Weight 86.6 kg (190 lb 14.7 oz) Documented at 05/23/2024 1104              Intake/Output Summary (Last 24 hours) at 5/28/2024 0741  Last data filed at 5/28/2024 0600  Gross per 24 hour   Intake 1163 ml   Output 2950 ml   Net -1787 ml          Telemetry: Sinus rhythm    Physical Exam:  The patient is alert, oriented and in no distress.  Vital signs as noted above.  Head and neck revealed no carotid bruits or jugular venous distention.  No thyromegaly or lymphadenopathy is present  Lungs clear.  No wheezing.  Breath sounds are normal bilaterally.  Heart normal first and second heart sounds.  No murmur. No precordial rub is present.  No gallop is present.  Abdomen soft and nontender.  No organomegaly is " present.  Extremities with good peripheral pulses without any pedal edema.  Skin warm and dry.  Musculoskeletal system is grossly normal.  CNS grossly normal.       Results Review:  I have personally reviewed the results from the time of this admission to 5/28/2024 07:41 EDT and agree with these findings:  []  Laboratory  []  Microbiology  []  Radiology  []  EKG/Telemetry   []  Cardiology/Vascular   []  Pathology  []  Old records  []  Other:    Most notable findings include:    Lab Results (last 24 hours)       Procedure Component Value Units Date/Time    POC Glucose 4x Daily Before Meals & at Bedtime [[id number]]  (Abnormal) Collected: 05/28/24 0722    Specimen: Blood Updated: 05/28/24 0723     Glucose 128 mg/dL      Comment: Serial Number: [id number]:  [id number]       Basic Metabolic Panel [[id number]]  (Abnormal) Collected: 05/28/24 0448    Specimen: Blood Updated: 05/28/24 0515     Glucose 127 mg/dL      BUN 14 mg/dL      Creatinine 1.09 mg/dL      Sodium 141 mmol/L      Potassium 3.4 mmol/L      Chloride 106 mmol/L      CO2 25.9 mmol/L      Calcium 8.7 mg/dL      BUN/Creatinine Ratio 12.8     Anion Gap 9.1 mmol/L      eGFR 80.7 mL/min/1.73     Narrative:      GFR Normal >60  Chronic Kidney Disease <60  Kidney Failure <15      CBC (No Diff) [[id number]]  (Abnormal) Collected: 05/28/24 0448    Specimen: Blood Updated: 05/28/24 0454     WBC 7.70 10*3/mm3      RBC 3.40 10*6/mm3      Hemoglobin 10.7 g/dL      Hematocrit 32.8 %      MCV 96.5 fL      MCH 31.5 pg      MCHC 32.6 g/dL      RDW 12.5 %      RDW-SD 44.9 fl      MPV 10.2 fL      Platelets 144 10*3/mm3     POC Glucose Once [[id number]]  (Abnormal) Collected: 05/27/24 2140    Specimen: Blood Updated: 05/27/24 2141     Glucose 255 mg/dL      Comment: Serial Number: [id number]:  [id number]       POC Glucose Once [[id number]]  (Abnormal) Collected: 05/27/24 1615    Specimen: Blood Updated: 05/27/24 1617     Glucose 194 mg/dL      Comment: Serial Number:  [id number]:  [id number]       POC Glucose 4x Daily Before Meals & at Bedtime [[id number]]  (Abnormal) Collected: 05/27/24 1246    Specimen: Blood Updated: 05/27/24 1248     Glucose 196 mg/dL      Comment: Serial Number: [id number]:  [id number]       Potassium [[id number]]  (Normal) Collected: 05/27/24 0829    Specimen: Blood from Arm, Left Updated: 05/27/24 0903     Potassium 4.1 mmol/L     POC Glucose Once [[id number]]  (Abnormal) Collected: 05/27/24 0829    Specimen: Blood Updated: 05/27/24 0830     Glucose 209 mg/dL      Comment: Serial Number: [id number]:  [id number]               Imaging Results (Last 24 Hours)       Procedure Component Value Units Date/Time    XR Chest 1 View [[id number]] Collected: 05/27/24 1039     Updated: 05/27/24 1042    Narrative:      XR CHEST 1 VW    Date of Exam: 5/27/2024 8:26 AM EDT    Indication: post op    Comparison: 5/26/2024    Findings:  Patient is status post median sternotomy and CABG. Cardiac silhouette is magnified. Pulmonary vasculature is unremarkable. Scattered atelectasis is present within the left lung base. No significant pleural effusion or pneumothorax. Osseous structures are   unchanged.      Impression:      Impression:  1.Scattered left basilar atelectasis. No significant change since 5/26/2024.      Electronically Signed: Grzegorz Mendes MD    5/27/2024 10:40 AM EDT    Workstation ID: [id number]            LAB RESULTS (LAST 7 DAYS)    CBC  Results from last 7 days   Lab Units 05/28/24  0448 05/27/24  0437 05/26/24  0349 05/25/24  0448 05/25/24  0225 05/25/24  0152 05/24/24  2257 05/24/24  1900 05/24/24  1729 05/24/24  1305 05/23/24  1707 05/21/24  0844   WBC 10*3/mm3 7.70 10.88* 10.94*  --   --  9.93  --   --  15.22*  --  5.95 5.86   RBC 10*6/mm3 3.40* 3.41* 3.09*  --   --  3.28*  --   --  3.88*  --  4.38 4.51   HEMOGLOBIN g/dL 10.7* 10.7* 9.8*  --   --  10.1*  --   --  12.3*  --  13.7 14.4   HEMOGLOBIN, POC g/dL  --   --   --  10.0* 11.0*  --   11.6*   < >  --    < >  --   --    HEMATOCRIT % 32.8* 33.1* 30.1*  --   --  30.9*  --   --  36.2*  --  41.6 41.8   HEMATOCRIT POC %  --   --   --  30* 32*  --  34*   < >  --    < >  --   --    MCV fL 96.5 97.1* 97.4*  --   --  94.2  --   --  93.3  --  95.0 92.7   PLATELETS 10*3/mm3 144 121* 101*  --   --  123*  --   --  133*  --  174 215    < > = values in this interval not displayed.       BMP  Results from last 7 days   Lab Units 05/28/24  0448 05/27/24  0829 05/27/24  0437 05/26/24  2300 05/26/24  1659 05/26/24  1016 05/26/24  0349 05/25/24  1425 05/25/24  0448 05/25/24  0225 05/24/24  2257 05/24/24  2125 05/24/24  1900 05/24/24  1729 05/23/24  1707 05/21/24  0844   SODIUM mmol/L 141  --  138  --   --   --  139  --   --   --   --  143  --  140 139 142   POTASSIUM mmol/L 3.4* 4.1 3.9 3.4* 3.9 3.9 3.7 4.0  --   --   --  4.2  --  4.4 3.5 4.5   CHLORIDE mmol/L 106  --  105  --   --   --  106  --   --   --   --  105  --  104 103 106   CO2 mmol/L 25.9  --  23.5  --   --   --  22.7  --   --   --   --  21.0*  --  23.0 27.0 25.0   BUN mg/dL 14  --  12  --   --   --  16  --   --   --   --  17  --  15 16 19   CREATININE mg/dL 1.09  --  1.10  --   --   --  1.12  --  1.45* 1.33* 1.43* 1.26   < > 1.23 1.20 1.28*   GLUCOSE mg/dL 127*  --  128*  --   --   --  133* 159*  --   --   --  195*  --  140* 176* 147*   MAGNESIUM mg/dL  --   --   --   --   --   --   --   --   --   --   --  2.5  --  2.9*  --   --    PHOSPHORUS mg/dL  --   --   --   --   --   --   --   --   --   --   --  2.5  --  1.9*  --   --     < > = values in this interval not displayed.       CMP   Results from last 7 days   Lab Units 05/28/24  0448 05/27/24  0829 05/27/24  0437 05/26/24  2300 05/26/24  1659 05/26/24  1016 05/26/24  0349 05/25/24  1425 05/25/24  0448 05/25/24  0225 05/24/24  2257 05/24/24  2125 05/24/24  1900 05/24/24  1729 05/23/24  1707 05/21/24  0844   SODIUM mmol/L 141  --  138  --   --   --  139  --   --   --   --  143  --  140 139 142   POTASSIUM  mmol/L 3.4* 4.1 3.9 3.4* 3.9 3.9 3.7 4.0  --   --   --  4.2  --  4.4 3.5 4.5   CHLORIDE mmol/L 106  --  105  --   --   --  106  --   --   --   --  105  --  104 103 106   CO2 mmol/L 25.9  --  23.5  --   --   --  22.7  --   --   --   --  21.0*  --  23.0 27.0 25.0   BUN mg/dL 14  --  12  --   --   --  16  --   --   --   --  17  --  15 16 19   CREATININE mg/dL 1.09  --  1.10  --   --   --  1.12  --  1.45* 1.33* 1.43* 1.26   < > 1.23 1.20 1.28*   GLUCOSE mg/dL 127*  --  128*  --   --   --  133* 159*  --   --   --  195*  --  140* 176* 147*   ALBUMIN g/dL  --   --   --   --   --   --   --   --   --   --   --  4.9  --  4.7 4.3  --    BILIRUBIN mg/dL  --   --   --   --   --   --   --   --   --   --   --   --   --   --  0.4  --    ALK PHOS U/L  --   --   --   --   --   --   --   --   --   --   --   --   --   --  70  --    AST (SGOT) U/L  --   --   --   --   --   --   --   --   --   --   --   --   --   --  15  --    ALT (SGPT) U/L  --   --   --   --   --   --   --   --   --   --   --   --   --   --  18  --     < > = values in this interval not displayed.       BNP        TROPONIN        CoAg  Results from last 7 days   Lab Units 05/25/24  0152 05/24/24  1729 05/23/24  1707 05/21/24  0844   INR  1.10 1.10 1.00 0.98   APTT seconds  --  25.3 26.2  --        Creatinine Clearance  Estimated Creatinine Clearance: 96.1 mL/min (by C-G formula based on SCr of 1.09 mg/dL).    ABG  Results from last 7 days   Lab Units 05/25/24  0648 05/25/24  0448 05/25/24  0225 05/24/24  2257 05/24/24  1900 05/24/24  1620 05/24/24  1532 05/24/24  1505   PH, ARTERIAL pH units  --  7.365 7.574* 7.374 7.393 7.370 7.320* 7.310*   PCO2, ARTERIAL mm Hg  --  39.1 20.7* 33.5* 38.2 46.3* 50.5* 55.6*   PO2 ART mm Hg  --  115.9* 179.2* 109.1* 302.9* 258.0* 349.0* 413.0*   O2 SATURATION ART %  --  98.4* 99.8* 98.2* 99.9* 100.0* 100.0* 100.0*   BASE EXCESS ART mmol/L -2.8* -2.7* -1.3* -4.9* -1.4* 1.0 0.0 2.0       Radiology  XR Chest 1 View    Result Date:  5/27/2024  Impression: 1.Scattered left basilar atelectasis. No significant change since 5/26/2024. Electronically Signed: Grzegorz Mendes MD  5/27/2024 10:40 AM EDT  Workstation ID: [id number]       EKG  I personally viewed and interpreted the patient's EKG/Telemetry data:  ECG 12 Lead Other; Post-op open heart   Preliminary Result   HEART RATE= 95  bpm   RR Interval= 636  ms   IA Interval= 141  ms   P Horizontal Axis= -25  deg   P Front Axis= 44  deg   QRSD Interval= 87  ms   QT Interval= 339  ms   QTcB= 425  ms   QRS Axis= -1  deg   T Wave Axis= 60  deg   - ABNORMAL ECG -   Sinus rhythm   ST elevation suggests acute pericarditis   When compared with ECG of 25-May-2024 4:27:21,   No significant change   Electronically Signed By:    Date and Time of Study: 2024-05-26 05:50:59      ECG 12 Lead Other; Post-op open heart   Final Result   HEART RATE= 93  bpm   RR Interval= 640  ms   IA Interval= 172  ms   P Horizontal Axis= -4  deg   P Front Axis= 47  deg   QRSD Interval= 81  ms   QT Interval= 358  ms   QTcB= 448  ms   QRS Axis= 8  deg   T Wave Axis= 33  deg   - ABNORMAL ECG -   Sinus rhythm   Anterior infarct, possibly acute   ST elevation, consider inferior injury   When compared with ECG of 24-May-2024 17:46:44,   No significant change   Electronically Signed By: Vinicio Lino (RAH) 25-May-2024 07:01:31   Date and Time of Study: 2024-05-25 04:27:21      ECG 12 Lead Other; Post-op open heart   Final Result   HEART RATE= 85  bpm   RR Interval= 700  ms   IA Interval= 170  ms   P Horizontal Axis= -21  deg   P Front Axis= 75  deg   QRSD Interval= 100  ms   QT Interval= 400  ms   QTcB= 478  ms   QRS Axis= -9  deg   T Wave Axis= 47  deg   - NORMAL ECG -   Sinus rhythm   When compared with ECG of 23-May-2024 22:22:51,   No significant change   Electronically Signed By: Vinicio Lino (RAH) 25-May-2024 07:02:14   Date and Time of Study: 2024-05-24 17:46:44      ECG 12 Lead Pre-Op / Pre-Procedure   Final Result   HEART RATE= 74   bpm   RR Interval= 808  ms   WV Interval= 184  ms   P Horizontal Axis= 10  deg   P Front Axis= 42  deg   QRSD Interval= 87  ms   QT Interval= 371  ms   QTcB= 413  ms   QRS Axis= 8  deg   T Wave Axis= -3  deg   - NORMAL ECG -   Sinus rhythm   When compared with ECG of 14-Oct-2020 14:36:03,   Significant rate decrease   Significant repolarization change   Electronically Signed By: Vinicio Lino (RAH) 24-May-2024 07:39:42   Date and Time of Study: 2024-05-23 22:22:51            Echocardiogram:    Results for orders placed in visit on 05/24/24    Intra-Op Anesthesia SOFIA    Narrative  Intra-Op Anesthesia SOFIA    Procedure Performed: Intra-Op Anesthesia SOFIA  Start Time:  End Time:    Preanesthesia Checklist:  Patient identified, IV assessed, risks and benefits discussed, monitors and equipment assessed, procedure being performed at surgeon's request and anesthesia consent obtained.    General Procedure Information  Diagnostic Indications for Echo:  assessment of surgical repair and hemodynamic monitoring  Location performed:  OR  Intubated  Bite block placed  Heart visualized  Probe Insertion:  Easy  Probe Type:  Biplane and multiplane  Modalities:  Color flow mapping, pulse wave Doppler and continuous wave Doppler    Echocardiographic and Doppler Measurements    Ventricles    Right Ventricle:  Cavity size normal.  Hypertrophy not present.  Thrombus not present.  Global function normal.  Ejection Fraction 60%.  Left Ventricle:  Cavity size normal.  Thrombus not present.  Global Function normal.  Ejection Fraction 60%.    Ventricular Regional Function:  1- Basal Anteroseptal:  normal  2- Basal Anterior:  normal  3- Basal Anterolateral:  normal  4- Basal Inferolateral:  normal  5- Basal Inferior:  normal  6- Basal Inferoseptal:  normal  7- Mid Anteroseptal:  normal  8- Mid Anterior:  normal  9- Mid Anterolateral:  normal  10- Mid Inferolateral:  normal  11- Mid Inferior:  normal  12- Mid Inferoseptal:  normal  13- Apical  Anterior:  normal  14- Apical Lateral:  normal  15- Apical Inferior:  normal  16- Apical Septal:  normal  17- [place]:  normal      Valves    Aortic Valve:  Annulus normal.  Stenosis not present.  Regurgitation absent.  Leaflets normal.  Leaflet motions normal.    Mitral Valve:  Annulus normal.  Stenosis not present.  Regurgitation absent.  Leaflets normal.  Leaflet motions normal.    Tricuspid Valve:  Annulus normal.  Stenosis not present.  Regurgitation absent.  Leaflets normal.  Leaflet motions normal.  Pulmonic Valve:  Annulus normal.  Stenosis not present.  Regurgitation absent.      Aorta    Ascending Aorta:  Size normal.  Dissection not present.  Plaque thickness less than 3 mm.  Mobile plaque not present.  Aortic Arch:  Size normal.  Dissection not present.  Plaque thickness less than 3 mm.  Mobile plaque not present.  Descending Aorta:  Size normal.  Dissection not present.  Plaque thickness less than 3 mm.  Mobile plaque not present.      Atria    Right Atrium:  Size normal.  Left Atrium:  Size normal.  Spontaneous echo contrast not present.  Thrombus not present.  Tumor not present.  Device not present.  Left atrial appendage normal.      Septa        Ventricular Septum:  Intra-ventricular septum morphology normal.        Other Findings  Pleural Effusion:  none  Pulmonary Arteries:  normal      Anesthesia Information  Performed Personally  Anesthesiologist:  Juan Moya MD      Echocardiogram Comments:  SOFIA placed easily, no resistance, bite guard, lubricated      Pre cpb  LV no wma ef 60  RV nl sf  MV AV TV wnl      S/p cabg x 4  No change        Stress Test:  Results for orders placed during the hospital encounter of 05/13/24    Stress test with myocardial perfusion one day    Interpretation Summary    Patient exercised for 10 minutes 21 seconds achieving 11.8 METS.    Findings consistent with an abnormal ECG stress test.  ST depressions in lateral leads.    Left ventricular ejection fraction is  hyperdynamic (Calculated EF > 70%).    Myocardial perfusion imaging indicates a moderate-sized, severe area of ischemia located in the inferior wall and septal wall.    Impressions are consistent with an intermediate risk study.         Cardiac Catheterization:  Results for orders placed during the hospital encounter of 05/23/24    Cardiac Catheterization/Vascular Study    Conclusion  OPERATORS:  1. Alejandro Nagy M.D., Attending Cardiologist      PROCEDURE PERFORMED.  Ultrasound guided vascular access  Left heart catheterization  Coronary Angiogram 15950  Moderate Sedation    INDICATIONS FOR PROCEDURE.  53 years old man with unstable angina and abnormal nuclear stress test.  After discussing the risk and benefit of the procedure he was brought into the Cath Lab for coronary angiography.    PROCEDURE IN DETAIL.  Informed consent was obtained from the patient after explaining the risks, benefits, and alternative options of the procedure. After obtaining informed consent, the patient was brought to the cath lab and was prepped in a sterile fashion. Lidocaine 1% was used for local anesthesia into the right radial access site. The right radial artery was accessed under direct ultrasound visualization  with an angiocath needle via modified Seldinger technique. A 6F slender sheath was inserted successfully. Afterwards, 6F JR4  was advanced over a wire into the ascending aorta and used to cross the AV and obtain LV pressures.  AV gradient obtained via pullback technique. JR4 and JL3.5 diagnostic catheters were used to engage the ostia of the RCA and LM respectively. Images of the right and left coronary systems were obtained. All the catheters were exchanged over a wire and subsequently removed. The patient tolerated the procedure well without any complications. The pictures were reviewed at the end of the procedure. TR band applied to right wrist for hemostasis and inflated with 15 cc of air. No complications were  encountered.    HEMODYNAMICS.  LV: 135/8, 17 mmHg  AO: 136/82, 105 mmHg  No significant gradient across the aortic valve during pullback of JR4 catheter.  LV gram was not performed due to recent echocardiogram.    FINDINGS.    Coronary Angiogram.    Left main. Left main is a large-caliber vessel which gives rise to the Left Anterior Descending, ramus and the Left circumflex.  Left main is angiographically free from any significant disease    Left Anterior Descending Artery. LAD is a large vessel which gives rise to several septal perforators and several diagonal branches.  Mid LAD has 80% stenosis at the origin of diagonal vessel which also has 70 to 80% stenosis.    Ramus intermedius has proximal segment 70% stenosis.    Left Circumflex. The LCx is a medium caliber which gives rise to marginals.  OM1 has proximal 50% stenosis.  OM 2 has proximal segment 70% stenosis.    Right Coronary Artery. The RCA is a dominant vessel which gives rise to several small caliber branches including PDA and PLV.  Mid RCA has 99% long tubular stenosis.  PLV has mid segment 50% stenosis.    IMPRESSIONS.  1 Multivessel obstructive coronary artery disease including LAD, ramus and RCA.  2.  Mildly elevated LVEDP    RECOMMENDATIONS.  1.  Consult cardiac surgery for CABG  2.  Start Imdur    Electronically signed by Alejandro Nagy MD, 05/23/24, 12:30 PM EDT.         Other:         ASSESSMENT & PLAN:    Principal Problem:    Coronary artery disease involving native coronary artery of native heart  Active Problems:    Chest pain due to myocardial ischemia    Abnormal nuclear stress test    CAD (coronary artery disease)    Unstable angina/coronary artery disease (Primary)  Multiple cardiovascular risk factors including hypertension, hyperlipidemia, diabetes, family history of premature coronary disease.  ECG is normal.  Abnormal nuclear stress test leading to cardiac catheterization  Cardiac cath 5/23/2024 showed multivessel coronary artery  disease including 99% RCA and 80% LAD.  CABGX4 5/24/2024: (LIMA to LAD, PRASAD to RCA, SVG to Ramus, SVG to OM)   Continue aspirin, statin and beta-blocker.  Encouraged incentive spirometry  Cardiac rehab at the time of discharge     Primary hypertension  Currently well-controlled on beta-blocker.  Uptitrate beta-blocker for better heart rate control  Can add lisinopril: Discussed about adding lisinopril as outpatient     Mixed hyperlipidemia  LDL 80, HDL 48, triglyceride 98 and total cholesterol 146  Goal LDL is less than 50  Continue high intensity statin     Type 2 diabetes mellitus without complication, without long-term current use of insulin  Patient is now on metformin and Jardiance.  He was on Trulicity prior to admission  A1c 7.1     Anxiety and depression  He is on bupropion      Alejandro Nagy MD  05/28/24  07:41 EDT

## 2024-05-28 NOTE — THERAPY TREATMENT NOTE
Subjective: Pt agreeable to therapeutic plan of care. Spouse also present and wants to observe stair training.    Objective:     Bed mobility - N/A or Not attempted.  Transfers - Modified-Independent; Pt in restroom toileting without assist.  Ambulation - >400 feet independent/supervision    Phase 1 Cardiac Rehab Initiated - Acute Care    Cardiac Level III Activities  Sitting tolerance: >10min and unsupported  Standing tolerance: >10min and unsupported    Precautions:  Mid-sternal incision; avoid scapular retraction and depression.  Cardiovascular impairment post-sx; encourage energy conservation strategies.    MET level equivalent: 2.0-3.0 (Unlimited sitting, ambulation on level ground <2mph, light housework)     Vitals: WNL    Pain: Did not rate numerically   Location: sternum  Intervention for pain: Repositioned and Increased Activity    Education: Gait Training, Energy conservation strategies, and Post-Op Precautions    Assessment: Ricci Ruiz presents with functional mobility impairments which indicate the need for skilled intervention. Pt able to amb today with supervision without AD >400 ft without report of fatigue. Pt asc/desc 10 stairs using single handrail and SBA. Answered all questions from spouse/pt regarding sternal precautions and activity recommendations once home. Pt has met all goals for acute setting and does not require additional PT.    Plan/Recommendations:   If medically appropriate, No ongoing therapy recommended post-acute care. No therapy needs. Pt requires no DME at discharge.     Pt desires Home with family assist at discharge. Pt cooperative; agreeable to therapeutic recommendations and plan of care.         Basic Mobility 6-click:  Rollin = Total, A lot = 2, A little = 3; 4 = None  Supine>Sit:   1 = Total, A lot = 2, A little = 3; 4 = None   Sit>Stand with arms:  1 = Total, A lot = 2, A little = 3; 4 = None  Bed>Chair:   1 = Total, A lot = 2, A little = 3; 4 =  None  Ambulate in room:  1 = Total, A lot = 2, A little = 3; 4 = None  3-5 Steps with railin = Total, A lot = 2, A little = 3; 4 = None  Score: 23    Modified [place]: N/A = No pre-op stroke/TIA    Post-Tx Position: In bathroom  PPE: gloves

## 2024-05-28 NOTE — CONSULTS
"Diabetes Education  Assessment/Teaching    Patient Name:  Ricci Ruiz  YOB: 1970  MRN: [id number]  Admit Date:  5/23/2024      Assessment Date:  5/28/2024  Flowsheet Row Most Recent Value   General Information     Referral From: MD order  [Consult received due to A1c >7%. A1c this adm 7.15%. Adm bs 137.]   Height 181.6 cm (71.5\")   Height Method Stated   Weight 87.7 kg (193 lb 5.5 oz)   Weight Method Standing scale   Pregnancy Assessment    Diabetes History    What type of diabetes do you have? Type 2   Length of Diabetes Diagnosis --  [Dx 10+ years ago.]   Do you test your blood sugar at home? yes   Frequency of checks has not been routinely checking bs   Meter type has been using wife's meter. Both he and wife's meter is >4 years old   Who performs the test? self   Have you had low blood sugar? (<70mg/dl) no   Education Preferences    Nutrition Information    Assessment Topics    DM Goals             Flowsheet Row Most Recent Value   DM Education Needs    Meter Meter provided  [Gave One Touch Verio Flex and instructed pt in use. He was able to perform return demo correctly for using lancing device. Pt verbalized understanding of test strip insertion and where to apply the blood sample.]   Meter Type One Touch   Frequency of Testing Daily  [Discussed checking bs daily and rotating the times when he checks. Encouraged pt to record bs and take record to PCP visits.]   Blood Glucose Target Range Reviewed A1c result of 7.15%. Discussed healthy bs range and healthy A1c target. Discussed importance of bs control.   Medication Oral, Other injectables  [Pt has been taking Metformin 1000 mg bid and Ozempic 0.5 mg weekly. Pt states he had been taking Trulicity but was unable to get at the pharmacy and did not take for about 1 month.]   Problem Solving Hyperglycemia, Signs, Symptoms, Treatment   Reducing Risks A1C testing  [Gave A1c info sheet.]   Healthy Eating --  [Pt usually eating 2-3 meals/day.] "   Motivation Engaged   Teaching Method Explanation, Discussion, Demonstration, Handouts   Patient Response Verbalized understanding, Demonstrates adequately              Other Comments:  Met with pt and wife at bedside. Pt had CABG on 5/24/2024. Discussed importance of bs control for proper wound healing. Pt states he does have PCP and sees routinely. Pt agreeable to begin checking bs daily and will rotate the times when he checks. Rxs started for One Touch Verio test strips and One Touch Delica Plus lancets. Pt verbalized understanding of info covered. He states he does not need additional education at this time.       Electronically signed by:  Leah Florez RN  05/28/24 19:02 EDT

## 2024-05-28 NOTE — SIGNIFICANT NOTE
05/28/24 1439   PT Discharge Summary   Anticipated Discharge Disposition (PT) home with assist   Reason for Discharge All goals achieved   Outcomes Achieved Able to achieve all goals within established timeline   Discharge Destination Home with assist

## 2024-05-28 NOTE — PROGRESS NOTES
" LOS: 5 days   Patient Care Team:  Rain Paniagua MD as PCP - General (Family Medicine)    Chief Complaint: post-op    Subjective     Subjective:  Symptoms:  Stable.  No shortness of breath or chest pain.    Diet:  Adequate intake.  No nausea or vomiting.    Activity level: Returning to normal.    Pain:  He reports no pain.      Objective     Vital Signs  Temp:  [97.5 °F (36.4 °C)-98.7 °F (37.1 °C)] 98.4 °F (36.9 °C)  Heart Rate:  [] 88  Resp:  [14-16] 14  BP: (107-147)/(68-85) 132/79  Body mass index is 26.59 kg/m².    Intake/Output Summary (Last 24 hours) at 5/28/2024 0928  Last data filed at 5/28/2024 0829  Gross per 24 hour   Intake 1403 ml   Output 2950 ml   Net -1547 ml     I/O this shift:  In: 240 [P.O.:240]  Out: -       Wt Readings from Last 3 Encounters:   05/28/24 87.7 kg (193 lb 5.5 oz)   05/13/24 86.2 kg (190 lb)   05/06/24 86.2 kg (190 lb)       Flowsheet Rows      Flowsheet Row First Filed Value   Admission Height 181.6 cm (71.5\") Documented at 05/23/2024 1104   Admission Weight 86.6 kg (190 lb 14.7 oz) Documented at 05/23/2024 1104            Objective:  General Appearance:  Comfortable, well-appearing, in no acute distress and not in pain.    Vital signs: (most recent): Blood pressure 122/77, pulse 88, temperature 98.2 °F (36.8 °C), temperature source Oral, resp. rate 14, height 181.6 cm (71.5\"), weight 87.7 kg (193 lb 5.5 oz), SpO2 96%.  Vital signs are normal.  No fever.    Output: Producing urine and producing stool.    Lungs:  Normal effort and normal respiratory rate.  There are decreased breath sounds.    Heart: Normal rate.  Regular rhythm.    Abdomen: Bowel sounds are normal.     Extremities: Normal range of motion.  There is no dependent edema.    Neurological: Patient is alert and oriented to person, place and time.    Skin:  Warm and dry.  (Sternal incision without erythema, edema, or drainage )              Results Review:        Results from last 7 days   Lab Units " "05/28/24 0448 05/27/24 0437 05/26/24  0349   WBC 10*3/mm3 7.70 10.88* 10.94*   HEMOGLOBIN g/dL 10.7* 10.7* 9.8*   HEMATOCRIT % 32.8* 33.1* 30.1*   PLATELETS 10*3/mm3 144 121* 101*         PT/INR:  No results found for: \"PROTIME\"/No results found for: \"INR\"    Results from last 7 days   Lab Units 05/28/24 0448 05/27/24  0829 05/27/24  0437 05/26/24  1016 05/26/24  0349   SODIUM mmol/L 141  --  138  --  139   POTASSIUM mmol/L 3.4* 4.1 3.9   < > 3.7   CHLORIDE mmol/L 106  --  105  --  106   CO2 mmol/L 25.9  --  23.5  --  22.7   BUN mg/dL 14  --  12  --  16   CREATININE mg/dL 1.09  --  1.10  --  1.12   GLUCOSE mg/dL 127*  --  128*  --  133*   CALCIUM mg/dL 8.7  --  8.8  --  8.7    < > = values in this interval not displayed.         Scheduled Meds:  aspirin, 81 mg, Oral, Daily  atorvastatin, 40 mg, Oral, Nightly  buPROPion XL, 150 mg, Oral, Daily  cetirizine, 10 mg, Oral, Daily  chlorhexidine, 15 mL, Mouth/Throat, Q12H  colchicine, 0.6 mg, Oral, Daily  empagliflozin, 10 mg, Oral, Daily  enoxaparin, 40 mg, Subcutaneous, Q24H  insulin lispro, 2-9 Units, Subcutaneous, 4x Daily AC & at Bedtime  metFORMIN ER, 500 mg, Oral, BID With Meals  metoprolol tartrate, 25 mg, Oral, Q12H  mupirocin, , Each Nare, BID  pantoprazole, 40 mg, Oral, Q AM  potassium chloride ER, 40 mEq, Oral, Q4H  povidone-iodine, , Topical, BID        Infusions:         Assessment & Plan       Coronary artery disease involving native coronary artery of native heart    Chest pain due to myocardial ischemia    Abnormal nuclear stress test    CAD (coronary artery disease)      Assessment & Plan    - MV CAD, EF 50-55% (echo)--s/p CABG x4 with LIMA to LAD, PRASAD to RCA, SVG to ramus, SVG to OM ([place]rot[place])  - HTN--stable  - HLD--statin  - DM type 2--A1c 7.1, insulin dripwatch closely  - Ozempic use--last dose 5/9  - CKD, stage 2--eGFR 66.5  - Postop ABLA, expected--watch closely  - Postop TCP, consumptive--watch closely  - Postop pericarditis--start " colchicine    POD4:     Patient looks good this morning, sitting in bedside chair  On room air and has been for a few days  CXR yesterday with vascular congestion, weight still up a little -- oral diuretic, creatinine normal   Endocrinology following, on SSI  On asa/statin/bb, continue colchicine for pericarditis  Continue routine care, encourage IS   Expect DC soon     Sujey Blancas PA-C  05/28/24  09:28 EDT

## 2024-05-28 NOTE — PLAN OF CARE
Assessment: Ricci Ruiz presents with functional mobility impairments which indicate the need for skilled intervention. Pt able to amb today with supervision without AD >400 ft without report of fatigue. Pt asc/desc 10 stairs using single handrail and SBA. Answered all questions from spouse/pt regarding sternal precautions and activity recommendations once home. Pt has met all goals for acute setting and does not require additional PT.

## 2024-05-28 NOTE — PROGRESS NOTES
ENDOCRINE CONSULT PROGRESS NOTE  DATE OF SERVICE: 24        PATIENT NAME: Ricci Ruiz  PATIENT : 1970 AGE: 53 y.o.  MRN NUMBER: [id number]    ==========================================================================    CHIEF COMPLAINT: Type 2 diabetes management    CARE TEAM:   Patient Care Team:  Rain Paniagua MD as PCP - General (Family Medicine)    SUBJECTIVE  Patient seen and examined.  Tolerating food.  Denied any active complaint.    ==========================================================================    CURRENT ACTIVE HOSPITAL MEDICATIONS    Scheduled Medications:  aspirin, 81 mg, Oral, Daily  atorvastatin, 40 mg, Oral, Nightly  buPROPion XL, 150 mg, Oral, Daily  cetirizine, 10 mg, Oral, Daily  chlorhexidine, 15 mL, Mouth/Throat, Q12H  colchicine, 0.6 mg, Oral, Daily  empagliflozin, 10 mg, Oral, Daily  enoxaparin, 40 mg, Subcutaneous, Q24H  furosemide, 40 mg, Oral, Daily  insulin lispro, 2-9 Units, Subcutaneous, 4x Daily AC & at Bedtime  metFORMIN ER, 500 mg, Oral, BID With Meals  metoprolol tartrate, 12.5 mg, Oral, Once  metoprolol tartrate, 37.5 mg, Oral, Q12H  mupirocin, , Each Nare, BID  pantoprazole, 40 mg, Oral, Q AM  potassium chloride, 20 mEq, Oral, Daily  povidone-iodine, , Topical, BID         PRN Medications:    acetaminophen **OR** [DISCONTINUED] acetaminophen **OR** [DISCONTINUED] acetaminophen    bisacodyl    bisacodyl    Calcium Replacement - Follow Nurse / BPA Driven Protocol    cyclobenzaprine    dextrose    dextrose    glucagon (human recombinant)    glucagon (human recombinant)    HYDROcodone-acetaminophen    magnesium hydroxide    Magnesium Standard Dose Replacement - Follow Nurse / BPA Driven Protocol    [DISCONTINUED] Morphine **AND** naloxone    nitroglycerin    ondansetron    Phosphorus Replacement - Follow Nurse / BPA Driven Protocol    polyethylene glycol    Potassium Replacement - Follow Nurse / BPA Driven Protocol    senna-docusate sodium      ==========================================================================    OBJECTIVE    Vitals:    05/28/24 1600   BP: 138/79   Pulse: 95   Resp:    Temp:    SpO2:       Body mass index is 26.59 kg/m².     General - A&Ox3, NAD, Calm, sternal wound healing    ==========================================================================    LAB EVALUATION    Lab Results   Component Value Date    GLUCOSE 127 (H) 05/28/2024    BUN 14 05/28/2024    CREATININE 1.09 05/28/2024    EGFRIFNONA 60 (L) 09/01/2021    EGFRIFAFRI 97 03/02/2016    BCR 12.8 05/28/2024    K 4.5 05/28/2024    CO2 25.9 05/28/2024    CALCIUM 8.7 05/28/2024    ALBUMIN 4.9 05/24/2024    LABIL2 1.3 05/30/2018    AST 15 05/23/2024    ALT 18 05/23/2024       Lab Results   Component Value Date    HGBA1C 7.15 (H) 05/23/2024    HGBA1C 7.10 (H) 05/09/2024    HGBA1C 6.30 (H) 03/10/2023     Lab Results   Component Value Date    MICROALBUR 3.8 09/09/2022    CREATININE 1.09 05/28/2024     Results from last 7 days   Lab Units 05/28/24  1539 05/28/24  1146 05/28/24  0722 05/27/24  2140 05/27/24  1615 05/27/24  1246   GLUCOSE mg/dL 109* 192* 128* 255* 194* 196*       ==========================================================================    ASSESSMENT AND PLAN    # Type 2 diabetes melitis with hyperglycemia  - Patient is s/p CABG, POD 4  - New current regimen:  Metformin 500 mg twice a day  Jardiance 10 mg p.o. daily  Sliding scale insulin  - Patient is stable to discharge from endocrine standpoint on following regimen:  Metformin 500 mg twice a day  Jardiance 10 mg p.o. daily  Ozempic 0.5 mg once a week  - Will review blood sugar for next 24 hours and adjust therapy accordingly  - Patient can either continue to follow-up with primary care as outpatient or can be seen in the endocrine clinic in 4 to 6 weeks time    # Coronary artery disease s/p CABG on 5/24/2024    Will follow with you.  Rest as per primary team.    Part of this note may be an electronic  transcription/translation of spoken language to printed text using the Dragon Dictation System.     Note: Portions of this note may have been copied from previous notes but documentation have been reviewed and edited as necessary to support clinical decision making for today's visit.  The time of this note does not reflect the time I saw the patient but the time that this note was written.    ==========================================================================  Orion Little MD  Department of Endocrine, Diabetes and Metabolism  [place], [place]  ==========================================================================

## 2024-05-28 NOTE — PAYOR COMM NOTE
"This is clinical update for Ricci Ruiz  Reference/Auth#: [id number]     AUTHORIZATION PENDING:     Please call or fax determination to contact below.   Thank you.    YADY Llamas, RN, CCM  Utilization Review Nurse  [facility]  Direct & confidential phone # [phone]  Fax # [phone]    Ricci Ruiz \"Arslan\" (53 y.o. Male)       Date of Birth   1970    Social Security Number       Address   [address] [place] [address] [place] [address]    Home Phone   [phone]    MRN   [id number]       Sikh   None    Marital Status                               Admission Date   5/23/24    Admission Type   Elective    Admitting Provider   Poncho Alvarez MD    Attending Provider   Poncho Alvarez MD    Department, Room/Bed   [facility] CARDIOVASCULAR CARE UNIT, 2209/1       Discharge Date       Discharge Disposition       Discharge Destination                                 Attending Provider: Poncho Alvarez MD    Allergies: No Known Allergies    Isolation: None   Infection: None   Code Status: CPR    Ht: 181.6 cm (71.5\")   Wt: 87.7 kg (193 lb 5.5 oz)    Admission Cmt: None   Principal Problem: Coronary artery disease involving native coronary artery of native heart [I25.10]                   Active Insurance as of 5/23/2024       Primary Coverage       Payor Plan Insurance Group Employer/Plan Group    CIGNA CIGNA [id number]       Payor Plan Address Payor Plan Phone Number Payor Plan Fax Number Effective Dates    [id number] [phone]  1/1/2024 - None Entered    [facility] [place] [address]         Subscriber Name Subscriber Birth Date Member ID       RICCI RUIZ 1970 [id number]                     Emergency Contacts        (Rel.) Home Phone Work Phone Mobile Phone    AMISHA RUIZ (Spouse) [phone] -- --                 Operative/Procedure Notes (last 7 days)        Kim" MD Poncho at 05/24/24 1259          Operative Note    Date of Dictation: 05/24/24    Date of Procedure: 05/24/24    Referring Physician: Alejandro Nagy MD    Preoperative diagnosis: Multivessel CAD    Postoperative diagnosis: Same    Procedure:   1. CABG x 4 (LIMA to LAD, PRASAD to RCA, SVG to Ramus, SVG to OM)  2. EVH of the left legs    Surgeon: Poncho Alvarez MD     Assistants: YOVANY Smith was responsible for performing the following activities: Cardiac Surgery First assist, Endoscopic Vein [place] for CABG, surgical wound closure and their skilled assistance was necessary for the success of this case.     Anesthesia: General endotracheal anesthesia and SOFIA    Findings:  The saphenous vein was harvested endoscopically form the both  leg. The vein had a diameter of 4 mm and was of good quality. The coronaries had a diameter of 2 mm and were of good quality.      Estimated Blood Loss:  200 mL of Cell Saver returned.    STS Data: The STS Risk score discussed with the patient and family.  Counseling was done regarding abuse of tobacco, alcohol and drugs as needed. They understand and wish to proceed. The antibiotics and b blockers were given in the STS required window.          Description of the procedure:     The patient was placed supine on the operative table. General anesthesia was given and lines placed. The patient was prepped and draped using the usual sterile technique. A median sternotomy was performed with a scalpel and the layers carried down to the sternum using the electrocautery. The sternum was split in the midline using a vertical oscillating saw. Hemostasis was achieved. The LIMA and PRASAD were harvested skeletonized and prepared with papaverine. They were of good quality. 300 units/kg of IV heparin was given to an ACT over 400. A Favaloro retractor was placed and the mediastinum exposed, the pericardium was opened and the edges tacked to the wound. Cannulation sutures were placed in  the ascending aorta and right atrium. Small cannulas were placed and aorto right atrial cardiopulmonary bypass was started. Cardioplegia cannulas were placed. Cardiopulmonary bypass was then established for 68 minutes drifting to 34°C at appropriate flow rates.  The aorta was crossclamped for 54 minutes and we gave 1000 cc of antegrade cold blood cardioplegia then 200L of retrograde cold blood cardioplegia and then repeated doses every 10 to 15 minutes to good effect. 2veins were anastomosed to the ascending aorta with 6-0 Prolene and marked with washers.  The first vein was sewn to the ramus intermedius with 7-0 Prolene.  The next vein was sewn to obtuse marginal of the circunflex artery with 7-0 Prolene. The LIMA was sewn to the distal LAD with 7-0 Prolene. The PRASAD was sewn to the distal RCA with 7-0 Prolene. A warm dose of cardioplegia was given and then the aortic clamp removed as well as the LIMA and PRASAD bulldog clamp. All anastomoses were checked and had good flow and morphology. The left and right pleural space were suctioned and the lungs ventilated. The heart was paced till regular atrial rhythm resumed. I allowed the heart to eject and once hemodynamics were acceptable, then the CPB was discontinued and the venous and cardioplegia cannulas removed. The matching dose of protamine was given and the aortic cannula removed as well. AV temporary wires and pleural and mediastinal chest tubes were placed and the wound sprayed with platelet rich plasma. The sternum was closed with single and double wires and soft tissue in layers of reabsorbable material. The wounds were covered with sterile dressings.       Specimen removed:  none    CPB time:  68 minutes.    Aortic clamp time:  54 minutes    Complications:  none           Disposition: Cardiovascular recovery room           Condition: Critical but stable.     Electronically signed by Poncho Alvarez MD at 05/24/24 7059          Physician Progress Notes  "(last 48 hours)        Sujey Blancas PA-C at 05/28/24 0928           LOS: 5 days   Patient Care Team:  Rain Paniagua MD as PCP - General (Family Medicine)    Chief Complaint: post-op    Subjective     Subjective:  Symptoms:  Stable.  No shortness of breath or chest pain.    Diet:  Adequate intake.  No nausea or vomiting.    Activity level: Returning to normal.    Pain:  He reports no pain.      Objective     Vital Signs  Temp:  [97.5 °F (36.4 °C)-98.7 °F (37.1 °C)] 98.4 °F (36.9 °C)  Heart Rate:  [] 88  Resp:  [14-16] 14  BP: (107-147)/(68-85) 132/79  Body mass index is 26.59 kg/m².    Intake/Output Summary (Last 24 hours) at 5/28/2024 0928  Last data filed at 5/28/2024 0829  Gross per 24 hour   Intake 1403 ml   Output 2950 ml   Net -1547 ml     I/O this shift:  In: 240 [P.O.:240]  Out: -       Wt Readings from Last 3 Encounters:   05/28/24 87.7 kg (193 lb 5.5 oz)   05/13/24 86.2 kg (190 lb)   05/06/24 86.2 kg (190 lb)       Flowsheet Rows      Flowsheet Row First Filed Value   Admission Height 181.6 cm (71.5\") Documented at 05/23/2024 1104   Admission Weight 86.6 kg (190 lb 14.7 oz) Documented at 05/23/2024 1104            Objective:  General Appearance:  Comfortable, well-appearing, in no acute distress and not in pain.    Vital signs: (most recent): Blood pressure 122/77, pulse 88, temperature 98.2 °F (36.8 °C), temperature source Oral, resp. rate 14, height 181.6 cm (71.5\"), weight 87.7 kg (193 lb 5.5 oz), SpO2 96%.  Vital signs are normal.  No fever.    Output: Producing urine and producing stool.    Lungs:  Normal effort and normal respiratory rate.  There are decreased breath sounds.    Heart: Normal rate.  Regular rhythm.    Abdomen: Bowel sounds are normal.     Extremities: Normal range of motion.  There is no dependent edema.    Neurological: Patient is alert and oriented to person, place and time.    Skin:  Warm and dry.  (Sternal incision without erythema, edema, or drainage " ")              Results Review:        Results from last 7 days   Lab Units 05/28/24 0448 05/27/24  0437 05/26/24  0349   WBC 10*3/mm3 7.70 10.88* 10.94*   HEMOGLOBIN g/dL 10.7* 10.7* 9.8*   HEMATOCRIT % 32.8* 33.1* 30.1*   PLATELETS 10*3/mm3 144 121* 101*         PT/INR:  No results found for: \"PROTIME\"/No results found for: \"INR\"    Results from last 7 days   Lab Units 05/28/24 0448 05/27/24  0829 05/27/24  0437 05/26/24  1016 05/26/24  0349   SODIUM mmol/L 141  --  138  --  139   POTASSIUM mmol/L 3.4* 4.1 3.9   < > 3.7   CHLORIDE mmol/L 106  --  105  --  106   CO2 mmol/L 25.9  --  23.5  --  22.7   BUN mg/dL 14  --  12  --  16   CREATININE mg/dL 1.09  --  1.10  --  1.12   GLUCOSE mg/dL 127*  --  128*  --  133*   CALCIUM mg/dL 8.7  --  8.8  --  8.7    < > = values in this interval not displayed.         Scheduled Meds:  aspirin, 81 mg, Oral, Daily  atorvastatin, 40 mg, Oral, Nightly  buPROPion XL, 150 mg, Oral, Daily  cetirizine, 10 mg, Oral, Daily  chlorhexidine, 15 mL, Mouth/Throat, Q12H  colchicine, 0.6 mg, Oral, Daily  empagliflozin, 10 mg, Oral, Daily  enoxaparin, 40 mg, Subcutaneous, Q24H  insulin lispro, 2-9 Units, Subcutaneous, 4x Daily AC & at Bedtime  metFORMIN ER, 500 mg, Oral, BID With Meals  metoprolol tartrate, 25 mg, Oral, Q12H  mupirocin, , Each Nare, BID  pantoprazole, 40 mg, Oral, Q AM  potassium chloride ER, 40 mEq, Oral, Q4H  povidone-iodine, , Topical, BID        Infusions:         Assessment & Plan       Coronary artery disease involving native coronary artery of native heart    Chest pain due to myocardial ischemia    Abnormal nuclear stress test    CAD (coronary artery disease)      Assessment & Plan    - MV CAD, EF 50-55% (echo)--s/p CABG x4 with LIMA to LAD, PRASAD to RCA, SVG to ramus, SVG to OM (Camporrotondo)  - HTN--stable  - HLD--statin  - DM type 2--A1c 7.1, insulin dripwatch closely  - Ozempic use--last dose 5/9  - CKD, stage 2--eGFR 66.5  - Postop ABLA, expected--watch closely  - " Postop TCP, consumptive--watch closely  - Postop pericarditis--start colchicine    POD4:     Patient looks good this morning, sitting in bedside chair  On room air and has been for a few days  CXR yesterday with vascular congestion, weight still up a little -- oral diuretic, creatinine normal   Endocrinology following, on SSI  On asa/statin/bb, continue colchicine for pericarditis  Continue routine care, encourage IS   Expect DC soon     Sujey Blancas PA-C  05/28/24  09:28 EDT     Electronically signed by Sujey Blancas PA-C at 05/28/24 1303       Alejandro Nagy MD at 05/28/24 5618          Referring Provider: Poncho Alvarez, *    Reason for follow-up: Coronary artery disease status post CABG     Patient Care Team:  Rain Paniagua MD as PCP - General (Family Medicine)      SUBJECTIVE  Sitting comfortably in chair and denies any chest pain or shortness of breath.  Able to ambulate.  Using incentive spirometry.     ROS  Review of all systems negative except as indicated.    Since I have last seen, the patient has been without any chest discomfort, shortness of breath, palpitations, dizziness or syncope.  Denies having any headache, abdominal pain, nausea, vomiting, diarrhea, constipation, loss of weight or loss of appetite.  Denies having any excessive bruising, hematuria or blood in the stool.  ROS      Personal History:    Past Medical History:   Diagnosis Date    Colon polyp 2019    Diabetes mellitus     Type 2    Hyperlipidemia        Past Surgical History:   Procedure Laterality Date    CARDIAC CATHETERIZATION N/A 5/23/2024    Procedure: Left Heart Cath with Coronary Angiography;  Surgeon: Alejandro Nagy MD;  Location: [facility] CATH INVASIVE LOCATION;  Service: Cardiovascular;  Laterality: N/A;    COLONOSCOPY  2019       Family History   Problem Relation Age of Onset    Arthritis Mother         Lupus    Diabetes Brother     Heart disease Father     Heart disease Brother        Social History     Tobacco Use     Smoking status: Never    Smokeless tobacco: Never   Vaping Use    Vaping status: Never Used   Substance Use Topics    Alcohol use: Yes     Alcohol/week: 1.0 standard drink of alcohol     Types: 1 Glasses of wine per week    Drug use: Never        Home meds:  Prior to Admission medications    Medication Sig Start Date End Date Taking? Authorizing Provider   aspirin 81 MG tablet Take 1 tablet by mouth Daily. 3/27/15  Yes Linda Banks MD   Blood Glucose Monitoring Suppl (Accu-Chek Gladis Plus) w/Device kit 1 kit Daily. 10/26/20  Yes Karen Pickard MD   buPROPion XL (WELLBUTRIN XL) 150 MG 24 hr tablet TAKE 1 TABLET BY MOUTH EVERY DAY 6/6/23  Yes Karen Picakrd MD   glucose blood (Accu-Chek Gladis Plus) test strip Use daily E11.9 9/9/22  Yes Karen Pickard MD   lisinopril (PRINIVIL,ZESTRIL) 5 MG tablet TAKE 1 TABLET BY MOUTH EVERY DAY 6/6/23  Yes Karen Pickard MD   loratadine (Claritin) 10 MG tablet Take 1 tablet by mouth Daily.   Yes Linda Banks MD   metFORMIN (GLUCOPHAGE) 1000 MG tablet TAKE 1 TABLET BY MOUTH TWICE A DAY WITH MEALS 6/6/23  Yes Karen Pickard MD   Multiple Vitamin (MULTIVITAMIN) capsule Take 1 capsule by mouth Daily. 2/10/16  Yes Linda Banks MD   nitroglycerin (NITROSTAT) 0.4 MG SL tablet Place 1 tablet under the tongue Every 5 (Five) Minutes As Needed for Chest Pain. 5/1/24  Yes Linda Banks MD   Semaglutide, 1 MG/DOSE, (Ozempic, 1 MG/DOSE,) 2 MG/1.5ML solution pen-injector Inject 0.5 mg under the skin into the appropriate area as directed 1 (One) Time Per Week.   Yes Linda Banks MD   simvastatin (ZOCOR) 40 MG tablet TAKE 1 TABLET BY MOUTH EVERY DAY 12/27/22  Yes Karen Pickard MD   vitamin D3 125 MCG (5000 UT) capsule capsule Take 1 capsule by mouth Daily. 3/10/23  Yes Karen Pickard MD   isosorbide mononitrate (IMDUR) 30 MG 24 hr tablet Take 1 tablet by mouth Every Morning. 5/23/24   Alejandro Nagy MD  "      Allergies:  Patient has no known allergies.    Scheduled Meds:aspirin, 81 mg, Oral, Daily  atorvastatin, 40 mg, Oral, Nightly  buPROPion XL, 150 mg, Oral, Daily  cetirizine, 10 mg, Oral, Daily  chlorhexidine, 15 mL, Mouth/Throat, Q12H  colchicine, 0.6 mg, Oral, Daily  empagliflozin, 10 mg, Oral, Daily  enoxaparin, 40 mg, Subcutaneous, Q24H  insulin lispro, 2-9 Units, Subcutaneous, 4x Daily AC & at Bedtime  metFORMIN ER, 500 mg, Oral, BID With Meals  metoprolol tartrate, 25 mg, Oral, Q12H  mupirocin, , Each Nare, BID  pantoprazole, 40 mg, Oral, Q AM  potassium chloride ER, 40 mEq, Oral, Q4H  povidone-iodine, , Topical, BID      Continuous Infusions:   PRN Meds:.  acetaminophen **OR** [DISCONTINUED] acetaminophen **OR** [DISCONTINUED] acetaminophen    bisacodyl    bisacodyl    Calcium Replacement - Follow Nurse / BPA Driven Protocol    cyclobenzaprine    dextrose    dextrose    glucagon (human recombinant)    glucagon (human recombinant)    HYDROcodone-acetaminophen    magnesium hydroxide    Magnesium Standard Dose Replacement - Follow Nurse / BPA Driven Protocol    [DISCONTINUED] Morphine **AND** naloxone    nitroglycerin    ondansetron    Phosphorus Replacement - Follow Nurse / BPA Driven Protocol    polyethylene glycol    Potassium Replacement - Follow Nurse / BPA Driven Protocol    senna-docusate sodium      OBJECTIVE    Vital Signs  Vitals:    05/28/24 0500 05/28/24 0600 05/28/24 0700 05/28/24 0718   BP: 112/76 117/79 132/79 132/79   BP Location:    Right arm   Patient Position:    Sitting   Pulse: 79 86 84 88   Resp:    14   Temp:    98.4 °F (36.9 °C)   TempSrc:    Oral   SpO2: 97% 93% 99% 98%   Weight:  87.7 kg (193 lb 5.5 oz)     Height:           Flowsheet Rows      Flowsheet Row First Filed Value   Admission Height 181.6 cm (71.5\") Documented at 05/23/2024 1104   Admission Weight 86.6 kg (190 lb 14.7 oz) Documented at 05/23/2024 1104              Intake/Output Summary (Last 24 hours) at 5/28/2024 " 0741  Last data filed at 5/28/2024 0600  Gross per 24 hour   Intake 1163 ml   Output 2950 ml   Net -1787 ml          Telemetry: Sinus rhythm    Physical Exam:  The patient is alert, oriented and in no distress.  Vital signs as noted above.  Head and neck revealed no carotid bruits or jugular venous distention.  No thyromegaly or lymphadenopathy is present  Lungs clear.  No wheezing.  Breath sounds are normal bilaterally.  Heart normal first and second heart sounds.  No murmur. No precordial rub is present.  No gallop is present.  Abdomen soft and nontender.  No organomegaly is present.  Extremities with good peripheral pulses without any pedal edema.  Skin warm and dry.  Musculoskeletal system is grossly normal.  CNS grossly normal.       Results Review:  I have personally reviewed the results from the time of this admission to 5/28/2024 07:41 EDT and agree with these findings:  []  Laboratory  []  Microbiology  []  Radiology  []  EKG/Telemetry   []  Cardiology/Vascular   []  Pathology  []  Old records  []  Other:    Most notable findings include:    Lab Results (last 24 hours)       Procedure Component Value Units Date/Time    POC Glucose 4x Daily Before Meals & at Bedtime [[id number]]  (Abnormal) Collected: 05/28/24 0722    Specimen: Blood Updated: 05/28/24 0723     Glucose 128 mg/dL      Comment: Serial Number: [id number]:  [id number]       Basic Metabolic Panel [[id number]]  (Abnormal) Collected: 05/28/24 0448    Specimen: Blood Updated: 05/28/24 0515     Glucose 127 mg/dL      BUN 14 mg/dL      Creatinine 1.09 mg/dL      Sodium 141 mmol/L      Potassium 3.4 mmol/L      Chloride 106 mmol/L      CO2 25.9 mmol/L      Calcium 8.7 mg/dL      BUN/Creatinine Ratio 12.8     Anion Gap 9.1 mmol/L      eGFR 80.7 mL/min/1.73     Narrative:      GFR Normal >60  Chronic Kidney Disease <60  Kidney Failure <15      CBC (No Diff) [[id number]]  (Abnormal) Collected: 05/28/24 0448    Specimen: Blood Updated: 05/28/24 0458      WBC 7.70 10*3/mm3      RBC 3.40 10*6/mm3      Hemoglobin 10.7 g/dL      Hematocrit 32.8 %      MCV 96.5 fL      MCH 31.5 pg      MCHC 32.6 g/dL      RDW 12.5 %      RDW-SD 44.9 fl      MPV 10.2 fL      Platelets 144 10*3/mm3     POC Glucose Once [[id number]]  (Abnormal) Collected: 05/27/24 2140    Specimen: Blood Updated: 05/27/24 2141     Glucose 255 mg/dL      Comment: Serial Number: [id number]:  [id number]       POC Glucose Once [[id number]]  (Abnormal) Collected: 05/27/24 1615    Specimen: Blood Updated: 05/27/24 1617     Glucose 194 mg/dL      Comment: Serial Number: [id number]:  [id number]       POC Glucose 4x Daily Before Meals & at Bedtime [[id number]]  (Abnormal) Collected: 05/27/24 1246    Specimen: Blood Updated: 05/27/24 1248     Glucose 196 mg/dL      Comment: Serial Number: [id number]:  [id number]       Potassium [[id number]]  (Normal) Collected: 05/27/24 0829    Specimen: Blood from Arm, Left Updated: 05/27/24 0903     Potassium 4.1 mmol/L     POC Glucose Once [[id number]]  (Abnormal) Collected: 05/27/24 0829    Specimen: Blood Updated: 05/27/24 0830     Glucose 209 mg/dL      Comment: Serial Number: [id number]:  [id number]               Imaging Results (Last 24 Hours)       Procedure Component Value Units Date/Time    XR Chest 1 View [[id number]] Collected: 05/27/24 1039     Updated: 05/27/24 1042    Narrative:      XR CHEST 1 VW    Date of Exam: 5/27/2024 8:26 AM EDT    Indication: post op    Comparison: 5/26/2024    Findings:  Patient is status post median sternotomy and CABG. Cardiac silhouette is magnified. Pulmonary vasculature is unremarkable. Scattered atelectasis is present within the left lung base. No significant pleural effusion or pneumothorax. Osseous structures are   unchanged.      Impression:      Impression:  1.Scattered left basilar atelectasis. No significant change since 5/26/2024.      Electronically Signed: Grzegorz Mendes MD    5/27/2024 10:40 AM EDT     Workstation ID: [id number]            LAB RESULTS (LAST 7 DAYS)    CBC  Results from last 7 days   Lab Units 05/28/24 0448 05/27/24  0437 05/26/24 0349 05/25/24 0448 05/25/24 0225 05/25/24  0152 05/24/24 2257 05/24/24 1900 05/24/24 1729 05/24/24  1305 05/23/24  1707 05/21/24  0844   WBC 10*3/mm3 7.70 10.88* 10.94*  --   --  9.93  --   --  15.22*  --  5.95 5.86   RBC 10*6/mm3 3.40* 3.41* 3.09*  --   --  3.28*  --   --  3.88*  --  4.38 4.51   HEMOGLOBIN g/dL 10.7* 10.7* 9.8*  --   --  10.1*  --   --  12.3*  --  13.7 14.4   HEMOGLOBIN, POC g/dL  --   --   --  10.0* 11.0*  --  11.6*   < >  --    < >  --   --    HEMATOCRIT % 32.8* 33.1* 30.1*  --   --  30.9*  --   --  36.2*  --  41.6 41.8   HEMATOCRIT POC %  --   --   --  30* 32*  --  34*   < >  --    < >  --   --    MCV fL 96.5 97.1* 97.4*  --   --  94.2  --   --  93.3  --  95.0 92.7   PLATELETS 10*3/mm3 144 121* 101*  --   --  123*  --   --  133*  --  174 215    < > = values in this interval not displayed.       BMP  Results from last 7 days   Lab Units 05/28/24 0448 05/27/24  0829 05/27/24 0437 05/26/24  2300 05/26/24  1659 05/26/24  1016 05/26/24 0349 05/25/24  1425 05/25/24 0448 05/25/24 0225 05/24/24 2257 05/24/24 2125 05/24/24 1900 05/24/24 1729 05/23/24  1707 05/21/24  0844   SODIUM mmol/L 141  --  138  --   --   --  139  --   --   --   --  143  --  140 139 142   POTASSIUM mmol/L 3.4* 4.1 3.9 3.4* 3.9 3.9 3.7 4.0  --   --   --  4.2  --  4.4 3.5 4.5   CHLORIDE mmol/L 106  --  105  --   --   --  106  --   --   --   --  105  --  104 103 106   CO2 mmol/L 25.9  --  23.5  --   --   --  22.7  --   --   --   --  21.0*  --  23.0 27.0 25.0   BUN mg/dL 14  --  12  --   --   --  16  --   --   --   --  17  --  15 16 19   CREATININE mg/dL 1.09  --  1.10  --   --   --  1.12  --  1.45* 1.33* 1.43* 1.26   < > 1.23 1.20 1.28*   GLUCOSE mg/dL 127*  --  128*  --   --   --  133* 159*  --   --   --  195*  --  140* 176* 147*   MAGNESIUM mg/dL  --   --   --   --   --   --    --   --   --   --   --  2.5  --  2.9*  --   --    PHOSPHORUS mg/dL  --   --   --   --   --   --   --   --   --   --   --  2.5  --  1.9*  --   --     < > = values in this interval not displayed.       CMP   Results from last 7 days   Lab Units 05/28/24  0448 05/27/24  0829 05/27/24  0437 05/26/24  2300 05/26/24  1659 05/26/24  1016 05/26/24  0349 05/25/24  1425 05/25/24  0448 05/25/24  0225 05/24/24  2257 05/24/24  2125 05/24/24  1900 05/24/24  1729 05/23/24  1707 05/21/24  0844   SODIUM mmol/L 141  --  138  --   --   --  139  --   --   --   --  143  --  140 139 142   POTASSIUM mmol/L 3.4* 4.1 3.9 3.4* 3.9 3.9 3.7 4.0  --   --   --  4.2  --  4.4 3.5 4.5   CHLORIDE mmol/L 106  --  105  --   --   --  106  --   --   --   --  105  --  104 103 106   CO2 mmol/L 25.9  --  23.5  --   --   --  22.7  --   --   --   --  21.0*  --  23.0 27.0 25.0   BUN mg/dL 14  --  12  --   --   --  16  --   --   --   --  17  --  15 16 19   CREATININE mg/dL 1.09  --  1.10  --   --   --  1.12  --  1.45* 1.33* 1.43* 1.26   < > 1.23 1.20 1.28*   GLUCOSE mg/dL 127*  --  128*  --   --   --  133* 159*  --   --   --  195*  --  140* 176* 147*   ALBUMIN g/dL  --   --   --   --   --   --   --   --   --   --   --  4.9  --  4.7 4.3  --    BILIRUBIN mg/dL  --   --   --   --   --   --   --   --   --   --   --   --   --   --  0.4  --    ALK PHOS U/L  --   --   --   --   --   --   --   --   --   --   --   --   --   --  70  --    AST (SGOT) U/L  --   --   --   --   --   --   --   --   --   --   --   --   --   --  15  --    ALT (SGPT) U/L  --   --   --   --   --   --   --   --   --   --   --   --   --   --  18  --     < > = values in this interval not displayed.       BNP        TROPONIN        CoAg  Results from last 7 days   Lab Units 05/25/24  0152 05/24/24  1729 05/23/24  1707 05/21/24  0844   INR  1.10 1.10 1.00 0.98   APTT seconds  --  25.3 26.2  --        Creatinine Clearance  Estimated Creatinine Clearance: 96.1 mL/min (by C-G formula based on SCr of  1.09 mg/dL).    ABG  Results from last 7 days   Lab Units 05/25/24  0648 05/25/24  0448 05/25/24  0225 05/24/24  2257 05/24/24  1900 05/24/24  1620 05/24/24  1532 05/24/24  1505   PH, ARTERIAL pH units  --  7.365 7.574* 7.374 7.393 7.370 7.320* 7.310*   PCO2, ARTERIAL mm Hg  --  39.1 20.7* 33.5* 38.2 46.3* 50.5* 55.6*   PO2 ART mm Hg  --  115.9* 179.2* 109.1* 302.9* 258.0* 349.0* 413.0*   O2 SATURATION ART %  --  98.4* 99.8* 98.2* 99.9* 100.0* 100.0* 100.0*   BASE EXCESS ART mmol/L -2.8* -2.7* -1.3* -4.9* -1.4* 1.0 0.0 2.0       Radiology  XR Chest 1 View    Result Date: 5/27/2024  Impression: 1.Scattered left basilar atelectasis. No significant change since 5/26/2024. Electronically Signed: Grzegorz Mendes MD  5/27/2024 10:40 AM EDT  Workstation ID: [id number]       EKG  I personally viewed and interpreted the patient's EKG/Telemetry data:  ECG 12 Lead Other; Post-op open heart   Preliminary Result   HEART RATE= 95  bpm   RR Interval= 636  ms   OK Interval= 141  ms   P Horizontal Axis= -25  deg   P Front Axis= 44  deg   QRSD Interval= 87  ms   QT Interval= 339  ms   QTcB= 425  ms   QRS Axis= -1  deg   T Wave Axis= 60  deg   - ABNORMAL ECG -   Sinus rhythm   ST elevation suggests acute pericarditis   When compared with ECG of 25-May-2024 4:27:21,   No significant change   Electronically Signed By:    Date and Time of Study: 2024-05-26 05:50:59      ECG 12 Lead Other; Post-op open heart   Final Result   HEART RATE= 93  bpm   RR Interval= 640  ms   OK Interval= 172  ms   P Horizontal Axis= -4  deg   P Front Axis= 47  deg   QRSD Interval= 81  ms   QT Interval= 358  ms   QTcB= 448  ms   QRS Axis= 8  deg   T Wave Axis= 33  deg   - ABNORMAL ECG -   Sinus rhythm   Anterior infarct, possibly acute   ST elevation, consider inferior injury   When compared with ECG of 24-May-2024 17:46:44,   No significant change   Electronically Signed By: Vinicio Lino (RAH) 25-May-2024 07:01:31   Date and Time of Study: 2024-05-25 04:27:21       ECG 12 Lead Other; Post-op open heart   Final Result   HEART RATE= 85  bpm   RR Interval= 700  ms   IA Interval= 170  ms   P Horizontal Axis= -21  deg   P Front Axis= 75  deg   QRSD Interval= 100  ms   QT Interval= 400  ms   QTcB= 478  ms   QRS Axis= -9  deg   T Wave Axis= 47  deg   - NORMAL ECG -   Sinus rhythm   When compared with ECG of 23-May-2024 22:22:51,   No significant change   Electronically Signed By: Vinicio Lino (RAH) 25-May-2024 07:02:14   Date and Time of Study: 2024-05-24 17:46:44      ECG 12 Lead Pre-Op / Pre-Procedure   Final Result   HEART RATE= 74  bpm   RR Interval= 808  ms   IA Interval= 184  ms   P Horizontal Axis= 10  deg   P Front Axis= 42  deg   QRSD Interval= 87  ms   QT Interval= 371  ms   QTcB= 413  ms   QRS Axis= 8  deg   T Wave Axis= -3  deg   - NORMAL ECG -   Sinus rhythm   When compared with ECG of 14-Oct-2020 14:36:03,   Significant rate decrease   Significant repolarization change   Electronically Signed By: Vinicio Lino (RAH) 24-May-2024 07:39:42   Date and Time of Study: 2024-05-23 22:22:51            Echocardiogram:    Results for orders placed in visit on 05/24/24    Intra-Op Anesthesia SOFIA    Narrative  Intra-Op Anesthesia SOFIA    Procedure Performed: Intra-Op Anesthesia SOFIA  Start Time:  End Time:    Preanesthesia Checklist:  Patient identified, IV assessed, risks and benefits discussed, monitors and equipment assessed, procedure being performed at surgeon's request and anesthesia consent obtained.    General Procedure Information  Diagnostic Indications for Echo:  assessment of surgical repair and hemodynamic monitoring  Location performed:  OR  Intubated  Bite block placed  Heart visualized  Probe Insertion:  Easy  Probe Type:  Biplane and multiplane  Modalities:  Color flow mapping, pulse wave Doppler and continuous wave Doppler    Echocardiographic and Doppler Measurements    Ventricles    Right Ventricle:  Cavity size normal.  Hypertrophy not present.  Thrombus not  present.  Global function normal.  Ejection Fraction 60%.  Left Ventricle:  Cavity size normal.  Thrombus not present.  Global Function normal.  Ejection Fraction 60%.    Ventricular Regional Function:  1- Basal Anteroseptal:  normal  2- Basal Anterior:  normal  3- Basal Anterolateral:  normal  4- Basal Inferolateral:  normal  5- Basal Inferior:  normal  6- Basal Inferoseptal:  normal  7- Mid Anteroseptal:  normal  8- Mid Anterior:  normal  9- Mid Anterolateral:  normal  10- Mid Inferolateral:  normal  11- Mid Inferior:  normal  12- Mid Inferoseptal:  normal  13- Apical Anterior:  normal  14- Apical Lateral:  normal  15- Apical Inferior:  normal  16- Apical Septal:  normal  17- [place]:  normal      Valves    Aortic Valve:  Annulus normal.  Stenosis not present.  Regurgitation absent.  Leaflets normal.  Leaflet motions normal.    Mitral Valve:  Annulus normal.  Stenosis not present.  Regurgitation absent.  Leaflets normal.  Leaflet motions normal.    Tricuspid Valve:  Annulus normal.  Stenosis not present.  Regurgitation absent.  Leaflets normal.  Leaflet motions normal.  Pulmonic Valve:  Annulus normal.  Stenosis not present.  Regurgitation absent.      Aorta    Ascending Aorta:  Size normal.  Dissection not present.  Plaque thickness less than 3 mm.  Mobile plaque not present.  Aortic Arch:  Size normal.  Dissection not present.  Plaque thickness less than 3 mm.  Mobile plaque not present.  Descending Aorta:  Size normal.  Dissection not present.  Plaque thickness less than 3 mm.  Mobile plaque not present.      Atria    Right Atrium:  Size normal.  Left Atrium:  Size normal.  Spontaneous echo contrast not present.  Thrombus not present.  Tumor not present.  Device not present.  Left atrial appendage normal.      Septa        Ventricular Septum:  Intra-ventricular septum morphology normal.        Other Findings  Pleural Effusion:  none  Pulmonary Arteries:  normal      Anesthesia Information  Performed  Personally  Anesthesiologist:  Juan Moya MD      Echocardiogram Comments:  SOFIA placed easily, no resistance, bite guard, lubricated      Pre cpb  LV no wma ef 60  RV nl sf  MV AV TV wnl      S/p cabg x 4  No change        Stress Test:  Results for orders placed during the hospital encounter of 05/13/24    Stress test with myocardial perfusion one day    Interpretation Summary    Patient exercised for 10 minutes 21 seconds achieving 11.8 METS.    Findings consistent with an abnormal ECG stress test.  ST depressions in lateral leads.    Left ventricular ejection fraction is hyperdynamic (Calculated EF > 70%).    Myocardial perfusion imaging indicates a moderate-sized, severe area of ischemia located in the inferior wall and septal wall.    Impressions are consistent with an intermediate risk study.         Cardiac Catheterization:  Results for orders placed during the hospital encounter of 05/23/24    Cardiac Catheterization/Vascular Study    Conclusion  OPERATORS:  1. Alejandro Nagy M.D., Attending Cardiologist      PROCEDURE PERFORMED.  Ultrasound guided vascular access  Left heart catheterization  Coronary Angiogram 34389  Moderate Sedation    INDICATIONS FOR PROCEDURE.  53 years old man with unstable angina and abnormal nuclear stress test.  After discussing the risk and benefit of the procedure he was brought into the Cath Lab for coronary angiography.    PROCEDURE IN DETAIL.  Informed consent was obtained from the patient after explaining the risks, benefits, and alternative options of the procedure. After obtaining informed consent, the patient was brought to the cath lab and was prepped in a sterile fashion. Lidocaine 1% was used for local anesthesia into the right radial access site. The right radial artery was accessed under direct ultrasound visualization  with an angiocath needle via modified Seldinger technique. A 6F slender sheath was inserted successfully. Afterwards, 6F JR4  was advanced over a  wire into the ascending aorta and used to cross the AV and obtain LV pressures.  AV gradient obtained via pullback technique. JR4 and JL3.5 diagnostic catheters were used to engage the ostia of the RCA and LM respectively. Images of the right and left coronary systems were obtained. All the catheters were exchanged over a wire and subsequently removed. The patient tolerated the procedure well without any complications. The pictures were reviewed at the end of the procedure. TR band applied to right wrist for hemostasis and inflated with 15 cc of air. No complications were encountered.    HEMODYNAMICS.  LV: 135/8, 17 mmHg  AO: 136/82, 105 mmHg  No significant gradient across the aortic valve during pullback of JR4 catheter.  LV gram was not performed due to recent echocardiogram.    FINDINGS.    Coronary Angiogram.    Left main. Left main is a large-caliber vessel which gives rise to the Left Anterior Descending, ramus and the Left circumflex.  Left main is angiographically free from any significant disease    Left Anterior Descending Artery. LAD is a large vessel which gives rise to several septal perforators and several diagonal branches.  Mid LAD has 80% stenosis at the origin of diagonal vessel which also has 70 to 80% stenosis.    Ramus intermedius has proximal segment 70% stenosis.    Left Circumflex. The LCx is a medium caliber which gives rise to marginals.  OM1 has proximal 50% stenosis.  OM 2 has proximal segment 70% stenosis.    Right Coronary Artery. The RCA is a dominant vessel which gives rise to several small caliber branches including PDA and PLV.  Mid RCA has 99% long tubular stenosis.  PLV has mid segment 50% stenosis.    IMPRESSIONS.  1 Multivessel obstructive coronary artery disease including LAD, ramus and RCA.  2.  Mildly elevated LVEDP    RECOMMENDATIONS.  1.  Consult cardiac surgery for CABG  2.  Start Imdur    Electronically signed by Alejandro Nagy MD, 05/23/24, 12:30 PM EDT.         Other:          ASSESSMENT & PLAN:    Principal Problem:    Coronary artery disease involving native coronary artery of native heart  Active Problems:    Chest pain due to myocardial ischemia    Abnormal nuclear stress test    CAD (coronary artery disease)    Unstable angina/coronary artery disease (Primary)  Multiple cardiovascular risk factors including hypertension, hyperlipidemia, diabetes, family history of premature coronary disease.  ECG is normal.  Abnormal nuclear stress test leading to cardiac catheterization  Cardiac cath 2024 showed multivessel coronary artery disease including 99% RCA and 80% LAD.  CABGX4 2024: (LIMA to LAD, PRASAD to RCA, SVG to Ramus, SVG to OM)   Continue aspirin, statin and beta-blocker.  Encouraged incentive spirometry  Cardiac rehab at the time of discharge     Primary hypertension  Currently well-controlled on beta-blocker.  Uptitrate beta-blocker for better heart rate control  Can add lisinopril: Discussed about adding lisinopril as outpatient     Mixed hyperlipidemia  LDL 80, HDL 48, triglyceride 98 and total cholesterol 146  Goal LDL is less than 50  Continue high intensity statin     Type 2 diabetes mellitus without complication, without long-term current use of insulin  Patient is now on metformin and Jardiance.  He was on Trulicity prior to admission  A1c 7.1     Anxiety and depression  He is on bupropion      Alejandro Nagy MD  24  07:41 EDT                  Electronically signed by Alejandro Nagy MD at 24 1012       Orion Little MD at 24 1801              ENDOCRINE CONSULT PROGRESS NOTE  DATE OF SERVICE: 24        PATIENT NAME: Ricci Ruiz  PATIENT : 1970 AGE: 53 y.o.  MRN NUMBER: [id number]    ==========================================================================    CHIEF COMPLAINT: Type 2 diabetes management    CARE TEAM:   Patient Care Team:  Rain Paniagua MD as PCP - General (Family Medicine)    SUBJECTIVE  Patient seen  and examined.  Tolerating food.  No acute complaint.    ==========================================================================    CURRENT ACTIVE HOSPITAL MEDICATIONS    Scheduled Medications:  aspirin, 81 mg, Oral, Daily  atorvastatin, 40 mg, Oral, Nightly  buPROPion XL, 150 mg, Oral, Daily  cetirizine, 10 mg, Oral, Daily  chlorhexidine, 15 mL, Mouth/Throat, Q12H  [START ON 5/28/2024] colchicine, 0.6 mg, Oral, Daily  [START ON 5/28/2024] empagliflozin, 10 mg, Oral, Daily  enoxaparin, 40 mg, Subcutaneous, Q24H  insulin lispro, 2-9 Units, Subcutaneous, 4x Daily AC & at Bedtime  metFORMIN ER, 500 mg, Oral, BID With Meals  metoprolol tartrate, 25 mg, Oral, Q12H  mupirocin, , Each Nare, BID  pantoprazole, 40 mg, Oral, Q AM  povidone-iodine, , Topical, BID         PRN Medications:    acetaminophen **OR** [DISCONTINUED] acetaminophen **OR** [DISCONTINUED] acetaminophen    bisacodyl    bisacodyl    Calcium Replacement - Follow Nurse / BPA Driven Protocol    cyclobenzaprine    dextrose    dextrose    glucagon (human recombinant)    glucagon (human recombinant)    HYDROcodone-acetaminophen    magnesium hydroxide    Magnesium Standard Dose Replacement - Follow Nurse / BPA Driven Protocol    [DISCONTINUED] Morphine **AND** naloxone    nitroglycerin    ondansetron    Phosphorus Replacement - Follow Nurse / BPA Driven Protocol    polyethylene glycol    Potassium Replacement - Follow Nurse / BPA Driven Protocol    senna-docusate sodium     ==========================================================================    OBJECTIVE    Vitals:    05/27/24 1625   BP: 115/74   Pulse:    Resp: 16   Temp: 98.7 °F (37.1 °C)   SpO2:       Body mass index is 27.11 kg/m².     General - A&Ox3, NAD, Calm, sternal wound healing    ==========================================================================    LAB EVALUATION    Lab Results   Component Value Date    GLUCOSE 128 (H) 05/27/2024    BUN 12 05/27/2024    CREATININE 1.10 05/27/2024     EGFRIFNONA 60 (L) 09/01/2021    EGFRIFAFRI 97 03/02/2016    BCR 10.9 05/27/2024    K 4.1 05/27/2024    CO2 23.5 05/27/2024    CALCIUM 8.8 05/27/2024    ALBUMIN 4.9 05/24/2024    LABIL2 1.3 05/30/2018    AST 15 05/23/2024    ALT 18 05/23/2024       Lab Results   Component Value Date    HGBA1C 7.15 (H) 05/23/2024    HGBA1C 7.10 (H) 05/09/2024    HGBA1C 6.30 (H) 03/10/2023     Lab Results   Component Value Date    MICROALBUR 3.8 09/09/2022    CREATININE 1.10 05/27/2024     Results from last 7 days   Lab Units 05/27/24  1615 05/27/24  1246 05/27/24  0829 05/27/24  0715 05/27/24  0534 05/27/24  0434   GLUCOSE mg/dL 194* 196* 209* 142* 122* 125*       ==========================================================================    ASSESSMENT AND PLAN    # Type 2 diabetes melitis with hyperglycemia  - Patient is s/p CABG, POD 3  - Insulin drip discontinued this a.m.  - Restarted patient on metformin 500 mg p.o. twice daily  - Currently on sliding scale coverage  - Patient to restart Ozempic therapy as outpatient  - Start Jardiance 10 mg p.o. daily tomorrow  - Will review blood sugar for next 24 hours and adjust therapy accordingly    # Coronary artery disease s/p CABG on 5/24/2024    Will follow with you.  Rest as per primary team.    Part of this note may be an electronic transcription/translation of spoken language to printed text using the Dragon Dictation System.     Note: Portions of this note may have been copied from previous notes but documentation have been reviewed and edited as necessary to support clinical decision making for today's visit.  The time of this note does not reflect the time I saw the patient but the time that this note was written.    ==========================================================================  Orion Little MD  Department of Endocrine, Diabetes and Metabolism  [facility],  [place]  ==========================================================================      Electronically signed by Orion Little MD at 05/27/24 1802       Cordell Soriano MD at 05/27/24 1616          Referring Provider: Poncho Alvarez, *    Reason for follow-up: Multivessel coronary artery disease     Patient Care Team:  Rain Paniagua MD as PCP - General (Family Medicine)      SUBJECTIVE  No chest pain or shortness of breath.  Sitting in chair comfortably.    Review of Systems   Constitutional: Negative for fever and malaise/fatigue.   HENT:  Negative for ear pain and nosebleeds.    Eyes:  Negative for blurred vision and double vision.   Cardiovascular:  Negative for chest pain, dyspnea on exertion and palpitations.   Respiratory:  Negative for cough and shortness of breath.    Skin:  Negative for rash.   Musculoskeletal:  Negative for joint pain.   Gastrointestinal:  Negative for abdominal pain, nausea and vomiting.   Neurological:  Negative for focal weakness and headaches.   Psychiatric/Behavioral:  Negative for depression. The patient is not nervous/anxious.    All other systems reviewed and are negative.        Personal History:    Past Medical History:   Diagnosis Date    Colon polyp 2019    Diabetes mellitus     Type 2    Hyperlipidemia        Past Surgical History:   Procedure Laterality Date    CARDIAC CATHETERIZATION N/A 5/23/2024    Procedure: Left Heart Cath with Coronary Angiography;  Surgeon: Alejandro Nagy MD;  Location: [facility] CATH INVASIVE LOCATION;  Service: Cardiovascular;  Laterality: N/A;    COLONOSCOPY  2019       Family History   Problem Relation Age of Onset    Arthritis Mother         Lupus    Diabetes Brother     Heart disease Father     Heart disease Brother        Social History     Tobacco Use    Smoking status: Never    Smokeless tobacco: Never   Vaping Use    Vaping status: Never Used   Substance Use Topics    Alcohol use: Yes     Alcohol/week: 1.0 standard drink of alcohol      Types: 1 Glasses of wine per week    Drug use: Never        Home meds:  Prior to Admission medications    Medication Sig Start Date End Date Taking? Authorizing Provider   aspirin 81 MG tablet Take 1 tablet by mouth Daily. 3/27/15  Yes Linda Banks MD   Blood Glucose Monitoring Suppl (Accu-Chek Gladis Plus) w/Device kit 1 kit Daily. 10/26/20  Yes Karen Pickard MD   buPROPion XL (WELLBUTRIN XL) 150 MG 24 hr tablet TAKE 1 TABLET BY MOUTH EVERY DAY 6/6/23  Yes Karen Pickard MD   glucose blood (Accu-Chek Gladis Plus) test strip Use daily E11.9 9/9/22  Yes Karen Pickard MD   lisinopril (PRINIVIL,ZESTRIL) 5 MG tablet TAKE 1 TABLET BY MOUTH EVERY DAY 6/6/23  Yes Karen Pickard MD   loratadine (Claritin) 10 MG tablet Take 1 tablet by mouth Daily.   Yes Linda Banks MD   metFORMIN (GLUCOPHAGE) 1000 MG tablet TAKE 1 TABLET BY MOUTH TWICE A DAY WITH MEALS 6/6/23  Yes Karen Pickard MD   Multiple Vitamin (MULTIVITAMIN) capsule Take 1 capsule by mouth Daily. 2/10/16  Yes Linda Banks MD   nitroglycerin (NITROSTAT) 0.4 MG SL tablet Place 1 tablet under the tongue Every 5 (Five) Minutes As Needed for Chest Pain. 5/1/24  Yes Linda Banks MD   Semaglutide, 1 MG/DOSE, (Ozempic, 1 MG/DOSE,) 2 MG/1.5ML solution pen-injector Inject 0.5 mg under the skin into the appropriate area as directed 1 (One) Time Per Week.   Yes Linda Banks MD   simvastatin (ZOCOR) 40 MG tablet TAKE 1 TABLET BY MOUTH EVERY DAY 12/27/22  Yes Karen Pickard MD   vitamin D3 125 MCG (5000 UT) capsule capsule Take 1 capsule by mouth Daily. 3/10/23  Yes Karen Pickard MD   isosorbide mononitrate (IMDUR) 30 MG 24 hr tablet Take 1 tablet by mouth Every Morning. 5/23/24   Alejandro Nagy MD       Allergies:  Patient has no known allergies.    Scheduled Meds:aspirin, 81 mg, Oral, Daily  atorvastatin, 40 mg, Oral, Nightly  buPROPion XL, 150 mg, Oral, Daily  cetirizine, 10 mg, Oral,  "Daily  chlorhexidine, 15 mL, Mouth/Throat, Q12H  [START ON 5/28/2024] colchicine, 0.6 mg, Oral, Daily  enoxaparin, 40 mg, Subcutaneous, Q24H  insulin lispro, 2-9 Units, Subcutaneous, 4x Daily AC & at Bedtime  metFORMIN ER, 500 mg, Oral, BID With Meals  metoprolol tartrate, 25 mg, Oral, Q12H  mupirocin, , Each Nare, BID  pantoprazole, 40 mg, Oral, Q AM  povidone-iodine, , Topical, BID      Continuous Infusions:insulin, 0-100 Units/hr, Last Rate: Stopped (05/27/24 0945)      PRN Meds:.  acetaminophen **OR** [DISCONTINUED] acetaminophen **OR** [DISCONTINUED] acetaminophen    bisacodyl    bisacodyl    Calcium Replacement - Follow Nurse / BPA Driven Protocol    cyclobenzaprine    dextrose    dextrose    dextrose    dextrose    glucagon (human recombinant)    glucagon (human recombinant)    HYDROcodone-acetaminophen    magnesium hydroxide    Magnesium Standard Dose Replacement - Follow Nurse / BPA Driven Protocol    [DISCONTINUED] Morphine **AND** naloxone    nitroglycerin    ondansetron    Phosphorus Replacement - Follow Nurse / BPA Driven Protocol    polyethylene glycol    Potassium Replacement - Follow Nurse / BPA Driven Protocol    senna-docusate sodium      OBJECTIVE    Vital Signs  Vitals:    05/27/24 1300 05/27/24 1400 05/27/24 1500 05/27/24 1600   BP: 131/75 107/68 147/85 115/74   BP Location:       Patient Position:       Pulse: 95 91 93 92   Resp:       Temp:       TempSrc:       SpO2: 98% 97% 96% 99%   Weight:       Height:           Flowsheet Rows      Flowsheet Row First Filed Value   Admission Height 181.6 cm (71.5\") Documented at 05/23/2024 1104   Admission Weight 86.6 kg (190 lb 14.7 oz) Documented at 05/23/2024 1104              Intake/Output Summary (Last 24 hours) at 5/27/2024 1616  Last data filed at 5/27/2024 1400  Gross per 24 hour   Intake 1277 ml   Output 2350 ml   Net -1073 ml          Telemetry: Normal sinus rhythm    Physical Exam:  The patient is alert, oriented and in no distress.  Vital signs " as noted above.  Head and neck revealed no carotid bruits or jugular venous distention.  No thyromegaly or lymphadenopathy is present  Lungs clear.  No wheezing.  Breath sounds are normal bilaterally.  Heart normal first and second heart sounds.  No murmur. No precordial rub is present.  No gallop is present.  Abdomen soft and nontender.  No organomegaly is present.  Extremities with good peripheral pulses without any pedal edema.  Skin warm and dry.  Musculoskeletal system is grossly normal.  CNS grossly normal.       Results Review:  I have personally reviewed the results from the time of this admission to 5/27/2024 16:16 EDT and agree with these findings:  []  Laboratory  []  Microbiology  []  Radiology  []  EKG/Telemetry   []  Cardiology/Vascular   []  Pathology  []  Old records  []  Other:    Most notable findings include:    Lab Results (last 24 hours)       Procedure Component Value Units Date/Time    POC Glucose 4x Daily Before Meals & at Bedtime [[id number]]  (Abnormal) Collected: 05/27/24 1246    Specimen: Blood Updated: 05/27/24 1248     Glucose 196 mg/dL      Comment: Serial Number: [id number]:  [id number]       Potassium [[id number]]  (Normal) Collected: 05/27/24 0829    Specimen: Blood from Arm, Left Updated: 05/27/24 0903     Potassium 4.1 mmol/L     POC Glucose Once [[id number]]  (Abnormal) Collected: 05/27/24 0829    Specimen: Blood Updated: 05/27/24 0830     Glucose 209 mg/dL      Comment: Serial Number: [id number]:  [id number]       POC Glucose Once [[id number]]  (Abnormal) Collected: 05/27/24 0715    Specimen: Blood Updated: 05/27/24 0716     Glucose 142 mg/dL      Comment: Serial Number: [id number]:  [id number]       POC Glucose Once [[id number]]  (Abnormal) Collected: 05/27/24 0534    Specimen: Blood Updated: 05/27/24 0535     Glucose 122 mg/dL      Comment: Serial Number: [id number]:  [id number]       Basic Metabolic Panel [[id number]]  (Abnormal) Collected: 05/27/24 0437     Specimen: Blood Updated: 05/27/24 0501     Glucose 128 mg/dL      BUN 12 mg/dL      Creatinine 1.10 mg/dL      Sodium 138 mmol/L      Potassium 3.9 mmol/L      Chloride 105 mmol/L      CO2 23.5 mmol/L      Calcium 8.8 mg/dL      BUN/Creatinine Ratio 10.9     Anion Gap 9.5 mmol/L      eGFR 79.8 mL/min/1.73     Narrative:      GFR Normal >60  Chronic Kidney Disease <60  Kidney Failure <15      CBC (No Diff) [[id number]]  (Abnormal) Collected: 05/27/24 0437    Specimen: Blood Updated: 05/27/24 0441     WBC 10.88 10*3/mm3      RBC 3.41 10*6/mm3      Hemoglobin 10.7 g/dL      Hematocrit 33.1 %      MCV 97.1 fL      MCH 31.4 pg      MCHC 32.3 g/dL      RDW 12.6 %      RDW-SD 45.3 fl      MPV 10.5 fL      Platelets 121 10*3/mm3     POC Glucose Once [[id number]]  (Abnormal) Collected: 05/27/24 0434    Specimen: Blood Updated: 05/27/24 0436     Glucose 125 mg/dL      Comment: Serial Number: [id number]:  [id number]       POC Glucose Once [[id number]]  (Abnormal) Collected: 05/27/24 0235    Specimen: Blood Updated: 05/27/24 0237     Glucose 134 mg/dL      Comment: Serial Number: [id number]:  [id number]       POC Glucose Once [[id number]]  (Abnormal) Collected: 05/27/24 0009    Specimen: Blood Updated: 05/27/24 0010     Glucose 153 mg/dL      Comment: Serial Number: [id number]:  [id number]       Potassium [[id number]]  (Abnormal) Collected: 05/26/24 2300    Specimen: Blood Updated: 05/26/24 2317     Potassium 3.4 mmol/L     POC Glucose Once [[id number]]  (Abnormal) Collected: 05/26/24 2230    Specimen: Blood Updated: 05/26/24 2232     Glucose 153 mg/dL      Comment: Serial Number: [id number]:  [id number]       POC Glucose Once [[id number]]  (Abnormal) Collected: 05/26/24 2125    Specimen: Blood Updated: 05/26/24 2126     Glucose 142 mg/dL      Comment: Serial Number: [id number]:  [id number]       POC Glucose Once [[id number]]  (Abnormal) Collected: 05/26/24 2041    Specimen: Blood Updated: 05/26/24 2042      Glucose 118 mg/dL      Comment: Serial Number: [id number]:  [id number]       POC Glucose Once [[id number]]  (Abnormal) Collected: 05/26/24 1934    Specimen: Blood Updated: 05/26/24 1936     Glucose 169 mg/dL      Comment: Serial Number: [id number]:  [id number]       POC Glucose Once [[id number]]  (Abnormal) Collected: 05/26/24 1906    Specimen: Blood Updated: 05/26/24 1908     Glucose 255 mg/dL      Comment: Serial Number: [id number]:  [id number]       POC Glucose Once [[id number]]  (Abnormal) Collected: 05/26/24 1755    Specimen: Blood Updated: 05/26/24 1756     Glucose 164 mg/dL      Comment: Serial Number: [id number]:  [id number]       Potassium [[id number]]  (Normal) Collected: 05/26/24 1659    Specimen: Blood from Arm, Right Updated: 05/26/24 1715     Potassium 3.9 mmol/L     POC Glucose Once [[id number]]  (Abnormal) Collected: 05/26/24 1653    Specimen: Blood Updated: 05/26/24 1654     Glucose 209 mg/dL      Comment: Serial Number: [id number]:  [id number]               Imaging Results (Last 24 Hours)       Procedure Component Value Units Date/Time    XR Chest 1 View [[id number]] Collected: 05/27/24 1039     Updated: 05/27/24 1042    Narrative:      XR CHEST 1 VW    Date of Exam: 5/27/2024 8:26 AM EDT    Indication: post op    Comparison: 5/26/2024    Findings:  Patient is status post median sternotomy and CABG. Cardiac silhouette is magnified. Pulmonary vasculature is unremarkable. Scattered atelectasis is present within the left lung base. No significant pleural effusion or pneumothorax. Osseous structures are   unchanged.      Impression:      Impression:  1.Scattered left basilar atelectasis. No significant change since 5/26/2024.      Electronically Signed: Grzegorz Mendes MD    5/27/2024 10:40 AM EDT    Workstation ID: [id number]            LAB RESULTS (LAST 7 DAYS)    CBC  Results from last 7 days   Lab Units 05/27/24  0437 05/26/24  0349 05/25/24  0448 05/25/24  0225  05/25/24  0152 05/24/24 2257 05/24/24 1900 05/24/24 1729 05/24/24  1305 05/23/24  1707 05/21/24  0844   WBC 10*3/mm3 10.88* 10.94*  --   --  9.93  --   --  15.22*  --  5.95 5.86   RBC 10*6/mm3 3.41* 3.09*  --   --  3.28*  --   --  3.88*  --  4.38 4.51   HEMOGLOBIN g/dL 10.7* 9.8*  --   --  10.1*  --   --  12.3*  --  13.7 14.4   HEMOGLOBIN, POC g/dL  --   --  10.0* 11.0*  --  11.6* 12.0  --    < >  --   --    HEMATOCRIT % 33.1* 30.1*  --   --  30.9*  --   --  36.2*  --  41.6 41.8   HEMATOCRIT POC %  --   --  30* 32*  --  34* 35*  --    < >  --   --    MCV fL 97.1* 97.4*  --   --  94.2  --   --  93.3  --  95.0 92.7   PLATELETS 10*3/mm3 121* 101*  --   --  123*  --   --  133*  --  174 215    < > = values in this interval not displayed.       BMP  Results from last 7 days   Lab Units 05/27/24  0829 05/27/24  0437 05/26/24  2300 05/26/24  1659 05/26/24  1016 05/26/24  0349 05/25/24  1425 05/25/24  0448 05/25/24  0225 05/24/24 2257 05/24/24 2125 05/24/24 1900 05/24/24 1729 05/23/24  1707 05/21/24  0844   SODIUM mmol/L  --  138  --   --   --  139  --   --   --   --  143  --  140 139 142   POTASSIUM mmol/L 4.1 3.9 3.4* 3.9 3.9 3.7 4.0  --   --   --  4.2  --  4.4 3.5 4.5   CHLORIDE mmol/L  --  105  --   --   --  106  --   --   --   --  105  --  104 103 106   CO2 mmol/L  --  23.5  --   --   --  22.7  --   --   --   --  21.0*  --  23.0 27.0 25.0   BUN mg/dL  --  12  --   --   --  16  --   --   --   --  17  --  15 16 19   CREATININE mg/dL  --  1.10  --   --   --  1.12  --  1.45* 1.33* 1.43* 1.26 1.13 1.23 1.20 1.28*   GLUCOSE mg/dL  --  128*  --   --   --  133* 159*  --   --   --  195*  --  140* 176* 147*   MAGNESIUM mg/dL  --   --   --   --   --   --   --   --   --   --  2.5  --  2.9*  --   --    PHOSPHORUS mg/dL  --   --   --   --   --   --   --   --   --   --  2.5  --  1.9*  --   --        CMP   Results from last 7 days   Lab Units 05/27/24  0829 05/27/24  0437 05/26/24  2300 05/26/24  1659 05/26/24  1016  05/26/24  0349 05/25/24  1425 05/25/24  0448 05/25/24  0225 05/24/24  2257 05/24/24  2125 05/24/24  1900 05/24/24  1729 05/23/24  1707 05/21/24  0844   SODIUM mmol/L  --  138  --   --   --  139  --   --   --   --  143  --  140 139 142   POTASSIUM mmol/L 4.1 3.9 3.4* 3.9 3.9 3.7 4.0  --   --   --  4.2  --  4.4 3.5 4.5   CHLORIDE mmol/L  --  105  --   --   --  106  --   --   --   --  105  --  104 103 106   CO2 mmol/L  --  23.5  --   --   --  22.7  --   --   --   --  21.0*  --  23.0 27.0 25.0   BUN mg/dL  --  12  --   --   --  16  --   --   --   --  17  --  15 16 19   CREATININE mg/dL  --  1.10  --   --   --  1.12  --  1.45* 1.33* 1.43* 1.26 1.13 1.23 1.20 1.28*   GLUCOSE mg/dL  --  128*  --   --   --  133* 159*  --   --   --  195*  --  140* 176* 147*   ALBUMIN g/dL  --   --   --   --   --   --   --   --   --   --  4.9  --  4.7 4.3  --    BILIRUBIN mg/dL  --   --   --   --   --   --   --   --   --   --   --   --   --  0.4  --    ALK PHOS U/L  --   --   --   --   --   --   --   --   --   --   --   --   --  70  --    AST (SGOT) U/L  --   --   --   --   --   --   --   --   --   --   --   --   --  15  --    ALT (SGPT) U/L  --   --   --   --   --   --   --   --   --   --   --   --   --  18  --        BNP        TROPONIN        CoAg  Results from last 7 days   Lab Units 05/25/24  0152 05/24/24  1729 05/23/24  1707 05/21/24  0844   INR  1.10 1.10 1.00 0.98   APTT seconds  --  25.3 26.2  --        Creatinine Clearance  Estimated Creatinine Clearance: 97.1 mL/min (by C-G formula based on SCr of 1.1 mg/dL).    ABG  Results from last 7 days   Lab Units 05/25/24  0648 05/25/24  0448 05/25/24  0225 05/24/24  2257 05/24/24  1900 05/24/24  1620 05/24/24  1532 05/24/24  1505   PH, ARTERIAL pH units  --  7.365 7.574* 7.374 7.393 7.370 7.320* 7.310*   PCO2, ARTERIAL mm Hg  --  39.1 20.7* 33.5* 38.2 46.3* 50.5* 55.6*   PO2 ART mm Hg  --  115.9* 179.2* 109.1* 302.9* 258.0* 349.0* 413.0*   O2 SATURATION ART %  --  98.4* 99.8* 98.2* 99.9*  100.0* 100.0* 100.0*   BASE EXCESS ART mmol/L -2.8* -2.7* -1.3* -4.9* -1.4* 1.0 0.0 2.0       Radiology  XR Chest 1 View    Result Date: 5/27/2024  Impression: 1.Scattered left basilar atelectasis. No significant change since 5/26/2024. Electronically Signed: Grzegorz Mendes MD  5/27/2024 10:40 AM EDT  Workstation ID: [id number]    XR Chest 1 View    Result Date: 5/26/2024  Impression: 1.No significant change since 5/25/2024. Electronically Signed: Grzegorz Mendes MD  5/26/2024 8:53 AM EDT  Workstation ID: [id number]    XR Chest 1 View    Result Date: 5/25/2024  Impression: Interval chest drain removal with mild bilateral discoid atelectasis, but no evidence of pneumothorax. Electronically Signed: Elias Lo MD  5/25/2024 5:14 PM EDT  Workstation ID: [id number]       EKG  I personally viewed and interpreted the patient's EKG/Telemetry data:  ECG 12 Lead Other; Post-op open heart   Preliminary Result   HEART RATE= 95  bpm   RR Interval= 636  ms   AK Interval= 141  ms   P Horizontal Axis= -25  deg   P Front Axis= 44  deg   QRSD Interval= 87  ms   QT Interval= 339  ms   QTcB= 425  ms   QRS Axis= -1  deg   T Wave Axis= 60  deg   - ABNORMAL ECG -   Sinus rhythm   ST elevation suggests acute pericarditis   When compared with ECG of 25-May-2024 4:27:21,   No significant change   Electronically Signed By:    Date and Time of Study: 2024-05-26 05:50:59      ECG 12 Lead Other; Post-op open heart   Final Result   HEART RATE= 93  bpm   RR Interval= 640  ms   AK Interval= 172  ms   P Horizontal Axis= -4  deg   P Front Axis= 47  deg   QRSD Interval= 81  ms   QT Interval= 358  ms   QTcB= 448  ms   QRS Axis= 8  deg   T Wave Axis= 33  deg   - ABNORMAL ECG -   Sinus rhythm   Anterior infarct, possibly acute   ST elevation, consider inferior injury   When compared with ECG of 24-May-2024 17:46:44,   No significant change   Electronically Signed By: Vinicio Lino (RAH) 25-May-2024 07:01:31   Date and Time of Study: 2024-05-25 04:27:21       ECG 12 Lead Other; Post-op open heart   Final Result   HEART RATE= 85  bpm   RR Interval= 700  ms   TN Interval= 170  ms   P Horizontal Axis= -21  deg   P Front Axis= 75  deg   QRSD Interval= 100  ms   QT Interval= 400  ms   QTcB= 478  ms   QRS Axis= -9  deg   T Wave Axis= 47  deg   - NORMAL ECG -   Sinus rhythm   When compared with ECG of 23-May-2024 22:22:51,   No significant change   Electronically Signed By: Vinicio Lino (RAH) 25-May-2024 07:02:14   Date and Time of Study: 2024-05-24 17:46:44      ECG 12 Lead Pre-Op / Pre-Procedure   Final Result   HEART RATE= 74  bpm   RR Interval= 808  ms   TN Interval= 184  ms   P Horizontal Axis= 10  deg   P Front Axis= 42  deg   QRSD Interval= 87  ms   QT Interval= 371  ms   QTcB= 413  ms   QRS Axis= 8  deg   T Wave Axis= -3  deg   - NORMAL ECG -   Sinus rhythm   When compared with ECG of 14-Oct-2020 14:36:03,   Significant rate decrease   Significant repolarization change   Electronically Signed By: Vinicio Lino (RAH) 24-May-2024 07:39:42   Date and Time of Study: 2024-05-23 22:22:51            Echocardiogram:    Results for orders placed in visit on 05/24/24    Intra-Op Anesthesia SOFIA    Narrative  Intra-Op Anesthesia SOFIA    Procedure Performed: Intra-Op Anesthesia SOFIA  Start Time:  End Time:    Preanesthesia Checklist:  Patient identified, IV assessed, risks and benefits discussed, monitors and equipment assessed, procedure being performed at surgeon's request and anesthesia consent obtained.    General Procedure Information  Diagnostic Indications for Echo:  assessment of surgical repair and hemodynamic monitoring  Location performed:  OR  Intubated  Bite block placed  Heart visualized  Probe Insertion:  Easy  Probe Type:  Biplane and multiplane  Modalities:  Color flow mapping, pulse wave Doppler and continuous wave Doppler    Echocardiographic and Doppler Measurements    Ventricles    Right Ventricle:  Cavity size normal.  Hypertrophy not present.  Thrombus not  present.  Global function normal.  Ejection Fraction 60%.  Left Ventricle:  Cavity size normal.  Thrombus not present.  Global Function normal.  Ejection Fraction 60%.    Ventricular Regional Function:  1- Basal Anteroseptal:  normal  2- Basal Anterior:  normal  3- Basal Anterolateral:  normal  4- Basal Inferolateral:  normal  5- Basal Inferior:  normal  6- Basal Inferoseptal:  normal  7- Mid Anteroseptal:  normal  8- Mid Anterior:  normal  9- Mid Anterolateral:  normal  10- Mid Inferolateral:  normal  11- Mid Inferior:  normal  12- Mid Inferoseptal:  normal  13- Apical Anterior:  normal  14- Apical Lateral:  normal  15- Apical Inferior:  normal  16- Apical Septal:  normal  17- [place]:  normal      Valves    Aortic Valve:  Annulus normal.  Stenosis not present.  Regurgitation absent.  Leaflets normal.  Leaflet motions normal.    Mitral Valve:  Annulus normal.  Stenosis not present.  Regurgitation absent.  Leaflets normal.  Leaflet motions normal.    Tricuspid Valve:  Annulus normal.  Stenosis not present.  Regurgitation absent.  Leaflets normal.  Leaflet motions normal.  Pulmonic Valve:  Annulus normal.  Stenosis not present.  Regurgitation absent.      Aorta    Ascending Aorta:  Size normal.  Dissection not present.  Plaque thickness less than 3 mm.  Mobile plaque not present.  Aortic Arch:  Size normal.  Dissection not present.  Plaque thickness less than 3 mm.  Mobile plaque not present.  Descending Aorta:  Size normal.  Dissection not present.  Plaque thickness less than 3 mm.  Mobile plaque not present.      Atria    Right Atrium:  Size normal.  Left Atrium:  Size normal.  Spontaneous echo contrast not present.  Thrombus not present.  Tumor not present.  Device not present.  Left atrial appendage normal.      Septa        Ventricular Septum:  Intra-ventricular septum morphology normal.        Other Findings  Pleural Effusion:  none  Pulmonary Arteries:  normal      Anesthesia Information  Performed  Personally  Anesthesiologist:  Juan Moya MD      Echocardiogram Comments:  SOFIA placed easily, no resistance, bite guard, lubricated      Pre cpb  LV no wma ef 60  RV nl sf  MV AV TV wnl      S/p cabg x 4  No change        Stress Test:  Results for orders placed during the hospital encounter of 05/13/24    Stress test with myocardial perfusion one day    Interpretation Summary    Patient exercised for 10 minutes 21 seconds achieving 11.8 METS.    Findings consistent with an abnormal ECG stress test.  ST depressions in lateral leads.    Left ventricular ejection fraction is hyperdynamic (Calculated EF > 70%).    Myocardial perfusion imaging indicates a moderate-sized, severe area of ischemia located in the inferior wall and septal wall.    Impressions are consistent with an intermediate risk study.         Cardiac Catheterization:  Results for orders placed during the hospital encounter of 05/23/24    Cardiac Catheterization/Vascular Study    Conclusion  OPERATORS:  1. Alejandro Nagy M.D., Attending Cardiologist      PROCEDURE PERFORMED.  Ultrasound guided vascular access  Left heart catheterization  Coronary Angiogram [id number]  Moderate Sedation    INDICATIONS FOR PROCEDURE.  53 years old man with unstable angina and abnormal nuclear stress test.  After discussing the risk and benefit of the procedure he was brought into the Cath Lab for coronary angiography.    PROCEDURE IN DETAIL.  Informed consent was obtained from the patient after explaining the risks, benefits, and alternative options of the procedure. After obtaining informed consent, the patient was brought to the cath lab and was prepped in a sterile fashion. Lidocaine 1% was used for local anesthesia into the right radial access site. The right radial artery was accessed under direct ultrasound visualization  with an angiocath needle via modified Seldinger technique. A 6F slender sheath was inserted successfully. Afterwards, 6F JR4  was advanced over a          ASSESSMENT & PLAN:        Unstable angina/coronary artery disease (Primary)  Multiple cardiovascular risk factors including hypertension, hyperlipidemia, diabetes, family history of premature coronary disease.  ECG is normal.  Abnormal nuclear stress test leading to cardiac catheterization  Cardiac cath 5/23/2024 showed multivessel coronary artery disease including 99% RCA and 80% LAD.  Patient underwent cardiac surgery with LIMA to the LAD and PRASAD to RCA and SVG to ramus and OM and is currently stable.  Patient is extubated and his chest tubes are removed   Patient is currently on aspirin statins and beta-blockers  and is tolerating well with blood pressure and heart rate being stable  Patient is ambulating very well and will be discharged home soon.      Primary hypertension  Patient is on low-dose beta-blockers and tolerating very well.    Mixed hyperlipidemia  LDL 80, HDL 48, triglyceride 98 and total cholesterol 146  Goal LDL is less than 50  Start high intensity statin    Type 2 diabetes mellitus without complication, without long-term current use of insulin  Patient is typically on metformin and Trulicity  A1c 7.1    Anxiety and depression  He is on bupropion      Cordell Soriano MD  05/27/24  16:16 EDT                  Electronically signed by Cordell Soriano MD at 05/27/24 1618       Stevo-Blanca Steinberg APRN at 05/27/24 0812       Attestation signed by Jr Everardo Moses MD at 05/27/24 1200    I have reviewed this documentation and agree.                  S/P POD# 3 CABG x4 with bilateral IMAs--Camporrotondo  EF 50-55% (echo)    Subjective:  reports he slept well last night    No events overnight  Sinus tach sitting in chair  Endo following  Drips:  insulin 3u/hr  Wt is up 3 kgs from preop  CXR:  atel/ small left effusion, gas noted in colon        Intake/Output Summary (Last 24 hours) at 5/27/2024 0812  Last data filed at 5/27/2024 0600  Gross per 24 hour   Intake 703 ml   Output 500 ml    Net 203 ml     Temp:  [98 °F (36.7 °C)-99.4 °F (37.4 °C)] 98.7 °F (37.1 °C)  Heart Rate:  [] 92  Resp:  [14-18] 15  BP: (108-151)/(66-91) 121/80      Results from last 7 days   Lab Units 05/27/24  0437 05/26/24  0349 05/25/24  0225 05/25/24  0152 05/24/24  1900 05/24/24  1729 05/24/24  1305 05/23/24  1707   WBC 10*3/mm3 10.88* 10.94*  --  9.93  --  15.22*  --  5.95   HEMOGLOBIN g/dL 10.7* 9.8*  --  10.1*  --  12.3*  --  13.7   HEMOGLOBIN, POC   --   --    < >  --    < >  --    < >  --    HEMATOCRIT % 33.1* 30.1*  --  30.9*  --  36.2*  --  41.6   HEMATOCRIT POC   --   --    < >  --    < >  --    < >  --    PLATELETS 10*3/mm3 121* 101*  --  123*  --  133*  --  174   INR   --   --   --  1.10  --  1.10  --  1.00    < > = values in this interval not displayed.     Results from last 7 days   Lab Units 05/27/24  0437 05/24/24  2257 05/24/24  2125   CREATININE mg/dL 1.10   < > 1.26   POTASSIUM mmol/L 3.9   < > 4.2   SODIUM mmol/L 138   < > 143   MAGNESIUM mg/dL  --   --  2.5   PHOSPHORUS mg/dL  --   --  2.5    < > = values in this interval not displayed.       Physical Exam:  Neuro intact, nad, resting in recliner, no family seen this morning  Tele:  -106  Diminished bases, 99% RA  Sternotomy/ SVHS healing well  Benign abd, no BM, + flatus  No edema    Assessment/Plan:  Principal Problem:    Coronary artery disease involving native coronary artery of native heart  Active Problems:    Chest pain due to myocardial ischemia    Abnormal nuclear stress test    CAD (coronary artery disease)    - MV CAD, EF 50-55% (echo)--s/p CABG x4 with LIMA to LAD, PRASAD to RCA, SVG to ramus, SVG to OM (Camporrotondo)  - HTN--stable  - HLD--statin  - DM type 2--A1c 7.1, insulin dripwatch closely  - Ozempic use--last dose 5/9  - CKD, stage 2--eGFR 66.5  - Postop ABLA, expected--watch closely  - Postop TCP, consumptive--watch closely  - Postop pericarditis--start colchicine    POD# 3.  Doing well.  Creatinine returned to normal.  On  asa/stain, increase bb.  Cont colchicine for pericarditis, will decrease d/t increased stooling.  Gentle diuresis. Replete e-.  DC wires.  Insulin drip/ DM mmgt per endo    Routine care--as above  D/w pt/family, nsg, Dr. Moses  Anticipate home at discharge    Blanca Redmond, KASIE  2024  08:12 EDT    Electronically signed by Jr Everardo Moses MD at 24 1200       Orion Little MD at 24 [phone]              ENDOCRINE CONSULT PROGRESS NOTE  DATE OF SERVICE: 24        PATIENT NAME: Ricci Ruiz  PATIENT : 1970 AGE: 53 y.o.  MRN NUMBER: [id number]    ==========================================================================    CHIEF COMPLAINT: Type 2 diabetes management    CARE TEAM:   Patient Care Team:  Rain Paniagua MD as PCP - General (Family Medicine)    SUBJECTIVE  Pt seen and examined.  Tolerating food.    ==========================================================================    CURRENT ACTIVE HOSPITAL MEDICATIONS    Scheduled Medications:  aspirin, 81 mg, Oral, Daily  atorvastatin, 40 mg, Oral, Nightly  buPROPion XL, 150 mg, Oral, Daily  cetirizine, 10 mg, Oral, Daily  chlorhexidine, 15 mL, Mouth/Throat, Q12H  colchicine, 0.6 mg, Oral, Q12H  enoxaparin, 40 mg, Subcutaneous, Q24H  metoprolol tartrate, 12.5 mg, Oral, Q12H  mupirocin, , Each Nare, BID  pantoprazole, 40 mg, Oral, Q AM  polyethylene glycol, 17 g, Oral, BID  senna-docusate sodium, 2 tablet, Oral, BID         PRN Medications:    acetaminophen **OR** acetaminophen **OR** acetaminophen    bisacodyl    bisacodyl    Calcium Replacement - Follow Nurse / BPA Driven Protocol    cyclobenzaprine    dextrose    dextrose    glucagon (human recombinant)    HYDROcodone-acetaminophen    magnesium hydroxide    Magnesium Standard Dose Replacement - Follow Nurse / BPA Driven Protocol    Morphine **AND** naloxone    nitroglycerin    ondansetron    Phosphorus Replacement - Follow Nurse / BPA Driven Protocol     Potassium Replacement - Follow Nurse / BPA Driven Protocol     ==========================================================================    OBJECTIVE    Vitals:    05/26/24 1520   BP:    Pulse: 100   Resp:    Temp:    SpO2: 96%      Body mass index is 27.04 kg/m².     General - A&Ox3, NAD, Calm    ==========================================================================    LAB EVALUATION    Lab Results   Component Value Date    GLUCOSE 133 (H) 05/26/2024    BUN 16 05/26/2024    CREATININE 1.12 05/26/2024    EGFRIFNONA 60 (L) 09/01/2021    EGFRIFAFRI 97 03/02/2016    BCR 14.3 05/26/2024    K 3.9 05/26/2024    CO2 22.7 05/26/2024    CALCIUM 8.7 05/26/2024    ALBUMIN 4.9 05/24/2024    LABIL2 1.3 05/30/2018    AST 15 05/23/2024    ALT 18 05/23/2024       Lab Results   Component Value Date    HGBA1C 7.15 (H) 05/23/2024    HGBA1C 7.10 (H) 05/09/2024    HGBA1C 6.30 (H) 03/10/2023     Lab Results   Component Value Date    MICROALBUR 3.8 09/09/2022    CREATININE 1.12 05/26/2024     Results from last 7 days   Lab Units 05/26/24  1653 05/26/24  1542 05/26/24  1455 05/26/24  1346 05/26/24  1234 05/26/24  1134   GLUCOSE mg/dL 209* 200* 206* 156* 170* 193*       ==========================================================================    ASSESSMENT AND PLAN    # Type 2 diabetes management with A1c of 7.15 on 5/23/2024  - Blood sugar reviewed for last 24 hours  - Patient is s/p CABG, POD 2  - Patient currently on insulin drip therapy  - Maintained on metformin and Ozempic therapy at home  - After completion of treatment we will reintroduce metformin therapy and we will also introduce SGLT2 inhibitor therapy  - Patient denies any previous exposure to SGLT2 inhibitor therapy  - Insulin drip for next 24 hours and then most likely will start patient on sliding scale coverage and reintroduction of oral hypoglycemic agent    # Coronary artery disease s/p CABG on 5/24/2024    Will follow with you.  Rest as per primary team.    Part of  this note may be an electronic transcription/translation of spoken language to printed text using the Dragon Dictation System.     Note: Portions of this note may have been copied from previous notes but documentation have been reviewed and edited as necessary to support clinical decision making for today's visit.  The time of this note does not reflect the time I saw the patient but the time that this note was written.    ==========================================================================  Orion Little MD  Department of Endocrine, Diabetes and Metabolism  [place], [place]  ==========================================================================      Electronically signed by Orion Little MD at 05/26/24 5991       Cordell Soriano MD at 05/26/24 9639          Referring Provider: Poncho Alvarez, *    Reason for follow-up: Multivessel coronary artery disease     Patient Care Team:  Rain Paniagua MD as PCP - General (Family Medicine)      SUBJECTIVE  No chest pain or shortness of breath.  Sitting in chair comfortably.    Review of Systems   Constitutional: Negative for fever and malaise/fatigue.   HENT:  Negative for ear pain and nosebleeds.    Eyes:  Negative for blurred vision and double vision.   Cardiovascular:  Negative for chest pain, dyspnea on exertion and palpitations.   Respiratory:  Negative for cough and shortness of breath.    Skin:  Negative for rash.   Musculoskeletal:  Negative for joint pain.   Gastrointestinal:  Negative for abdominal pain, nausea and vomiting.   Neurological:  Negative for focal weakness and headaches.   Psychiatric/Behavioral:  Negative for depression. The patient is not nervous/anxious.    All other systems reviewed and are negative.        Personal History:    Past Medical History:   Diagnosis Date    Colon polyp 2019    Diabetes mellitus     Type 2    Hyperlipidemia        Past Surgical History:   Procedure Laterality Date    CARDIAC  CATHETERIZATION N/A 5/23/2024    Procedure: Left Heart Cath with Coronary Angiography;  Surgeon: Alejandro Nagy MD;  [facility]: [facility] CATH [facility]AS[facility];  Service: Cardiovascular;  Laterality: N/A;    COLONOSCOPY  2019       Family History   Problem Relation Age of Onset    Arthritis Mother         Lupus    Diabetes Brother     Heart disease Father     Heart disease Brother        Social History     Tobacco Use    Smoking status: Never    Smokeless tobacco: Never   Vaping Use    Vaping status: Never Used   Substance Use Topics    Alcohol use: Yes     Alcohol/week: 1.0 standard drink of alcohol     Types: 1 Glasses of wine per week    Drug use: Never        Home meds:  Prior to Admission medications    Medication Sig Start Date End Date Taking? Authorizing Provider   aspirin 81 MG tablet Take 1 tablet by mouth Daily. 3/27/15  Yes Linda Banks MD   Blood Glucose Monitoring Suppl (Accu-Chek Gladis Plus) w/Device kit 1 kit Daily. 10/26/20  Yes Karen Pickard MD   buPROPion XL (WELLBUTRIN XL) 150 MG 24 hr tablet TAKE 1 TABLET BY MOUTH EVERY DAY 6/6/23  Yes Karen Pickard MD   glucose blood (Accu-Chek Gladis Plus) test strip Use daily E11.9 9/9/22  Yes Karen Pickard MD   lisinopril (PRINIVIL,ZESTRIL) 5 MG tablet TAKE 1 TABLET BY MOUTH EVERY DAY 6/6/23  Yes Karen Pickard MD   loratadine (Claritin) 10 MG tablet Take 1 tablet by mouth Daily.   Yes Linda Banks MD   metFORMIN (GLUCOPHAGE) 1000 MG tablet TAKE 1 TABLET BY MOUTH TWICE A DAY WITH MEALS 6/6/23  Yes Karen Pickard MD   Multiple Vitamin (MULTIVITAMIN) capsule Take 1 capsule by mouth Daily. 2/10/16  Yes Linda Banks MD   nitroglycerin (NITROSTAT) 0.4 MG SL tablet Place 1 tablet under the tongue Every 5 (Five) Minutes As Needed for Chest Pain. 5/1/24  Yes Linda Banks MD   Semaglutide, 1 MG/DOSE, (Ozempic, 1 MG/DOSE,) 2 MG/1.5ML solution pen-injector Inject 0.5 mg under the skin into the  appropriate area as directed 1 (One) Time Per Week.   Yes Provider, MD Linda   simvastatin (ZOCOR) 40 MG tablet TAKE 1 TABLET BY MOUTH EVERY DAY 12/27/22  Yes Karen Pickard MD   vitamin D3 125 MCG (5000 UT) capsule capsule Take 1 capsule by mouth Daily. 3/10/23  Yes Karen Pickard MD   isosorbide mononitrate (IMDUR) 30 MG 24 hr tablet Take 1 tablet by mouth Every Morning. 5/23/24   Alejandro Nagy MD       Allergies:  Patient has no known allergies.    Scheduled Meds:aspirin, 81 mg, Oral, Daily  atorvastatin, 40 mg, Oral, Nightly  buPROPion XL, 150 mg, Oral, Daily  cetirizine, 10 mg, Oral, Daily  chlorhexidine, 15 mL, Mouth/Throat, Q12H  colchicine, 0.6 mg, Oral, Q12H  enoxaparin, 40 mg, Subcutaneous, Q24H  metoprolol tartrate, 12.5 mg, Oral, Q12H  mupirocin, , Each Nare, BID  pantoprazole, 40 mg, Oral, Q AM  polyethylene glycol, 17 g, Oral, BID  senna-docusate sodium, 2 tablet, Oral, BID      Continuous Infusions:insulin, 0-100 Units/hr, Last Rate: 3.5 Units/hr (05/26/24 1549)      PRN Meds:.  acetaminophen **OR** acetaminophen **OR** acetaminophen    bisacodyl    bisacodyl    Calcium Replacement - Follow Nurse / BPA Driven Protocol    cyclobenzaprine    dextrose    dextrose    glucagon (human recombinant)    HYDROcodone-acetaminophen    magnesium hydroxide    Magnesium Standard Dose Replacement - Follow Nurse / BPA Driven Protocol    Morphine **AND** naloxone    nitroglycerin    ondansetron    Phosphorus Replacement - Follow Nurse / BPA Driven Protocol    Potassium Replacement - Follow Nurse / BPA Driven Protocol      OBJECTIVE    Vital Signs  Vitals:    05/26/24 1450 05/26/24 1500 05/26/24 1510 05/26/24 1520   BP:   151/83    BP Location:   Left arm    Patient Position:   Sitting    Pulse: 110 114 102 100   Resp:   18    Temp:   98.5 °F (36.9 °C)    TempSrc:   Oral    SpO2:   96% 96%   Weight:       Height:           Flowsheet Rows      Flowsheet Row First Filed Value   Admission Height 181.6  "cm (71.5\") Documented at 05/23/2024 1104   Admission Weight 86.6 kg (190 lb 14.7 oz) Documented at 05/23/2024 1104              Intake/Output Summary (Last 24 hours) at 5/26/2024 1629  Last data filed at 5/26/2024 1400  Gross per 24 hour   Intake 2138 ml   Output 295 ml   Net 1843 ml          Telemetry: Normal sinus rhythm    Physical Exam:  The patient is alert, oriented and in no distress.  Vital signs as noted above.  Head and neck revealed no carotid bruits or jugular venous distention.  No thyromegaly or lymphadenopathy is present  Lungs clear.  No wheezing.  Breath sounds are normal bilaterally.  Heart normal first and second heart sounds.  No murmur. No precordial rub is present.  No gallop is present.  Abdomen soft and nontender.  No organomegaly is present.  Extremities with good peripheral pulses without any pedal edema.  Skin warm and dry.  Musculoskeletal system is grossly normal.  CNS grossly normal.       Results Review:  I have personally reviewed the results from the time of this admission to 5/26/2024 16:29 EDT and agree with these findings:  []  Laboratory  []  Microbiology  []  Radiology  []  EKG/Telemetry   []  Cardiology/Vascular   []  Pathology  []  Old records  []  Other:    Most notable findings include:    Lab Results (last 24 hours)       Procedure Component Value Units Date/Time    POC Glucose Once [[id number]]  (Abnormal) Collected: 05/26/24 1542    Specimen: Blood Updated: 05/26/24 1544     Glucose 200 mg/dL      Comment: Serial Number: [id number]:  [id number]       POC Glucose Once [[id number]]  (Abnormal) Collected: 05/26/24 1455    Specimen: Blood Updated: 05/26/24 1456     Glucose 206 mg/dL      Comment: Serial Number: [id number]:  [id number]       POC Glucose Once [[id number]]  (Abnormal) Collected: 05/26/24 1346    Specimen: Blood Updated: 05/26/24 1347     Glucose 156 mg/dL      Comment: Serial Number: [id number]:  [id number]       POC Glucose Once [[id number]]  " (Abnormal) Collected: 05/26/24 1234    Specimen: Blood Updated: 05/26/24 1235     Glucose 170 mg/dL      Comment: Serial Number: [id number]:  [id number]       POC Glucose Once [[id number]]  (Abnormal) Collected: 05/26/24 1134    Specimen: Blood Updated: 05/26/24 1136     Glucose 193 mg/dL      Comment: Serial Number: [id number]:  [id number]       Potassium [[id number]]  (Normal) Collected: 05/26/24 1016    Specimen: Blood Updated: 05/26/24 1041     Potassium 3.9 mmol/L     POC Glucose Once [[id number]]  (Abnormal) Collected: 05/26/24 1012    Specimen: Blood Updated: 05/26/24 1013     Glucose 184 mg/dL      Comment: Serial Number: [id number]:  [id number]       POC Glucose Once [[id number]]  (Abnormal) Collected: 05/26/24 0813    Specimen: Blood Updated: 05/26/24 0814     Glucose 135 mg/dL      Comment: Serial Number: [id number]:  [id number]       POC Glucose Once [[id number]]  (Abnormal) Collected: 05/26/24 0712    Specimen: Blood Updated: 05/26/24 0713     Glucose 150 mg/dL      Comment: Serial Number: [id number]:  [id number]       POC Glucose Once [[id number]]  (Abnormal) Collected: 05/26/24 0559    Specimen: Blood Updated: 05/26/24 0600     Glucose 141 mg/dL      Comment: Serial Number: [id number]:  [id number]       Basic Metabolic Panel [[id number]]  (Abnormal) Collected: 05/26/24 0349    Specimen: Blood Updated: 05/26/24 0420     Glucose 133 mg/dL      BUN 16 mg/dL      Creatinine 1.12 mg/dL      Sodium 139 mmol/L      Potassium 3.7 mmol/L      Chloride 106 mmol/L      CO2 22.7 mmol/L      Calcium 8.7 mg/dL      BUN/Creatinine Ratio 14.3     Anion Gap 10.3 mmol/L      eGFR 78.1 mL/min/1.73     Narrative:      GFR Normal >60  Chronic Kidney Disease <60  Kidney Failure <15      CBC (No Diff) [[id number]]  (Abnormal) Collected: 05/26/24 0349    Specimen: Blood Updated: 05/26/24 0358     WBC 10.94 10*3/mm3      RBC 3.09 10*6/mm3      Hemoglobin 9.8 g/dL      Hematocrit 30.1 %      MCV  97.4 fL      MCH 31.7 pg      MCHC 32.6 g/dL      RDW 12.8 %      RDW-SD 45.3 fl      MPV 10.6 fL      Platelets 101 10*3/mm3     POC Glucose Once [[id number]]  (Abnormal) Collected: 05/26/24 0348    Specimen: Blood Updated: 05/26/24 0349     Glucose 137 mg/dL      Comment: Serial Number: [id number]:  [id number]       POC Glucose Once [[id number]]  (Abnormal) Collected: 05/26/24 0210    Specimen: Blood Updated: 05/26/24 0211     Glucose 127 mg/dL      Comment: Serial Number: [id number]:  [id number]       POC Glucose Once [[id number]]  (Abnormal) Collected: 05/26/24 0054    Specimen: Blood Updated: 05/26/24 0055     Glucose 135 mg/dL      Comment: Serial Number: [id number]:  [id number]       POC Glucose Once [[id number]]  (Abnormal) Collected: 05/25/24 2248    Specimen: Blood Updated: 05/25/24 2249     Glucose 122 mg/dL      Comment: Serial Number: [id number]:  [id number]       POC Glucose Once [[id number]]  (Abnormal) Collected: 05/25/24 2121    Specimen: Blood Updated: 05/25/24 2122     Glucose 155 mg/dL      Comment: Serial Number: [id number]:  [id number]       POC Glucose Once [[id number]]  (Abnormal) Collected: 05/25/24 1946    Specimen: Blood Updated: 05/25/24 1948     Glucose 204 mg/dL      Comment: Serial Number: [id number]:  [id number]       POC Glucose Once [[id number]]  (Abnormal) Collected: 05/25/24 1827    Specimen: Blood Updated: 05/25/24 1828     Glucose 161 mg/dL      Comment: Serial Number: [id number]:  [id number]       POC Glucose Once [[id number]]  (Abnormal) Collected: 05/25/24 1708    Specimen: Blood Updated: 05/25/24 1709     Glucose 223 mg/dL      Comment: Serial Number: [id number]:  [id number]               Imaging Results (Last 24 Hours)       Procedure Component Value Units Date/Time    XR Chest 1 View [[id number]] Collected: 05/26/24 0851     Updated: 05/26/24 0855    Narrative:      XR CHEST 1 VW    Date of Exam: 5/26/2024 4:55 AM EDT    Indication:  Post-Op Heart Surgery    Comparison: 5/25/2024    Findings:  Patient is status post median sternotomy and CABG. Right IJ vascular sheath overlies the SVC. Cardiac silhouette is magnified but appears enlarged. Pulmonary vasculature is unremarkable. Left basilar atelectasis is seen. No significant pleural effusion   or pneumothorax. Osseous structures are unchanged.      Impression:      Impression:  1.No significant change since 5/25/2024.      Electronically Signed: Grzegorz Mendes MD    5/26/2024 8:53 AM EDT    Workstation ID: [id number]    XR Chest 1 View [[id number]] Collected: 05/25/24 1712     Updated: 05/25/24 1717    Narrative:      XR CHEST 1 VW    Date of Exam: 5/25/2024 4:20 PM EDT    Indication: post chest tube removal    Comparison: 5/25/2024, 5:54 a.m.    Findings:  Current study is timed 4:19 p.m. PA catheter has been removed. Right IJ sheath remains in the brachiocephalic vein or proximal SVC. Heart is normal in size. Vasculature is at upper limits of normal size. Apparatus shadow overlies the lower chest. Chest   drains have apparently been removed.     There is minimal left basilar discoid atelectasis. There is questionable left midlung discoid atelectasis, or more likely overlie apparatus shadow. No other new pulmonary parenchymal disease is seen. No pneumothorax or effusion is seen. There is   increased upper abdominal bowel gas, whether from air swallowing or mild ileus.          Impression:      Impression:  Interval chest drain removal with mild bilateral discoid atelectasis, but no evidence of pneumothorax.      Electronically Signed: Elias Lo MD    5/25/2024 5:14 PM EDT    Workstation ID: [id number]            LAB RESULTS (LAST 7 DAYS)    CBC  Results from last 7 days   Lab Units 05/26/24  0349 05/25/24  0448 05/25/24  0225 05/25/24  0152 05/24/24  2257 05/24/24  1900 05/24/24  1729 05/24/24  1305 05/23/24  1707 05/21/24  0844   WBC 10*3/mm3 10.94*  --   --  9.93  --   --  15.22*  --  5.95 5.86    RBC 10*6/mm3 3.09*  --   --  3.28*  --   --  3.88*  --  4.38 4.51   HEMOGLOBIN g/dL 9.8*  --   --  10.1*  --   --  12.3*  --  13.7 14.4   HEMOGLOBIN, POC g/dL  --  10.0* 11.0*  --  11.6* 12.0  --    < >  --   --    HEMATOCRIT % 30.1*  --   --  30.9*  --   --  36.2*  --  41.6 41.8   HEMATOCRIT POC %  --  30* 32*  --  34* 35*  --    < >  --   --    MCV fL 97.4*  --   --  94.2  --   --  93.3  --  95.0 92.7   PLATELETS 10*3/mm3 101*  --   --  123*  --   --  133*  --  174 215    < > = values in this interval not displayed.       BMP  Results from last 7 days   Lab Units 05/26/24  1016 05/26/24  0349 05/25/24  1425 05/25/24  0448 05/25/24  0225 05/24/24  2257 05/24/24  2125 05/24/24  1900 05/24/24  1729 05/23/24  1707 05/21/24  0844   SODIUM mmol/L  --  139  --   --   --   --  143  --  140 139 142   POTASSIUM mmol/L 3.9 3.7 4.0  --   --   --  4.2  --  4.4 3.5 4.5   CHLORIDE mmol/L  --  106  --   --   --   --  105  --  104 103 106   CO2 mmol/L  --  22.7  --   --   --   --  21.0*  --  23.0 27.0 25.0   BUN mg/dL  --  16  --   --   --   --  17  --  15 16 19   CREATININE mg/dL  --  1.12  --  1.45* 1.33* 1.43* 1.26 1.13 1.23 1.20 1.28*   GLUCOSE mg/dL  --  133* 159*  --   --   --  195*  --  140* 176* 147*   MAGNESIUM mg/dL  --   --   --   --   --   --  2.5  --  2.9*  --   --    PHOSPHORUS mg/dL  --   --   --   --   --   --  2.5  --  1.9*  --   --        CMP   Results from last 7 days   Lab Units 05/26/24  1016 05/26/24  0349 05/25/24  1425 05/25/24  0448 05/25/24  0225 05/24/24  2257 05/24/24  2125 05/24/24  1900 05/24/24  1729 05/23/24  1707 05/21/24  0844   SODIUM mmol/L  --  139  --   --   --   --  143  --  140 139 142   POTASSIUM mmol/L 3.9 3.7 4.0  --   --   --  4.2  --  4.4 3.5 4.5   CHLORIDE mmol/L  --  106  --   --   --   --  105  --  104 103 106   CO2 mmol/L  --  22.7  --   --   --   --  21.0*  --  23.0 27.0 25.0   BUN mg/dL  --  16  --   --   --   --  17  --  15 16 19   CREATININE mg/dL  --  1.12  --  1.45* 1.33*  1.43* 1.26 1.13 1.23 1.20 1.28*   GLUCOSE mg/dL  --  133* 159*  --   --   --  195*  --  140* 176* 147*   ALBUMIN g/dL  --   --   --   --   --   --  4.9  --  4.7 4.3  --    BILIRUBIN mg/dL  --   --   --   --   --   --   --   --   --  0.4  --    ALK PHOS U/L  --   --   --   --   --   --   --   --   --  70  --    AST (SGOT) U/L  --   --   --   --   --   --   --   --   --  15  --    ALT (SGPT) U/L  --   --   --   --   --   --   --   --   --  18  --        BNP        TROPONIN        CoAg  Results from last 7 days   Lab Units 05/25/24  0152 05/24/24  1729 05/23/24  1707 05/21/24  0844   INR  1.10 1.10 1.00 0.98   APTT seconds  --  25.3 26.2  --        Creatinine Clearance  Estimated Creatinine Clearance: 95.1 mL/min (by C-G formula based on SCr of 1.12 mg/dL).    ABG  Results from last 7 days   Lab Units 05/25/24  0648 05/25/24  0448 05/25/24  0225 05/24/24  2257 05/24/24  1900 05/24/24  1620 05/24/24  1532 05/24/24  1505   PH, ARTERIAL pH units  --  7.365 7.574* 7.374 7.393 7.370 7.320* 7.310*   PCO2, ARTERIAL mm Hg  --  39.1 20.7* 33.5* 38.2 46.3* 50.5* 55.6*   PO2 ART mm Hg  --  115.9* 179.2* 109.1* 302.9* 258.0* 349.0* 413.0*   O2 SATURATION ART %  --  98.4* 99.8* 98.2* 99.9* 100.0* 100.0* 100.0*   BASE EXCESS ART mmol/L -2.8* -2.7* -1.3* -4.9* -1.4* 1.0 0.0 2.0       Radiology  XR Chest 1 View    Result Date: 5/26/2024  Impression: 1.No significant change since 5/25/2024. Electronically Signed: Grzegorz Mendes MD  5/26/2024 8:53 AM EDT  Workstation ID: [id number]    XR Chest 1 View    Result Date: 5/25/2024  Impression: Interval chest drain removal with mild bilateral discoid atelectasis, but no evidence of pneumothorax. Electronically Signed: Elias Lo MD  5/25/2024 5:14 PM EDT  Workstation ID: [id number]    XR Chest 1 View    Result Date: 5/25/2024  Impression: Post extubation chest radiograph with no evidence of pneumothorax or other new chest disease. Electronically Signed: Elias Lo MD  5/25/2024 10:55 AM EDT   Workstation ID: [id number]    XR Chest 1 View    Result Date: 5/24/2024  Impression: Postoperative changes of CABG with medical support devices detailed above. No acute cardiopulmonary findings. Electronically Signed: Rey Cornejo MD  5/24/2024 6:13 PM EDT  Workstation ID: [id number]       EKG  I personally viewed and interpreted the patient's EKG/Telemetry data:  ECG 12 Lead Other; Post-op open heart   Preliminary Result   HEART RATE= 95  bpm   RR Interval= 636  ms   OK Interval= 141  ms   P Horizontal Axis= -25  deg   P Front Axis= 44  deg   QRSD Interval= 87  ms   QT Interval= 339  ms   QTcB= 425  ms   QRS Axis= -1  deg   T Wave Axis= 60  deg   - ABNORMAL ECG -   Sinus rhythm   ST elevation suggests acute pericarditis   When compared with ECG of 25-May-2024 4:27:21,   No significant change   Electronically Signed By:    Date and Time of Study: 2024-05-26 05:50:59      ECG 12 Lead Other; Post-op open heart   Final Result   HEART RATE= 93  bpm   RR Interval= 640  ms   OK Interval= 172  ms   P Horizontal Axis= -4  deg   P Front Axis= 47  deg   QRSD Interval= 81  ms   QT Interval= 358  ms   QTcB= 448  ms   QRS Axis= 8  deg   T Wave Axis= 33  deg   - ABNORMAL ECG -   Sinus rhythm   Anterior infarct, possibly acute   ST elevation, consider inferior injury   When compared with ECG of 24-May-2024 17:46:44,   No significant change   Electronically Signed By: Vinicoi Lino (RAH) 25-May-2024 07:01:31   Date and Time of Study: 2024-05-25 04:27:21      ECG 12 Lead Other; Post-op open heart   Final Result   HEART RATE= 85  bpm   RR Interval= 700  ms   OK Interval= 170  ms   P Horizontal Axis= -21  deg   P Front Axis= 75  deg   QRSD Interval= 100  ms   QT Interval= 400  ms   QTcB= 478  ms   QRS Axis= -9  deg   T Wave Axis= 47  deg   - NORMAL ECG -   Sinus rhythm   When compared with ECG of 23-May-2024 22:22:51,   No significant change   Electronically Signed By: Vinicio Lino (RAH) 25-May-2024 07:02:14   Date and Time of Study:  2024-05-24 17:46:44      ECG 12 Lead Pre-Op / Pre-Procedure   Final Result   HEART RATE= 74  bpm   RR Interval= 808  ms   AK Interval= 184  ms   P Horizontal Axis= 10  deg   P Front Axis= 42  deg   QRSD Interval= 87  ms   QT Interval= 371  ms   QTcB= 413  ms   QRS Axis= 8  deg   T Wave Axis= -3  deg   - NORMAL ECG -   Sinus rhythm   When compared with ECG of 14-Oct-2020 14:36:03,   Significant rate decrease   Significant repolarization change   Electronically Signed By: Vinicio Lino (RAH) 24-May-2024 07:39:42   Date and Time of Study: 2024-05-23 22:22:51            Echocardiogram:    Results for orders placed in visit on 05/24/24    Intra-Op Anesthesia SOFIA    Narrative  Intra-Op Anesthesia SOFIA    Procedure Performed: Intra-Op Anesthesia SOFIA  Start Time:  End Time:    Preanesthesia Checklist:  Patient identified, IV assessed, risks and benefits discussed, monitors and equipment assessed, procedure being performed at surgeon's request and anesthesia consent obtained.    General Procedure Information  Diagnostic Indications for Echo:  assessment of surgical repair and hemodynamic monitoring  Location performed:  OR  Intubated  Bite block placed  Heart visualized  Probe Insertion:  Easy  Probe Type:  Biplane and multiplane  Modalities:  Color flow mapping, pulse wave Doppler and continuous wave Doppler    Echocardiographic and Doppler Measurements    Ventricles    Right Ventricle:  Cavity size normal.  Hypertrophy not present.  Thrombus not present.  Global function normal.  Ejection Fraction 60%.  Left Ventricle:  Cavity size normal.  Thrombus not present.  Global Function normal.  Ejection Fraction 60%.    Ventricular Regional Function:  1- Basal Anteroseptal:  normal  2- Basal Anterior:  normal  3- Basal Anterolateral:  normal  4- Basal Inferolateral:  normal  5- Basal Inferior:  normal  6- Basal Inferoseptal:  normal  7- Mid Anteroseptal:  normal  8- Mid Anterior:  normal  9- Mid Anterolateral:  normal  10- Mid  Inferolateral:  normal  11- Mid Inferior:  normal  12- Mid Inferoseptal:  normal  13- Apical Anterior:  normal  14- Apical Lateral:  normal  15- Apical Inferior:  normal  16- Apical Septal:  normal  17- [place]:  normal      Valves    Aortic Valve:  Annulus normal.  Stenosis not present.  Regurgitation absent.  Leaflets normal.  Leaflet motions normal.    Mitral Valve:  Annulus normal.  Stenosis not present.  Regurgitation absent.  Leaflets normal.  Leaflet motions normal.    Tricuspid Valve:  Annulus normal.  Stenosis not present.  Regurgitation absent.  Leaflets normal.  Leaflet motions normal.  Pulmonic Valve:  Annulus normal.  Stenosis not present.  Regurgitation absent.      Aorta    Ascending Aorta:  Size normal.  Dissection not present.  Plaque thickness less than 3 mm.  Mobile plaque not present.  Aortic Arch:  Size normal.  Dissection not present.  Plaque thickness less than 3 mm.  Mobile plaque not present.  Descending Aorta:  Size normal.  Dissection not present.  Plaque thickness less than 3 mm.  Mobile plaque not present.      Atria    Right Atrium:  Size normal.  Left Atrium:  Size normal.  Spontaneous echo contrast not present.  Thrombus not present.  Tumor not present.  Device not present.  Left atrial appendage normal.      Septa        Ventricular Septum:  Intra-ventricular septum morphology normal.        Other Findings  Pleural Effusion:  none  Pulmonary Arteries:  normal      Anesthesia Information  Performed Personally  Anesthesiologist:  Juan Moya MD      Echocardiogram Comments:  SOFIA placed easily, no resistance, bite guard, lubricated      Pre cpb  LV no wma ef 60  RV nl sf  MV AV TV wnl      S/p cabg x 4  No change        Stress Test:  Results for orders placed during the hospital encounter of 05/13/24    Stress test with myocardial perfusion one day    Interpretation Summary    Patient exercised for 10 minutes 21 seconds achieving 11.8 METS.    Findings consistent with an abnormal ECG  stress test.  ST depressions in lateral leads.    Left ventricular ejection fraction is hyperdynamic (Calculated EF > 70%).    Myocardial perfusion imaging indicates a moderate-sized, severe area of ischemia located in the inferior wall and septal wall.    Impressions are consistent with an intermediate risk study.         Cardiac Catheterization:  Results for orders placed during the hospital encounter of 05/23/24    Cardiac Catheterization/Vascular Study    Conclusion  OPERATORS:  1. Alejandro Nagy M.D., Attending Cardiologist      PROCEDURE PERFORMED.  Ultrasound guided vascular access  Left heart catheterization  Coronary Angiogram 26497  Moderate Sedation    INDICATIONS FOR PROCEDURE.  53 years old man with unstable angina and abnormal nuclear stress test.  After discussing the risk and benefit of the procedure he was brought into the Cath Lab for coronary angiography.    PROCEDURE IN DETAIL.  Informed consent was obtained from the patient after explaining the risks, benefits, and alternative options of the procedure. After obtaining informed consent, the patient was brought to the cath lab and was prepped in a sterile fashion. Lidocaine 1% was used for local anesthesia into the right radial access site. The right radial artery was accessed under direct ultrasound visualization  with an angiocath needle via modified Seldinger technique. A 6F slender sheath was inserted successfully. Afterwards, 6F JR4  was advanced over a wire into the ascending aorta and used to cross the AV and obtain LV pressures.  AV gradient obtained via pullback technique. JR4 and JL3.5 diagnostic catheters were used to engage the ostia of the RCA and LM respectively. Images of the right and left coronary systems were obtained. All the catheters were exchanged over a wire and subsequently removed. The patient tolerated the procedure well without any complications. The pictures were reviewed at the end of the procedure. TR band applied to  right wrist for hemostasis and inflated with 15 cc of air. No complications were encountered.    HEMODYNAMICS.  LV: 135/8, 17 mmHg  AO: 136/82, 105 mmHg  No significant gradient across the aortic valve during pullback of JR4 catheter.  LV gram was not performed due to recent echocardiogram.    FINDINGS.    Coronary Angiogram.    Left main. Left main is a large-caliber vessel which gives rise to the Left Anterior Descending, ramus and the Left circumflex.  Left main is angiographically free from any significant disease    Left Anterior Descending Artery. LAD is a large vessel which gives rise to several septal perforators and several diagonal branches.  Mid LAD has 80% stenosis at the origin of diagonal vessel which also has 70 to 80% stenosis.    Ramus intermedius has proximal segment 70% stenosis.    Left Circumflex. The LCx is a medium caliber which gives rise to marginals.  OM1 has proximal 50% stenosis.  OM 2 has proximal segment 70% stenosis.    Right Coronary Artery. The RCA is a dominant vessel which gives rise to several small caliber branches including PDA and PLV.  Mid RCA has 99% long tubular stenosis.  PLV has mid segment 50% stenosis.    IMPRESSIONS.  1 Multivessel obstructive coronary artery disease including LAD, ramus and RCA.  2.  Mildly elevated LVEDP    RECOMMENDATIONS.  1.  Consult cardiac surgery for CABG  2.  Start Imdur    Electronically signed by Alejandro Nagy MD, 05/23/24, 12:30 PM EDT.         Other:         ASSESSMENT & PLAN:        Unstable angina/coronary artery disease (Primary)  Multiple cardiovascular risk factors including hypertension, hyperlipidemia, diabetes, family history of premature coronary disease.  ECG is normal.  Abnormal nuclear stress test leading to cardiac catheterization  Cardiac cath 5/23/2024 showed multivessel coronary artery disease including 99% RCA and 80% LAD.  Patient underwent cardiac surgery with LIMA to the LAD and PRASAD to RCA and SVG to ramus and OM and is  currently stable.  Patient is extubated and his chest tubes are removed   Patient is currently on aspirin statins and beta-blockers and low-dose and is tolerating well with blood pressure and heart rate being stable      Primary hypertension  Patient is on low-dose beta-blockers and tolerating very well.    Mixed hyperlipidemia  LDL 80, HDL 48, triglyceride 98 and total cholesterol 146  Goal LDL is less than 50  Start high intensity statin    Type 2 diabetes mellitus without complication, without long-term current use of insulin  Patient is typically on metformin and Trulicity  A1c 7.1    Anxiety and depression  He is on bupropion      Cordell Soriano MD  05/26/24  16:29 EDT                  Electronically signed by Cordell Soriano MD at 05/26/24 1631       Consult Notes (last 48 hours)  Notes from 05/26/24 1311 through 05/28/24 1311   No notes of this type exist for this encounter.

## 2024-05-28 NOTE — CASE MANAGEMENT/SOCIAL WORK
Continued Stay Note  MARIA VICTORIA Guzman     Patient Name: Ricci Ruiz  MRN: [id number]  Today's Date: 5/28/2024    Admit Date: 5/23/2024    Plan: DC Barriers: Retrun home with wife. CABG 5/24/2024.   Discharge Plan       Row Name 05/28/24 1516       Plan    Plan DC Barriers: Retrun home with wife. CABG 5/24/2024.    Patient/Family in Agreement with Plan yes    Provided Post Acute Provider List? N/A    Provided Post Acute Provider Quality & Resource List? N/A    Plan Comments CM spoke with patient’s nurse and [facility] PA Noreen Dorman to obtain clinical updates. Patient recovering well, and should be ready for DC today or tomorrow. No needs anticipated from CM perspective.CM will continue to follow for any further needs and adjust discharge plan accordingly. DC Barriers: POD 4 OHs, and cardiac monitoring.                 Expected Discharge Date and Time       Expected Discharge Date Expected Discharge Time    May 28, 2024           Phone communication or documentation only- no physical contact with patient or family.      Lian Johnson RN     Office Phone: [phone]  Office Cell:     [phone]

## 2024-05-29 ENCOUNTER — READMISSION MANAGEMENT (OUTPATIENT)
Dept: CALL CENTER | Facility: HOSPITAL | Age: 54
End: 2024-05-29
Payer: COMMERCIAL

## 2024-05-29 VITALS
RESPIRATION RATE: 16 BRPM | SYSTOLIC BLOOD PRESSURE: 111 MMHG | HEART RATE: 86 BPM | OXYGEN SATURATION: 98 % | TEMPERATURE: 97.6 F | DIASTOLIC BLOOD PRESSURE: 66 MMHG | BODY MASS INDEX: 25.95 KG/M2 | HEIGHT: 72 IN | WEIGHT: 191.6 LBS

## 2024-05-29 PROBLEM — Z95.1 S/P CABG X 4: Status: ACTIVE | Noted: 2024-05-29

## 2024-05-29 PROBLEM — N18.2 CKD (CHRONIC KIDNEY DISEASE) STAGE 2, GFR 60-89 ML/MIN: Status: ACTIVE | Noted: 2024-05-29

## 2024-05-29 LAB
ANION GAP SERPL CALCULATED.3IONS-SCNC: 11.5 MMOL/L (ref 5–15)
BUN SERPL-MCNC: 19 MG/DL (ref 6–20)
BUN/CREAT SERPL: 14.4 (ref 7–25)
CALCIUM SPEC-SCNC: 9.2 MG/DL (ref 8.6–10.5)
CHLORIDE SERPL-SCNC: 102 MMOL/L (ref 98–107)
CO2 SERPL-SCNC: 25.5 MMOL/L (ref 22–29)
CREAT SERPL-MCNC: 1.32 MG/DL (ref 0.76–1.27)
EGFRCR SERPLBLD CKD-EPI 2021: 64.1 ML/MIN/1.73
GLUCOSE BLDC GLUCOMTR-MCNC: 105 MG/DL (ref 70–105)
GLUCOSE BLDC GLUCOMTR-MCNC: 165 MG/DL (ref 70–105)
GLUCOSE SERPL-MCNC: 141 MG/DL (ref 65–99)
POTASSIUM SERPL-SCNC: 3.7 MMOL/L (ref 3.5–5.2)
SODIUM SERPL-SCNC: 139 MMOL/L (ref 136–145)

## 2024-05-29 PROCEDURE — 82948 REAGENT STRIP/BLOOD GLUCOSE: CPT | Performed by: INTERNAL MEDICINE

## 2024-05-29 PROCEDURE — 99232 SBSQ HOSP IP/OBS MODERATE 35: CPT | Performed by: INTERNAL MEDICINE

## 2024-05-29 PROCEDURE — 80048 BASIC METABOLIC PNL TOTAL CA: CPT

## 2024-05-29 RX ORDER — BLOOD SUGAR DIAGNOSTIC
1 STRIP MISCELLANEOUS DAILY
Qty: 50 EACH | Refills: 2 | Status: SHIPPED | OUTPATIENT
Start: 2024-05-29

## 2024-05-29 RX ORDER — COLCHICINE 0.6 MG/1
0.6 TABLET ORAL DAILY
Qty: 3 TABLET | Refills: 0 | Status: SHIPPED | OUTPATIENT
Start: 2024-05-30 | End: 2024-06-02

## 2024-05-29 RX ORDER — ATORVASTATIN CALCIUM 40 MG/1
40 TABLET, FILM COATED ORAL NIGHTLY
Qty: 90 TABLET | Refills: 1 | Status: SHIPPED | OUTPATIENT
Start: 2024-05-29

## 2024-05-29 RX ORDER — LANCETS 33 GAUGE
1 EACH MISCELLANEOUS DAILY
Qty: 100 EACH | Refills: 2 | Status: SHIPPED | OUTPATIENT
Start: 2024-05-29

## 2024-05-29 RX ORDER — ACETAMINOPHEN 650 MG
TABLET, EXTENDED RELEASE ORAL 2 TIMES DAILY
Start: 2024-05-29

## 2024-05-29 RX ORDER — POTASSIUM CHLORIDE 20 MEQ/1
20 TABLET, EXTENDED RELEASE ORAL ONCE
Status: COMPLETED | OUTPATIENT
Start: 2024-05-29 | End: 2024-05-29

## 2024-05-29 RX ORDER — METFORMIN HYDROCHLORIDE 500 MG/1
500 TABLET, EXTENDED RELEASE ORAL 2 TIMES DAILY WITH MEALS
Qty: 60 TABLET | Refills: 2 | Status: SHIPPED | OUTPATIENT
Start: 2024-05-29

## 2024-05-29 RX ORDER — HYDROCODONE BITARTRATE AND ACETAMINOPHEN 5; 325 MG/1; MG/1
1 TABLET ORAL EVERY 6 HOURS PRN
Qty: 20 TABLET | Refills: 0 | Status: SHIPPED | OUTPATIENT
Start: 2024-05-29

## 2024-05-29 RX ADMIN — ASPIRIN 81 MG: 81 TABLET, COATED ORAL at 09:00

## 2024-05-29 RX ADMIN — METOPROLOL TARTRATE 37.5 MG: 25 TABLET, FILM COATED ORAL at 08:59

## 2024-05-29 RX ADMIN — POTASSIUM CHLORIDE 20 MEQ: 1500 TABLET, EXTENDED RELEASE ORAL at 09:01

## 2024-05-29 RX ADMIN — CETIRIZINE HYDROCHLORIDE 10 MG: 10 TABLET, FILM COATED ORAL at 09:01

## 2024-05-29 RX ADMIN — COLCHICINE 0.6 MG: 0.6 TABLET, FILM COATED ORAL at 09:01

## 2024-05-29 RX ADMIN — ACETAMINOPHEN 650 MG: 325 TABLET, FILM COATED ORAL at 02:37

## 2024-05-29 RX ADMIN — PANTOPRAZOLE SODIUM 40 MG: 40 TABLET, DELAYED RELEASE ORAL at 05:55

## 2024-05-29 RX ADMIN — BUPROPION HYDROCHLORIDE 150 MG: 150 TABLET, EXTENDED RELEASE ORAL at 09:01

## 2024-05-29 RX ADMIN — MUPIROCIN 1 APPLICATION: 20 OINTMENT TOPICAL at 09:01

## 2024-05-29 RX ADMIN — METFORMIN HYDROCHLORIDE 500 MG: 500 TABLET, EXTENDED RELEASE ORAL at 08:59

## 2024-05-29 RX ADMIN — FUROSEMIDE 40 MG: 40 TABLET ORAL at 09:00

## 2024-05-29 RX ADMIN — EMPAGLIFLOZIN 10 MG: 10 TABLET, FILM COATED ORAL at 09:01

## 2024-05-29 RX ADMIN — POVIDONE-IODINE: 10 SOLUTION TOPICAL at 09:02

## 2024-05-29 RX ADMIN — CHLORHEXIDINE GLUCONATE, 0.12% ORAL RINSE 15 ML: 1.2 SOLUTION DENTAL at 09:01

## 2024-05-29 NOTE — PROGRESS NOTES
Referring Provider: Poncho Alvarez, *    Reason for follow-up: Coronary artery disease status post CABG     Patient Care Team:  Rain Paniagua MD as PCP - General (Family Medicine)      SUBJECTIVE  Doing well denies any chest pain or shortness of breath.  Able to ambulate freely with no difficulty.     ROS  Review of all systems negative except as indicated.    Since I have last seen, the patient has been without any chest discomfort, shortness of breath, palpitations, dizziness or syncope.  Denies having any headache, abdominal pain, nausea, vomiting, diarrhea, constipation, loss of weight or loss of appetite.  Denies having any excessive bruising, hematuria or blood in the stool.  ROS      Personal History:    Past Medical History:   Diagnosis Date    Colon polyp 2019    Diabetes mellitus     Type 2    Hyperlipidemia        Past Surgical History:   Procedure Laterality Date    CARDIAC CATHETERIZATION N/A 5/23/2024    Procedure: Left Heart Cath with Coronary Angiography;  Surgeon: Alejandro Nagy MD;  Location: [facility] CATH INVASIVE LOCATION;  Service: Cardiovascular;  Laterality: N/A;    COLONOSCOPY  2019       Family History   Problem Relation Age of Onset    Arthritis Mother         Lupus    Diabetes Brother     Heart disease Father     Heart disease Brother        Social History     Tobacco Use    Smoking status: Never    Smokeless tobacco: Never   Vaping Use    Vaping status: Never Used   Substance Use Topics    Alcohol use: Yes     Alcohol/week: 1.0 standard drink of alcohol     Types: 1 Glasses of wine per week    Drug use: Never        Home meds:  Prior to Admission medications    Medication Sig Start Date End Date Taking? Authorizing Provider   aspirin 81 MG tablet Take 1 tablet by mouth Daily. 3/27/15  Yes ProviderLinda MD   Blood Glucose Monitoring Suppl (Accu-Chek Gladis Plus) w/Device kit 1 kit Daily. 10/26/20  Yes Karen Pickard MD   buPROPion XL (WELLBUTRIN XL) 150 MG 24 hr  tablet TAKE 1 TABLET BY MOUTH EVERY DAY 6/6/23  Yes Karen Pickard MD   glucose blood (Accu-Chek Gladis Plus) test strip Use daily E11.9 9/9/22  Yes Karen Pickard MD   lisinopril (PRINIVIL,ZESTRIL) 5 MG tablet TAKE 1 TABLET BY MOUTH EVERY DAY 6/6/23  Yes Karen Pickard MD   loratadine (Claritin) 10 MG tablet Take 1 tablet by mouth Daily.   Yes Linda Banks MD   metFORMIN (GLUCOPHAGE) 1000 MG tablet TAKE 1 TABLET BY MOUTH TWICE A DAY WITH MEALS 6/6/23  Yes Karen Pickard MD   Multiple Vitamin (MULTIVITAMIN) capsule Take 1 capsule by mouth Daily. 2/10/16  Yes Linda Banks MD   nitroglycerin (NITROSTAT) 0.4 MG SL tablet Place 1 tablet under the tongue Every 5 (Five) Minutes As Needed for Chest Pain. 5/1/24  Yes Linda Banks MD   Semaglutide, 1 MG/DOSE, (Ozempic, 1 MG/DOSE,) 2 MG/1.5ML solution pen-injector Inject 0.5 mg under the skin into the appropriate area as directed 1 (One) Time Per Week.   Yes Linda Banks MD   simvastatin (ZOCOR) 40 MG tablet TAKE 1 TABLET BY MOUTH EVERY DAY 12/27/22  Yes Karen Pickard MD   vitamin D3 125 MCG (5000 UT) capsule capsule Take 1 capsule by mouth Daily. 3/10/23  Yes Karen Pickard MD   isosorbide mononitrate (IMDUR) 30 MG 24 hr tablet Take 1 tablet by mouth Every Morning. 5/23/24   Alejandro Nagy MD       Allergies:  Patient has no known allergies.    Scheduled Meds:aspirin, 81 mg, Oral, Daily  atorvastatin, 40 mg, Oral, Nightly  buPROPion XL, 150 mg, Oral, Daily  cetirizine, 10 mg, Oral, Daily  chlorhexidine, 15 mL, Mouth/Throat, Q12H  colchicine, 0.6 mg, Oral, Daily  empagliflozin, 10 mg, Oral, Daily  enoxaparin, 40 mg, Subcutaneous, Q24H  furosemide, 40 mg, Oral, Daily  insulin lispro, 2-9 Units, Subcutaneous, 4x Daily AC & at Bedtime  metFORMIN ER, 500 mg, Oral, BID With Meals  metoprolol tartrate, 37.5 mg, Oral, Q12H  mupirocin, , Each Nare, BID  pantoprazole, 40 mg, Oral, Q AM  potassium chloride, 20  "mEq, Oral, Daily  potassium chloride ER, 20 mEq, Oral, Once  povidone-iodine, , Topical, BID      Continuous Infusions:   PRN Meds:.  acetaminophen **OR** [DISCONTINUED] acetaminophen **OR** [DISCONTINUED] acetaminophen    bisacodyl    bisacodyl    Calcium Replacement - Follow Nurse / BPA Driven Protocol    cyclobenzaprine    dextrose    dextrose    glucagon (human recombinant)    glucagon (human recombinant)    HYDROcodone-acetaminophen    magnesium hydroxide    Magnesium Standard Dose Replacement - Follow Nurse / BPA Driven Protocol    [DISCONTINUED] Morphine **AND** naloxone    nitroglycerin    ondansetron    Phosphorus Replacement - Follow Nurse / BPA Driven Protocol    polyethylene glycol    Potassium Replacement - Follow Nurse / BPA Driven Protocol    senna-docusate sodium      OBJECTIVE    Vital Signs  Vitals:    05/29/24 0400 05/29/24 0500 05/29/24 0600 05/29/24 0738   BP: 111/66 115/67 122/79 121/77   BP Location:    Right arm   Patient Position:    Sitting   Pulse: 75 77 78    Resp:    16   Temp:    98.4 °F (36.9 °C)   TempSrc:    Oral   SpO2: 97% 95% 98%    Weight:   86.9 kg (191 lb 9.6 oz)    Height:           Flowsheet Rows      Flowsheet Row First Filed Value   Admission Height 181.6 cm (71.5\") Documented at 05/23/2024 1104   Admission Weight 86.6 kg (190 lb 14.7 oz) Documented at 05/23/2024 1104              Intake/Output Summary (Last 24 hours) at 5/29/2024 0858  Last data filed at 5/29/2024 0615  Gross per 24 hour   Intake 240 ml   Output 4350 ml   Net -4110 ml          Telemetry: Sinus rhythm    Physical Exam:  The patient is alert, oriented and in no distress.  Vital signs as noted above.  Head and neck revealed no carotid bruits or jugular venous distention.  No thyromegaly or lymphadenopathy is present  Lungs clear.  No wheezing.  Breath sounds are normal bilaterally.  Heart normal first and second heart sounds.  No murmur. No precordial rub is present.  No gallop is present.  Abdomen soft and " nontender.  No organomegaly is present.  Extremities with good peripheral pulses without any pedal edema.  Skin warm and dry.  Musculoskeletal system is grossly normal.  CNS grossly normal.       Results Review:  I have personally reviewed the results from the time of this admission to 5/29/2024 08:58 EDT and agree with these findings:  []  Laboratory  []  Microbiology  []  Radiology  []  EKG/Telemetry   []  Cardiology/Vascular   []  Pathology  []  Old records  []  Other:    Most notable findings include:    Lab Results (last 24 hours)       Procedure Component Value Units Date/Time    POC Glucose 4x Daily Before Meals & at Bedtime [[id number]]  (Normal) Collected: 05/29/24 0728    Specimen: Blood Updated: 05/29/24 0729     Glucose 105 mg/dL      Comment: Serial Number: [id number]:  [id number]       Basic Metabolic Panel [[id number]]  (Abnormal) Collected: 05/29/24 0620    Specimen: Blood from Arm, Left Updated: 05/29/24 0645     Glucose 141 mg/dL      BUN 19 mg/dL      Creatinine 1.32 mg/dL      Sodium 139 mmol/L      Potassium 3.7 mmol/L      Chloride 102 mmol/L      CO2 25.5 mmol/L      Calcium 9.2 mg/dL      BUN/Creatinine Ratio 14.4     Anion Gap 11.5 mmol/L      eGFR 64.1 mL/min/1.73     Narrative:      GFR Normal >60  Chronic Kidney Disease <60  Kidney Failure <15      POC Glucose Once [[id number]]  (Abnormal) Collected: 05/28/24 2013    Specimen: Blood Updated: 05/28/24 2015     Glucose 176 mg/dL      Comment: Serial Number: [id number]:  [id number]       Potassium [[id number]]  (Normal) Collected: 05/28/24 1513    Specimen: Blood Updated: 05/28/24 1548     Potassium 4.5 mmol/L      Comment: Result checked         POC Glucose Once [[id number]]  (Abnormal) Collected: 05/28/24 1539    Specimen: Blood Updated: 05/28/24 1541     Glucose 109 mg/dL      Comment: Serial Number: [id number]:  [id number]       POC Glucose 4x Daily Before Meals & at Bedtime [[id number]]  (Abnormal) Collected: 05/28/24 1146     Specimen: Blood Updated: 05/28/24 1148     Glucose 192 mg/dL      Comment: Serial Number: [id number]:  [id number]               Imaging Results (Last 24 Hours)       ** No results found for the last 24 hours. **            LAB RESULTS (LAST 7 DAYS)    CBC  Results from last 7 days   Lab Units 05/28/24  0448 05/27/24  0437 05/26/24  0349 05/25/24  0448 05/25/24  0225 05/25/24  0152 05/24/24  2257 05/24/24  1900 05/24/24  1729 05/24/24  1305 05/23/24  1707   WBC 10*3/mm3 7.70 10.88* 10.94*  --   --  9.93  --   --  15.22*  --  5.95   RBC 10*6/mm3 3.40* 3.41* 3.09*  --   --  3.28*  --   --  3.88*  --  4.38   HEMOGLOBIN g/dL 10.7* 10.7* 9.8*  --   --  10.1*  --   --  12.3*  --  13.7   HEMOGLOBIN, POC g/dL  --   --   --  10.0* 11.0*  --  11.6*   < >  --    < >  --    HEMATOCRIT % 32.8* 33.1* 30.1*  --   --  30.9*  --   --  36.2*  --  41.6   HEMATOCRIT POC %  --   --   --  30* 32*  --  34*   < >  --    < >  --    MCV fL 96.5 97.1* 97.4*  --   --  94.2  --   --  93.3  --  95.0   PLATELETS 10*3/mm3 144 121* 101*  --   --  123*  --   --  133*  --  174    < > = values in this interval not displayed.       BMP  Results from last 7 days   Lab Units 05/29/24  0620 05/28/24  1513 05/28/24 0448 05/27/24  0829 05/27/24  0437 05/26/24  2300 05/26/24  1659 05/26/24  1016 05/26/24  0349 05/25/24  1425 05/25/24  0448 05/25/24  0225 05/24/24  2257 05/24/24  2125 05/24/24  1900 05/24/24  1729 05/23/24  1707   SODIUM mmol/L 139  --  141  --  138  --   --   --  139  --   --   --   --  143  --  140 139   POTASSIUM mmol/L 3.7 4.5 3.4* 4.1 3.9 3.4* 3.9   < > 3.7 4.0  --   --   --  4.2  --  4.4 3.5   CHLORIDE mmol/L 102  --  106  --  105  --   --   --  106  --   --   --   --  105  --  104 103   CO2 mmol/L 25.5  --  25.9  --  23.5  --   --   --  22.7  --   --   --   --  21.0*  --  23.0 27.0   BUN mg/dL 19  --  14  --  12  --   --   --  16  --   --   --   --  17  --  15 16   CREATININE mg/dL 1.32*  --  1.09  --  1.10  --   --   --  1.12   --  1.45* 1.33* 1.43* 1.26   < > 1.23 1.20   GLUCOSE mg/dL 141*  --  127*  --  128*  --   --   --  133* 159*  --   --   --  195*  --  140* 176*   MAGNESIUM mg/dL  --   --   --   --   --   --   --   --   --   --   --   --   --  2.5  --  2.9*  --    PHOSPHORUS mg/dL  --   --   --   --   --   --   --   --   --   --   --   --   --  2.5  --  1.9*  --     < > = values in this interval not displayed.       CMP   Results from last 7 days   Lab Units 05/29/24  0620 05/28/24  1513 05/28/24  0448 05/27/24  0829 05/27/24  0437 05/26/24  2300 05/26/24  1659 05/26/24  1016 05/26/24  0349 05/25/24  1425 05/25/24  0448 05/25/24  0225 05/24/24  2257 05/24/24  2125 05/24/24  1900 05/24/24  1729 05/23/24  1707   SODIUM mmol/L 139  --  141  --  138  --   --   --  139  --   --   --   --  143  --  140 139   POTASSIUM mmol/L 3.7 4.5 3.4* 4.1 3.9 3.4* 3.9   < > 3.7 4.0  --   --   --  4.2  --  4.4 3.5   CHLORIDE mmol/L 102  --  106  --  105  --   --   --  106  --   --   --   --  105  --  104 103   CO2 mmol/L 25.5  --  25.9  --  23.5  --   --   --  22.7  --   --   --   --  21.0*  --  23.0 27.0   BUN mg/dL 19  --  14  --  12  --   --   --  16  --   --   --   --  17  --  15 16   CREATININE mg/dL 1.32*  --  1.09  --  1.10  --   --   --  1.12  --  1.45* 1.33* 1.43* 1.26   < > 1.23 1.20   GLUCOSE mg/dL 141*  --  127*  --  128*  --   --   --  133* 159*  --   --   --  195*  --  140* 176*   ALBUMIN g/dL  --   --   --   --   --   --   --   --   --   --   --   --   --  4.9  --  4.7 4.3   BILIRUBIN mg/dL  --   --   --   --   --   --   --   --   --   --   --   --   --   --   --   --  0.4   ALK PHOS U/L  --   --   --   --   --   --   --   --   --   --   --   --   --   --   --   --  70   AST (SGOT) U/L  --   --   --   --   --   --   --   --   --   --   --   --   --   --   --   --  15   ALT (SGPT) U/L  --   --   --   --   --   --   --   --   --   --   --   --   --   --   --   --  18    < > = values in this interval not displayed.       BNP         TROPONIN        CoAg  Results from last 7 days   Lab Units 05/25/24  0152 05/24/24  1729 05/23/24  1707   INR  1.10 1.10 1.00   APTT seconds  --  25.3 26.2       Creatinine Clearance  Estimated Creatinine Clearance: 78.6 mL/min (A) (by C-G formula based on SCr of 1.32 mg/dL (H)).    ABG  Results from last 7 days   Lab Units 05/25/24  0648 05/25/24  0448 05/25/24  0225 05/24/24  2257 05/24/24  1900 05/24/24  1620 05/24/24  1532 05/24/24  1505   PH, ARTERIAL pH units  --  7.365 7.574* 7.374 7.393 7.370 7.320* 7.310*   PCO2, ARTERIAL mm Hg  --  39.1 20.7* 33.5* 38.2 46.3* 50.5* 55.6*   PO2 ART mm Hg  --  115.9* 179.2* 109.1* 302.9* 258.0* 349.0* 413.0*   O2 SATURATION ART %  --  98.4* 99.8* 98.2* 99.9* 100.0* 100.0* 100.0*   BASE EXCESS ART mmol/L -2.8* -2.7* -1.3* -4.9* -1.4* 1.0 0.0 2.0       Radiology  No radiology results for the last day      EKG  I personally viewed and interpreted the patient's EKG/Telemetry data:  ECG 12 Lead Other; Post-op open heart   Final Result   HEART RATE= 95  bpm   RR Interval= 636  ms   RI Interval= 141  ms   P Horizontal Axis= -25  deg   P Front Axis= 44  deg   QRSD Interval= 87  ms   QT Interval= 339  ms   QTcB= 425  ms   QRS Axis= -1  deg   T Wave Axis= 60  deg   - ABNORMAL ECG -   Sinus rhythm   When compared with ECG of 25-May-2024 4:27:21,   No significant change   Electronically Signed By: Vinicio Lino ([facility]) 28-May-2024 14:13:13   Date and Time of Study: 2024-05-26 05:50:59      ECG 12 Lead Other; Post-op open heart   Final Result   HEART RATE= 93  bpm   RR Interval= 640  ms   RI Interval= 172  ms   P Horizontal Axis= -4  deg   P Front Axis= 47  deg   QRSD Interval= 81  ms   QT Interval= 358  ms   QTcB= 448  ms   QRS Axis= 8  deg   T Wave Axis= 33  deg   - ABNORMAL ECG -   Sinus rhythm   Anterior infarct, possibly acute   ST elevation, consider inferior injury   When compared with ECG of 24-May-2024 17:46:44,   No significant change   Electronically Signed By: Vinicio Lino  ([facility]) 25-May-2024 07:01:31   Date and Time of Study: 2024-05-25 04:27:21      ECG 12 Lead Other; Post-op open heart   Final Result   HEART RATE= 85  bpm   RR Interval= 700  ms   LA Interval= 170  ms   P Horizontal Axis= -21  deg   P Front Axis= 75  deg   QRSD Interval= 100  ms   QT Interval= 400  ms   QTcB= 478  ms   QRS Axis= -9  deg   T Wave Axis= 47  deg   - NORMAL ECG -   Sinus rhythm   When compared with ECG of 23-May-2024 22:22:51,   No significant change   Electronically Signed By: Vinicio Lino ([facility]) 25-May-2024 07:02:14   Date and Time of Study: 2024-05-24 17:46:44      ECG 12 Lead Pre-Op / Pre-Procedure   Final Result   HEART RATE= 74  bpm   RR Interval= 808  ms   LA Interval= 184  ms   P Horizontal Axis= 10  deg   P Front Axis= 42  deg   QRSD Interval= 87  ms   QT Interval= 371  ms   QTcB= 413  ms   QRS Axis= 8  deg   T Wave Axis= -3  deg   - NORMAL ECG -   Sinus rhythm   When compared with ECG of 14-Oct-2020 14:36:03,   Significant rate decrease   Significant repolarization change   Electronically Signed By: Vinicio Lino ([facility]) 24-May-2024 07:39:42   Date and Time of Study: 2024-05-23 22:22:51            Echocardiogram:    Results for orders placed in visit on 05/24/24    Intra-Op Anesthesia SOFIA    Narrative  Intra-Op Anesthesia SOFIA    Procedure Performed: Intra-Op Anesthesia SOFIA  Start Time:  End Time:    Preanesthesia Checklist:  Patient identified, IV assessed, risks and benefits discussed, monitors and equipment assessed, procedure being performed at surgeon's request and anesthesia consent obtained.    General Procedure Information  Diagnostic Indications for Echo:  assessment of surgical repair and hemodynamic monitoring  Location performed:  OR  Intubated  Bite block placed  Heart visualized  Probe Insertion:  Easy  Probe Type:  Biplane and multiplane  Modalities:  Color flow mapping, pulse wave Doppler and continuous wave Doppler    Echocardiographic and Doppler Measurements    Ventricles    Right  Ventricle:  Cavity size normal.  Hypertrophy not present.  Thrombus not present.  Global function normal.  Ejection Fraction 60%.  Left Ventricle:  Cavity size normal.  Thrombus not present.  Global Function normal.  Ejection Fraction 60%.    Ventricular Regional Function:  1- Basal Anteroseptal:  normal  2- Basal Anterior:  normal  3- Basal Anterolateral:  normal  4- Basal Inferolateral:  normal  5- Basal Inferior:  normal  6- Basal Inferoseptal:  normal  7- Mid Anteroseptal:  normal  8- Mid Anterior:  normal  9- Mid Anterolateral:  normal  10- Mid Inferolateral:  normal  11- Mid Inferior:  normal  12- Mid Inferoseptal:  normal  13- Apical Anterior:  normal  14- Apical Lateral:  normal  15- Apical Inferior:  normal  16- Apical Septal:  normal  17- [place]:  normal      Valves    Aortic Valve:  Annulus normal.  Stenosis not present.  Regurgitation absent.  Leaflets normal.  Leaflet motions normal.    Mitral Valve:  Annulus normal.  Stenosis not present.  Regurgitation absent.  Leaflets normal.  Leaflet motions normal.    Tricuspid Valve:  Annulus normal.  Stenosis not present.  Regurgitation absent.  Leaflets normal.  Leaflet motions normal.  Pulmonic Valve:  Annulus normal.  Stenosis not present.  Regurgitation absent.      Aorta    Ascending Aorta:  Size normal.  Dissection not present.  Plaque thickness less than 3 mm.  Mobile plaque not present.  Aortic Arch:  Size normal.  Dissection not present.  Plaque thickness less than 3 mm.  Mobile plaque not present.  Descending Aorta:  Size normal.  Dissection not present.  Plaque thickness less than 3 mm.  Mobile plaque not present.      Atria    Right Atrium:  Size normal.  Left Atrium:  Size normal.  Spontaneous echo contrast not present.  Thrombus not present.  Tumor not present.  Device not present.  Left atrial appendage normal.      Septa        Ventricular Septum:  Intra-ventricular septum morphology normal.        Other Findings  Pleural Effusion:  none  Pulmonary  Arteries:  normal      Anesthesia Information  Performed Personally  Anesthesiologist:  Juan Moya MD      Echocardiogram Comments:  SOFIA placed easily, no resistance, bite guard, lubricated      Pre cpb  LV no wma ef 60  RV nl sf  MV AV TV wnl      S/p cabg x 4  No change        Stress Test:  Results for orders placed during the hospital encounter of 05/13/24    Stress test with myocardial perfusion one day    Interpretation Summary    Patient exercised for 10 minutes 21 seconds achieving 11.8 METS.    Findings consistent with an abnormal ECG stress test.  ST depressions in lateral leads.    Left ventricular ejection fraction is hyperdynamic (Calculated EF > 70%).    Myocardial perfusion imaging indicates a moderate-sized, severe area of ischemia located in the inferior wall and septal wall.    Impressions are consistent with an intermediate risk study.         Cardiac Catheterization:  Results for orders placed during the hospital encounter of 05/23/24    Cardiac Catheterization/Vascular Study    Conclusion  OPERATORS:  1. Alejandro Nagy M.D., Attending Cardiologist      PROCEDURE PERFORMED.  Ultrasound guided vascular access  Left heart catheterization  Coronary Angiogram 41911  Moderate Sedation    INDICATIONS FOR PROCEDURE.  53 years old man with unstable angina and abnormal nuclear stress test.  After discussing the risk and benefit of the procedure he was brought into the Cath Lab for coronary angiography.    PROCEDURE IN DETAIL.  Informed consent was obtained from the patient after explaining the risks, benefits, and alternative options of the procedure. After obtaining informed consent, the patient was brought to the cath lab and was prepped in a sterile fashion. Lidocaine 1% was used for local anesthesia into the right radial access site. The right radial artery was accessed under direct ultrasound visualization  with an angiocath needle via modified Seldinger technique. A 6F slender sheath was  inserted successfully. Afterwards, 6F JR4  was advanced over a wire into the ascending aorta and used to cross the AV and obtain LV pressures.  AV gradient obtained via pullback technique. JR4 and JL3.5 diagnostic catheters were used to engage the ostia of the RCA and LM respectively. Images of the right and left coronary systems were obtained. All the catheters were exchanged over a wire and subsequently removed. The patient tolerated the procedure well without any complications. The pictures were reviewed at the end of the procedure. TR band applied to right wrist for hemostasis and inflated with 15 cc of air. No complications were encountered.    HEMODYNAMICS.  LV: 135/8, 17 mmHg  AO: 136/82, 105 mmHg  No significant gradient across the aortic valve during pullback of JR4 catheter.  LV gram was not performed due to recent echocardiogram.    FINDINGS.    Coronary Angiogram.    Left main. Left main is a large-caliber vessel which gives rise to the Left Anterior Descending, ramus and the Left circumflex.  Left main is angiographically free from any significant disease    Left Anterior Descending Artery. LAD is a large vessel which gives rise to several septal perforators and several diagonal branches.  Mid LAD has 80% stenosis at the origin of diagonal vessel which also has 70 to 80% stenosis.    Ramus intermedius has proximal segment 70% stenosis.    Left Circumflex. The LCx is a medium caliber which gives rise to marginals.  OM1 has proximal 50% stenosis.  OM 2 has proximal segment 70% stenosis.    Right Coronary Artery. The RCA is a dominant vessel which gives rise to several small caliber branches including PDA and PLV.  Mid RCA has 99% long tubular stenosis.  PLV has mid segment 50% stenosis.    IMPRESSIONS.  1 Multivessel obstructive coronary artery disease including LAD, ramus and RCA.  2.  Mildly elevated LVEDP    RECOMMENDATIONS.  1.  Consult cardiac surgery for CABG  2.  Start Imdur    Electronically  signed by Alejandro Nagy MD, 05/23/24, 12:30 PM EDT.         Other:         ASSESSMENT & PLAN:    Principal Problem:    Coronary artery disease involving native coronary artery of native heart  Active Problems:    Chest pain due to myocardial ischemia    Abnormal nuclear stress test    CAD (coronary artery disease)    Unstable angina/coronary artery disease (Primary)  Multiple cardiovascular risk factors including hypertension, hyperlipidemia, diabetes, family history of premature coronary disease.  ECG is normal.  Abnormal nuclear stress test leading to cardiac catheterization  Cardiac cath 5/23/2024 showed multivessel coronary artery disease including 99% RCA and 80% LAD.  CABGX4 5/24/2024: (LIMA to LAD, PRASAD to RCA, SVG to Ramus, SVG to OM)   Continue aspirin, high intensity statin and beta-blocker  Encouraged the use of incentive spirometry  Cardiac rehab at the time of discharge.     Primary hypertension  Continue beta-blocker, metoprolol  I will add lisinopril as an outpatient.  Pre-CABG he was on lisinopril  Lisinopril is also indicated due to diabetes     Mixed hyperlipidemia  LDL 80, HDL 48, triglyceride 98 and total cholesterol 146  Goal LDL is less than 50  Continue high intensity statin     Type 2 diabetes mellitus without complication, without long-term current use of insulin  Patient is now on metformin and Jardiance.  He was on Trulicity prior to admission  A1c 7.1     Anxiety and depression  He is on bupropion      Alejandro Nagy MD  05/29/24  08:58 EDT

## 2024-05-29 NOTE — CONSULTS
Nutrition Services    Patient Name: Ricci Ruiz  YOB: 1970  MRN: [id number]  Admission date: 5/23/2024    Nutrition Counseling & Education       Reason for Visit: Physician Consult      Session topic: Diet rationale, Key food habit change, Consistent Carbohydrate Diet, Type 2 Diabetes, and Heart Healthy eating     Session comments: Patient accepting of diet education at time of RD visit.  Spouse also present.  Discussed his typical meals with suggestions of ways to cut down on Na+ and CHO content.  Patient reports having been to a diabetes education class in the past.  Drinks sugar free beverages.  New goals include homemade breakfast, packing lunch, avoid deli meat, no salt added canned items + rinsing.  RD provided handout and RD contact information for follow up.       Provided print material via: Academy of [employer] Dietetics-Nutrition Care Manual      Goals: Increase knowledge on diet/nutrition effects on condition/status      Monitoring/Follow Up: RD to follow up PRN    Patient to follow up PRN on outpatient basis       Electronically signed by:  Penny Ovalles RD  05/29/24 09:50 EDT

## 2024-05-29 NOTE — PROGRESS NOTES
S/P POD# 5 CABG x4 with bilateral IMAs--[place]  EF 50-55% (echo)    Subjective:  reports he's ready to go home    No events overnight  Creatinine bumped to 1.32  Jardiance started yest  Wt even from preop      Intake/Output Summary (Last 24 hours) at 5/29/2024 0809  Last data filed at 5/29/2024 0615  Gross per 24 hour   Intake 480 ml   Output 4350 ml   Net -3870 ml     Temp:  [98 °F (36.7 °C)-98.4 °F (36.9 °C)] 98.4 °F (36.9 °C)  Heart Rate:  [] 78  Resp:  [14-20] 16  BP: (107-141)/(66-87) 121/77      Results from last 7 days   Lab Units 05/28/24  0448 05/27/24  0437 05/25/24  0225 05/25/24  0152 05/24/24  1900 05/24/24  1729 05/24/24  1305 05/23/24  1707   WBC 10*3/mm3 7.70 10.88*   < > 9.93  --  15.22*  --  5.95   HEMOGLOBIN g/dL 10.7* 10.7*   < > 10.1*  --  12.3*  --  13.7   HEMOGLOBIN, POC   --   --    < >  --    < >  --    < >  --    HEMATOCRIT % 32.8* 33.1*   < > 30.9*  --  36.2*  --  41.6   HEMATOCRIT POC   --   --    < >  --    < >  --    < >  --    PLATELETS 10*3/mm3 144 121*   < > 123*  --  133*  --  174   INR   --   --   --  1.10  --  1.10  --  1.00    < > = values in this interval not displayed.     Results from last 7 days   Lab Units 05/29/24  0620 05/24/24  2257 05/24/24  2125   CREATININE mg/dL 1.32*   < > 1.26   POTASSIUM mmol/L 3.7   < > 4.2   SODIUM mmol/L 139   < > 143   MAGNESIUM mg/dL  --   --  2.5   PHOSPHORUS mg/dL  --   --  2.5    < > = values in this interval not displayed.       Physical Exam:  Neuro intact, nad, up in recliner, wife at bedside  Tele: SR 90s  Diminished bases, 99% RA  Sternotomy/ SVHS healing well  Benign abd, + BM  No edema    Assessment/Plan:  Principal Problem:    Coronary artery disease involving native coronary artery of native heart  Active Problems:    Chest pain due to myocardial ischemia    Abnormal nuclear stress test    CAD (coronary artery disease)    - MV CAD, EF 50-55% (echo)--s/p CABG x4 with LIMA to LAD, PRASAD to RCA, SVG to ramus, SVG to OM  (Kim)  - HTN--stable  - HLD--statin  - DM type 2--A1c 7.1, insulin dripwatch closely  - Ozempic use--last dose 5/9  - CKD, stage 2--eGFR 66.5  - Postop ABLA, expected--watch closely  - Postop TCP, consumptive--watch closely  - Postop pericarditis--start colchicine    POD# 5.  Doing well.  Creatinine bumped to 1.32, d/w Dr. Little could be r/t Jardiance and diuretics.  On asa/stain/bb.  Cont colchicine for pericarditis.  Plans for dc home today.    Routine care--as above  D/w pt/family, nsg, Dr. Alvarez, cards, endo  Home today    Blanca Redmond, KASIE  5/29/2024  08:09 EDT

## 2024-05-29 NOTE — PLAN OF CARE
Goal Outcome Evaluation:                 Patient doing well, discharging today.

## 2024-05-29 NOTE — DISCHARGE SUMMARY
Date of Admission: 5/23/2024  Date of Discharge: 5/29/2024    Discharge Diagnosis:   - MV CAD, EF 50-55% (echo)--s/p CABG x4 with LIMA to LAD, PRASAD to RCA, SVG to ramus, SVG to OM (Kim)  - HTN--stable  - HLD--statin  - DM type 2--A1c 7.1, insulin dripwatch closely  - Ozempic use--last dose 5/9  - CKD, stage 2--eGFR 66.5  - Postop ABLA, expected--watch closely  - Postop TCP, consumptive--watch closely  - Postop pericarditis--start colchicine    Presenting Problem/History of Present Illness  Chest pain due to myocardial ischemia, unspecified ischemic chest pain type [I25.9]  Abnormal nuclear stress test [R94.39]  CAD (coronary artery disease) [I25.10]     Hospital Course  Patient is a 53 y.o. male who presented for an elective heart cath following an abnormal stress test.  On 5/23/2024, he underwent cardiac cath by Dr. Nagy revealing MV CAD.  Cardiac surgery was consulted.  On 5/24/2024, he underwent CABG x 4 with bilateral ADINA's per Dr. Alvarez.  Please see op note for full details.  He was extubated in a timely manner.  On POD 1 his creatinine was noted to be 1.45.  This resolved to baseline.  Endo was consulted for DM management.  On POD 2 he had ST elevation c/w pericarditis.  Colchicine was started.  On POD 4, Jardiance was started.  On POD 5, he was ready for discharge and his creatinine had bumped to 1.32.  This was discussed with Dr. Little.  The plan is to continue Jardiance and hold diuretics.  BMP in 1 week.  Discharge plan for DM mmgt determined per Dr. Little--metformin 500 mg twice daily, Jardiance 10 daily, Ozempic 0.5 mg once weekly.  Patient will continue to follow with his PCP for DM mmgt.  Surgical wounds are healing well.  He is eating/drinking adequately.  He is ambulating well.  He has had a postop BM.  [place] 5/325, # 20, no refills e-scribed.    Procedures Performed  Per Dr. Alvarez 5/24/2024  1. CABG x 4 (LIMA to LAD, PRASAD to RCA, SVG to Ramus, SVG to OM)  2. EVH of the  left legs    Per Dr. Nagy 5/23/2024  Left heart cath       Consults:   Consults       Date and Time Order Name Status Description    5/25/2024 10:49 AM Inpatient Endocrinology Consult      5/24/2024  5:24 PM Inpatient Cardiology Consult              Pertinent Test Results:    Lab Results   Component Value Date    WBC 7.70 05/28/2024    HGB 10.7 (L) 05/28/2024    HCT 32.8 (L) 05/28/2024    MCV 96.5 05/28/2024     05/28/2024      Lab Results   Component Value Date    GLUCOSE 141 (H) 05/29/2024    CALCIUM 9.2 05/29/2024     05/29/2024    K 3.7 05/29/2024    CO2 25.5 05/29/2024     05/29/2024    BUN 19 05/29/2024    CREATININE 1.32 (H) 05/29/2024    EGFRIFAFRI 97 03/02/2016    EGFRIFNONA 60 (L) 09/01/2021    BCR 14.4 05/29/2024    ANIONGAP 11.5 05/29/2024     Lab Results   Component Value Date    INR 1.10 05/25/2024    PROTIME 11.9 (H) 05/25/2024         Condition on Discharge: Stable for discharge to home with family assistance.    Vital Signs  Temp:  [98 °F (36.7 °C)-98.4 °F (36.9 °C)] 98.4 °F (36.9 °C)  Heart Rate:  [] 78  Resp:  [14-20] 16  BP: (107-141)/(66-87) 121/77    Physical Exam:  Neuro intact, nad, up in recliner, wife at bedside  Tele: SR 90s  Diminished bases, 99% RA  Sternotomy/ SVHS healing well  Benign abd, + BM  No edema      Discharge Disposition  Home or Self Care    Discharge Medications     Discharge Medications        New Medications        Instructions Start Date   atorvastatin 40 MG tablet  Commonly known as: LIPITOR   40 mg, Oral, Nightly      colchicine 0.6 MG tablet   0.6 mg, Oral, Daily   Start Date: May 30, 2024     empagliflozin 10 MG tablet tablet  Commonly known as: JARDIANCE   10 mg, Oral, Daily   Start Date: May 30, 2024     HYDROcodone-acetaminophen 5-325 MG per tablet  Commonly known as: NORCO   1 tablet, Oral, Every 6 Hours PRN      metFORMIN  MG 24 hr tablet  Commonly known as: GLUCOPHAGE-XR  Replaces: metFORMIN 1000 MG tablet   500 mg, Oral, 2 Times  Daily With Meals      metoprolol tartrate 25 MG tablet  Commonly known as: LOPRESSOR   37.5 mg, Oral, Every 12 Hours Scheduled      OneTouch Delica Plus [id number] misc   1 each, Does not apply, Daily, Dx: E11.65      povidone-iodine 10 % external solution 10%  Commonly known as: BETADINE   Topical, 2 Times Daily             Changes to Medications        Instructions Start Date   glucose blood test strip  Commonly known as: Accu-Chek Gladis Plus  What changed: Another medication with the same name was added. Make sure you understand how and when to take each.   Use daily E11.9      OneTouch Verio test strip  Generic drug: glucose blood  What changed: You were already taking a medication with the same name, and this prescription was added. Make sure you understand how and when to take each.   1 each, Other, Daily, Dx: E11.65. Use as instructed             Continue These Medications        Instructions Start Date   Accu-Chek Gladis Plus w/Device kit   1 kit, Does not apply, Daily      aspirin 81 MG tablet   81 mg, Oral, Daily      buPROPion  MG 24 hr tablet  Commonly known as: WELLBUTRIN XL   TAKE 1 TABLET BY MOUTH EVERY DAY      Claritin 10 MG tablet  Generic drug: loratadine   10 mg, Oral, Daily      multivitamin capsule   1 capsule, Oral, Daily      nitroglycerin 0.4 MG SL tablet  Commonly known as: NITROSTAT   0.4 mg, Sublingual, Every 5 Minutes PRN      Ozempic (1 MG/DOSE) 2 MG/1.5ML solution pen-injector  Generic drug: Semaglutide (1 MG/DOSE)   0.5 mg, Subcutaneous, Weekly      vitamin D3 125 MCG (5000 UT) capsule capsule   5,000 Units, Oral, Daily             Stop These Medications      lisinopril 5 MG tablet  Commonly known as: PRINIVIL,ZESTRIL     metFORMIN 1000 MG tablet  Commonly known as: GLUCOPHAGE  Replaced by: metFORMIN  MG 24 hr tablet     simvastatin 40 MG tablet  Commonly known as: ZOCOR              Discharge Diet:   Healthy Heart Diet    Activity at Discharge:   1. No driving for 2 weeks  and off narcotic pain medications.  2. Shower daily. Clean incisions with warm water and antibacterial soap only. Do not put any lotion or ointments on incisions.  3. Ambulate for 10 minutes at least 3 times a day.  4. No heavy lifting > 10lbs until seen in office.   5. Take all medications as prescribed.     Follow-up Appointments  Future Appointments   Date Time Provider Department Center   6/12/2024 12:15 PM Blanca Redmond APRN [facility]   6/28/2024  2:00 PM Alejandro Nagy MD [facility]     Additional Instructions for the Follow-ups that You Need to Schedule       Discharge Follow-up with PCP   As directed       Currently Documented PCP:    Rain Paniagua MD    PCP Phone Number:    [phone]     Follow Up Details: in one month--follow-up on diabetes management        Discharge Follow-up with Specialty: Cardiology; 1 Month   As directed      Specialty: Cardiology   Follow Up: 1 Month   Follow Up Details: Postop follow-up with Dr. Nagy is on 6/28/2024 at 2:00 PM        Discharge Follow-up with Specified Provider: Cardiac Surgery; 2 Weeks   As directed      To: Cardiac Surgery   Follow Up: 2 Weeks   Follow Up Details: NP postop follow-up appt is on 6/12/2024 at 12:15 PM        Referral to Cardiac Rehab   As directed      Basic Metabolic Panel    Jun 05, 2024 (Approximate)      Release to patient: Routine Release        Call MD With Problems / Concerns    Jun 29, 2024 (Approximate)      Instructions: Call for temp >101 or any surgical wound drainage    Order Comments: Instructions: Call for temp >101 or any surgical wound drainage                 Test Results Pending at Discharge    STS information:  Pt discharged on asa/statin/bb.       KASIE Zee  05/29/24  10:21 EDT

## 2024-05-29 NOTE — CASE MANAGEMENT/SOCIAL WORK
Continued Stay Note  MARIA VICTORIA Guzman     Patient Name: Ricci Ruiz  MRN: [id number]  Today's Date: 5/29/2024    Admit Date: 5/23/2024    Plan: DC Barriers: Retrun home with wife. CABG 5/24/2024.   Discharge Plan       Row Name 05/29/24 1102       Plan    Plan DC Barriers: Retrun home with wife. CABG 5/24/2024.    Provided Post Acute Provider List? N/A    Provided Post Acute Provider Quality & Resource List? N/A    Plan Comments CM spoke with patient’s nurse and [facility] NP Blanca Yu to obtain clinical updates. Patient to DC home today. Orders in place. No additional services from CM needed. Patient will transport home via private vehicle with spouse. No barriers.                 Expected Discharge Date and Time       Expected Discharge Date Expected Discharge Time    May 29, 2024           Phone communication or documentation only- no physical contact with patient or family.      Lian Johnson RN     Office Phone: [phone]  Office Cell:     [phone]

## 2024-05-30 ENCOUNTER — TELEPHONE (OUTPATIENT)
Dept: DIABETES SERVICES | Facility: HOSPITAL | Age: 54
End: 2024-05-30
Payer: COMMERCIAL

## 2024-05-30 NOTE — TELEPHONE ENCOUNTER
Pt returned educator phone call and left voice mail for educator to call him. Educator returned pt's call. Pt states he has been to the pharmacy and has picked up the lancets for the One Touch Verio. He states pharmacy did not have the rx ready for the test strips. Pt states he will return tomorrow to get the test strips. Pt states he has no questions for educator at this time.

## 2024-05-30 NOTE — PAYOR COMM NOTE
"       DISCHARGE NOTICE -- [id number]       This patient discharged  HOME on 5/29/24.  Please advise if additional information is needed to finalize this request.    Thank you!      Radha Vazquez  Utilization Review Coordinator  [address] [place]  [address]  Ph: [phone]  Fx: [phone]      Ricci Ruiz \"Arslan\" (53 y.o. Male)       Date of Birth   1970    Social Security Number       Address   [address] [place] [address] [place] [address]    Home Phone   [phone]    MRN   [id number]       Christian   None    Marital Status                               Admission Date   5/23/24    Admission Type   Elective    Admitting Provider   Poncho Alvarez MD    Attending Provider       Department, Room/Bed   [facility] CARDIOVASC[facility] CARE UNIT, 2209/1       Discharge Date   5/29/2024    Discharge Disposition   Home or Self Care    Discharge Destination                                 Attending Provider: (none)   Allergies: No Known Allergies    Isolation: None   Infection: None   Code Status: Prior    Ht: 181.6 cm (71.5\")   Wt: 86.9 kg (191 lb 9.6 oz)    Admission Cmt: None   Principal Problem: Coronary artery disease involving native coronary artery of native heart [I25.10]                   Active Insurance as of 5/23/2024       Primary Coverage       Payor Plan Insurance Group Employer/Plan Group    [employer] [id number]       Payor Plan Address Payor Plan Phone Number Payor Plan Fax Number Effective Dates    [id number] [phone]  1/1/2024 - None Entered    [facility] [id number]         Subscriber Name Subscriber Birth Date Member ID       RICCI RUIZ 1970 [id number]                     Emergency Contacts        (Rel.) Home Phone Work Phone Mobile Phone    AMISHA RUIZ (Spouse) [phone] -- --                 Discharge Summary        Blanca Redmond, KASIE at 05/29/24 0956       " Attestation signed by Poncho Alvarez MD at 05/29/24 1611    I have reviewed this documentation and agree.                  Date of Admission: 5/23/2024  Date of Discharge: 5/29/2024    Discharge Diagnosis:   - MV CAD, EF 50-55% (echo)--s/p CABG x4 with LIMA to LAD, PRASAD to RCA, SVG to ramus, SVG to OM (Kim)  - HTN--stable  - HLD--statin  - DM type 2--A1c 7.1, insulin dripwatch closely  - Ozempic use--last dose 5/9  - CKD, stage 2--eGFR 66.5  - Postop ABLA, expected--watch closely  - Postop TCP, consumptive--watch closely  - Postop pericarditis--start colchicine    Presenting Problem/History of Present Illness  Chest pain due to myocardial ischemia, unspecified ischemic chest pain type [I25.9]  Abnormal nuclear stress test [R94.39]  CAD (coronary artery disease) [I25.10]     Hospital Course  Patient is a 53 y.o. male who presented for an elective heart cath following an abnormal stress test.  On 5/23/2024, he underwent cardiac cath by Dr. Nagy revealing MV CAD.  Cardiac surgery was consulted.  On 5/24/2024, he underwent CABG x 4 with bilateral ADINA's per Dr. Alvarez.  Please see op note for full details.  He was extubated in a timely manner.  On POD 1 his creatinine was noted to be 1.45.  This resolved to baseline.  Endo was consulted for DM management.  On POD 2 he had ST elevation c/w pericarditis.  Colchicine was started.  On POD 4, Jardiance was started.  On POD 5, he was ready for discharge and his creatinine had bumped to 1.32.  This was discussed with Dr. Little.  The plan is to continue Jardiance and hold diuretics.  BMP in 1 week.  Discharge plan for DM mmgt determined per Dr. Little--metformin 500 mg twice daily, Jardiance 10 daily, Ozempic 0.5 mg once weekly.  Patient will continue to follow with his PCP for DM mmgt.  Surgical wounds are healing well.  He is eating/drinking adequately.  He is ambulating well.  He has had a postop BM.  [place] 5/325, # 20, no refills  e-scribed.    Procedures Performed  Per Dr. Alvarez 5/24/2024  1. CABG x 4 (LIMA to LAD, PRASAD to RCA, SVG to Ramus, SVG to OM)  2. EVH of the left legs    Per Dr. Nagy 5/23/2024  Left heart cath       Consults:   Consults       Date and Time Order Name Status Description    5/25/2024 10:49 AM Inpatient Endocrinology Consult      5/24/2024  5:24 PM Inpatient Cardiology Consult              Pertinent Test Results:    Lab Results   Component Value Date    WBC 7.70 05/28/2024    HGB 10.7 (L) 05/28/2024    HCT 32.8 (L) 05/28/2024    MCV 96.5 05/28/2024     05/28/2024      Lab Results   Component Value Date    GLUCOSE 141 (H) 05/29/2024    CALCIUM 9.2 05/29/2024     05/29/2024    K 3.7 05/29/2024    CO2 25.5 05/29/2024     05/29/2024    BUN 19 05/29/2024    CREATININE 1.32 (H) 05/29/2024    EGFRIFAFRI 97 03/02/2016    EGFRIFNONA 60 (L) 09/01/2021    BCR 14.4 05/29/2024    ANIONGAP 11.5 05/29/2024     Lab Results   Component Value Date    INR 1.10 05/25/2024    PROTIME 11.9 (H) 05/25/2024         Condition on Discharge: Stable for discharge to home with family assistance.    Vital Signs  Temp:  [98 °F (36.7 °C)-98.4 °F (36.9 °C)] 98.4 °F (36.9 °C)  Heart Rate:  [] 78  Resp:  [14-20] 16  BP: (107-141)/(66-87) 121/77    Physical Exam:  Neuro intact, nad, up in recliner, wife at bedside  Tele: SR 90s  Diminished bases, 99% RA  Sternotomy/ SVHS healing well  Benign abd, + BM  No edema      Discharge Disposition  Home or Self Care    Discharge Medications     Discharge Medications        New Medications        Instructions Start Date   atorvastatin 40 MG tablet  Commonly known as: LIPITOR   40 mg, Oral, Nightly      colchicine 0.6 MG tablet   0.6 mg, Oral, Daily   Start Date: May 30, 2024     empagliflozin 10 MG tablet tablet  Commonly known as: JARDIANCE   10 mg, Oral, Daily   Start Date: May 30, 2024     HYDROcodone-acetaminophen 5-325 MG per tablet  Commonly known as: NORCO   1 tablet, Oral,  Every 6 Hours PRN      metFORMIN  MG 24 hr tablet  Commonly known as: GLUCOPHAGE-XR  Replaces: metFORMIN 1000 MG tablet   500 mg, Oral, 2 Times Daily With Meals      metoprolol tartrate 25 MG tablet  Commonly known as: LOPRESSOR   37.5 mg, Oral, Every 12 Hours Scheduled      OneTouch Delica Plus [id number] misc   1 each, Does not apply, Daily, Dx: E11.65      povidone-iodine 10 % external solution 10%  Commonly known as: BETADINE   Topical, 2 Times Daily             Changes to Medications        Instructions Start Date   glucose blood test strip  Commonly known as: Accu-Chek Gladis Plus  What changed: Another medication with the same name was added. Make sure you understand how and when to take each.   Use daily E11.9      OneTouch Verio test strip  Generic drug: glucose blood  What changed: You were already taking a medication with the same name, and this prescription was added. Make sure you understand how and when to take each.   1 each, Other, Daily, Dx: E11.65. Use as instructed             Continue These Medications        Instructions Start Date   Accu-Chek Gladis Plus w/Device kit   1 kit, Does not apply, Daily      aspirin 81 MG tablet   81 mg, Oral, Daily      buPROPion  MG 24 hr tablet  Commonly known as: WELLBUTRIN XL   TAKE 1 TABLET BY MOUTH EVERY DAY      Claritin 10 MG tablet  Generic drug: loratadine   10 mg, Oral, Daily      multivitamin capsule   1 capsule, Oral, Daily      nitroglycerin 0.4 MG SL tablet  Commonly known as: NITROSTAT   0.4 mg, Sublingual, Every 5 Minutes PRN      Ozempic (1 MG/DOSE) 2 MG/1.5ML solution pen-injector  Generic drug: Semaglutide (1 MG/DOSE)   0.5 mg, Subcutaneous, Weekly      vitamin D3 125 MCG (5000 UT) capsule capsule   5,000 Units, Oral, Daily             Stop These Medications      lisinopril 5 MG tablet  Commonly known as: PRINIVIL,ZESTRIL     metFORMIN 1000 MG tablet  Commonly known as: GLUCOPHAGE  Replaced by: metFORMIN  MG 24 hr tablet      simvastatin 40 MG tablet  Commonly known as: ZOCOR              Discharge Diet:   Healthy Heart Diet    Activity at Discharge:   1. No driving for 2 weeks and off narcotic pain medications.  2. Shower daily. Clean incisions with warm water and antibacterial soap only. Do not put any lotion or ointments on incisions.  3. Ambulate for 10 minutes at least 3 times a day.  4. No heavy lifting > 10lbs until seen in office.   5. Take all medications as prescribed.     Follow-up Appointments  Future Appointments   Date Time Provider Department Center   6/12/2024 12:15 PM Blanca Redmond APRN [facility]   6/28/2024  2:00 PM Alejandro Nagy MD [facility]     Additional Instructions for the Follow-ups that You Need to Schedule       Discharge Follow-up with PCP   As directed       Currently Documented PCP:    Rain Paniagua MD    PCP Phone Number:    [phone]     Follow Up Details: in one month--follow-up on diabetes management        Discharge Follow-up with Specialty: Cardiology; 1 Month   As directed      Specialty: Cardiology   Follow Up: 1 Month   Follow Up Details: Postop follow-up with Dr. Nagy is on 6/28/2024 at 2:00 PM        Discharge Follow-up with Specified Provider: Cardiac Surgery; 2 Weeks   As directed      To: Cardiac Surgery   Follow Up: 2 Weeks   Follow Up Details: NP postop follow-up appt is on 6/12/2024 at 12:15 PM        Referral to Cardiac Rehab   As directed      Basic Metabolic Panel    Jun 05, 2024 (Approximate)      Release to patient: Routine Release        Call MD With Problems / Concerns    Jun 29, 2024 (Approximate)      Instructions: Call for temp >101 or any surgical wound drainage    Order Comments: Instructions: Call for temp >101 or any surgical wound drainage                 Test Results Pending at Discharge    STS information:  Pt discharged on asa/statin/bb.       KASIE Zee  05/29/24  10:21 EDT                  Electronically  signed by Poncho Alvarez MD at 05/29/24 3624

## 2024-05-30 NOTE — OUTREACH NOTE
Prep Survey      Flowsheet Row Responses   [facility] facility patient discharged from? [facility]   Is LACE score < 7 ? No   Eligibility Readm Mgmt   Discharge diagnosis CABG   Does the patient have one of the following disease processes/diagnoses(primary or secondary)? Cardiothoracic surgery   Does the patient have Home health ordered? No   Is there a DME ordered? No   Prep survey completed? Yes            JUAN CARLOS PERALES - Registered Nurse

## 2024-06-05 ENCOUNTER — READMISSION MANAGEMENT (OUTPATIENT)
Dept: CALL CENTER | Facility: HOSPITAL | Age: 54
End: 2024-06-05
Payer: COMMERCIAL

## 2024-06-05 NOTE — OUTREACH NOTE
CT Surgery Week 1 Survey      Flowsheet Row Responses   [facility] facility patient discharged from? [facility]   Does the patient have one of the following disease processes/diagnoses(primary or secondary)? Cardiothoracic surgery   Week 1 attempt successful? No   Unsuccessful attempts Attempt 1  [attempted patient all numbers and spouse krish]              Vale BUTTERFIELD - Registered Nurse

## 2024-06-06 ENCOUNTER — TELEPHONE (OUTPATIENT)
Dept: CARDIAC REHAB | Facility: HOSPITAL | Age: 54
End: 2024-06-06
Payer: COMMERCIAL

## 2024-06-06 DIAGNOSIS — Z95.1 S/P CABG X 4: Primary | ICD-10-CM

## 2024-06-12 ENCOUNTER — READMISSION MANAGEMENT (OUTPATIENT)
Dept: CALL CENTER | Facility: HOSPITAL | Age: 54
End: 2024-06-12
Payer: COMMERCIAL

## 2024-06-12 ENCOUNTER — OFFICE VISIT (OUTPATIENT)
Dept: CARDIAC SURGERY | Facility: CLINIC | Age: 54
End: 2024-06-12
Payer: COMMERCIAL

## 2024-06-12 VITALS
TEMPERATURE: 97.8 F | OXYGEN SATURATION: 99 % | SYSTOLIC BLOOD PRESSURE: 126 MMHG | WEIGHT: 185.2 LBS | BODY MASS INDEX: 23.77 KG/M2 | HEART RATE: 87 BPM | DIASTOLIC BLOOD PRESSURE: 87 MMHG | HEIGHT: 74 IN | RESPIRATION RATE: 18 BRPM

## 2024-06-12 DIAGNOSIS — Z95.1 S/P CABG X 4: Primary | ICD-10-CM

## 2024-06-12 PROCEDURE — 99024 POSTOP FOLLOW-UP VISIT: CPT | Performed by: NURSE PRACTITIONER

## 2024-06-12 RX ORDER — CETIRIZINE HYDROCHLORIDE 10 MG/1
10 TABLET ORAL DAILY
COMMUNITY

## 2024-06-12 NOTE — PROGRESS NOTES
"CARDIOVASCULAR SURGERY FOLLOW-UP PROGRESS NOTE  Chief Complaint: Post-op Follow Up        HPI:   Dear Rain Purcell MD and colleagues:    It was nice to see Ricci Ruiz in follow up today after cardiac surgery.  As you know, he is a 53 y.o. male with MV CAD, HTN, HLD, DM type 2, and CKD stage II who underwent CABG x4 with LIMA to LAD, PRASAD to RCA, SVG to ramus, SVG to OM at [facility] by Dr. Alvarez on 5/24/2024.  He did well postoperatively and continues to do well.  He did have postop pericarditis and was sent home on colchicine.  He comes in today complaining of nothing except he is \"bored\" at home.  His activity level has been good. He has not seen cardiology, but has an appointment on 6/28..  He has not started cardiac rehab but has talked with them.  Cardiac rehab is working on insurance approval.  He reports his glucoses have been in the 120 range.  His wife is with him for his appointment.    Physical Exam:         /87 (BP Location: Left arm, Patient Position: Sitting, Cuff Size: Adult)   Pulse 87   Temp 97.8 °F (36.6 °C) (Temporal)   Resp 18   Ht 186.7 cm (73.5\")   Wt 84 kg (185 lb 3.2 oz)   SpO2 99%   BMI 24.10 kg/m²   Heart:  regular rate and rhythm  Lungs:  clear to auscultation bilaterally  Extremities:  no edema  Incision(s):  mid chest incision healing well, left leg healing well, sternum stable    Assessment/Plan:     S/P CABG x4 with LIMA to LAD, PRASAD to RCA, SVG to ramus, SVG to OM . Overall, he is doing well.  His sternal incision is healing well.  He did have some loose scabbing which was easily removed.  He continues to paint his chest tube exit sites with Betadine.  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  He is working from home currently.  We discussed the continued importance of good glucose control.  He verbalized understanding.  I think he can start driving short distances as well.    No significant post-op " complications    Keep incisions clean and dry  OK to drive if not taking narcotic pain medicine  OK to begin cardiac rehab  Follow-up as scheduled with cardiology  Follow-up as scheduled with endocrinology  Follow-up as scheduled with PCP  Follow-up with CT surgery prn    Continue lifting restriction of 10 lbs until 6 weeks and 50 lbs until 12 weeks from the date of surgery, no excessive jarring motions or twisting motions until 12 weeks from the date of surgery.    Thank you for allowing me to participate in the care of your patient.    Regards,  Blanca Redmond, APRN

## 2024-06-12 NOTE — OUTREACH NOTE
CT Surgery Week 1 Survey      Flowsheet Row Responses   [facility] patient discharged from? [facility]   Does the patient have one of the following disease processes/diagnoses(primary or secondary)? Cardiothoracic surgery   Week 1 attempt successful? Yes   Call start time 1345   Call end time 1347   Discharge diagnosis CABG   Person spoke with today (if not patient) and relationship patient and wife   Meds reviewed with patient/caregiver? Yes   Is the patient having any side effects they believe may be caused by any medication additions or changes? No   Does the patient have all medications related to this admission filled (includes all antibiotics, pain medications, cardiac medications, etc.) Yes   Is the patient taking all medications as directed (includes completed medication regime)? Yes   Comments regarding appointments Pt saw surgeon today and reports all looks well   Does the patient have a primary care provider?  Yes   Does the patient have an appointment scheduled with their C/T surgeon? Yes   Comments regarding PCP Dr. Paniagua   Has the patient kept scheduled appointments due by today? Yes   Has all DME been delivered? No   Did the patient receive a copy of their discharge instructions? Yes   Nursing interventions Reviewed instructions with patient   If the patient is a current smoker, are they able to teach back resources for cessation? Not a smoker   Is the patient/caregiver able to teach back the hierarchy of who to call/visit for symptoms/problems? PCP, Specialist, Home health nurse, Urgent Care, ED, 911 Yes   Week 1 call completed? Yes   Graduated Yes   Is the patient interested in additional calls from an ambulatory ? No   Would this patient benefit from a Referral to Amb Social Work? No   Wrap up additional comments Pt states he is doing well and denies needs.   Call end time 1347              JUAN CARLOS PERALES - Registered Nurse

## 2024-06-16 NOTE — PROGRESS NOTES
Encounter Date:06/28/2024        Patient ID: Ricci Ruiz is a 53 y.o. male.      Chief Complaint:      History of Present Illness  53 years old man with hypertension, hyperlipidemia, diabetes, coronary artery disease status post CABG in May 2024.   Today he returns for follow-up.  Current cardiac medications include aspirin, atorvastatin, Jardiance, metoprolol.    He has done exceptionally well and just started cardiac rehab.  Today he denies any signs and symptoms    The following portions of the patient's history were reviewed and updated as appropriate: allergies, current medications, past family history, past medical history, past social history, past surgical history, and problem list.    Review of Systems   Constitutional: Negative for fever and malaise/fatigue.   HENT:  Negative for ear pain and nosebleeds.    Eyes:  Negative for blurred vision and double vision.   Cardiovascular:  Negative for chest pain, dyspnea on exertion, leg swelling and palpitations.   Respiratory:  Negative for cough and shortness of breath.    Skin:  Negative for rash.   Musculoskeletal:  Negative for joint pain.   Gastrointestinal:  Negative for abdominal pain, nausea and vomiting.   Neurological:  Negative for focal weakness and headaches.   Psychiatric/Behavioral:  Negative for depression. The patient is not nervous/anxious.    All other systems reviewed and are negative.        Current Outpatient Medications:     aspirin 81 MG tablet, Take 1 tablet by mouth Daily., Disp: , Rfl:     atorvastatin (LIPITOR) 40 MG tablet, Take 1 tablet by mouth Every Night., Disp: 90 tablet, Rfl: 1    Blood Glucose Monitoring Suppl (Accu-Chek Gladis Plus) w/Device kit, 1 kit Daily., Disp: 1 kit, Rfl: 1    buPROPion XL (WELLBUTRIN XL) 150 MG 24 hr tablet, TAKE 1 TABLET BY MOUTH EVERY DAY, Disp: 90 tablet, Rfl: 1    cetirizine (zyrTEC) 10 MG tablet, Take 1 tablet by mouth Daily. AT NIGHT, Disp: , Rfl:     empagliflozin (JARDIANCE) 10 MG  tablet tablet, Take 1 tablet by mouth Daily., Disp: 30 tablet, Rfl: 3    glucose blood (Accu-Chek Gladis Plus) test strip, Use daily E11.9, Disp: 100 each, Rfl: 12    glucose blood (OneTouch Verio) test strip, 1 each by Other route Daily. Dx: E11.65. Use as instructed, Disp: 50 each, Rfl: 2    HYDROcodone-acetaminophen (NORCO) 5-325 MG per tablet, Take 1 tablet by mouth Every 6 (Six) Hours As Needed for Moderate Pain., Disp: 20 tablet, Rfl: 0    Lancets (OneTouch Delica Plus [id number]) misc, Use 1 each Daily. Dx: E11.65, Disp: 100 each, Rfl: 2    loratadine (Claritin) 10 MG tablet, Take 1 tablet by mouth Daily., Disp: , Rfl:     metFORMIN ER (GLUCOPHAGE-XR) 500 MG 24 hr tablet, Take 1 tablet by mouth 2 (Two) Times a Day With Meals., Disp: 60 tablet, Rfl: 2    metoprolol tartrate (LOPRESSOR) 25 MG tablet, Take 1.5 tablets by mouth Every 12 (Twelve) Hours., Disp: 90 tablet, Rfl: 3    Multiple Vitamin (MULTIVITAMIN) capsule, Take 1 capsule by mouth Daily., Disp: , Rfl:     nitroglycerin (NITROSTAT) 0.4 MG SL tablet, Place 1 tablet under the tongue Every 5 (Five) Minutes As Needed for Chest Pain., Disp: , Rfl:     povidone-iodine (BETADINE) 10 % external solution 10%, Apply  topically to the appropriate area as directed 2 (Two) Times a Day., Disp: , Rfl:     Semaglutide, 1 MG/DOSE, (Ozempic, 1 MG/DOSE,) 2 MG/1.5ML solution pen-injector, Inject 0.5 mg under the skin into the appropriate area as directed 1 (One) Time Per Week., Disp: , Rfl:     vitamin D3 125 MCG (5000 UT) capsule capsule, Take 1 capsule by mouth Daily., Disp: 90 capsule, Rfl: 9    Current outpatient and discharge medications have been reconciled for the patient.  Reviewed by: Alejandro Nagy MD       No Known Allergies    Family History   Problem Relation Age of Onset    Arthritis Mother         Lupus    Diabetes Brother     Heart disease Father     Heart disease Brother        Past Surgical History:   Procedure Laterality Date    CARDIAC CATHETERIZATION N/A  "5/23/2024    Procedure: Left Heart Cath with Coronary Angiography;  Surgeon: Alejandro Nagy MD;  Location: [facility] CATH INVASIVE LOCATION;  Service: Cardiovascular;  Laterality: N/A;    COLONOSCOPY  2019    CORONARY ARTERY BYPASS GRAFT N/A 5/24/2024    Procedure: CORONARY ARTERY BYPASS WITH INTERNAL MAMMARY ARTERY GRAFT with intraop SOFIA;  Surgeon: Poncho Alvarez MD;  Location: [facility] CVOR;  Service: Cardiothoracic;  Laterality: N/A;  CABG x4 using LIMA, PRASAD, and left endosaphenous vein; 2 coronary markers implanted.    TRANSESOPHAGEAL ECHOCARDIOGRAM (SOFIA) N/A 5/24/2024    Procedure: TRANSESOPHAGEAL ECHOCARDIOGRAM WITH ANESTHESIA;  Surgeon: Poncho Alvarez MD;  Location: [facility];  Service: Cardiothoracic;  Laterality: N/A;       Past Medical History:   Diagnosis Date    Colon polyp 2019    Diabetes mellitus     Type 2    Hyperlipidemia        Family History   Problem Relation Age of Onset    Arthritis Mother         Lupus    Diabetes Brother     Heart disease Father     Heart disease Brother        Social History     Socioeconomic History    Marital status:    Tobacco Use    Smoking status: Never     Passive exposure: Past (as a child)    Smokeless tobacco: Never   Vaping Use    Vaping status: Never Used   Substance and Sexual Activity    Alcohol use: Yes     Alcohol/week: 1.0 standard drink of alcohol     Types: 1 Glasses of wine per week    Drug use: Never    Sexual activity: Yes     Partners: Female     Birth control/protection: Post-menopausal, None               Objective:       Physical Exam    BP 93/60 (BP Location: Right arm, Patient Position: Sitting, Cuff Size: Adult)   Pulse 90   Ht 185.4 cm (73\")   Wt 83 kg (183 lb)   SpO2 99% Comment: ra  BMI 24.14 kg/m²   The patient is alert, oriented and in no distress.    Vital signs as noted above.    Head and neck revealed no carotid bruits or jugular venous distension.  No thyromegaly or lymphadenopathy is present.    Lungs clear.  " No wheezing.  Breath sounds are normal bilaterally.    Heart normal first and second heart sounds.  No murmur..  No pericardial rub is present.  No gallop is present.    Abdomen soft and nontender.  No organomegaly is present.    Extremities revealed good peripheral pulses without any pedal edema.    Skin warm and dry.    Musculoskeletal system is grossly normal.    CNS grossly normal.           Diagnosis Plan   1. Abnormal nuclear stress test        2. S/P CABG x 4 with bilateral ADINA's per Dr. Alvarez 5/24/2024        3. Unstable angina        4. Primary hypertension        5. Mixed hyperlipidemia        6. Type 2 diabetes mellitus without complication, without long-term current use of insulin        7. Anxiety and depression        LAB RESULTS (LAST 7 DAYS)    CBC        BMP        CMP         BNP        TROPONIN        CoAg        Creatinine Clearance  CrCl cannot be calculated (Patient's most recent lab result is older than the maximum 30 days allowed.).    ABG        Radiology  No radiology results for the last day    EKG  Procedures    Stress test  Results for orders placed during the hospital encounter of 05/13/24    Stress test with myocardial perfusion one day    Interpretation Summary    Patient exercised for 10 minutes 21 seconds achieving 11.8 METS.    Findings consistent with an abnormal ECG stress test.  ST depressions in lateral leads.    Left ventricular ejection fraction is hyperdynamic (Calculated EF > 70%).    Myocardial perfusion imaging indicates a moderate-sized, severe area of ischemia located in the inferior wall and septal wall.    Impressions are consistent with an intermediate risk study.      Echocardiogram  Results for orders placed in visit on 05/24/24    Intra-Op Anesthesia SOFIA    Narrative  Intra-Op Anesthesia SOFIA    Procedure Performed: Intra-Op Anesthesia SOFIA  Start Time:  End Time:    Preanesthesia Checklist:  Patient identified, IV assessed, risks and benefits discussed,  monitors and equipment assessed, procedure being performed at surgeon's request and anesthesia consent obtained.    General Procedure Information  Diagnostic Indications for Echo:  assessment of surgical repair and hemodynamic monitoring  Location performed:  OR  Intubated  Bite block placed  Heart visualized  Probe Insertion:  Easy  Probe Type:  Biplane and multiplane  Modalities:  Color flow mapping, pulse wave Doppler and continuous wave Doppler    Echocardiographic and Doppler Measurements    Ventricles    Right Ventricle:  Cavity size normal.  Hypertrophy not present.  Thrombus not present.  Global function normal.  Ejection Fraction 60%.  Left Ventricle:  Cavity size normal.  Thrombus not present.  Global Function normal.  Ejection Fraction 60%.    Ventricular Regional Function:  1- Basal Anteroseptal:  normal  2- Basal Anterior:  normal  3- Basal Anterolateral:  normal  4- Basal Inferolateral:  normal  5- Basal Inferior:  normal  6- Basal Inferoseptal:  normal  7- Mid Anteroseptal:  normal  8- Mid Anterior:  normal  9- Mid Anterolateral:  normal  10- Mid Inferolateral:  normal  11- Mid Inferior:  normal  12- Mid Inferoseptal:  normal  13- Apical Anterior:  normal  14- Apical Lateral:  normal  15- Apical Inferior:  normal  16- Apical Septal:  normal  17- [place]:  normal      Valves    Aortic Valve:  Annulus normal.  Stenosis not present.  Regurgitation absent.  Leaflets normal.  Leaflet motions normal.    Mitral Valve:  Annulus normal.  Stenosis not present.  Regurgitation absent.  Leaflets normal.  Leaflet motions normal.    Tricuspid Valve:  Annulus normal.  Stenosis not present.  Regurgitation absent.  Leaflets normal.  Leaflet motions normal.  Pulmonic Valve:  Annulus normal.  Stenosis not present.  Regurgitation absent.      Aorta    Ascending Aorta:  Size normal.  Dissection not present.  Plaque thickness less than 3 mm.  Mobile plaque not present.  Aortic Arch:  Size normal.  Dissection not present.   Plaque thickness less than 3 mm.  Mobile plaque not present.  Descending Aorta:  Size normal.  Dissection not present.  Plaque thickness less than 3 mm.  Mobile plaque not present.      Atria    Right Atrium:  Size normal.  Left Atrium:  Size normal.  Spontaneous echo contrast not present.  Thrombus not present.  Tumor not present.  Device not present.  Left atrial appendage normal.      Septa        Ventricular Septum:  Intra-ventricular septum morphology normal.        Other Findings  Pleural Effusion:  none  Pulmonary Arteries:  normal      Anesthesia Information  Performed Personally  Anesthesiologist:  Juan Moya MD      Echocardiogram Comments:  SOFIA placed easily, no resistance, bite guard, lubricated      Pre cpb  LV no wma ef 60  RV nl sf  MV AV TV wnl      S/p cabg x 4  No change      Cardiac catheterization  Results for orders placed during the hospital encounter of 05/23/24    Cardiac Catheterization/Vascular Study    Conclusion  OPERATORS:  1. Alejandro Nagy M.D., Attending Cardiologist      PROCEDURE PERFORMED.  Ultrasound guided vascular access  Left heart catheterization  Coronary Angiogram 35578  Moderate Sedation    INDICATIONS FOR PROCEDURE.  53 years old man with unstable angina and abnormal nuclear stress test.  After discussing the risk and benefit of the procedure he was brought into the Cath Lab for coronary angiography.    PROCEDURE IN DETAIL.  Informed consent was obtained from the patient after explaining the risks, benefits, and alternative options of the procedure. After obtaining informed consent, the patient was brought to the cath lab and was prepped in a sterile fashion. Lidocaine 1% was used for local anesthesia into the right radial access site. The right radial artery was accessed under direct ultrasound visualization  with an angiocath needle via modified Seldinger technique. A 6F slender sheath was inserted successfully. Afterwards, 6F JR4  was advanced over a wire into the  ascending aorta and used to cross the AV and obtain LV pressures.  AV gradient obtained via pullback technique. JR4 and JL3.5 diagnostic catheters were used to engage the ostia of the RCA and LM respectively. Images of the right and left coronary systems were obtained. All the catheters were exchanged over a wire and subsequently removed. The patient tolerated the procedure well without any complications. The pictures were reviewed at the end of the procedure. TR band applied to right wrist for hemostasis and inflated with 15 cc of air. No complications were encountered.    HEMODYNAMICS.  LV: 135/8, 17 mmHg  AO: 136/82, 105 mmHg  No significant gradient across the aortic valve during pullback of JR4 catheter.  LV gram was not performed due to recent echocardiogram.    FINDINGS.    Coronary Angiogram.    Left main. Left main is a large-caliber vessel which gives rise to the Left Anterior Descending, ramus and the Left circumflex.  Left main is angiographically free from any significant disease    Left Anterior Descending Artery. LAD is a large vessel which gives rise to several septal perforators and several diagonal branches.  Mid LAD has 80% stenosis at the origin of diagonal vessel which also has 70 to 80% stenosis.    Ramus intermedius has proximal segment 70% stenosis.    Left Circumflex. The LCx is a medium caliber which gives rise to marginals.  OM1 has proximal 50% stenosis.  OM 2 has proximal segment 70% stenosis.    Right Coronary Artery. The RCA is a dominant vessel which gives rise to several small caliber branches including PDA and PLV.  Mid RCA has 99% long tubular stenosis.  PLV has mid segment 50% stenosis.    IMPRESSIONS.  1 Multivessel obstructive coronary artery disease including LAD, ramus and RCA.  2.  Mildly elevated LVEDP    RECOMMENDATIONS.  1.  Consult cardiac surgery for CABG  2.  Start Imdur    Electronically signed by Alejandro Nagy MD, 05/23/24, 12:30 PM EDT.          Assessment and Plan        Diagnoses and all orders for this visit:    1. Abnormal nuclear stress test (Primary)    2. S/P CABG x 4 with bilateral ADINA's per Dr. Alvarez 5/24/2024    3. Unstable angina    4. Primary hypertension    5. Mixed hyperlipidemia    6. Type 2 diabetes mellitus without complication, without long-term current use of insulin    7. Anxiety and depression         Unstable angina/coronary artery disease (Primary)  Multiple cardiovascular risk factors including hypertension, hyperlipidemia, diabetes, family history of premature coronary disease.  ECG is normal.  Abnormal nuclear stress test leading to cardiac catheterization  Cardiac cath 5/23/2024 showed multivessel coronary artery disease including 99% RCA and 80% LAD.  CABGX4 5/24/2024: (LIMA to LAD, PRASAD to RCA, SVG to Ramus, SVG to OM)   Continue aspirin, high intensity statin and beta-blocker  He will complete cardiac rehab     Primary hypertension  Continue beta-blocker, metoprolol  Unable to add lisinopril due to low blood pressure.  BP is 93/60 today  I will reassess addition of lisinopril during next visit     Mixed hyperlipidemia  LDL 80, HDL 48, triglyceride 98 and total cholesterol 146  Goal LDL is less than 50  Continue high intensity statin     Type 2 diabetes mellitus without complication, without long-term current use of insulin  He is on Ozempic, metformin and Jardiance.  A1c 7.1     Anxiety and depression  He is on bupropion

## 2024-06-27 ENCOUNTER — OFFICE VISIT (OUTPATIENT)
Dept: CARDIAC REHAB | Facility: HOSPITAL | Age: 54
End: 2024-06-27
Payer: COMMERCIAL

## 2024-06-27 DIAGNOSIS — Z95.1 S/P CABG X 4: ICD-10-CM

## 2024-06-27 PROCEDURE — 93798 PHYS/QHP OP CAR RHAB W/ECG: CPT

## 2024-06-28 ENCOUNTER — TREATMENT (OUTPATIENT)
Dept: CARDIAC REHAB | Facility: HOSPITAL | Age: 54
End: 2024-06-28
Payer: COMMERCIAL

## 2024-06-28 ENCOUNTER — OFFICE VISIT (OUTPATIENT)
Dept: CARDIOLOGY | Facility: CLINIC | Age: 54
End: 2024-06-28
Payer: COMMERCIAL

## 2024-06-28 VITALS
HEIGHT: 73 IN | SYSTOLIC BLOOD PRESSURE: 93 MMHG | WEIGHT: 183 LBS | HEART RATE: 90 BPM | OXYGEN SATURATION: 99 % | BODY MASS INDEX: 24.25 KG/M2 | DIASTOLIC BLOOD PRESSURE: 60 MMHG

## 2024-06-28 DIAGNOSIS — R94.39 ABNORMAL NUCLEAR STRESS TEST: Primary | ICD-10-CM

## 2024-06-28 DIAGNOSIS — F41.9 ANXIETY AND DEPRESSION: ICD-10-CM

## 2024-06-28 DIAGNOSIS — Z95.1 S/P CABG X 4: Primary | ICD-10-CM

## 2024-06-28 DIAGNOSIS — I10 PRIMARY HYPERTENSION: ICD-10-CM

## 2024-06-28 DIAGNOSIS — Z95.1 S/P CABG X 4: ICD-10-CM

## 2024-06-28 DIAGNOSIS — I20.0 UNSTABLE ANGINA: ICD-10-CM

## 2024-06-28 DIAGNOSIS — F32.A ANXIETY AND DEPRESSION: ICD-10-CM

## 2024-06-28 DIAGNOSIS — E11.9 TYPE 2 DIABETES MELLITUS WITHOUT COMPLICATION, WITHOUT LONG-TERM CURRENT USE OF INSULIN: ICD-10-CM

## 2024-06-28 DIAGNOSIS — E78.2 MIXED HYPERLIPIDEMIA: ICD-10-CM

## 2024-06-28 PROCEDURE — 93798 PHYS/QHP OP CAR RHAB W/ECG: CPT

## 2024-07-01 ENCOUNTER — TREATMENT (OUTPATIENT)
Dept: CARDIAC REHAB | Facility: HOSPITAL | Age: 54
End: 2024-07-01
Payer: COMMERCIAL

## 2024-07-01 DIAGNOSIS — Z95.1 S/P CABG X 4: Primary | ICD-10-CM

## 2024-07-01 PROCEDURE — 93798 PHYS/QHP OP CAR RHAB W/ECG: CPT

## 2024-07-03 ENCOUNTER — TREATMENT (OUTPATIENT)
Dept: CARDIAC REHAB | Facility: HOSPITAL | Age: 54
End: 2024-07-03
Payer: COMMERCIAL

## 2024-07-03 DIAGNOSIS — Z95.1 S/P CABG X 4: Primary | ICD-10-CM

## 2024-07-03 PROCEDURE — 93798 PHYS/QHP OP CAR RHAB W/ECG: CPT

## 2024-07-05 ENCOUNTER — TREATMENT (OUTPATIENT)
Dept: CARDIAC REHAB | Facility: HOSPITAL | Age: 54
End: 2024-07-05
Payer: COMMERCIAL

## 2024-07-05 DIAGNOSIS — Z95.1 S/P CABG X 4: Primary | ICD-10-CM

## 2024-07-05 PROCEDURE — 93798 PHYS/QHP OP CAR RHAB W/ECG: CPT

## 2024-07-08 ENCOUNTER — APPOINTMENT (OUTPATIENT)
Dept: CARDIAC REHAB | Facility: HOSPITAL | Age: 54
End: 2024-07-08
Payer: COMMERCIAL

## 2024-07-10 ENCOUNTER — TREATMENT (OUTPATIENT)
Dept: CARDIAC REHAB | Facility: HOSPITAL | Age: 54
End: 2024-07-10
Payer: COMMERCIAL

## 2024-07-10 DIAGNOSIS — Z95.1 S/P CABG X 4: Primary | ICD-10-CM

## 2024-07-10 PROCEDURE — 93798 PHYS/QHP OP CAR RHAB W/ECG: CPT

## 2024-07-12 ENCOUNTER — TREATMENT (OUTPATIENT)
Dept: CARDIAC REHAB | Facility: HOSPITAL | Age: 54
End: 2024-07-12
Payer: COMMERCIAL

## 2024-07-12 DIAGNOSIS — Z95.1 S/P CABG X 4: Primary | ICD-10-CM

## 2024-07-12 PROCEDURE — 93798 PHYS/QHP OP CAR RHAB W/ECG: CPT

## 2024-07-15 ENCOUNTER — TREATMENT (OUTPATIENT)
Dept: CARDIAC REHAB | Facility: HOSPITAL | Age: 54
End: 2024-07-15
Payer: COMMERCIAL

## 2024-07-15 DIAGNOSIS — Z95.1 S/P CABG X 4: Primary | ICD-10-CM

## 2024-07-15 PROCEDURE — 93798 PHYS/QHP OP CAR RHAB W/ECG: CPT

## 2024-07-17 ENCOUNTER — TREATMENT (OUTPATIENT)
Dept: CARDIAC REHAB | Facility: HOSPITAL | Age: 54
End: 2024-07-17
Payer: COMMERCIAL

## 2024-07-17 DIAGNOSIS — Z95.1 S/P CABG X 4: Primary | ICD-10-CM

## 2024-07-17 PROCEDURE — 93798 PHYS/QHP OP CAR RHAB W/ECG: CPT

## 2024-07-19 ENCOUNTER — TREATMENT (OUTPATIENT)
Dept: CARDIAC REHAB | Facility: HOSPITAL | Age: 54
End: 2024-07-19
Payer: COMMERCIAL

## 2024-07-19 DIAGNOSIS — Z95.1 S/P CABG X 4: Primary | ICD-10-CM

## 2024-07-19 PROCEDURE — 93798 PHYS/QHP OP CAR RHAB W/ECG: CPT

## 2024-07-22 ENCOUNTER — TREATMENT (OUTPATIENT)
Dept: CARDIAC REHAB | Facility: HOSPITAL | Age: 54
End: 2024-07-22
Payer: COMMERCIAL

## 2024-07-22 DIAGNOSIS — Z95.1 S/P CABG X 4: Primary | ICD-10-CM

## 2024-07-22 PROCEDURE — 93798 PHYS/QHP OP CAR RHAB W/ECG: CPT

## 2024-07-24 ENCOUNTER — TREATMENT (OUTPATIENT)
Dept: CARDIAC REHAB | Facility: HOSPITAL | Age: 54
End: 2024-07-24
Payer: COMMERCIAL

## 2024-07-24 DIAGNOSIS — Z95.1 S/P CABG X 4: Primary | ICD-10-CM

## 2024-07-24 PROCEDURE — 93798 PHYS/QHP OP CAR RHAB W/ECG: CPT

## 2024-07-26 ENCOUNTER — TREATMENT (OUTPATIENT)
Dept: CARDIAC REHAB | Facility: HOSPITAL | Age: 54
End: 2024-07-26
Payer: COMMERCIAL

## 2024-07-26 DIAGNOSIS — Z95.1 S/P CABG X 4: Primary | ICD-10-CM

## 2024-07-26 PROCEDURE — 93798 PHYS/QHP OP CAR RHAB W/ECG: CPT

## 2024-07-29 ENCOUNTER — TREATMENT (OUTPATIENT)
Dept: CARDIAC REHAB | Facility: HOSPITAL | Age: 54
End: 2024-07-29
Payer: COMMERCIAL

## 2024-07-29 DIAGNOSIS — Z95.1 S/P CABG X 4: Primary | ICD-10-CM

## 2024-07-29 PROCEDURE — 93798 PHYS/QHP OP CAR RHAB W/ECG: CPT

## 2024-07-31 ENCOUNTER — TREATMENT (OUTPATIENT)
Dept: CARDIAC REHAB | Facility: HOSPITAL | Age: 54
End: 2024-07-31
Payer: COMMERCIAL

## 2024-07-31 DIAGNOSIS — Z95.1 S/P CABG X 4: Primary | ICD-10-CM

## 2024-07-31 PROCEDURE — 93798 PHYS/QHP OP CAR RHAB W/ECG: CPT

## 2024-08-02 ENCOUNTER — TREATMENT (OUTPATIENT)
Dept: CARDIAC REHAB | Facility: HOSPITAL | Age: 54
End: 2024-08-02
Payer: COMMERCIAL

## 2024-08-02 DIAGNOSIS — Z95.1 S/P CABG X 4: Primary | ICD-10-CM

## 2024-08-02 PROCEDURE — 93798 PHYS/QHP OP CAR RHAB W/ECG: CPT

## 2024-08-05 ENCOUNTER — TREATMENT (OUTPATIENT)
Dept: CARDIAC REHAB | Facility: HOSPITAL | Age: 54
End: 2024-08-05
Payer: COMMERCIAL

## 2024-08-05 DIAGNOSIS — Z95.1 S/P CABG X 4: Primary | ICD-10-CM

## 2024-08-05 PROCEDURE — 93798 PHYS/QHP OP CAR RHAB W/ECG: CPT

## 2024-08-07 ENCOUNTER — TREATMENT (OUTPATIENT)
Dept: CARDIAC REHAB | Facility: HOSPITAL | Age: 54
End: 2024-08-07
Payer: COMMERCIAL

## 2024-08-07 DIAGNOSIS — Z95.1 S/P CABG X 4: Primary | ICD-10-CM

## 2024-08-07 PROCEDURE — 93798 PHYS/QHP OP CAR RHAB W/ECG: CPT

## 2024-08-09 ENCOUNTER — TREATMENT (OUTPATIENT)
Dept: CARDIAC REHAB | Facility: HOSPITAL | Age: 54
End: 2024-08-09
Payer: COMMERCIAL

## 2024-08-09 DIAGNOSIS — Z95.1 S/P CABG X 4: Primary | ICD-10-CM

## 2024-08-09 PROCEDURE — 93798 PHYS/QHP OP CAR RHAB W/ECG: CPT

## 2024-08-12 ENCOUNTER — TREATMENT (OUTPATIENT)
Dept: CARDIAC REHAB | Facility: HOSPITAL | Age: 54
End: 2024-08-12
Payer: COMMERCIAL

## 2024-08-12 DIAGNOSIS — Z95.1 S/P CABG X 4: Primary | ICD-10-CM

## 2024-08-12 PROCEDURE — 93798 PHYS/QHP OP CAR RHAB W/ECG: CPT

## 2024-08-14 ENCOUNTER — TREATMENT (OUTPATIENT)
Dept: CARDIAC REHAB | Facility: HOSPITAL | Age: 54
End: 2024-08-14
Payer: COMMERCIAL

## 2024-08-14 DIAGNOSIS — Z95.1 S/P CABG X 4: Primary | ICD-10-CM

## 2024-08-14 PROCEDURE — 93798 PHYS/QHP OP CAR RHAB W/ECG: CPT

## 2024-08-16 ENCOUNTER — TREATMENT (OUTPATIENT)
Dept: CARDIAC REHAB | Facility: HOSPITAL | Age: 54
End: 2024-08-16
Payer: COMMERCIAL

## 2024-08-16 DIAGNOSIS — Z95.1 S/P CABG X 4: Primary | ICD-10-CM

## 2024-08-16 PROCEDURE — 93798 PHYS/QHP OP CAR RHAB W/ECG: CPT

## 2024-08-19 ENCOUNTER — APPOINTMENT (OUTPATIENT)
Dept: CARDIAC REHAB | Facility: HOSPITAL | Age: 54
End: 2024-08-19
Payer: COMMERCIAL

## 2024-08-21 ENCOUNTER — TREATMENT (OUTPATIENT)
Dept: CARDIAC REHAB | Facility: HOSPITAL | Age: 54
End: 2024-08-21
Payer: COMMERCIAL

## 2024-08-21 DIAGNOSIS — Z95.1 S/P CABG X 4: Primary | ICD-10-CM

## 2024-08-21 PROCEDURE — 93798 PHYS/QHP OP CAR RHAB W/ECG: CPT

## 2024-08-23 ENCOUNTER — TREATMENT (OUTPATIENT)
Dept: CARDIAC REHAB | Facility: HOSPITAL | Age: 54
End: 2024-08-23
Payer: COMMERCIAL

## 2024-08-23 DIAGNOSIS — Z95.1 S/P CABG X 4: Primary | ICD-10-CM

## 2024-08-23 PROCEDURE — 93798 PHYS/QHP OP CAR RHAB W/ECG: CPT

## 2024-08-26 ENCOUNTER — TREATMENT (OUTPATIENT)
Dept: CARDIAC REHAB | Facility: HOSPITAL | Age: 54
End: 2024-08-26
Payer: COMMERCIAL

## 2024-08-26 DIAGNOSIS — Z95.1 S/P CABG X 4: Primary | ICD-10-CM

## 2024-08-26 PROCEDURE — 93798 PHYS/QHP OP CAR RHAB W/ECG: CPT

## 2024-08-28 ENCOUNTER — TREATMENT (OUTPATIENT)
Dept: CARDIAC REHAB | Facility: HOSPITAL | Age: 54
End: 2024-08-28
Payer: COMMERCIAL

## 2024-08-28 DIAGNOSIS — Z95.1 S/P CABG X 4: Primary | ICD-10-CM

## 2024-08-28 PROCEDURE — 93798 PHYS/QHP OP CAR RHAB W/ECG: CPT

## 2024-08-30 ENCOUNTER — TREATMENT (OUTPATIENT)
Dept: CARDIAC REHAB | Facility: HOSPITAL | Age: 54
End: 2024-08-30
Payer: COMMERCIAL

## 2024-08-30 DIAGNOSIS — Z95.1 S/P CABG X 4: Primary | ICD-10-CM

## 2024-08-30 PROCEDURE — 93798 PHYS/QHP OP CAR RHAB W/ECG: CPT

## 2024-09-02 ENCOUNTER — APPOINTMENT (OUTPATIENT)
Dept: CARDIAC REHAB | Facility: HOSPITAL | Age: 54
End: 2024-09-02
Payer: COMMERCIAL

## 2024-09-04 ENCOUNTER — TREATMENT (OUTPATIENT)
Dept: CARDIAC REHAB | Facility: HOSPITAL | Age: 54
End: 2024-09-04
Payer: COMMERCIAL

## 2024-09-04 DIAGNOSIS — Z95.1 S/P CABG X 4: Primary | ICD-10-CM

## 2024-09-04 PROCEDURE — 93798 PHYS/QHP OP CAR RHAB W/ECG: CPT

## 2024-09-06 ENCOUNTER — TREATMENT (OUTPATIENT)
Dept: CARDIAC REHAB | Facility: HOSPITAL | Age: 54
End: 2024-09-06
Payer: COMMERCIAL

## 2024-09-06 DIAGNOSIS — Z95.1 S/P CABG X 4: Primary | ICD-10-CM

## 2024-09-06 PROCEDURE — 93798 PHYS/QHP OP CAR RHAB W/ECG: CPT

## 2024-09-09 ENCOUNTER — TREATMENT (OUTPATIENT)
Dept: CARDIAC REHAB | Facility: HOSPITAL | Age: 54
End: 2024-09-09
Payer: COMMERCIAL

## 2024-09-09 DIAGNOSIS — Z95.1 S/P CABG X 4: Primary | ICD-10-CM

## 2024-09-09 PROCEDURE — 93798 PHYS/QHP OP CAR RHAB W/ECG: CPT

## 2024-09-11 ENCOUNTER — TREATMENT (OUTPATIENT)
Dept: CARDIAC REHAB | Facility: HOSPITAL | Age: 54
End: 2024-09-11
Payer: COMMERCIAL

## 2024-09-11 DIAGNOSIS — Z95.1 S/P CABG X 4: Primary | ICD-10-CM

## 2024-09-11 PROCEDURE — 93798 PHYS/QHP OP CAR RHAB W/ECG: CPT

## 2024-09-13 ENCOUNTER — TREATMENT (OUTPATIENT)
Dept: CARDIAC REHAB | Facility: HOSPITAL | Age: 54
End: 2024-09-13
Payer: COMMERCIAL

## 2024-09-13 DIAGNOSIS — Z95.1 S/P CABG X 4: Primary | ICD-10-CM

## 2024-09-13 PROCEDURE — 93798 PHYS/QHP OP CAR RHAB W/ECG: CPT

## 2024-09-16 ENCOUNTER — TREATMENT (OUTPATIENT)
Dept: CARDIAC REHAB | Facility: HOSPITAL | Age: 54
End: 2024-09-16
Payer: COMMERCIAL

## 2024-09-16 DIAGNOSIS — Z95.1 S/P CABG X 4: Primary | ICD-10-CM

## 2024-09-16 PROCEDURE — 93798 PHYS/QHP OP CAR RHAB W/ECG: CPT

## 2024-09-20 ENCOUNTER — TREATMENT (OUTPATIENT)
Dept: CARDIAC REHAB | Facility: HOSPITAL | Age: 54
End: 2024-09-20
Payer: COMMERCIAL

## 2024-09-20 DIAGNOSIS — Z95.1 S/P CABG X 4: Primary | ICD-10-CM

## 2024-09-20 PROCEDURE — 93798 PHYS/QHP OP CAR RHAB W/ECG: CPT

## 2024-09-23 ENCOUNTER — TREATMENT (OUTPATIENT)
Dept: CARDIAC REHAB | Facility: HOSPITAL | Age: 54
End: 2024-09-23
Payer: COMMERCIAL

## 2024-09-23 DIAGNOSIS — Z95.1 S/P CABG X 4: Primary | ICD-10-CM

## 2024-09-23 PROCEDURE — 93798 PHYS/QHP OP CAR RHAB W/ECG: CPT

## 2024-09-25 ENCOUNTER — TREATMENT (OUTPATIENT)
Dept: CARDIAC REHAB | Facility: HOSPITAL | Age: 54
End: 2024-09-25
Payer: COMMERCIAL

## 2024-09-25 DIAGNOSIS — Z95.1 S/P CABG X 4: Primary | ICD-10-CM

## 2024-09-25 PROCEDURE — 93798 PHYS/QHP OP CAR RHAB W/ECG: CPT

## 2024-10-10 ENCOUNTER — TELEPHONE (OUTPATIENT)
Dept: CARDIOLOGY | Facility: CLINIC | Age: 54
End: 2024-10-10
Payer: COMMERCIAL

## 2024-10-10 RX ORDER — BLOOD SUGAR DIAGNOSTIC
1 STRIP MISCELLANEOUS DAILY
Refills: 2 | OUTPATIENT
Start: 2024-10-10

## 2024-10-10 RX ORDER — METOPROLOL TARTRATE 25 MG/1
37.5 TABLET, FILM COATED ORAL EVERY 12 HOURS SCHEDULED
Qty: 270 TABLET | Refills: 1 | Status: SHIPPED | OUTPATIENT
Start: 2024-10-10

## 2024-10-10 NOTE — TELEPHONE ENCOUNTER
Caller: AMISHA HENRY    Relationship to patient: Emergency Contact    Best call back number:      Patient is needing: PATIENT WAS PRESCRIBED METOPROLOL TARTRATE 25 MG IN THE HOSPITAL. PCP REFILLED FOR PATIENT LAST TIME. PATIENT'S WIFE IS CHECKING TO SEE IF PATIENT NEED TO CONTINUE TAKING MEDICATION AND IF HE CAN REFILL THE PRESCRIPTION.

## 2024-12-04 RX ORDER — BLOOD SUGAR DIAGNOSTIC
1 STRIP MISCELLANEOUS DAILY
Refills: 2 | OUTPATIENT
Start: 2024-12-04

## 2024-12-21 NOTE — PROGRESS NOTES
Encounter Date:01/03/2025        Patient ID: Ricci Ruiz is a 54 y.o. male.      Chief Complaint:      History of Present Illness  54 years old man with hypertension, hyperlipidemia, diabetes, coronary artery disease status post CABG in May 2024.   Today he returns for follow-up.  Current cardiac medications include aspirin, atorvastatin, Jardiance, metoprolol.     He has done exceptionally well and completed cardiac rehab. today he denies any signs and symptoms.  He is accompanied by his wife    The following portions of the patient's history were reviewed and updated as appropriate: allergies, current medications, past family history, past medical history, past social history, past surgical history, and problem list.    Review of Systems   Constitutional: Negative for malaise/fatigue.   Cardiovascular:  Negative for chest pain, dyspnea on exertion, leg swelling and palpitations.   Respiratory:  Negative for cough and shortness of breath.    Gastrointestinal:  Negative for abdominal pain, nausea and vomiting.   Neurological:  Positive for dizziness and light-headedness. Negative for focal weakness, headaches and numbness.   All other systems reviewed and are negative.        Current Outpatient Medications:     aspirin 81 MG tablet, Take 1 tablet by mouth Daily., Disp: , Rfl:     atorvastatin (LIPITOR) 40 MG tablet, Take 1 tablet by mouth Every Night., Disp: 90 tablet, Rfl: 1    Blood Glucose Monitoring Suppl (Accu-Chek Gladis Plus) w/Device kit, 1 kit Daily., Disp: 1 kit, Rfl: 1    buPROPion XL (WELLBUTRIN XL) 150 MG 24 hr tablet, TAKE 1 TABLET BY MOUTH EVERY DAY, Disp: 90 tablet, Rfl: 1    cetirizine (zyrTEC) 10 MG tablet, Take 1 tablet by mouth Daily. AT NIGHT, Disp: , Rfl:     empagliflozin (JARDIANCE) 10 MG tablet tablet, Take 1 tablet by mouth Daily., Disp: 30 tablet, Rfl: 3    glucose blood (Accu-Chek Gladis Plus) test strip, Use daily E11.9, Disp: 100 each, Rfl: 12    glucose blood (OneTouch Verio)  test strip, 1 each by Other route Daily. Dx: E11.65. Use as instructed, Disp: 50 each, Rfl: 2    isosorbide mononitrate (IMDUR) 30 MG 24 hr tablet, Take 1 tablet by mouth Every Morning., Disp: , Rfl:     Lancets (OneTouch Delica Plus [id number]) misc, Use 1 each Daily. Dx: E11.65, Disp: 100 each, Rfl: 2    loratadine (Claritin) 10 MG tablet, Take 1 tablet by mouth Daily., Disp: , Rfl:     metFORMIN (GLUCOPHAGE) 1000 MG tablet, Take 0.5 tablets by mouth 2 (Two) Times a Day With Meals., Disp: , Rfl:     metoprolol tartrate (LOPRESSOR) 25 MG tablet, Take 1.5 tablets by mouth Every 12 (Twelve) Hours., Disp: 270 tablet, Rfl: 1    Multiple Vitamin (MULTIVITAMIN) capsule, Take 1 capsule by mouth Daily., Disp: , Rfl:     nitroglycerin (NITROSTAT) 0.4 MG SL tablet, Place 1 tablet under the tongue Every 5 (Five) Minutes As Needed for Chest Pain., Disp: , Rfl:     Ozempic, 0.25 or 0.5 MG/DOSE, 2 MG/3ML solution pen-injector, Inject 0.5 mg under the skin into the appropriate area as directed 1 (One) Time Per Week., Disp: , Rfl:     vitamin D3 125 MCG (5000 UT) capsule capsule, Take 1 capsule by mouth Daily., Disp: 90 capsule, Rfl: 9    Current outpatient and discharge medications have been reconciled for the patient.  Reviewed by: Alejandro Nagy MD       No Known Allergies    Family History   Problem Relation Age of Onset    Arthritis Mother         Lupus    Diabetes Brother     Heart disease Father     Heart disease Brother        Past Surgical History:   Procedure Laterality Date    CARDIAC CATHETERIZATION N/A 5/23/2024    Procedure: Left Heart Cath with Coronary Angiography;  Surgeon: Alejandro Nagy MD;  Location: [facility] CATH INVASIVE LOCATION;  Service: Cardiovascular;  Laterality: N/A;    COLONOSCOPY  2019    CORONARY ARTERY BYPASS GRAFT N/A 5/24/2024    Procedure: CORONARY ARTERY BYPASS WITH INTERNAL MAMMARY ARTERY GRAFT with intraop SOFIA;  Surgeon: Poncho Alvarez MD;  Location: [facility] CVOR;  Service: Cardiothoracic;   "Laterality: N/A;  CABG x4 using LIMA, PRASAD, and left endosaphenous vein; 2 coronary markers implanted.    TRANSESOPHAGEAL ECHOCARDIOGRAM (SOFIA) N/A 5/24/2024    Procedure: TRANSESOPHAGEAL ECHOCARDIOGRAM WITH ANESTHESIA;  Surgeon: Poncho Alvarez MD;  Location: [facility];  Service: Cardiothoracic;  Laterality: N/A;       Past Medical History:   Diagnosis Date    Colon polyp 2019    Diabetes mellitus     Type 2    Hyperlipidemia        Family History   Problem Relation Age of Onset    Arthritis Mother         Lupus    Diabetes Brother     Heart disease Father     Heart disease Brother        Social History     Socioeconomic History    Marital status:    Tobacco Use    Smoking status: Never     Passive exposure: Past (as a child)    Smokeless tobacco: Never   Vaping Use    Vaping status: Never Used   Substance and Sexual Activity    Alcohol use: Yes     Alcohol/week: 1.0 standard drink of alcohol     Types: 1 Glasses of wine per week    Drug use: Never    Sexual activity: Yes     Partners: Female     Birth control/protection: Post-menopausal, None               Objective:       Physical Exam    /78 (BP Location: Left arm, Patient Position: Sitting, Cuff Size: Adult)   Pulse 85   Ht 185.4 cm (73\")   Wt 82.6 kg (182 lb)   SpO2 98%   BMI 24.01 kg/m²   The patient is alert, oriented and in no distress.    Vital signs as noted above.    Head and neck revealed no carotid bruits or jugular venous distension.  No thyromegaly or lymphadenopathy is present.    Lungs clear.  No wheezing.  Breath sounds are normal bilaterally.    Heart normal first and second heart sounds.  No murmur..  No pericardial rub is present.  No gallop is present.    Abdomen soft and nontender.  No organomegaly is present.    Extremities revealed good peripheral pulses without any pedal edema.    Skin warm and dry.    Musculoskeletal system is grossly normal.    CNS grossly normal.           Diagnosis Plan   1. Abnormal nuclear " stress test        2. S/P CABG x 4 with bilateral ADINA's per Dr. Alvarez 5/24/2024        3. Unstable angina        4. Primary hypertension        5. Mixed hyperlipidemia        6. Type 2 diabetes mellitus without complication, without long-term current use of insulin        7. Anxiety and depression        8. Chest pain due to myocardial ischemia, unspecified ischemic chest pain type        9. Angina pectoris, nocturnal        LAB RESULTS (LAST 7 DAYS)    CBC        BMP        CMP         BNP        TROPONIN        CoAg        Creatinine Clearance  CrCl cannot be calculated (Patient's most recent lab result is older than the maximum 30 days allowed.).    ABG        Radiology  No radiology results for the last day    EKG  Procedures    Stress test  Results for orders placed during the hospital encounter of 05/13/24    Stress test with myocardial perfusion one day    Interpretation Summary    Patient exercised for 10 minutes 21 seconds achieving 11.8 METS.    Findings consistent with an abnormal ECG stress test.  ST depressions in lateral leads.    Left ventricular ejection fraction is hyperdynamic (Calculated EF > 70%).    Myocardial perfusion imaging indicates a moderate-sized, severe area of ischemia located in the inferior wall and septal wall.    Impressions are consistent with an intermediate risk study.      Echocardiogram  Results for orders placed in visit on 05/24/24    Intra-Op Anesthesia SOFIA    Narrative  Intra-Op Anesthesia SOFIA    Procedure Performed: Intra-Op Anesthesia SOFIA  Start Time:  End Time:    Preanesthesia Checklist:  Patient identified, IV assessed, risks and benefits discussed, monitors and equipment assessed, procedure being performed at surgeon's request and anesthesia consent obtained.    General Procedure Information  Diagnostic Indications for Echo:  assessment of surgical repair and hemodynamic monitoring  Location performed:  OR  Intubated  Bite block placed  Heart  visualized  Probe Insertion:  Easy  Probe Type:  Biplane and multiplane  Modalities:  Color flow mapping, pulse wave Doppler and continuous wave Doppler    Echocardiographic and Doppler Measurements    Ventricles    Right Ventricle:  Cavity size normal.  Hypertrophy not present.  Thrombus not present.  Global function normal.  Ejection Fraction 60%.  Left Ventricle:  Cavity size normal.  Thrombus not present.  Global Function normal.  Ejection Fraction 60%.    Ventricular Regional Function:  1- Basal Anteroseptal:  normal  2- Basal Anterior:  normal  3- Basal Anterolateral:  normal  4- Basal Inferolateral:  normal  5- Basal Inferior:  normal  6- Basal Inferoseptal:  normal  7- Mid Anteroseptal:  normal  8- Mid Anterior:  normal  9- Mid Anterolateral:  normal  10- Mid Inferolateral:  normal  11- Mid Inferior:  normal  12- Mid Inferoseptal:  normal  13- Apical Anterior:  normal  14- Apical Lateral:  normal  15- Apical Inferior:  normal  16- Apical Septal:  normal  17- [place]:  normal      Valves    Aortic Valve:  Annulus normal.  Stenosis not present.  Regurgitation absent.  Leaflets normal.  Leaflet motions normal.    Mitral Valve:  Annulus normal.  Stenosis not present.  Regurgitation absent.  Leaflets normal.  Leaflet motions normal.    Tricuspid Valve:  Annulus normal.  Stenosis not present.  Regurgitation absent.  Leaflets normal.  Leaflet motions normal.  Pulmonic Valve:  Annulus normal.  Stenosis not present.  Regurgitation absent.      Aorta    Ascending Aorta:  Size normal.  Dissection not present.  Plaque thickness less than 3 mm.  Mobile plaque not present.  Aortic Arch:  Size normal.  Dissection not present.  Plaque thickness less than 3 mm.  Mobile plaque not present.  Descending Aorta:  Size normal.  Dissection not present.  Plaque thickness less than 3 mm.  Mobile plaque not present.      Atria    Right Atrium:  Size normal.  Left Atrium:  Size normal.  Spontaneous echo contrast not present.  Thrombus not  present.  Tumor not present.  Device not present.  Left atrial appendage normal.      Septa        Ventricular Septum:  Intra-ventricular septum morphology normal.        Other Findings  Pleural Effusion:  none  Pulmonary Arteries:  normal      Anesthesia Information  Performed Personally  Anesthesiologist:  Juan Moya MD      Echocardiogram Comments:  SOFIA placed easily, no resistance, bite guard, lubricated      Pre cpb  LV no wma ef 60  RV nl sf  MV AV TV wnl      S/p cabg x 4  No change      Cardiac catheterization  Results for orders placed during the hospital encounter of 05/23/24    Cardiac Catheterization/Vascular Study    Conclusion  OPERATORS:  1. Alejandro Nagy M.D., Attending Cardiologist      PROCEDURE PERFORMED.  Ultrasound guided vascular access  Left heart catheterization  Coronary Angiogram 01449  Moderate Sedation    INDICATIONS FOR PROCEDURE.  53 years old man with unstable angina and abnormal nuclear stress test.  After discussing the risk and benefit of the procedure he was brought into the Cath Lab for coronary angiography.    PROCEDURE IN DETAIL.  Informed consent was obtained from the patient after explaining the risks, benefits, and alternative options of the procedure. After obtaining informed consent, the patient was brought to the cath lab and was prepped in a sterile fashion. Lidocaine 1% was used for local anesthesia into the right radial access site. The right radial artery was accessed under direct ultrasound visualization  with an angiocath needle via modified Seldinger technique. A 6F slender sheath was inserted successfully. Afterwards, 6F JR4  was advanced over a wire into the ascending aorta and used to cross the AV and obtain LV pressures.  AV gradient obtained via pullback technique. JR4 and JL3.5 diagnostic catheters were used to engage the ostia of the RCA and LM respectively. Images of the right and left coronary systems were obtained. All the catheters were exchanged  over a wire and subsequently removed. The patient tolerated the procedure well without any complications. The pictures were reviewed at the end of the procedure. TR band applied to right wrist for hemostasis and inflated with 15 cc of air. No complications were encountered.    HEMODYNAMICS.  LV: 135/8, 17 mmHg  AO: 136/82, 105 mmHg  No significant gradient across the aortic valve during pullback of JR4 catheter.  LV gram was not performed due to recent echocardiogram.    FINDINGS.    Coronary Angiogram.    Left main. Left main is a large-caliber vessel which gives rise to the Left Anterior Descending, ramus and the Left circumflex.  Left main is angiographically free from any significant disease    Left Anterior Descending Artery. LAD is a large vessel which gives rise to several septal perforators and several diagonal branches.  Mid LAD has 80% stenosis at the origin of diagonal vessel which also has 70 to 80% stenosis.    Ramus intermedius has proximal segment 70% stenosis.    Left Circumflex. The LCx is a medium caliber which gives rise to marginals.  OM1 has proximal 50% stenosis.  OM 2 has proximal segment 70% stenosis.    Right Coronary Artery. The RCA is a dominant vessel which gives rise to several small caliber branches including PDA and PLV.  Mid RCA has 99% long tubular stenosis.  PLV has mid segment 50% stenosis.    IMPRESSIONS.  1 Multivessel obstructive coronary artery disease including LAD, ramus and RCA.  2.  Mildly elevated LVEDP    RECOMMENDATIONS.  1.  Consult cardiac surgery for CABG  2.  Start Imdur    Electronically signed by Alejandro Nagy MD, 05/23/24, 12:30 PM EDT.          Assessment and Plan       Diagnoses and all orders for this visit:    1. Abnormal nuclear stress test (Primary)    2. S/P CABG x 4 with bilateral ADINA's per Dr. Alvarez 5/24/2024    3. Unstable angina    4. Primary hypertension    5. Mixed hyperlipidemia    6. Type 2 diabetes mellitus without complication, without  long-term current use of insulin    7. Anxiety and depression    8. Chest pain due to myocardial ischemia, unspecified ischemic chest pain type    9. Angina pectoris, nocturnal         Unstable angina/coronary artery disease (Primary)  Multiple cardiovascular risk factors including hypertension, hyperlipidemia, diabetes, family history of premature coronary disease.  ECG is normal.  Abnormal nuclear stress test leading to cardiac catheterization  Cardiac cath 5/23/2024 showed multivessel coronary artery disease including 99% RCA and 80% LAD.  CABGX4 5/24/2024: (LIMA to LAD, PRASAD to RCA, SVG to Ramus, SVG to OM)   Continue aspirin, high intensity statin and beta-blocker  He has completed cardiac rehab  Currently doing well with no anginal symptoms     Primary hypertension  Continue beta-blocker, metoprolol  Unable to add lisinopril due to low blood pressure.  BP is low today as well     Mixed hyperlipidemia  LDL 80, HDL 48, triglyceride 98 and total cholesterol 146  Goal LDL is less than 50  Continue high intensity statin     Type 2 diabetes mellitus without complication, without long-term current use of insulin  He is on Ozempic, metformin and Jardiance.  A1c 7.1     Anxiety and depression  He is on bupropion

## 2024-12-26 ENCOUNTER — TRANSCRIBE ORDERS (OUTPATIENT)
Dept: PHYSICAL THERAPY | Facility: CLINIC | Age: 54
End: 2024-12-26
Payer: COMMERCIAL

## 2024-12-26 DIAGNOSIS — M25.512 ACUTE PAIN OF LEFT SHOULDER: Primary | ICD-10-CM

## 2025-01-03 ENCOUNTER — OFFICE VISIT (OUTPATIENT)
Dept: CARDIOLOGY | Facility: CLINIC | Age: 55
End: 2025-01-03
Payer: COMMERCIAL

## 2025-01-03 VITALS
HEART RATE: 85 BPM | HEIGHT: 73 IN | OXYGEN SATURATION: 98 % | BODY MASS INDEX: 24.12 KG/M2 | WEIGHT: 182 LBS | SYSTOLIC BLOOD PRESSURE: 116 MMHG | DIASTOLIC BLOOD PRESSURE: 78 MMHG

## 2025-01-03 DIAGNOSIS — E78.2 MIXED HYPERLIPIDEMIA: ICD-10-CM

## 2025-01-03 DIAGNOSIS — I25.9 CHEST PAIN DUE TO MYOCARDIAL ISCHEMIA, UNSPECIFIED ISCHEMIC CHEST PAIN TYPE: ICD-10-CM

## 2025-01-03 DIAGNOSIS — R94.39 ABNORMAL NUCLEAR STRESS TEST: Primary | ICD-10-CM

## 2025-01-03 DIAGNOSIS — F41.9 ANXIETY AND DEPRESSION: ICD-10-CM

## 2025-01-03 DIAGNOSIS — I10 PRIMARY HYPERTENSION: ICD-10-CM

## 2025-01-03 DIAGNOSIS — I20.89 ANGINA PECTORIS, NOCTURNAL: ICD-10-CM

## 2025-01-03 DIAGNOSIS — E11.9 TYPE 2 DIABETES MELLITUS WITHOUT COMPLICATION, WITHOUT LONG-TERM CURRENT USE OF INSULIN: ICD-10-CM

## 2025-01-03 DIAGNOSIS — F32.A ANXIETY AND DEPRESSION: ICD-10-CM

## 2025-01-03 DIAGNOSIS — I20.0 UNSTABLE ANGINA: ICD-10-CM

## 2025-01-03 DIAGNOSIS — Z95.1 S/P CABG X 4: ICD-10-CM

## 2025-01-03 RX ORDER — SEMAGLUTIDE 0.68 MG/ML
0.5 INJECTION, SOLUTION SUBCUTANEOUS WEEKLY
COMMUNITY
Start: 2024-12-12

## 2025-01-03 RX ORDER — ISOSORBIDE MONONITRATE 30 MG/1
30 TABLET, EXTENDED RELEASE ORAL EVERY MORNING
COMMUNITY
Start: 2024-10-08

## 2025-01-10 ENCOUNTER — TREATMENT (OUTPATIENT)
Dept: PHYSICAL THERAPY | Facility: CLINIC | Age: 55
End: 2025-01-10
Payer: COMMERCIAL

## 2025-01-10 DIAGNOSIS — M25.512 CHRONIC LEFT SHOULDER PAIN: Primary | ICD-10-CM

## 2025-01-10 DIAGNOSIS — G89.29 CHRONIC LEFT SHOULDER PAIN: Primary | ICD-10-CM

## 2025-01-10 NOTE — PROGRESS NOTES
" Physical Therapy Initial Evaluation and Plan of Care         [facility], [address] [place] [address]     Patient: Ricci Ruiz   : 1970  Diagnosis/ICD-10 Code:  Chronic left shoulder pain [M25.512, G89.29]  Referring practitioner: Rain Paniagua MD  Date of Initial Visit: 1/10/2025  Today's Date: 1/10/2025  Patient seen for 1 sessions           Subjective Questionnaire: QuickDASH: % limited      Subjective Evaluation    History of Present Illness  Mechanism of injury: Per 24 office visit with Rain Paniagua MD:  \"Arslan presents to OP PT with left shoulder pain since 2024. Patient states it started after having a quadruple bypass surgery.  He thought initially it was just secondary to not being able to move his arm above his head due to his surgery.  But now he state ROM has not improved and he has minimal and intermittent sharp shooting pain when he moves a certain way.  He denies any numbness or weakness of his arm or new new neck pain.  He denies any chest pain or SOA.\"      Patient Occupation:  Pain  Current pain ratin  At best pain ratin  At worst pain ratin  Location: left shoulder  Quality: sharp  Relieving factors: rest and change in position  Aggravating factors: overhead activity, sleeping, movement and lifting  Progression: no change    Social Support  Lives with: spouse, young children and adult children    Hand dominance: right    Diagnostic Tests  X-ray: abnormal (OA 24)    Treatments  Current treatment: physical therapy  Patient Goals  Patient goals for therapy: decreased pain, increased motion, return to sport/leisure activities, independence with ADLs/IADLs and increased strength  Patient goal: be able to play with grad kids         Precautions: DM II, CAD s/p CABG in May 2024, atelectasis    Objective          Postural Observations  Seated posture: fair  Standing posture: fair  Correction of posture: has no consistent " effect    Additional Postural Observation Details  B rounded shoulders and forward head position     Palpation   Left   No palpable tenderness to the biceps, pectoralis major, pectoralis minor and supraspinatus.   Hypertonic in the infraspinatus and upper trapezius.   Tenderness of the infraspinatus.     Tenderness     Left Shoulder   No tenderness in the AC joint, acromion, biceps tendon (proximal), bicipital groove, clavicle, infraspinatus tendon, scapular spine, subacromial bursa and supraspinatus tendon.     Cervical/Thoracic Screen   Cervical range of motion within normal limits with the following exceptions: 40 deg of L rotation and 60 deg R rotation, 35 deg ext, 40 deg flexion, 40 deg R LF, 20 deg L LF    Neurological Testing     Sensation     Shoulder   Left Shoulder   Intact: light touch    Right Shoulder   Intact: Light touch    Active Range of Motion   Left Shoulder   Flexion: 95 degrees with pain  Extension: 65 degrees   Abduction: 110 degrees with pain  External rotation BTH: T1 with pain  Internal rotation BTB: L3 with pain    Right Shoulder   Internal rotation BTB: T6     Passive Range of Motion   Left Shoulder   Flexion: 106 degrees with pain  Abduction: 110 degrees with pain  External rotation 45°: 5 degrees with pain  Internal rotation 45°: 70 degrees     Scapular Mobility   Left Shoulder   Scapular mobility: fair    Right Shoulder   Scapular mobility: good    Joint Play   Left Shoulder  Joints within functional limits are the anterior capsule and inferior capsule. Hypomobile in the posterior capsule.    Strength/Myotome Testing     Left Shoulder     Planes of Motion   Flexion: 4+   Extension: 5   Abduction: 5   Adduction: 5   External rotation at 0°: 4   Internal rotation at 0°: 5     Right Shoulder     Planes of Motion   Flexion: 5   Extension: 5   Abduction: 5   Adduction: 5   External rotation at 0°: 5   Internal rotation at 0°: 5     Tests     Left Shoulder   Negative lift-off.       See  Exercise, Manual, and Modality Logs for complete treatment.     Assessment & Plan       Assessment  Impairments: abnormal muscle tone, abnormal or restricted ROM, activity intolerance, impaired physical strength, lacks appropriate home exercise program, pain with function and weight-bearing intolerance   Functional limitations: lifting, sleeping, uncomfortable because of pain, reaching behind back and reaching overhead   Assessment details: The patient is a 54 y.o. male who presents to physical therapy today for acute left shoulder pain. Upon initial evaluation, the patient demonstrates the following impairments: L shoulder ROM deficits, pain with movements and laying on left side. Due to these impairments, the patient is unable to lay on side to sleep, reach behind back, play with grand kids, lift L UE OH. The patient would benefit from skilled PT services to address functional limitations and impairments and to improve patient quality of life.        Goals  Plan Goals: LTG 1: 12 weeks:  The patient will demonstrate 165 degrees of L shoulder flexion, 165 degrees of shoulder abduction, 80 degrees of shoulder external rotation, and 80 degrees of shoulder internal rotation to allow the patient to reach into upper kitchen cabinets and manipulate clothing behind the back with greater ease.    STATUS:  New  STG 1a: 6 weeks:  The patient will demonstrate 125 degrees of L shoulder flexion, 125 degrees of shoulder abduction, 70 degrees of shoulder external rotation, and 70 degrees of shoulder internal rotation.    STATUS:  New    LTG 2: 12 weeks:  The patient will demonstrate 5/5 strength for L shoulder flexion, abduction, external rotation, and internal rotation in order to demonstrate improved shoulder stability.    STATUS:  New  STG 2a: 8 weeks:  The patient will demonstrate 4+/5 strength for L shoulder flexion, abduction, external rotation, and internal rotation.    STATUS:  New  STG2b:  2 weeks:  The patient will be  independent with home exercises.     STATUS:  New     LTG 3: 12 weeks:  The patient will report a pain rating of 1/10 or better in order to improve sleep quality and tolerance to performance of activities of daily living.    STATUS:  New  STG 3a: 8 weeks:  The patient will report a pain rating of 3/10 or better.     STATUS:  New        Plan  Therapy options: will be seen for skilled therapy services  Planned modality interventions: thermotherapy (hydrocollator packs), cryotherapy, dry needling, ultrasound, TENS and high voltage pulsed current (pain management)  Planned therapy interventions: manual therapy, neuromuscular re-education, postural training, soft tissue mobilization, strengthening, stretching, therapeutic activities, joint mobilization, home exercise program, functional ROM exercises, flexibility and body mechanics training  Frequency: 2x week  Duration in weeks: 12  Treatment plan discussed with: patient        Visit Diagnoses:    ICD-10-CM ICD-9-CM   1. Chronic left shoulder pain  M25.512 719.41    G89.29 338.29       History # of Personal Factors and/or Comorbidities: LOW (0)  Examination of Body System(s): # of elements: LOW (1-2)  Clinical Presentation: STABLE   Clinical Decision Making: LOW       Timed:         Manual Therapy:         mins  54344;     Therapeutic Exercise:    10     mins  38731;     Neuromuscular Alex:        mins  89610;    Therapeutic Activity:          mins  79857;     Gait Training:           mins  81427;     Ultrasound:          mins  27285;    Ionto                                   mins   86096  Self Care                       8     mins   53300  Canalith Repos         mins 65978      Un-Timed:  Electrical Stimulation:         mins  40359 ( );  Dry Needling          mins self-pay  Traction          mins 61168  Low Eval     30     Mins  99676  Mod Eval          Mins  99877  High Eval                            Mins  86358  Re-Eval                               mins   02947    Timed Treatment:   18   mins   Total Treatment:     48   mins    PT SIGNATURE: Barbara Wong PT         Initial Certification  Certification Period: 1/10/2025 thru 4/10/2025  I certify that the therapy services are furnished while this patient is under my care.  The services outlined above are required by this patient, and will be reviewed every 90 days.     PHYSICIAN: Rani Paniagua MD      DATE:     Please sign and return via fax to [phone].. Thank you, [facility].

## 2025-01-14 ENCOUNTER — TREATMENT (OUTPATIENT)
Dept: PHYSICAL THERAPY | Facility: CLINIC | Age: 55
End: 2025-01-14
Payer: COMMERCIAL

## 2025-01-14 DIAGNOSIS — G89.29 CHRONIC LEFT SHOULDER PAIN: Primary | ICD-10-CM

## 2025-01-14 DIAGNOSIS — M25.512 CHRONIC LEFT SHOULDER PAIN: Primary | ICD-10-CM

## 2025-01-14 PROCEDURE — 97014 ELECTRIC STIMULATION THERAPY: CPT | Performed by: PHYSICAL THERAPIST

## 2025-01-14 PROCEDURE — 97140 MANUAL THERAPY 1/> REGIONS: CPT | Performed by: PHYSICAL THERAPIST

## 2025-01-14 PROCEDURE — 97112 NEUROMUSCULAR REEDUCATION: CPT | Performed by: PHYSICAL THERAPIST

## 2025-01-14 PROCEDURE — 97110 THERAPEUTIC EXERCISES: CPT | Performed by: PHYSICAL THERAPIST

## 2025-01-14 PROCEDURE — 97530 THERAPEUTIC ACTIVITIES: CPT | Performed by: PHYSICAL THERAPIST

## 2025-01-14 RX ORDER — METOPROLOL TARTRATE 25 MG/1
37.5 TABLET, FILM COATED ORAL EVERY 12 HOURS SCHEDULED
Qty: 270 TABLET | Refills: 1 | Status: SHIPPED | OUTPATIENT
Start: 2025-01-14

## 2025-01-14 NOTE — TELEPHONE ENCOUNTER
Rx Refill Note  Requested Prescriptions     Signed Prescriptions Disp Refills    metoprolol tartrate (LOPRESSOR) 25 MG tablet 270 tablet 1     Sig: TAKE 1.5 TABLETS BY MOUTH EVERY 12 (TWELVE) HOURS.     Authorizing Provider: EMILIA NELSON     Ordering User: GIOVANNA HALL      Last office visit with prescribing clinician: 1/3/2025   Last telemedicine visit with prescribing clinician: Visit date not found   Next office visit with prescribing clinician: 10/3/2025                         Would you like a call back once the refill request has been completed: [] Yes [] No    If the office needs to give you a call back, can they leave a voicemail: [] Yes [] No    Giovanna Hall MA  01/14/25, 10:10 EST

## 2025-01-14 NOTE — PROGRESS NOTES
Physical Therapy Daily Treatment Note  [address] [facility], [address] [place] [address]     Patient: Ricci Ruiz   : 1970  Referring practitioner: JUNE Whitlock  Diagnosis:      ICD-10-CM ICD-9-CM   1. Chronic left shoulder pain  M25.512 719.41    G89.29 338.29     Date of Initial Visit: Type: THERAPY  Noted: 1/10/2025  Today's Date: 2025    VISIT#: 2          Subjective   Ricci Ruiz reports: no pain today as he has not been using it much yet.  Pt performed HEP and noticed improved ROM in the left shoulder.      Objective   See Exercise, Manual, and Modality Logs for complete treatment.       Assessment & Plan       Assessment  Assessment details: Reviewed HEP and progressed L shoulder ROM exercises.  Added estim and MH for pain management and improved L shoulder joint mobility and soft tissue elasticity.          Progress per Plan of Care and Progress strengthening /stabilization /functional activity           Timed:  Manual Therapy:  8       mins  83572;  Therapeutic Exercise:    8     mins  81739;     Neuromuscular Alex:    8    mins  03961;    Therapeutic Activity:     8     mins  85331;     Gait Training:           mins  74543;     Ultrasound:          mins  29827;    Electrical Stimulation:     20    mins  65438 ( );    Untimed:  Electrical Stimulation:         mins  39707 ( );  Mechanical Traction:         mins  54254;   Dry needling:       Self pay    Timed Treatment:   32   mins   Total Treatment:     52   mins  Barbara Wong PT, DPT, CLT, CIDN  Physical Therapist

## 2025-01-17 ENCOUNTER — TREATMENT (OUTPATIENT)
Dept: PHYSICAL THERAPY | Facility: CLINIC | Age: 55
End: 2025-01-17
Payer: COMMERCIAL

## 2025-01-17 DIAGNOSIS — M25.512 CHRONIC LEFT SHOULDER PAIN: Primary | ICD-10-CM

## 2025-01-17 DIAGNOSIS — G89.29 CHRONIC LEFT SHOULDER PAIN: Primary | ICD-10-CM

## 2025-01-17 NOTE — PROGRESS NOTES
Physical Therapy Daily Treatment Note  [address] [facility], [address] [place] [address]     Patient: Ricci Ruiz   : 1970  Referring practitioner: JUNE Whitlock  Diagnosis:      ICD-10-CM ICD-9-CM   1. Chronic left shoulder pain  M25.512 719.41    G89.29 338.29     Date of Initial Visit: Type: THERAPY  Noted: 1/10/2025  Today's Date: 2025    VISIT#: 3          Subjective   Ricci Ruiz reports: he has increased L UE elevation and can reach behind his back with greater ease and motion.     Objective   See Exercise, Manual, and Modality Logs for complete treatment.       Assessment & Plan       Assessment  Assessment details: Added L shoulder ROM exercises with cues for proper form needed.  Good tolerance with increased ROM noted.            Progress per Plan of Care and Progress strengthening /stabilization /functional activity           Timed:  Manual Therapy:     8    mins  80606;  Therapeutic Exercise:    15     mins  90273;     Neuromuscular Alex:    10    mins  99208;    Therapeutic Activity:     10     mins  01829;     Gait Training:           mins  50400;     Ultrasound:          mins  61898;        Untimed:  Electrical Stimulation:  20       mins  76462 ( );  Mechanical Traction:         mins  63259;   Dry needling:       Self pay    Timed Treatment:   43   mins   Total Treatment:     63   mins  Barbara Wong PT, ZAKIAT, CLT, THALIAN  Physical Therapist

## 2025-01-22 ENCOUNTER — TREATMENT (OUTPATIENT)
Dept: PHYSICAL THERAPY | Facility: CLINIC | Age: 55
End: 2025-01-22
Payer: COMMERCIAL

## 2025-01-22 DIAGNOSIS — G89.29 CHRONIC LEFT SHOULDER PAIN: Primary | ICD-10-CM

## 2025-01-22 DIAGNOSIS — M25.512 CHRONIC LEFT SHOULDER PAIN: Primary | ICD-10-CM

## 2025-01-22 NOTE — PROGRESS NOTES
Physical Therapy Daily Treatment Note  [address] [facility], [address] [place] [address]     Patient: Ricci Ruiz   : 1970  Referring practitioner: JUNE Whitlock  Diagnosis:      ICD-10-CM ICD-9-CM   1. Chronic left shoulder pain  M25.512 719.41    G89.29 338.29     Date of Initial Visit: Type: THERAPY  Noted: 1/10/2025  Today's Date: 2025    VISIT#: 4          Subjective   Ricci Ruiz reports: his left shoulder is doing better, but sore now due to some long days at the computer.  Pt is having less pain with laying on his side now.     Objective   See Exercise, Manual, and Modality Logs for complete treatment.       Assessment & Plan       Assessment  Assessment details: Pt has improved ROM in the L shoulder and continues to get relief with estim and MH.            Progress per Plan of Care and Progress strengthening /stabilization /functional activity           Timed:  Manual Therapy:         mins  90351;  Therapeutic Exercise:    10     mins  55125;     Neuromuscular Alex:    10    mins  84329;    Therapeutic Activity:     10     mins  92067;     Gait Training:           mins  86105;     Ultrasound:          mins  79553;    Electrical Stimulation:         mins  65113 ( );    Untimed:  Electrical Stimulation:   20      mins  04296 ( );  Mechanical Traction:         mins  18642;   Dry needling:       Self pay    Timed Treatment:   30   mins   Total Treatment:     50   mins  Barbara Wong PT, DPT, CLT, CIDN  Physical Therapist

## 2025-01-24 ENCOUNTER — TREATMENT (OUTPATIENT)
Dept: PHYSICAL THERAPY | Facility: CLINIC | Age: 55
End: 2025-01-24
Payer: COMMERCIAL

## 2025-01-24 DIAGNOSIS — G89.29 CHRONIC LEFT SHOULDER PAIN: Primary | ICD-10-CM

## 2025-01-24 DIAGNOSIS — M25.512 CHRONIC LEFT SHOULDER PAIN: Primary | ICD-10-CM

## 2025-01-24 NOTE — PROGRESS NOTES
Physical Therapy Daily Treatment Note  [address] [facility], [address] [place] [address]     Patient: Ricci Ruiz   : 1970  Referring practitioner: JUNE Whitlock  Diagnosis:      ICD-10-CM ICD-9-CM   1. Chronic left shoulder pain  M25.512 719.41    G89.29 338.29     Date of Initial Visit: Type: THERAPY  Noted: 1/10/2025  Today's Date: 2025    VISIT#: 5          Subjective   Ricci Ruiz reports: no pain today.      Objective   See Exercise, Manual, and Modality Logs for complete treatment.       Assessment & Plan       Assessment  Assessment details: Pt continues to gain L shoulder ROM and have decreased pain with activities now.          Progress per Plan of Care and Progress strengthening /stabilization /functional activity           Timed:  Manual Therapy:         mins  32474;  Therapeutic Exercise:   8     mins  90041;     Neuromuscular Alex:    8    mins  56156;    Therapeutic Activity:     8     mins  19528;     Gait Training:           mins  44997;     Ultrasound:          mins  54493;    Electrical Stimulation:         mins  49186 ( );    Untimed:  Electrical Stimulation:    20     mins  24334 ( );  Mechanical Traction:         mins  37181;   Dry needling:       Self pay    Timed Treatment:   24   mins   Total Treatment:     44   mins  Barbara Wong PT, DPT, CLT, CIDN  Physical Therapist

## 2025-01-28 ENCOUNTER — TREATMENT (OUTPATIENT)
Dept: PHYSICAL THERAPY | Facility: CLINIC | Age: 55
End: 2025-01-28
Payer: COMMERCIAL

## 2025-01-28 DIAGNOSIS — M25.512 CHRONIC LEFT SHOULDER PAIN: Primary | ICD-10-CM

## 2025-01-28 DIAGNOSIS — G89.29 CHRONIC LEFT SHOULDER PAIN: Primary | ICD-10-CM

## 2025-01-28 PROCEDURE — 97110 THERAPEUTIC EXERCISES: CPT | Performed by: PHYSICAL THERAPIST

## 2025-01-28 PROCEDURE — 97112 NEUROMUSCULAR REEDUCATION: CPT | Performed by: PHYSICAL THERAPIST

## 2025-01-28 PROCEDURE — 97530 THERAPEUTIC ACTIVITIES: CPT | Performed by: PHYSICAL THERAPIST

## 2025-01-28 PROCEDURE — 97014 ELECTRIC STIMULATION THERAPY: CPT | Performed by: PHYSICAL THERAPIST

## 2025-01-28 NOTE — PROGRESS NOTES
Physical Therapy Daily Treatment Note  [address] [facility], [address] [place] [address]     Patient: Ricci Ruiz   : 1970  Referring practitioner: JUNE Whitlock  Diagnosis:      ICD-10-CM ICD-9-CM   1. Chronic left shoulder pain  M25.512 719.41    G89.29 338.29     Date of Initial Visit: Type: THERAPY  Noted: 1/10/2025  Today's Date: 2025    VISIT#: 6          Subjective   Ricci Ruiz reports: no pain today at rest.      Objective   See Exercise, Manual, and Modality Logs for complete treatment.       Assessment & Plan       Assessment  Assessment details: Pt tolerated session well with improved movement quality and ROM noted.            Progress per Plan of Care and Progress strengthening /stabilization /functional activity           Timed:  Manual Therapy:         mins  69839;  Therapeutic Exercise:    10     mins  35285;     Neuromuscular Alex:    8    mins  91685;    Therapeutic Activity:     10     mins  80608;     Gait Training:           mins  91565;     Ultrasound:          mins  91419;    Electrical Stimulation:         mins  41467 ( );    Untimed:  Electrical Stimulation:    20     mins  67970 ( );  Mechanical Traction:         mins  34487;   Dry needling:       Self pay    Timed Treatment:   28   mins   Total Treatment:     48   mins  Barbara Wong PT, DPT, CLT, CIDN  Physical Therapist

## 2025-02-04 ENCOUNTER — PATIENT ROUNDING (BHMG ONLY) (OUTPATIENT)
Dept: URGENT CARE | Facility: CLINIC | Age: 55
End: 2025-02-04
Payer: COMMERCIAL

## 2025-02-04 NOTE — ED NOTES
Thank you for letting us care for you in your recent visit to our urgent care center. We would love to hear about your experience with us. Was this the first time you have visited our location?    We’re always looking for ways to make our patients’ experiences even better. Do you have any recommendations on ways we may improve?     I appreciate you taking the time to respond. Please be on the lookout for a survey about your recent visit from [facility] via text or email. We would greatly appreciate if you could fill that out and turn it back in. We want your voice to be heard and we value your feedback.   Thank you for choosing [facility] for your healthcare needs.

## 2025-07-01 RX ORDER — ISOSORBIDE MONONITRATE 30 MG/1
30 TABLET, EXTENDED RELEASE ORAL EVERY MORNING
Qty: 90 TABLET | Refills: 3 | Status: SHIPPED | OUTPATIENT
Start: 2025-07-01

## 2025-07-01 NOTE — TELEPHONE ENCOUNTER
Rx Refill Note  Requested Prescriptions     Pending Prescriptions Disp Refills    isosorbide mononitrate (IMDUR) 30 MG 24 hr tablet [Pharmacy Med Name: ISOSORBIDE MONONIT ER 30 MG TB] 90 tablet 3     Sig: TAKE 1 TABLET BY MOUTH EVERY DAY IN THE MORNING      Last office visit with prescribing clinician: 1/3/2025     Next office visit with prescribing clinician: 10/3/2025                             Wendi Li MA  07/01/25, 10:06 EDT

## 2025-07-10 RX ORDER — METOPROLOL TARTRATE 25 MG/1
37.5 TABLET, FILM COATED ORAL EVERY 12 HOURS SCHEDULED
Qty: 270 TABLET | Refills: 1 | Status: SHIPPED | OUTPATIENT
Start: 2025-07-10

## (undated) DEVICE — ANTIBACTERIAL UNDYED BRAIDED (POLYGLACTIN 910), SYNTHETIC ABSORBABLE SUTURE: Brand: COATED VICRYL

## (undated) DEVICE — SUT PROLN 7/0 BV1 D/A 30IN 8703H

## (undated) DEVICE — CVR PROB ULTRASND CIVFLEX GEN/PURP TELESCP/FOLD 5.5X96IN LF

## (undated) DEVICE — ROTATING SURGICAL PUNCHES, 1 PER POUCH: Brand: A&E MEDICAL / ROTATING SURGICAL PUNCHES

## (undated) DEVICE — 3M™ TEGADERM™ I.V. ADVANCED SECUREMENT DRESSING, 1685, 3-1/2 IN X 4-1/2 IN (8.5 CM X 11.5 CM), 50/CT 4CT/CASE: Brand: 3M™ TEGADERM™

## (undated) DEVICE — PK PERFUS CUST W/CARDIOPLEGIA

## (undated) DEVICE — SUT PROLN 6/0 RB2 D/A 30IN 8711H

## (undated) DEVICE — OASIS DRAIN, SINGLE, INLINE & ATS COMPATIBLE: Brand: OASIS

## (undated) DEVICE — 500ML,PRESSURE INFUSER W/STOPCOCK: Brand: MEDLINE

## (undated) DEVICE — SOL IRR NACL 0.9PCT BT 1000ML

## (undated) DEVICE — BG BLD SYS

## (undated) DEVICE — KT CATH CV ACC MAC 2L SFTY 9F 4 1/2IN

## (undated) DEVICE — TUBING, SUCTION, 1/4" X 12', STRAIGHT: Brand: MEDLINE

## (undated) DEVICE — DRAPE SHEET ULTRAGARD: Brand: MEDLINE

## (undated) DEVICE — ST ACC MICROPUNCTURE STFF/CANN PLAT/TP 4F 21G 40CM

## (undated) DEVICE — SOL IRR H2O BTL 1000ML STRL

## (undated) DEVICE — SOL NACL 0.9PCT 1000ML

## (undated) DEVICE — BIOPATCH™ ANTIMICROBIAL DRESSING WITH CHLORHEXIDINE GLUCONATE IS A HYDROPHILLIC POLYURETHANE ABSORPTIVE FOAM WITH CHLORHEXIDINE GLUCONATE (CHG) WHICH INHIBITS BACTERIAL GROWTH UNDER THE DRESSING. THE DRESSING IS INTENDED TO BE USED TO ABSORB EXUDATE, COVER A WOUND CAUSED BY VASCULAR AND NONVASCULAR PERCUTANEOUS MEDICAL DEVICES DURING SURGERY, AS WELL AS REDUCE LOCAL INFECTION AND COLONIZATION OF MICROORGANISMS.: Brand: BIOPATCH

## (undated) DEVICE — SPNG GZ AVANT 6PLY 4X4IN STRL PK/2

## (undated) DEVICE — PK ATS CUST W CARDIOTOMY RESEVOIR

## (undated) DEVICE — Device

## (undated) DEVICE — SUT PROLN 5/0 V5 36IN 8934H

## (undated) DEVICE — GLIDESHEATH SLENDER ACCESS KIT: Brand: GLIDESHEATH SLENDER

## (undated) DEVICE — SUT SILK 2 SUTUPAK TIE 60IN SA8H 2STRAND

## (undated) DEVICE — DRN WND CH RND FUL/FLUT NO/TROC 3/8IN 28F

## (undated) DEVICE — BLOWER MISTER CLEARVIEW W/TBG

## (undated) DEVICE — INTENDED FOR TISSUE SEPARATION, AND OTHER PROCEDURES THAT REQUIRE A SHARP SURGICAL BLADE TO PUNCTURE OR CUT.: Brand: BARD-PARKER ® CARBON RIB-BACK BLADES

## (undated) DEVICE — BNDG,ELSTC,MATRIX,STRL,6"X5YD,LF,HOOK&LP: Brand: MEDLINE

## (undated) DEVICE — HEMOCONCENTRATOR PERFUS LPS06

## (undated) DEVICE — CATH TDILUT SWANGANZ VIP 7.5F 110CM

## (undated) DEVICE — BNDG,ELSTC,MATRIX,STRL,4"X5YD,LF,HOOK&LP: Brand: MEDLINE

## (undated) DEVICE — ST IV BLD W/HANDPUMP YSITE 125IN

## (undated) DEVICE — SUT SILK 0 CT1 CR8 18IN C021D

## (undated) DEVICE — CORONARY ARTERY BYPASS GRAFT MARKERS, STAINLESS STEEL, DISTAL, WITHOUT HOLDER: Brand: ANASTOMARK CORONARY ARTERY BYPASS GRAFT MARKERS, STAINLESS STEEL, DISTAL

## (undated) DEVICE — SUP ARMBRD HANDAID ART/LINE 9IN

## (undated) DEVICE — DGW .035 FC J3MM 260CM TEF: Brand: EMERALD

## (undated) DEVICE — DRSNG SLVR/ANTIBAC PRIMASEAL POST/OP ADHS 3.5X10IN

## (undated) DEVICE — THE SAPHENA MEDICAL ONEPASS ENDOSCOPIC VESSEL HARVESTING SYSTEM IS INDICATED FOR USE IN MINIMALLY INVASIVE SURGERY ALLOWING ACCESS FOR VESSEL HARVESTING, AND IS PRIMARILY INDICATED FOR PATIENTS UNDERGOING ENDOSCOPIC SURGERY FOR ARTERIAL BYPASS. IT IS INDICATED FOR CUTTING TISSUE AND CONTROLLING BLEEDING THROUGH COAGULATION, AND FOR PATIENTS REQUIRING BLUNT DISSECTION OF TISSUE INCLUDING DISSECTION OF BLOOD VESSELS, DISSECTION OF BLOOD VESSELS OF THE EXTREMITIES, DISSECTION OF DUCTS AND OTHER STRUCTURES IN THE EXTRA PERITONEALL OR SUBCUTANEOUS EXTREMITY AND THORACIC SPACE. EXTREMITY PROCEDURES INCLUDE TISSUE DISSECTION ALONG THE SAPHENOUS VEIN FOR USE IN CORONARY ARTERY BYPASS GRAFTING AND PERIPHERAL ARTERY BYPASS OR RADIAL ARTERY FOR USE IN CORONARY ARTERY BYPASS GRAFTING. THORASCOPIC PROCEDURES INCLUDE EXPOSURE AND DISSECTION OF STRUCTURES EXTERNAL TOTHE PARIETAL PLEURA, INCLUDING NERVES, BLOOD VESSELS, AND OTHER TISSUES OF THE CHEST WALL.: Brand: VENAPAX

## (undated) DEVICE — SUT SILK 2/0 SH CR8 18IN CR8 C012D

## (undated) DEVICE — PK TRY HEART CATH 50

## (undated) DEVICE — ELECTRD DEFIB M/FUNC PROPADZ STRL 2PK

## (undated) DEVICE — CABL BIPOL W/ALLGTR CLIP/SM 12FT

## (undated) DEVICE — SENSR CERBRL O2 PK/2

## (undated) DEVICE — PRESSURE MONITORING SET: Brand: TRUWAVE, VAMP PLUS

## (undated) DEVICE — SUT SILK 4/0 TIES 18IN A183H

## (undated) DEVICE — CATH ART RADL 20GA 1 3/4IN LF

## (undated) DEVICE — 3M™ TEGADERM™ IV TRANSPARENT FILM DRESSING WITH BORDER 100 BAGS/CARTON 4 CARTONS/CASE 1633: Brand: 3M™ TEGADERM™

## (undated) DEVICE — 3M™ IOBAN™ 2 ANTIMICROBIAL INCISE DRAPE 6650EZ: Brand: IOBAN™ 2

## (undated) DEVICE — SUT PROLN 4/0 V5 36IN 8935H

## (undated) DEVICE — SCANLAN® VASCU-STATT® II SINGLE-USE BULLDOG CLAMP W/FIRMER CLAMPING PRESS - MIDI ANGLED 45° (YELLOW), CLAMPING PRESSURE 75-80 G (2/STERILE PKG): Brand: SCANLAN® VASCU-STATT® II SINGLE-USE BULLDOG CLAMP W/FIRMER CLAMPING PRESS

## (undated) DEVICE — CATH DIAG IMPULSE FR4 6F 100CM

## (undated) DEVICE — SUT PROLENE PP 7/0 BV175-6 24IN

## (undated) DEVICE — CANN ART EOPA 3D NV W/CONN 20F

## (undated) DEVICE — SUT PROLN 3/0 V7 D/A 36IN 8976H

## (undated) DEVICE — PRESSURE TUBING: Brand: TRUWAVE

## (undated) DEVICE — BLOOD TRANSFUSION FILTER: Brand: HAEMONETICS

## (undated) DEVICE — PK OPN HEART WHT WRP 50

## (undated) DEVICE — SUT SILK 2/0 TIES 18IN A185H

## (undated) DEVICE — SYR LL TP 10ML STRL

## (undated) DEVICE — GLV SURG BIOGEL LTX PF 7 1/2

## (undated) DEVICE — SUT PDS2 0 CT1 27IN Z340H MF VIL

## (undated) DEVICE — SYS PERFUS SEP PLATLT W TIPS CUST

## (undated) DEVICE — TR BAND RADIAL ARTERY COMPRESSION DEVICE: Brand: TR BAND

## (undated) DEVICE — CONNECT Y INTERSEPT W/LL 3/8 X 3/8 X 3/8IN

## (undated) DEVICE — ELECTRD BLD EZ CLN STD 6.5IN

## (undated) DEVICE — 3M™ BAIR HUGGER® UNDERBODY BLANKET, FULL ACCESS, 10 PER CASE 63500: Brand: BAIR HUGGER™

## (undated) DEVICE — SAFELINER OUTER SHELL SUCTION CANISTER: Brand: DEROYAL

## (undated) DEVICE — CANN AORT ROOT DLP VNT/8IN 14G 7F

## (undated) DEVICE — BLD SCLPL BEAVR MINI STR 2BVL 180D LF

## (undated) DEVICE — 28 FR RIGHT ANGLE – SOFT PVC CATHETER: Brand: PVC THORACIC CATHETERS

## (undated) DEVICE — SUT PROLN 5/0 RB2 D/A 30IN 8710H

## (undated) DEVICE — TBG INSUFF MALE L/L W 12MM CON: Brand: MEDLINE INDUSTRIES, INC.

## (undated) DEVICE — CATH DIAG IMPULSE FL3.5 6F 100CM

## (undated) DEVICE — SUT PROLN 4/0 V7 36IN 8975H